# Patient Record
Sex: MALE | Race: WHITE | NOT HISPANIC OR LATINO | Employment: OTHER | URBAN - METROPOLITAN AREA
[De-identification: names, ages, dates, MRNs, and addresses within clinical notes are randomized per-mention and may not be internally consistent; named-entity substitution may affect disease eponyms.]

---

## 2018-11-07 ENCOUNTER — IMMUNIZATION (OUTPATIENT)
Dept: INTERNAL MEDICINE | Facility: CLINIC | Age: 73
End: 2018-11-07
Payer: MEDICARE

## 2018-11-07 PROCEDURE — 90662 IIV NO PRSV INCREASED AG IM: CPT | Mod: S$GLB,,, | Performed by: INTERNAL MEDICINE

## 2018-11-07 PROCEDURE — 99999 PR PBB SHADOW E&M-NEW PATIENT-LVL I: CPT | Mod: PBBFAC,,,

## 2018-11-07 PROCEDURE — 90471 IMMUNIZATION ADMIN: CPT | Mod: S$GLB,,, | Performed by: INTERNAL MEDICINE

## 2018-11-07 RX ORDER — BENAZEPRIL/HYDROCHLOROTHIAZIDE 20 MG-25MG
1 TABLET ORAL DAILY
COMMUNITY
End: 2019-04-11

## 2018-11-07 RX ORDER — PANTOPRAZOLE SODIUM 40 MG/1
40 TABLET, DELAYED RELEASE ORAL DAILY
COMMUNITY
End: 2021-04-30 | Stop reason: SDUPTHER

## 2018-11-07 RX ORDER — NAPROXEN SODIUM 220 MG/1
81 TABLET, FILM COATED ORAL DAILY
COMMUNITY

## 2018-11-07 RX ORDER — METOPROLOL TARTRATE 100 MG/1
50 TABLET ORAL 2 TIMES DAILY
COMMUNITY
End: 2021-03-17

## 2018-11-07 RX ORDER — CITALOPRAM 40 MG/1
40 TABLET, FILM COATED ORAL DAILY
COMMUNITY
End: 2019-04-11

## 2018-11-07 RX ORDER — AMLODIPINE BESYLATE 10 MG/1
10 TABLET ORAL DAILY
COMMUNITY
End: 2019-06-26

## 2018-11-07 RX ORDER — FUROSEMIDE 20 MG/1
20 TABLET ORAL 2 TIMES DAILY
COMMUNITY
End: 2019-04-11

## 2018-11-07 RX ORDER — ATORVASTATIN CALCIUM 20 MG/1
20 TABLET, FILM COATED ORAL NIGHTLY
COMMUNITY
End: 2021-04-30 | Stop reason: SDUPTHER

## 2019-04-11 ENCOUNTER — OFFICE VISIT (OUTPATIENT)
Dept: NEPHROLOGY | Facility: CLINIC | Age: 74
End: 2019-04-11
Payer: MEDICARE

## 2019-04-11 VITALS
HEIGHT: 72 IN | BODY MASS INDEX: 29.8 KG/M2 | DIASTOLIC BLOOD PRESSURE: 52 MMHG | HEART RATE: 64 BPM | WEIGHT: 220 LBS | SYSTOLIC BLOOD PRESSURE: 100 MMHG

## 2019-04-11 DIAGNOSIS — N18.30 CKD (CHRONIC KIDNEY DISEASE) STAGE 3, GFR 30-59 ML/MIN: Primary | ICD-10-CM

## 2019-04-11 PROCEDURE — 99999 PR PBB SHADOW E&M-EST. PATIENT-LVL III: CPT | Mod: PBBFAC,,, | Performed by: INTERNAL MEDICINE

## 2019-04-11 PROCEDURE — 99999 PR PBB SHADOW E&M-EST. PATIENT-LVL III: ICD-10-PCS | Mod: PBBFAC,,, | Performed by: INTERNAL MEDICINE

## 2019-04-11 PROCEDURE — 99205 PR OFFICE/OUTPT VISIT, NEW, LEVL V, 60-74 MIN: ICD-10-PCS | Mod: S$GLB,,, | Performed by: INTERNAL MEDICINE

## 2019-04-11 PROCEDURE — 99205 OFFICE O/P NEW HI 60 MIN: CPT | Mod: S$GLB,,, | Performed by: INTERNAL MEDICINE

## 2019-04-11 RX ORDER — AMOXICILLIN 500 MG
CAPSULE ORAL DAILY
COMMUNITY

## 2019-04-11 RX ORDER — LANOLIN ALCOHOL/MO/W.PET/CERES
400 CREAM (GRAM) TOPICAL DAILY
COMMUNITY

## 2019-04-11 RX ORDER — LOSARTAN POTASSIUM AND HYDROCHLOROTHIAZIDE 12.5; 1 MG/1; MG/1
1 TABLET ORAL DAILY
COMMUNITY
End: 2021-04-30 | Stop reason: SDUPTHER

## 2019-04-11 RX ORDER — OXYBUTYNIN CHLORIDE 5 MG/1
5 TABLET, EXTENDED RELEASE ORAL DAILY
COMMUNITY
End: 2021-04-30 | Stop reason: SDUPTHER

## 2019-04-11 RX ORDER — CHOLECALCIFEROL (VITAMIN D3) 50 MCG
2000 TABLET ORAL DAILY
COMMUNITY

## 2019-04-11 RX ORDER — DEXTROMETHORPHAN HYDROBROMIDE, GUAIFENESIN 5; 100 MG/5ML; MG/5ML
650 LIQUID ORAL EVERY 8 HOURS
COMMUNITY

## 2019-04-11 RX ORDER — METFORMIN HYDROCHLORIDE 500 MG/1
500 TABLET ORAL
COMMUNITY
End: 2021-04-30 | Stop reason: SDUPTHER

## 2019-04-11 RX ORDER — FLUOXETINE 10 MG/1
10 CAPSULE ORAL DAILY
COMMUNITY
End: 2021-02-01

## 2019-04-11 NOTE — PATIENT INSTRUCTIONS
Hydrate with 60-70 ounces of water per day.     Avoid NSAID pain medications such as advil, aleve, motrin, ibuprofen, naprosyn, meloxicam, diclofenac, mobic.

## 2019-04-12 NOTE — PROGRESS NOTES
NEPHROLOGY CONSULTATION    PHYSICIAN REQUESTING THE CONSULT: Dr. Vincent Boykin (Urology), PMD: Dr. Sj Petit    REASON FOR CONSULTATION: Renal insufficiency    73 y.o. male with history of renal cell carcinoma (s/p partial left nephrectomy), HTN, hyperlipidemia, GERD, right leg DVT, bacteremia, DM2  presents to the renal clinic for evaluation of renal insufficiency. A consultation has been requested by Dr. Vincent Boykin. Patient today presents to the clinic complaining of SOB with exercise, chronic right leg edema and hand paresthesia.  He denies any headaches, congenital hearing loss, chest pain, hemoptysis, abdominal or flank pain, diarrhea, nausea/vomiting, hematuria, dysuria, rashes, nasal congestion. Patient reports moderate NSAID use history. He had been on motrin at least once per month for years.Last use was many years ago.  Patient has a longstanding history of HTN that was diagnosed > 20 years ago. BP is currently well  controlled at 100/52. In addition, patient reports history of GERD (controlled with protonix), right leg DVT (> 10 years ago, likely due to immobility, he was on coumadin for six months and states that he developed mild chronic right leg edema afterwards), DM2 (he was diagnosed about a year ago and was placed on metformin in 11/2018), renal cell carcinoma (s/p partial left nephrectomy in 2006, currently being followed by Dr. Boykin). He denies any history of nephrolithiasis or heart disease. Patient denies any history of renal disease in his family. Laboratory review revealed that the patient's renal function has been mildly affected with creatinine of 1.9 on 1/23/14 and 1.8 on 3/5/19.     Past Medical History:   Diagnosis Date    DM2 (diabetes mellitus, type 2)     DVT, lower extremity     GERD (gastroesophageal reflux disease)     HTN (hypertension)     Renal cell carcinoma        Past Surgical History:   Procedure Laterality Date    left partial nephrectomy Left 2006          Social History     Socioeconomic History    Marital status:      Spouse name: Not on file    Number of children: Not on file    Years of education: Not on file    Highest education level: Not on file   Occupational History    Not on file   Social Needs    Financial resource strain: Not on file    Food insecurity:     Worry: Not on file     Inability: Not on file    Transportation needs:     Medical: Not on file     Non-medical: Not on file   Tobacco Use    Smoking status: Former Smoker     Types: Cigarettes     Last attempt to quit: 2005     Years since quittin.0   Substance and Sexual Activity    Alcohol use: Not Currently    Drug use: Not on file    Sexual activity: Not on file   Lifestyle    Physical activity:     Days per week: Not on file     Minutes per session: Not on file    Stress: Not on file   Relationships    Social connections:     Talks on phone: Not on file     Gets together: Not on file     Attends Buddhism service: Not on file     Active member of club or organization: Not on file     Attends meetings of clubs or organizations: Not on file     Relationship status: Not on file   Other Topics Concern    Not on file   Social History Narrative    Not on file     History reviewed. No pertinent family history.      Review of patient's allergies indicates:  No Known Allergies        Current Outpatient Medications   Medication Sig Dispense Refill    acetaminophen (TYLENOL) 650 MG TbSR Take 650 mg by mouth every 8 (eight) hours.      amLODIPine (NORVASC) 10 MG tablet Take 10 mg by mouth once daily.      aspirin 81 MG Chew Take 81 mg by mouth once daily.      atorvastatin (LIPITOR) 20 MG tablet Take 20 mg by mouth once daily.      cholecalciferol, vitamin D3, (VITAMIN D3) 2,000 unit Tab Take by mouth once daily.      fish oil-omega-3 fatty acids 300-1,000 mg capsule Take by mouth once daily.      FLUoxetine 10 MG capsule Take 10 mg by mouth once daily.       "losartan-hydrochlorothiazide 100-25 mg (HYZAAR) 100-25 mg per tablet Take 1 tablet by mouth once daily.      magnesium oxide (MAG-OX) 400 mg (241.3 mg magnesium) tablet Take 400 mg by mouth once daily.      metFORMIN (GLUCOPHAGE) 500 MG tablet Take 500 mg by mouth daily with breakfast.      metoprolol tartrate (LOPRESSOR) 100 MG tablet Take 50 mg by mouth 2 (two) times daily.       oxybutynin (DITROPAN-XL) 5 MG TR24 Take 5 mg by mouth once daily.      pantoprazole (PROTONIX) 40 MG tablet Take 40 mg by mouth once daily.       No current facility-administered medications for this visit.          Review of Systems:  1. GENERAL: patient denies any fever, weight changes, generalized weakness, dizziness.  2. HEENT: patient denies headaches, visual disturbances, swallowing problems, sinus pain, nasal congestion.  3. CARDIOVASCULAR: patient denies chest pain, palpitations.  4. PULMONARY: patient reports SOB with exercise, no coughing, hemoptysis, wheezing.  5. GASTROINTESTINAL: patient denies abdominal pain, nausea, vomiting, diarrhea.  6. GENITOURINARY: patient denies dysuria, hematuria, hesitancy, frequency.  7. EXTREMITIES: patient reports mild chronic right LE edema, no LE cramping.  8. DERMATOLOGY: patient denies rashes, ulcers.  9. NEURO: patient denies tremors, extremity weakness,  patient reports  extremity numbness/tingling.  10. MUSCULOSKELETAL: patient denies joint pain, joint swelling.  11. HEMATOLOGY: patient denies rectal or gum bleeding.  12: PSYCH: patient denies anxiety, depression.      PHYSICAL EXAM:    BP (!) 100/52   Pulse 64   Ht 5' 11.5" (1.816 m)   Wt 99.8 kg (220 lb 0.3 oz)   BMI 30.26 kg/m²     GENERAL: Pleasant gentleman presents to clinic with non-labored breathing.  HEENT: PER, no nasal discharge, no icterus, no oral exudates, moist mucosal membranes.  NECK: no thyroid mass, no lymphadenopathy.  HEART: RRR S1/S2, no rubs, good peripheral pulses.  LUNGS: CTA bilaterally, no wheezing, " breathing is nonlabored.  ABDOMEN: soft, nontender, not distended, bowel sounds are present, no abdominal hernia.  EXTREM: mild right LE edema (Chronic as per patient)  SKIN: no rashes, skin is warm and dry.  NEURO: A & O x 3, no obvious focal signs.    LABORATORY RESULTS:    OUTSIDE LABS from 3/5/19:    Na-143, K-5.2 CO2-29, BUN-44 Cr-1.8, Ca-9.6, Glu-117    Cr from 1/23/14: 1.9    Urinalysis: no protein, no blood.    Renal US from 3/5/19: no hydronephrosis.       ASSESSMENT AND PLAN:  73 y.o. male with history of renal cell carcinoma (s/p partial left nephrectomy), HTN, hyperlipidemia, GERD, right leg DVT, bacteremia, DM2  presents to the renal clinic for evaluation of renal insufficiency.    1. Renal insufficiency: Patient presents with mild renal insufficiency, consistent with CKD stage 3 (last creatinine was 1.8, eGFR-39). There is no proteinuria or hematuria as per urinalysis.  Cause of his renal insufficiency is not entirely clear but past NSAID use and recently diagnosed DM2 are in the differential. Furthermore, partial nephrectomy frequently leads to creatinine elevation. Patient's renal function will be monitored closely and he will return to the clinic in 1 month for follow up. I will order a renal panel, CBC, urinalysis, protein creatinine ratio, PTH prior to his return visit.     2. Electrolytes: Borderline hyperkalemia. Monitor for now. Continue Losartan for now.     3. Acid base status: No acute issues.    4. Volume: Mild right LE edema. This is a chronic finding a developed after right LE DVT.    5. Hypertension: Good BP control.     6. Medications: Reviewed. Agree with current medical regimen.     7. Diabetes mellitus: Will check HgA1c. Metformin use OK for now.         Thank you very much for this consult. Please see my note in Epic for recommendations.    Total time spent was about 45 min. 50 % or more of time was spent on counseling patient about treatment options and educating patient about  disease etiology. Complex case. Level 5 consult.

## 2019-05-13 ENCOUNTER — LAB VISIT (OUTPATIENT)
Dept: LAB | Facility: HOSPITAL | Age: 74
End: 2019-05-13
Payer: MEDICARE

## 2019-05-13 DIAGNOSIS — N18.30 CKD (CHRONIC KIDNEY DISEASE) STAGE 3, GFR 30-59 ML/MIN: ICD-10-CM

## 2019-05-13 LAB
ALBUMIN SERPL BCP-MCNC: 3.8 G/DL (ref 3.5–5.2)
ANION GAP SERPL CALC-SCNC: 8 MMOL/L (ref 8–16)
BASOPHILS # BLD AUTO: 0.05 K/UL (ref 0–0.2)
BASOPHILS NFR BLD: 0.7 % (ref 0–1.9)
BUN SERPL-MCNC: 44 MG/DL (ref 8–23)
CALCIUM SERPL-MCNC: 9.2 MG/DL (ref 8.7–10.5)
CHLORIDE SERPL-SCNC: 104 MMOL/L (ref 95–110)
CO2 SERPL-SCNC: 27 MMOL/L (ref 23–29)
CREAT SERPL-MCNC: 1.4 MG/DL (ref 0.5–1.4)
DIFFERENTIAL METHOD: ABNORMAL
EOSINOPHIL # BLD AUTO: 0.2 K/UL (ref 0–0.5)
EOSINOPHIL NFR BLD: 2.8 % (ref 0–8)
ERYTHROCYTE [DISTWIDTH] IN BLOOD BY AUTOMATED COUNT: 13.2 % (ref 11.5–14.5)
EST. GFR  (AFRICAN AMERICAN): 56.4 ML/MIN/1.73 M^2
EST. GFR  (NON AFRICAN AMERICAN): 48.8 ML/MIN/1.73 M^2
ESTIMATED AVG GLUCOSE: 111 MG/DL (ref 68–131)
GLUCOSE SERPL-MCNC: 129 MG/DL (ref 70–110)
HBA1C MFR BLD HPLC: 5.5 % (ref 4–5.6)
HCT VFR BLD AUTO: 40.9 % (ref 40–54)
HGB BLD-MCNC: 13.5 G/DL (ref 14–18)
IMM GRANULOCYTES # BLD AUTO: 0.03 K/UL (ref 0–0.04)
IMM GRANULOCYTES NFR BLD AUTO: 0.4 % (ref 0–0.5)
LYMPHOCYTES # BLD AUTO: 1.6 K/UL (ref 1–4.8)
LYMPHOCYTES NFR BLD: 23.2 % (ref 18–48)
MCH RBC QN AUTO: 29.7 PG (ref 27–31)
MCHC RBC AUTO-ENTMCNC: 33 G/DL (ref 32–36)
MCV RBC AUTO: 90 FL (ref 82–98)
MONOCYTES # BLD AUTO: 0.6 K/UL (ref 0.3–1)
MONOCYTES NFR BLD: 8.6 % (ref 4–15)
NEUTROPHILS # BLD AUTO: 4.3 K/UL (ref 1.8–7.7)
NEUTROPHILS NFR BLD: 64.3 % (ref 38–73)
NRBC BLD-RTO: 0 /100 WBC
PHOSPHATE SERPL-MCNC: 4.3 MG/DL (ref 2.7–4.5)
PLATELET # BLD AUTO: 149 K/UL (ref 150–350)
PMV BLD AUTO: 12.1 FL (ref 9.2–12.9)
POTASSIUM SERPL-SCNC: 4.2 MMOL/L (ref 3.5–5.1)
PTH-INTACT SERPL-MCNC: 71 PG/ML (ref 9–77)
RBC # BLD AUTO: 4.55 M/UL (ref 4.6–6.2)
SODIUM SERPL-SCNC: 139 MMOL/L (ref 136–145)
WBC # BLD AUTO: 6.71 K/UL (ref 3.9–12.7)

## 2019-05-13 PROCEDURE — 83036 HEMOGLOBIN GLYCOSYLATED A1C: CPT

## 2019-05-13 PROCEDURE — 83970 ASSAY OF PARATHORMONE: CPT

## 2019-05-13 PROCEDURE — 85025 COMPLETE CBC W/AUTO DIFF WBC: CPT

## 2019-05-13 PROCEDURE — 80069 RENAL FUNCTION PANEL: CPT

## 2019-05-13 PROCEDURE — 36415 COLL VENOUS BLD VENIPUNCTURE: CPT | Mod: PO

## 2019-06-21 ENCOUNTER — TELEPHONE (OUTPATIENT)
Dept: NEPHROLOGY | Facility: CLINIC | Age: 74
End: 2019-06-21

## 2019-06-21 NOTE — TELEPHONE ENCOUNTER
----- Message from Hari Hernández sent at 6/21/2019  2:53 PM CDT -----  Contact: [phone] or [phone]  .Type:  Patient Returning Call    Who Called:Daniel Villeda  Who Left Message for Patient:unsure  Does the patient know what this is regarding?:unsure  Would the patient rather a call back or a response via [facility]? Call back   Best Call Back Number:[phone] or [phone]  Additional Information:

## 2019-06-26 ENCOUNTER — OFFICE VISIT (OUTPATIENT)
Dept: NEPHROLOGY | Facility: CLINIC | Age: 74
End: 2019-06-26
Payer: MEDICARE

## 2019-06-26 VITALS
SYSTOLIC BLOOD PRESSURE: 90 MMHG | BODY MASS INDEX: 29.68 KG/M2 | HEIGHT: 72 IN | WEIGHT: 219.13 LBS | HEART RATE: 62 BPM | DIASTOLIC BLOOD PRESSURE: 58 MMHG

## 2019-06-26 DIAGNOSIS — I10 BENIGN ESSENTIAL HTN: ICD-10-CM

## 2019-06-26 DIAGNOSIS — N18.30 CKD (CHRONIC KIDNEY DISEASE) STAGE 3, GFR 30-59 ML/MIN: Primary | ICD-10-CM

## 2019-06-26 PROCEDURE — 99214 PR OFFICE/OUTPT VISIT, EST, LEVL IV, 30-39 MIN: ICD-10-PCS | Mod: S$GLB,,, | Performed by: INTERNAL MEDICINE

## 2019-06-26 PROCEDURE — 99999 PR PBB SHADOW E&M-EST. PATIENT-LVL III: CPT | Mod: PBBFAC,,, | Performed by: INTERNAL MEDICINE

## 2019-06-26 PROCEDURE — 99214 OFFICE O/P EST MOD 30 MIN: CPT | Mod: S$GLB,,, | Performed by: INTERNAL MEDICINE

## 2019-06-26 PROCEDURE — 99999 PR PBB SHADOW E&M-EST. PATIENT-LVL III: ICD-10-PCS | Mod: PBBFAC,,, | Performed by: INTERNAL MEDICINE

## 2019-06-26 NOTE — PATIENT INSTRUCTIONS
Stop amlodipine       Check BP at home     Let us know if SBP ( top BP) is highre than 140 then we can restart amlodipine at a lower dose

## 2019-06-26 NOTE — PROGRESS NOTES
Subjective:       Patient ID: Daniel Villeda is a 73 y.o. White male who presents for follow-up evaluation of Chronic Kidney Disease; Follow-up; and Hypertension    HPI 73 y.o. male with history of renal cell carcinoma (s/p partial left nephrectomy), HTN, hyperlipidemia, GERD, right leg DVT, bacteremia, DM2  presents to the renal clinic for evaluation of renal insufficiency. A consultation has been requested by Dr. Vincent Boykin. Patient today presents to the clinic complaining of SOB with exercise, chronic right leg edema and hand paresthesia.  He denies any headaches, congenital hearing loss, chest pain, hemoptysis, abdominal or flank pain, diarrhea, nausea/vomiting, hematuria, dysuria, rashes, nasal congestion. Patient reports moderate NSAID use history. He had been on motrin at least once per month for years.Last use was many years ago.  Patient has a longstanding history of HTN that was diagnosed > 20 years ago. BP is currently well  controlled at 100/52. In addition, patient reports history of GERD (controlled with protonix), right leg DVT (> 10 years ago, likely due to immobility, he was on coumadin for six months and states that he developed mild chronic right leg edema afterwards), DM2 (he was diagnosed about a year ago and was placed on metformin in 11/2018), renal cell carcinoma (s/p partial left nephrectomy in 2006, currently being followed by Dr. Boykin). He denies any history of nephrolithiasis or heart disease. Patient denies any history of renal disease in his family. Laboratory review revealed that the patient's renal function has been mildly affected with creatinine of 1.9 on 1/23/14 and 1.8 on 3/5/19.        June 2019 patient seen for follow-up as a new patient to me.  Creatinine down to 1.4.  Blood pressure is down to 90 systolic.  Amlodipine stopped.        Review of Systems   Constitutional: Negative.  Negative for activity change, appetite change, chills, diaphoresis, fatigue and fever.  "  HENT: Negative.  Negative for congestion and trouble swallowing.    Eyes: Negative.    Respiratory: Negative.  Negative for cough, chest tightness, shortness of breath and wheezing.    Cardiovascular: Negative.  Negative for chest pain, palpitations and leg swelling.   Gastrointestinal: Negative.  Negative for abdominal distention, abdominal pain, nausea and vomiting.   Genitourinary: Negative.  Negative for decreased urine volume, difficulty urinating, dysuria, enuresis, flank pain, frequency, hematuria, penile swelling, scrotal swelling and urgency.   Musculoskeletal: Negative.  Negative for arthralgias, back pain, joint swelling and neck pain.   Skin: Negative for rash.   Neurological: Negative.  Negative for tremors, seizures and headaches.   Psychiatric/Behavioral: Negative.  Negative for confusion and sleep disturbance. The patient is not nervous/anxious.    All other systems reviewed and are negative.      Objective:   BP (!) 90/58   Pulse 62   Ht 5' 11.5" (1.816 m)   Wt 99.4 kg (219 lb 2.2 oz)   BMI 30.14 kg/m²      Physical Exam   Constitutional: He is oriented to person, place, and time. He appears well-developed and well-nourished. No distress.   HENT:   Head: Normocephalic.   Eyes: Pupils are equal, round, and reactive to light. EOM are normal.   Neck: Normal range of motion. Neck supple. No JVD present. No thyromegaly present.   Cardiovascular: Normal rate, regular rhythm, S1 normal, S2 normal, normal heart sounds and intact distal pulses. PMI is not displaced. Exam reveals no gallop and no friction rub.   No murmur heard.  Pulmonary/Chest: Effort normal and breath sounds normal. No respiratory distress. He has no wheezes. He has no rales. He exhibits no tenderness.   Abdominal: He exhibits no distension and no mass. There is no hepatosplenomegaly. There is no tenderness. There is no rebound and no CVA tenderness. No hernia.   Musculoskeletal: Normal range of motion. He exhibits no edema or " tenderness.   Lymphadenopathy:     He has no cervical adenopathy.   Neurological: He is alert and oriented to person, place, and time. He has normal reflexes. He is not disoriented. He displays normal reflexes. No cranial nerve deficit. He exhibits normal muscle tone. Coordination normal.   Skin: Skin is warm and dry. Capillary refill takes less than 2 seconds. No rash noted. No erythema.   Psychiatric: He has a normal mood and affect. His behavior is normal. Judgment and thought content normal.   Nursing note and vitals reviewed.        Lab Results   Component Value Date    CREATININE 1.4 05/13/2019    BUN 44 (H) 05/13/2019     05/13/2019    K 4.2 05/13/2019     05/13/2019    CO2 27 05/13/2019     Lab Results   Component Value Date    WBC 6.71 05/13/2019    HGB 13.5 (L) 05/13/2019    HCT 40.9 05/13/2019    MCV 90 05/13/2019     (L) 05/13/2019     Lab Results   Component Value Date    PTH 71.0 05/13/2019    CALCIUM 9.2 05/13/2019    PHOS 4.3 05/13/2019     @RESUFAST(URICACID)    Assessment:    )    1. CKD (chronic kidney disease) stage 3, GFR 30-59 ml/min    2. Benign essential HTN        Plan:           1.  Chronic kidney disease stable.  Recheck GFR on follow-up with cystatin C.  Avoid Baclofen.  Avoid nonsteroidals.  Adequate dehydration discussed in detail with the patient.    2.  Essential hypertension blood pressure is running low.  Check blood pressure at home.  Stop amlodipine for now.        Follow-up in 4 months with Dr. Phoenix

## 2021-02-01 ENCOUNTER — HOSPITAL ENCOUNTER (OUTPATIENT)
Dept: RADIOLOGY | Facility: HOSPITAL | Age: 76
Discharge: HOME | End: 2021-02-01
Attending: INTERNAL MEDICINE
Payer: MEDICARE

## 2021-02-01 ENCOUNTER — OFFICE VISIT (OUTPATIENT)
Dept: GASTROENTEROLOGY | Facility: CLINIC | Age: 76
End: 2021-02-01
Payer: MEDICARE

## 2021-02-01 VITALS
BODY MASS INDEX: 33.06 KG/M2 | HEIGHT: 72 IN | DIASTOLIC BLOOD PRESSURE: 90 MMHG | SYSTOLIC BLOOD PRESSURE: 156 MMHG | HEART RATE: 66 BPM | WEIGHT: 244.06 LBS

## 2021-02-01 DIAGNOSIS — R14.0 ABDOMINAL DISTENSION: ICD-10-CM

## 2021-02-01 DIAGNOSIS — K59.09 CHRONIC CONSTIPATION: ICD-10-CM

## 2021-02-01 DIAGNOSIS — K59.04 CHRONIC IDIOPATHIC CONSTIPATION: ICD-10-CM

## 2021-02-01 DIAGNOSIS — R10.84 GENERALIZED ABDOMINAL PAIN: ICD-10-CM

## 2021-02-01 DIAGNOSIS — R10.84 GENERALIZED ABDOMINAL PAIN: Primary | ICD-10-CM

## 2021-02-01 DIAGNOSIS — R14.0 ABDOMINAL DISTENSION (GASEOUS): ICD-10-CM

## 2021-02-01 DIAGNOSIS — K31.89 GASTRIC MASS: Primary | ICD-10-CM

## 2021-02-01 PROCEDURE — 74176 CT ABD & PELVIS W/O CONTRAST: CPT | Mod: TC

## 2021-02-01 PROCEDURE — 99203 OFFICE O/P NEW LOW 30 MIN: CPT | Mod: S$GLB,,, | Performed by: INTERNAL MEDICINE

## 2021-02-01 PROCEDURE — A9698 NON-RAD CONTRAST MATERIALNOC: HCPCS | Performed by: INTERNAL MEDICINE

## 2021-02-01 PROCEDURE — 1125F PR PAIN SEVERITY QUANTIFIED, PAIN PRESENT: ICD-10-PCS | Mod: S$GLB,,, | Performed by: INTERNAL MEDICINE

## 2021-02-01 PROCEDURE — 1101F PT FALLS ASSESS-DOCD LE1/YR: CPT | Mod: CPTII,S$GLB,, | Performed by: INTERNAL MEDICINE

## 2021-02-01 PROCEDURE — 74019 XR ABDOMEN FLAT AND ERECT: ICD-10-PCS | Mod: 26,,, | Performed by: RADIOLOGY

## 2021-02-01 PROCEDURE — 3288F FALL RISK ASSESSMENT DOCD: CPT | Mod: CPTII,S$GLB,, | Performed by: INTERNAL MEDICINE

## 2021-02-01 PROCEDURE — 99999 PR PBB SHADOW E&M-EST. PATIENT-LVL IV: CPT | Mod: PBBFAC,,, | Performed by: INTERNAL MEDICINE

## 2021-02-01 PROCEDURE — 99203 PR OFFICE/OUTPT VISIT, NEW, LEVL III, 30-44 MIN: ICD-10-PCS | Mod: S$GLB,,, | Performed by: INTERNAL MEDICINE

## 2021-02-01 PROCEDURE — 3288F PR FALLS RISK ASSESSMENT DOCUMENTED: ICD-10-PCS | Mod: CPTII,S$GLB,, | Performed by: INTERNAL MEDICINE

## 2021-02-01 PROCEDURE — 1159F MED LIST DOCD IN RCRD: CPT | Mod: S$GLB,,, | Performed by: INTERNAL MEDICINE

## 2021-02-01 PROCEDURE — 25500020 PHARM REV CODE 255: Performed by: INTERNAL MEDICINE

## 2021-02-01 PROCEDURE — 99999 PR PBB SHADOW E&M-EST. PATIENT-LVL IV: ICD-10-PCS | Mod: PBBFAC,,, | Performed by: INTERNAL MEDICINE

## 2021-02-01 PROCEDURE — 1101F PR PT FALLS ASSESS DOC 0-1 FALLS W/OUT INJ PAST YR: ICD-10-PCS | Mod: CPTII,S$GLB,, | Performed by: INTERNAL MEDICINE

## 2021-02-01 PROCEDURE — 74019 RADEX ABDOMEN 2 VIEWS: CPT | Mod: TC

## 2021-02-01 PROCEDURE — 3077F SYST BP >= 140 MM HG: CPT | Mod: CPTII,S$GLB,, | Performed by: INTERNAL MEDICINE

## 2021-02-01 PROCEDURE — 1159F PR MEDICATION LIST DOCUMENTED IN MEDICAL RECORD: ICD-10-PCS | Mod: S$GLB,,, | Performed by: INTERNAL MEDICINE

## 2021-02-01 PROCEDURE — 3080F DIAST BP >= 90 MM HG: CPT | Mod: CPTII,S$GLB,, | Performed by: INTERNAL MEDICINE

## 2021-02-01 PROCEDURE — 3080F PR MOST RECENT DIASTOLIC BLOOD PRESSURE >= 90 MM HG: ICD-10-PCS | Mod: CPTII,S$GLB,, | Performed by: INTERNAL MEDICINE

## 2021-02-01 PROCEDURE — 3077F PR MOST RECENT SYSTOLIC BLOOD PRESSURE >= 140 MM HG: ICD-10-PCS | Mod: CPTII,S$GLB,, | Performed by: INTERNAL MEDICINE

## 2021-02-01 PROCEDURE — 1125F AMNT PAIN NOTED PAIN PRSNT: CPT | Mod: S$GLB,,, | Performed by: INTERNAL MEDICINE

## 2021-02-01 PROCEDURE — 74019 RADEX ABDOMEN 2 VIEWS: CPT | Mod: 26,,, | Performed by: RADIOLOGY

## 2021-02-01 RX ORDER — SODIUM, POTASSIUM,MAG SULFATES 17.5-3.13G
1 SOLUTION, RECONSTITUTED, ORAL ORAL DAILY
Qty: 1 KIT | Refills: 0 | Status: SHIPPED | OUTPATIENT
Start: 2021-02-01 | End: 2021-02-03

## 2021-02-01 RX ADMIN — IOHEXOL 1000 ML: 9 SOLUTION ORAL at 04:02

## 2021-02-02 ENCOUNTER — TELEPHONE (OUTPATIENT)
Dept: GASTROENTEROLOGY | Facility: CLINIC | Age: 76
End: 2021-02-02

## 2021-02-03 ENCOUNTER — ANESTHESIA (OUTPATIENT)
Dept: ENDOSCOPY | Facility: HOSPITAL | Age: 76
End: 2021-02-03
Payer: MEDICARE

## 2021-02-03 ENCOUNTER — HOSPITAL ENCOUNTER (OUTPATIENT)
Facility: HOSPITAL | Age: 76
Discharge: HOME | End: 2021-02-03
Attending: INTERNAL MEDICINE | Admitting: INTERNAL MEDICINE
Payer: MEDICARE

## 2021-02-03 ENCOUNTER — ANESTHESIA EVENT (OUTPATIENT)
Dept: ENDOSCOPY | Facility: HOSPITAL | Age: 76
End: 2021-02-03
Payer: MEDICARE

## 2021-02-03 VITALS
SYSTOLIC BLOOD PRESSURE: 131 MMHG | HEART RATE: 65 BPM | OXYGEN SATURATION: 95 % | HEIGHT: 72 IN | RESPIRATION RATE: 18 BRPM | DIASTOLIC BLOOD PRESSURE: 84 MMHG | TEMPERATURE: 98 F | WEIGHT: 241.88 LBS | BODY MASS INDEX: 32.76 KG/M2

## 2021-02-03 DIAGNOSIS — K31.89 GASTRIC MASS: Primary | ICD-10-CM

## 2021-02-03 DIAGNOSIS — R93.3 ABNORMAL FINDING ON GI TRACT IMAGING: ICD-10-CM

## 2021-02-03 LAB — SARS-COV-2 RDRP RESP QL NAA+PROBE: NEGATIVE

## 2021-02-03 PROCEDURE — 25000003 PHARM REV CODE 250: Performed by: NURSE ANESTHETIST, CERTIFIED REGISTERED

## 2021-02-03 PROCEDURE — 43235 EGD DIAGNOSTIC BRUSH WASH: CPT | Mod: ,,, | Performed by: INTERNAL MEDICINE

## 2021-02-03 PROCEDURE — 63600175 PHARM REV CODE 636 W HCPCS: Performed by: NURSE ANESTHETIST, CERTIFIED REGISTERED

## 2021-02-03 PROCEDURE — 37000008 HC ANESTHESIA 1ST 15 MINUTES: Performed by: INTERNAL MEDICINE

## 2021-02-03 PROCEDURE — 43235 PR EGD, FLEX, DIAGNOSTIC: ICD-10-PCS | Mod: ,,, | Performed by: INTERNAL MEDICINE

## 2021-02-03 PROCEDURE — 63600175 PHARM REV CODE 636 W HCPCS: Performed by: INTERNAL MEDICINE

## 2021-02-03 PROCEDURE — 43235 EGD DIAGNOSTIC BRUSH WASH: CPT | Performed by: INTERNAL MEDICINE

## 2021-02-03 PROCEDURE — U0002 COVID-19 LAB TEST NON-CDC: HCPCS

## 2021-02-03 RX ORDER — PROPOFOL 10 MG/ML
VIAL (ML) INTRAVENOUS
Status: DISCONTINUED | OUTPATIENT
Start: 2021-02-03 | End: 2021-02-03

## 2021-02-03 RX ORDER — LIDOCAINE HYDROCHLORIDE 10 MG/ML
INJECTION, SOLUTION EPIDURAL; INFILTRATION; INTRACAUDAL; PERINEURAL
Status: DISCONTINUED | OUTPATIENT
Start: 2021-02-03 | End: 2021-02-03

## 2021-02-03 RX ORDER — SODIUM CHLORIDE 0.9 % (FLUSH) 0.9 %
10 SYRINGE (ML) INJECTION
Status: DISCONTINUED | OUTPATIENT
Start: 2021-02-03 | End: 2021-02-03 | Stop reason: HOSPADM

## 2021-02-03 RX ORDER — SODIUM CHLORIDE, SODIUM LACTATE, POTASSIUM CHLORIDE, CALCIUM CHLORIDE 600; 310; 30; 20 MG/100ML; MG/100ML; MG/100ML; MG/100ML
INJECTION, SOLUTION INTRAVENOUS CONTINUOUS
Status: DISCONTINUED | OUTPATIENT
Start: 2021-02-03 | End: 2021-02-03 | Stop reason: HOSPADM

## 2021-02-03 RX ADMIN — LIDOCAINE HYDROCHLORIDE 50 MG: 10 INJECTION, SOLUTION EPIDURAL; INFILTRATION; INTRACAUDAL; PERINEURAL at 09:02

## 2021-02-03 RX ADMIN — SODIUM CHLORIDE, SODIUM LACTATE, POTASSIUM CHLORIDE, AND CALCIUM CHLORIDE: 600; 310; 30; 20 INJECTION, SOLUTION INTRAVENOUS at 09:02

## 2021-02-03 RX ADMIN — PROPOFOL 100 MG: 10 INJECTION, EMULSION INTRAVENOUS at 09:02

## 2021-02-04 ENCOUNTER — TELEPHONE (OUTPATIENT)
Dept: HEPATOLOGY | Facility: CLINIC | Age: 76
End: 2021-02-04

## 2021-02-04 ENCOUNTER — TELEPHONE (OUTPATIENT)
Dept: ENDOSCOPY | Facility: HOSPITAL | Age: 76
End: 2021-02-04

## 2021-02-04 LAB — POCT GLUCOSE: 144 MG/DL (ref 70–110)

## 2021-02-05 ENCOUNTER — HOSPITAL ENCOUNTER (OUTPATIENT)
Facility: HOSPITAL | Age: 76
Discharge: HOME | End: 2021-02-05
Attending: INTERNAL MEDICINE | Admitting: INTERNAL MEDICINE
Payer: MEDICARE

## 2021-02-05 ENCOUNTER — ANESTHESIA (OUTPATIENT)
Dept: ENDOSCOPY | Facility: HOSPITAL | Age: 76
End: 2021-02-05
Payer: MEDICARE

## 2021-02-05 ENCOUNTER — ANESTHESIA EVENT (OUTPATIENT)
Dept: ENDOSCOPY | Facility: HOSPITAL | Age: 76
End: 2021-02-05
Payer: MEDICARE

## 2021-02-05 VITALS
DIASTOLIC BLOOD PRESSURE: 88 MMHG | RESPIRATION RATE: 16 BRPM | SYSTOLIC BLOOD PRESSURE: 140 MMHG | TEMPERATURE: 98 F | OXYGEN SATURATION: 98 % | HEART RATE: 74 BPM | HEIGHT: 71 IN | WEIGHT: 240 LBS | BODY MASS INDEX: 33.6 KG/M2

## 2021-02-05 DIAGNOSIS — K31.89 GASTRIC MASS: Primary | ICD-10-CM

## 2021-02-05 PROCEDURE — 43242 EGD US FINE NEEDLE BX/ASPIR: CPT | Mod: ,,, | Performed by: INTERNAL MEDICINE

## 2021-02-05 PROCEDURE — 88342 IMHCHEM/IMCYTCHM 1ST ANTB: CPT | Mod: 91 | Performed by: PATHOLOGY

## 2021-02-05 PROCEDURE — 43242 PR UPGI ENDOSCOPY,FN NEEDLE BX,GUIDED: ICD-10-PCS | Mod: ,,, | Performed by: INTERNAL MEDICINE

## 2021-02-05 PROCEDURE — 88305 TISSUE EXAM BY PATHOLOGIST: CPT | Mod: 26,,, | Performed by: PATHOLOGY

## 2021-02-05 PROCEDURE — 88173 PR  INTERPRETATION OF FNA SMEAR: ICD-10-PCS | Mod: 26,,, | Performed by: PATHOLOGY

## 2021-02-05 PROCEDURE — 88173 CYTOPATH EVAL FNA REPORT: CPT | Mod: 26,,, | Performed by: PATHOLOGY

## 2021-02-05 PROCEDURE — 88305 TISSUE EXAM BY PATHOLOGIST: CPT | Performed by: PATHOLOGY

## 2021-02-05 PROCEDURE — 88341 IMHCHEM/IMCYTCHM EA ADD ANTB: CPT | Performed by: PATHOLOGY

## 2021-02-05 PROCEDURE — 88173 CYTOPATH EVAL FNA REPORT: CPT | Mod: 59 | Performed by: PATHOLOGY

## 2021-02-05 PROCEDURE — 88341 IMHCHEM/IMCYTCHM EA ADD ANTB: CPT | Mod: 26,,, | Performed by: PATHOLOGY

## 2021-02-05 PROCEDURE — 88342 IMHCHEM/IMCYTCHM 1ST ANTB: CPT | Performed by: PATHOLOGY

## 2021-02-05 PROCEDURE — 88342 CHG IMMUNOCYTOCHEMISTRY: ICD-10-PCS | Mod: 26,,, | Performed by: PATHOLOGY

## 2021-02-05 PROCEDURE — 63600175 PHARM REV CODE 636 W HCPCS: Performed by: NURSE ANESTHETIST, CERTIFIED REGISTERED

## 2021-02-05 PROCEDURE — 88342 IMHCHEM/IMCYTCHM 1ST ANTB: CPT | Mod: 26,,, | Performed by: PATHOLOGY

## 2021-02-05 PROCEDURE — 27202131 HC NEEDLE, FNB SINGLE (ANY): Performed by: INTERNAL MEDICINE

## 2021-02-05 PROCEDURE — 37000008 HC ANESTHESIA 1ST 15 MINUTES: Performed by: INTERNAL MEDICINE

## 2021-02-05 PROCEDURE — 37000009 HC ANESTHESIA EA ADD 15 MINS: Performed by: INTERNAL MEDICINE

## 2021-02-05 PROCEDURE — 43242 EGD US FINE NEEDLE BX/ASPIR: CPT | Performed by: INTERNAL MEDICINE

## 2021-02-05 PROCEDURE — 25000003 PHARM REV CODE 250: Performed by: NURSE ANESTHETIST, CERTIFIED REGISTERED

## 2021-02-05 PROCEDURE — 88341 IMHCHEM/IMCYTCHM EA ADD ANTB: CPT | Mod: 59 | Performed by: PATHOLOGY

## 2021-02-05 PROCEDURE — 88305 TISSUE EXAM BY PATHOLOGIST: ICD-10-PCS | Mod: 26,,, | Performed by: PATHOLOGY

## 2021-02-05 PROCEDURE — 88341 PR IHC OR ICC EACH ADD'L SINGLE ANTIBODY  STAINPR: ICD-10-PCS | Mod: 26,,, | Performed by: PATHOLOGY

## 2021-02-05 RX ORDER — FENTANYL CITRATE 50 UG/ML
INJECTION, SOLUTION INTRAMUSCULAR; INTRAVENOUS
Status: DISCONTINUED | OUTPATIENT
Start: 2021-02-05 | End: 2021-02-05

## 2021-02-05 RX ORDER — PROPOFOL 10 MG/ML
VIAL (ML) INTRAVENOUS
Status: DISCONTINUED | OUTPATIENT
Start: 2021-02-05 | End: 2021-02-05

## 2021-02-05 RX ORDER — TRAMADOL HYDROCHLORIDE 50 MG/1
50 TABLET ORAL EVERY 6 HOURS PRN
Qty: 30 TABLET | Refills: 0 | Status: SHIPPED | OUTPATIENT
Start: 2021-02-05 | End: 2021-04-30

## 2021-02-05 RX ORDER — LIDOCAINE HYDROCHLORIDE 10 MG/ML
INJECTION, SOLUTION EPIDURAL; INFILTRATION; INTRACAUDAL; PERINEURAL
Status: DISCONTINUED | OUTPATIENT
Start: 2021-02-05 | End: 2021-02-05

## 2021-02-05 RX ORDER — SODIUM CHLORIDE 0.9 % (FLUSH) 0.9 %
10 SYRINGE (ML) INJECTION
Status: DISCONTINUED | OUTPATIENT
Start: 2021-02-05 | End: 2021-02-05 | Stop reason: HOSPADM

## 2021-02-05 RX ADMIN — LIDOCAINE HYDROCHLORIDE 100 MG: 10 INJECTION, SOLUTION EPIDURAL; INFILTRATION; INTRACAUDAL; PERINEURAL at 02:02

## 2021-02-05 RX ADMIN — FENTANYL CITRATE 50 MCG: 50 INJECTION, SOLUTION INTRAMUSCULAR; INTRAVENOUS at 02:02

## 2021-02-05 RX ADMIN — PROPOFOL 75 MCG/KG/MIN: 10 INJECTION, EMULSION INTRAVENOUS at 02:02

## 2021-02-05 RX ADMIN — GLYCOPYRROLATE 0.2 MG: 0.2 INJECTION, SOLUTION INTRAMUSCULAR; INTRAVITREAL at 02:02

## 2021-02-05 RX ADMIN — PROPOFOL 70 MG: 10 INJECTION, EMULSION INTRAVENOUS at 02:02

## 2021-02-05 RX ADMIN — FENTANYL CITRATE 25 MCG: 50 INJECTION, SOLUTION INTRAMUSCULAR; INTRAVENOUS at 02:02

## 2021-02-08 LAB — POCT GLUCOSE: 134 MG/DL (ref 70–110)

## 2021-02-09 ENCOUNTER — TELEPHONE (OUTPATIENT)
Dept: GASTROENTEROLOGY | Facility: CLINIC | Age: 76
End: 2021-02-09

## 2021-02-11 ENCOUNTER — TELEPHONE (OUTPATIENT)
Dept: GASTROENTEROLOGY | Facility: CLINIC | Age: 76
End: 2021-02-11

## 2021-02-11 RX ORDER — HYDROCODONE BITARTRATE AND ACETAMINOPHEN 7.5; 325 MG/15ML; MG/15ML
15 SOLUTION ORAL EVERY 6 HOURS PRN
Qty: 118 ML | Refills: 0 | Status: SHIPPED | OUTPATIENT
Start: 2021-02-11 | End: 2021-02-18

## 2021-02-17 LAB
FINAL PATHOLOGIC DIAGNOSIS: ABNORMAL
Lab: ABNORMAL

## 2021-02-22 ENCOUNTER — TELEPHONE (OUTPATIENT)
Dept: GASTROENTEROLOGY | Facility: CLINIC | Age: 76
End: 2021-02-22

## 2021-02-24 ENCOUNTER — HOSPITAL ENCOUNTER (OUTPATIENT)
Dept: CARDIOLOGY | Facility: HOSPITAL | Age: 76
Discharge: HOME | End: 2021-02-24
Attending: SURGERY
Payer: MEDICARE

## 2021-02-24 ENCOUNTER — OFFICE VISIT (OUTPATIENT)
Dept: SURGERY | Facility: CLINIC | Age: 76
End: 2021-02-24
Payer: MEDICARE

## 2021-02-24 VITALS
DIASTOLIC BLOOD PRESSURE: 93 MMHG | SYSTOLIC BLOOD PRESSURE: 162 MMHG | BODY MASS INDEX: 33.64 KG/M2 | WEIGHT: 241.19 LBS | HEART RATE: 62 BPM

## 2021-02-24 DIAGNOSIS — R94.31 ABNORMAL EKG: ICD-10-CM

## 2021-02-24 DIAGNOSIS — C49.A0 GASTROINTESTINAL STROMAL TUMOR (GIST): ICD-10-CM

## 2021-02-24 DIAGNOSIS — K31.89 GASTRIC MASS: Primary | ICD-10-CM

## 2021-02-24 DIAGNOSIS — Z01.818 PRE-OP TESTING: ICD-10-CM

## 2021-02-24 PROCEDURE — 3288F FALL RISK ASSESSMENT DOCD: CPT | Mod: CPTII,S$GLB,, | Performed by: SURGERY

## 2021-02-24 PROCEDURE — 1159F PR MEDICATION LIST DOCUMENTED IN MEDICAL RECORD: ICD-10-PCS | Mod: S$GLB,,, | Performed by: SURGERY

## 2021-02-24 PROCEDURE — 3077F PR MOST RECENT SYSTOLIC BLOOD PRESSURE >= 140 MM HG: ICD-10-PCS | Mod: CPTII,S$GLB,, | Performed by: SURGERY

## 2021-02-24 PROCEDURE — 3077F SYST BP >= 140 MM HG: CPT | Mod: CPTII,S$GLB,, | Performed by: SURGERY

## 2021-02-24 PROCEDURE — 1125F PR PAIN SEVERITY QUANTIFIED, PAIN PRESENT: ICD-10-PCS | Mod: S$GLB,,, | Performed by: SURGERY

## 2021-02-24 PROCEDURE — 1125F AMNT PAIN NOTED PAIN PRSNT: CPT | Mod: S$GLB,,, | Performed by: SURGERY

## 2021-02-24 PROCEDURE — 93010 ELECTROCARDIOGRAM REPORT: CPT | Mod: ,,, | Performed by: INTERNAL MEDICINE

## 2021-02-24 PROCEDURE — 1101F PR PT FALLS ASSESS DOC 0-1 FALLS W/OUT INJ PAST YR: ICD-10-PCS | Mod: CPTII,S$GLB,, | Performed by: SURGERY

## 2021-02-24 PROCEDURE — 99999 PR PBB SHADOW E&M-EST. PATIENT-LVL V: ICD-10-PCS | Mod: PBBFAC,,, | Performed by: SURGERY

## 2021-02-24 PROCEDURE — 3080F PR MOST RECENT DIASTOLIC BLOOD PRESSURE >= 90 MM HG: ICD-10-PCS | Mod: CPTII,S$GLB,, | Performed by: SURGERY

## 2021-02-24 PROCEDURE — 1101F PT FALLS ASSESS-DOCD LE1/YR: CPT | Mod: CPTII,S$GLB,, | Performed by: SURGERY

## 2021-02-24 PROCEDURE — 99204 OFFICE O/P NEW MOD 45 MIN: CPT | Mod: S$GLB,,, | Performed by: SURGERY

## 2021-02-24 PROCEDURE — 93010 EKG 12-LEAD: ICD-10-PCS | Mod: ,,, | Performed by: INTERNAL MEDICINE

## 2021-02-24 PROCEDURE — 3080F DIAST BP >= 90 MM HG: CPT | Mod: CPTII,S$GLB,, | Performed by: SURGERY

## 2021-02-24 PROCEDURE — 99999 PR PBB SHADOW E&M-EST. PATIENT-LVL V: CPT | Mod: PBBFAC,,, | Performed by: SURGERY

## 2021-02-24 PROCEDURE — 93005 ELECTROCARDIOGRAM TRACING: CPT

## 2021-02-24 PROCEDURE — 99204 PR OFFICE/OUTPT VISIT, NEW, LEVL IV, 45-59 MIN: ICD-10-PCS | Mod: S$GLB,,, | Performed by: SURGERY

## 2021-02-24 PROCEDURE — 1159F MED LIST DOCD IN RCRD: CPT | Mod: S$GLB,,, | Performed by: SURGERY

## 2021-02-24 PROCEDURE — 3288F PR FALLS RISK ASSESSMENT DOCUMENTED: ICD-10-PCS | Mod: CPTII,S$GLB,, | Performed by: SURGERY

## 2021-02-24 RX ORDER — SODIUM CHLORIDE 9 MG/ML
INJECTION, SOLUTION INTRAVENOUS CONTINUOUS
Status: CANCELLED | OUTPATIENT
Start: 2021-02-24

## 2021-02-24 RX ORDER — LIDOCAINE HYDROCHLORIDE 10 MG/ML
1 INJECTION, SOLUTION EPIDURAL; INFILTRATION; INTRACAUDAL; PERINEURAL ONCE
Status: CANCELLED | OUTPATIENT
Start: 2021-02-24 | End: 2021-02-24

## 2021-02-24 RX ORDER — METRONIDAZOLE 500 MG/100ML
500 INJECTION, SOLUTION INTRAVENOUS
Status: CANCELLED | OUTPATIENT
Start: 2021-02-24

## 2021-02-26 ENCOUNTER — OFFICE VISIT (OUTPATIENT)
Dept: CARDIOLOGY | Facility: CLINIC | Age: 76
End: 2021-02-26
Payer: MEDICARE

## 2021-02-26 VITALS
SYSTOLIC BLOOD PRESSURE: 150 MMHG | HEIGHT: 71 IN | OXYGEN SATURATION: 97 % | DIASTOLIC BLOOD PRESSURE: 82 MMHG | BODY MASS INDEX: 33.48 KG/M2 | WEIGHT: 239.19 LBS | HEART RATE: 85 BPM

## 2021-02-26 DIAGNOSIS — I10 ESSENTIAL HYPERTENSION: ICD-10-CM

## 2021-02-26 DIAGNOSIS — I44.0 FIRST DEGREE AV BLOCK: ICD-10-CM

## 2021-02-26 DIAGNOSIS — I45.10 RBBB: ICD-10-CM

## 2021-02-26 DIAGNOSIS — E78.2 MIXED HYPERLIPIDEMIA: ICD-10-CM

## 2021-02-26 DIAGNOSIS — E66.09 CLASS 1 OBESITY DUE TO EXCESS CALORIES WITH SERIOUS COMORBIDITY AND BODY MASS INDEX (BMI) OF 33.0 TO 33.9 IN ADULT: ICD-10-CM

## 2021-02-26 DIAGNOSIS — R94.31 ABNORMAL EKG: ICD-10-CM

## 2021-02-26 DIAGNOSIS — Z01.810 PREOP CARDIOVASCULAR EXAM: Primary | ICD-10-CM

## 2021-02-26 DIAGNOSIS — E11.9 TYPE 2 DIABETES MELLITUS WITHOUT COMPLICATION, WITHOUT LONG-TERM CURRENT USE OF INSULIN: ICD-10-CM

## 2021-02-26 PROCEDURE — 3079F PR MOST RECENT DIASTOLIC BLOOD PRESSURE 80-89 MM HG: ICD-10-PCS | Mod: CPTII,S$GLB,, | Performed by: INTERNAL MEDICINE

## 2021-02-26 PROCEDURE — 1126F PR PAIN SEVERITY QUANTIFIED, NO PAIN PRESENT: ICD-10-PCS | Mod: S$GLB,,, | Performed by: INTERNAL MEDICINE

## 2021-02-26 PROCEDURE — 1126F AMNT PAIN NOTED NONE PRSNT: CPT | Mod: S$GLB,,, | Performed by: INTERNAL MEDICINE

## 2021-02-26 PROCEDURE — 3079F DIAST BP 80-89 MM HG: CPT | Mod: CPTII,S$GLB,, | Performed by: INTERNAL MEDICINE

## 2021-02-26 PROCEDURE — 3077F SYST BP >= 140 MM HG: CPT | Mod: CPTII,S$GLB,, | Performed by: INTERNAL MEDICINE

## 2021-02-26 PROCEDURE — 99204 OFFICE O/P NEW MOD 45 MIN: CPT | Mod: S$GLB,,, | Performed by: INTERNAL MEDICINE

## 2021-02-26 PROCEDURE — 99499 RISK ADDL DX/OHS AUDIT: ICD-10-PCS | Mod: S$GLB,,, | Performed by: INTERNAL MEDICINE

## 2021-02-26 PROCEDURE — 99999 PR PBB SHADOW E&M-EST. PATIENT-LVL III: CPT | Mod: PBBFAC,,, | Performed by: INTERNAL MEDICINE

## 2021-02-26 PROCEDURE — 99204 PR OFFICE/OUTPT VISIT, NEW, LEVL IV, 45-59 MIN: ICD-10-PCS | Mod: S$GLB,,, | Performed by: INTERNAL MEDICINE

## 2021-02-26 PROCEDURE — 3077F PR MOST RECENT SYSTOLIC BLOOD PRESSURE >= 140 MM HG: ICD-10-PCS | Mod: CPTII,S$GLB,, | Performed by: INTERNAL MEDICINE

## 2021-02-26 PROCEDURE — 99999 PR PBB SHADOW E&M-EST. PATIENT-LVL III: ICD-10-PCS | Mod: PBBFAC,,, | Performed by: INTERNAL MEDICINE

## 2021-02-26 PROCEDURE — 1159F PR MEDICATION LIST DOCUMENTED IN MEDICAL RECORD: ICD-10-PCS | Mod: S$GLB,,, | Performed by: INTERNAL MEDICINE

## 2021-02-26 PROCEDURE — 99499 UNLISTED E&M SERVICE: CPT | Mod: S$GLB,,, | Performed by: INTERNAL MEDICINE

## 2021-02-26 PROCEDURE — 1159F MED LIST DOCD IN RCRD: CPT | Mod: S$GLB,,, | Performed by: INTERNAL MEDICINE

## 2021-03-01 ENCOUNTER — TELEPHONE (OUTPATIENT)
Dept: CARDIOLOGY | Facility: CLINIC | Age: 76
End: 2021-03-01

## 2021-03-02 ENCOUNTER — TELEPHONE (OUTPATIENT)
Dept: CARDIOLOGY | Facility: HOSPITAL | Age: 76
End: 2021-03-02

## 2021-03-02 ENCOUNTER — HOSPITAL ENCOUNTER (OUTPATIENT)
Dept: CARDIOLOGY | Facility: HOSPITAL | Age: 76
Discharge: HOME | End: 2021-03-02
Attending: INTERNAL MEDICINE
Payer: MEDICARE

## 2021-03-02 VITALS
BODY MASS INDEX: 33.46 KG/M2 | SYSTOLIC BLOOD PRESSURE: 130 MMHG | WEIGHT: 239 LBS | DIASTOLIC BLOOD PRESSURE: 80 MMHG | HEIGHT: 71 IN

## 2021-03-02 DIAGNOSIS — E11.9 TYPE 2 DIABETES MELLITUS WITHOUT COMPLICATION, WITHOUT LONG-TERM CURRENT USE OF INSULIN: ICD-10-CM

## 2021-03-02 DIAGNOSIS — I44.0 FIRST DEGREE AV BLOCK: ICD-10-CM

## 2021-03-02 DIAGNOSIS — Z01.810 PREOP CARDIOVASCULAR EXAM: ICD-10-CM

## 2021-03-02 DIAGNOSIS — R94.31 ABNORMAL EKG: ICD-10-CM

## 2021-03-02 DIAGNOSIS — I45.10 RBBB: ICD-10-CM

## 2021-03-02 DIAGNOSIS — I10 ESSENTIAL HYPERTENSION: ICD-10-CM

## 2021-03-02 LAB
AORTIC ROOT ANNULUS: 3.64 CM
AV INDEX (PROSTH): 0.74
AV MEAN GRADIENT: 5 MMHG
AV PEAK GRADIENT: 8 MMHG
AV VALVE AREA: 2.55 CM2
AV VELOCITY RATIO: 0.79
BSA FOR ECHO PROCEDURE: 2.33 M2
CV ECHO LV RWT: 0.63 CM
DOP CALC AO PEAK VEL: 1.43 M/S
DOP CALC AO VTI: 31.55 CM
DOP CALC LVOT AREA: 3.5 CM2
DOP CALC LVOT DIAMETER: 2.1 CM
DOP CALC LVOT PEAK VEL: 1.13 M/S
DOP CALC LVOT STROKE VOLUME: 80.31 CM3
DOP CALC RVOT PEAK VEL: 0.79 M/S
DOP CALC RVOT VTI: 13.7 CM
DOP CALCLVOT PEAK VEL VTI: 23.2 CM
E WAVE DECELERATION TIME: 376.33 MSEC
E/A RATIO: 0.83
E/E' RATIO: 8 M/S
ECHO LV POSTERIOR WALL: 1.39 CM (ref 0.6–1.1)
FRACTIONAL SHORTENING: 28 % (ref 28–44)
INTERVENTRICULAR SEPTUM: 1.23 CM (ref 0.6–1.1)
IVRT: 145.58 MSEC
LA MAJOR: 4.88 CM
LA MINOR: 4.7 CM
LA WIDTH: 3.45 CM
LEFT ATRIUM SIZE: 4.2 CM
LEFT ATRIUM VOLUME INDEX: 26 ML/M2
LEFT ATRIUM VOLUME: 58.98 CM3
LEFT INTERNAL DIMENSION IN SYSTOLE: 3.18 CM (ref 2.1–4)
LEFT VENTRICLE DIASTOLIC VOLUME INDEX: 38.52 ML/M2
LEFT VENTRICLE DIASTOLIC VOLUME: 87.45 ML
LEFT VENTRICLE MASS INDEX: 96 G/M2
LEFT VENTRICLE SYSTOLIC VOLUME INDEX: 17.8 ML/M2
LEFT VENTRICLE SYSTOLIC VOLUME: 40.37 ML
LEFT VENTRICULAR INTERNAL DIMENSION IN DIASTOLE: 4.39 CM (ref 3.5–6)
LEFT VENTRICULAR MASS: 216.81 G
LV LATERAL E/E' RATIO: 9.71 M/S
LV SEPTAL E/E' RATIO: 6.8 M/S
MV PEAK A VEL: 0.82 M/S
MV PEAK E VEL: 0.68 M/S
MV STENOSIS PRESSURE HALF TIME: 109.14 MS
MV VALVE AREA P 1/2 METHOD: 2.02 CM2
PISA TR MAX VEL: 2.68 M/S
PV MEAN GRADIENT: 1 MMHG
PV PEAK VELOCITY: 0.78 CM/S
RA MAJOR: 4.64 CM
RA PRESSURE: 3 MMHG
RA WIDTH: 3.13 CM
RIGHT VENTRICULAR END-DIASTOLIC DIMENSION: 3.16 CM
SINUS: 4.02 CM
STJ: 4.08 CM
TDI LATERAL: 0.07 M/S
TDI SEPTAL: 0.1 M/S
TDI: 0.09 M/S
TR MAX PG: 29 MMHG
TRICUSPID ANNULAR PLANE SYSTOLIC EXCURSION: 1.8 CM
TV REST PULMONARY ARTERY PRESSURE: 32 MMHG

## 2021-03-02 PROCEDURE — 93306 ECHO (CUPID ONLY): ICD-10-PCS | Mod: 26,,, | Performed by: INTERNAL MEDICINE

## 2021-03-02 PROCEDURE — 93306 TTE W/DOPPLER COMPLETE: CPT

## 2021-03-02 PROCEDURE — 93306 TTE W/DOPPLER COMPLETE: CPT | Mod: 26,,, | Performed by: INTERNAL MEDICINE

## 2021-03-03 ENCOUNTER — TELEPHONE (OUTPATIENT)
Dept: CARDIOLOGY | Facility: CLINIC | Age: 76
End: 2021-03-03

## 2021-03-06 ENCOUNTER — LAB VISIT (OUTPATIENT)
Dept: OTOLARYNGOLOGY | Facility: CLINIC | Age: 76
End: 2021-03-06
Payer: MEDICARE

## 2021-03-06 DIAGNOSIS — Z01.818 PRE-OP TESTING: ICD-10-CM

## 2021-03-06 PROCEDURE — U0005 INFEC AGEN DETEC AMPLI PROBE: HCPCS | Performed by: SURGERY

## 2021-03-06 PROCEDURE — U0003 INFECTIOUS AGENT DETECTION BY NUCLEIC ACID (DNA OR RNA); SEVERE ACUTE RESPIRATORY SYNDROME CORONAVIRUS 2 (SARS-COV-2) (CORONAVIRUS DISEASE [COVID-19]), AMPLIFIED PROBE TECHNIQUE, MAKING USE OF HIGH THROUGHPUT TECHNOLOGIES AS DESCRIBED BY CMS-2020-01-R: HCPCS | Performed by: SURGERY

## 2021-03-07 LAB — SARS-COV-2 RNA RESP QL NAA+PROBE: NOT DETECTED

## 2021-03-09 ENCOUNTER — HOSPITAL ENCOUNTER (INPATIENT)
Facility: HOSPITAL | Age: 76
LOS: 1 days | Discharge: HOME | DRG: 328 | End: 2021-03-11
Attending: SURGERY | Admitting: SURGERY
Payer: MEDICARE

## 2021-03-09 ENCOUNTER — ANESTHESIA EVENT (OUTPATIENT)
Dept: SURGERY | Facility: HOSPITAL | Age: 76
DRG: 328 | End: 2021-03-09
Payer: MEDICARE

## 2021-03-09 ENCOUNTER — ANESTHESIA (OUTPATIENT)
Dept: SURGERY | Facility: HOSPITAL | Age: 76
DRG: 328 | End: 2021-03-09
Payer: MEDICARE

## 2021-03-09 DIAGNOSIS — C49.A0 GASTROINTESTINAL STROMAL TUMOR (GIST): Primary | ICD-10-CM

## 2021-03-09 LAB
ANION GAP SERPL CALC-SCNC: 12 MMOL/L (ref 8–16)
BUN SERPL-MCNC: 27 MG/DL (ref 8–23)
CALCIUM SERPL-MCNC: 8.3 MG/DL (ref 8.7–10.5)
CHLORIDE SERPL-SCNC: 107 MMOL/L (ref 95–110)
CO2 SERPL-SCNC: 23 MMOL/L (ref 23–29)
CREAT SERPL-MCNC: 1.4 MG/DL (ref 0.5–1.4)
EST. GFR  (AFRICAN AMERICAN): 56 ML/MIN/1.73 M^2
EST. GFR  (NON AFRICAN AMERICAN): 48 ML/MIN/1.73 M^2
GLUCOSE SERPL-MCNC: 138 MG/DL (ref 70–110)
POCT GLUCOSE: 137 MG/DL (ref 70–110)
POCT GLUCOSE: 179 MG/DL (ref 70–110)
POTASSIUM SERPL-SCNC: 4.5 MMOL/L (ref 3.5–5.1)
SODIUM SERPL-SCNC: 142 MMOL/L (ref 136–145)

## 2021-03-09 PROCEDURE — 49329 PR LAP OMENTAL FLAP: ICD-10-PCS | Mod: ,,, | Performed by: SURGERY

## 2021-03-09 PROCEDURE — 37000008 HC ANESTHESIA 1ST 15 MINUTES: Performed by: SURGERY

## 2021-03-09 PROCEDURE — 88309 TISSUE EXAM BY PATHOLOGIST: CPT | Performed by: STUDENT IN AN ORGANIZED HEALTH CARE EDUCATION/TRAINING PROGRAM

## 2021-03-09 PROCEDURE — 88341 PR IHC OR ICC EACH ADD'L SINGLE ANTIBODY  STAINPR: ICD-10-PCS | Mod: 26,,, | Performed by: STUDENT IN AN ORGANIZED HEALTH CARE EDUCATION/TRAINING PROGRAM

## 2021-03-09 PROCEDURE — 83036 HEMOGLOBIN GLYCOSYLATED A1C: CPT | Performed by: SURGERY

## 2021-03-09 PROCEDURE — 88341 IMHCHEM/IMCYTCHM EA ADD ANTB: CPT | Mod: 26,,, | Performed by: STUDENT IN AN ORGANIZED HEALTH CARE EDUCATION/TRAINING PROGRAM

## 2021-03-09 PROCEDURE — 88341 IMHCHEM/IMCYTCHM EA ADD ANTB: CPT | Performed by: STUDENT IN AN ORGANIZED HEALTH CARE EDUCATION/TRAINING PROGRAM

## 2021-03-09 PROCEDURE — 63600175 PHARM REV CODE 636 W HCPCS: Performed by: ANESTHESIOLOGY

## 2021-03-09 PROCEDURE — 71000033 HC RECOVERY, INTIAL HOUR: Performed by: SURGERY

## 2021-03-09 PROCEDURE — 36000712 HC OR TIME LEV V 1ST 15 MIN: Performed by: SURGERY

## 2021-03-09 PROCEDURE — 25000003 PHARM REV CODE 250: Performed by: NURSE ANESTHETIST, CERTIFIED REGISTERED

## 2021-03-09 PROCEDURE — S0028 INJECTION, FAMOTIDINE, 20 MG: HCPCS | Performed by: SURGERY

## 2021-03-09 PROCEDURE — 43659 UNLISTED LAPS PX STOMACH: CPT | Mod: ,,, | Performed by: SURGERY

## 2021-03-09 PROCEDURE — 43659 PR LAPAROSCOPIC; LOCAL; ULCER OR TUMOR OF STOMACH: ICD-10-PCS | Mod: ,,, | Performed by: SURGERY

## 2021-03-09 PROCEDURE — 25000003 PHARM REV CODE 250: Performed by: SURGERY

## 2021-03-09 PROCEDURE — 63600175 PHARM REV CODE 636 W HCPCS: Performed by: SURGERY

## 2021-03-09 PROCEDURE — 94761 N-INVAS EAR/PLS OXIMETRY MLT: CPT

## 2021-03-09 PROCEDURE — 63600175 PHARM REV CODE 636 W HCPCS: Performed by: NURSE ANESTHETIST, CERTIFIED REGISTERED

## 2021-03-09 PROCEDURE — 80048 BASIC METABOLIC PNL TOTAL CA: CPT | Performed by: SURGERY

## 2021-03-09 PROCEDURE — S0030 INJECTION, METRONIDAZOLE: HCPCS | Performed by: SURGERY

## 2021-03-09 PROCEDURE — 49329 UNLSTD LAPS PX ABD PERTM&OMN: CPT | Mod: ,,, | Performed by: SURGERY

## 2021-03-09 PROCEDURE — 88342 CHG IMMUNOCYTOCHEMISTRY: ICD-10-PCS | Mod: 26,,, | Performed by: STUDENT IN AN ORGANIZED HEALTH CARE EDUCATION/TRAINING PROGRAM

## 2021-03-09 PROCEDURE — 99900035 HC TECH TIME PER 15 MIN (STAT)

## 2021-03-09 PROCEDURE — 88309 TISSUE EXAM BY PATHOLOGIST: CPT | Mod: 26,,, | Performed by: STUDENT IN AN ORGANIZED HEALTH CARE EDUCATION/TRAINING PROGRAM

## 2021-03-09 PROCEDURE — 27201423 OPTIME MED/SURG SUP & DEVICES STERILE SUPPLY: Performed by: SURGERY

## 2021-03-09 PROCEDURE — 37000009 HC ANESTHESIA EA ADD 15 MINS: Performed by: SURGERY

## 2021-03-09 PROCEDURE — 27000221 HC OXYGEN, UP TO 24 HOURS

## 2021-03-09 PROCEDURE — 88342 IMHCHEM/IMCYTCHM 1ST ANTB: CPT | Performed by: STUDENT IN AN ORGANIZED HEALTH CARE EDUCATION/TRAINING PROGRAM

## 2021-03-09 PROCEDURE — 88309 PR  SURG PATH,LEVEL VI: ICD-10-PCS | Mod: 26,,, | Performed by: STUDENT IN AN ORGANIZED HEALTH CARE EDUCATION/TRAINING PROGRAM

## 2021-03-09 PROCEDURE — 94799 UNLISTED PULMONARY SVC/PX: CPT

## 2021-03-09 PROCEDURE — 36415 COLL VENOUS BLD VENIPUNCTURE: CPT | Performed by: SURGERY

## 2021-03-09 PROCEDURE — 71000039 HC RECOVERY, EACH ADD'L HOUR: Performed by: SURGERY

## 2021-03-09 PROCEDURE — 88342 IMHCHEM/IMCYTCHM 1ST ANTB: CPT | Mod: 26,,, | Performed by: STUDENT IN AN ORGANIZED HEALTH CARE EDUCATION/TRAINING PROGRAM

## 2021-03-09 PROCEDURE — 36000713 HC OR TIME LEV V EA ADD 15 MIN: Performed by: SURGERY

## 2021-03-09 RX ORDER — NALOXONE HCL 0.4 MG/ML
0.02 VIAL (ML) INJECTION
Status: DISCONTINUED | OUTPATIENT
Start: 2021-03-09 | End: 2021-03-11 | Stop reason: HOSPADM

## 2021-03-09 RX ORDER — SUCCINYLCHOLINE CHLORIDE 20 MG/ML
INJECTION INTRAMUSCULAR; INTRAVENOUS
Status: DISCONTINUED | OUTPATIENT
Start: 2021-03-09 | End: 2021-03-09

## 2021-03-09 RX ORDER — DIPHENHYDRAMINE HYDROCHLORIDE 50 MG/ML
25 INJECTION INTRAMUSCULAR; INTRAVENOUS EVERY 6 HOURS PRN
Status: DISCONTINUED | OUTPATIENT
Start: 2021-03-09 | End: 2021-03-09 | Stop reason: HOSPADM

## 2021-03-09 RX ORDER — FENTANYL CITRATE 50 UG/ML
INJECTION, SOLUTION INTRAMUSCULAR; INTRAVENOUS
Status: DISCONTINUED | OUTPATIENT
Start: 2021-03-09 | End: 2021-03-09

## 2021-03-09 RX ORDER — LIDOCAINE HYDROCHLORIDE 10 MG/ML
INJECTION, SOLUTION EPIDURAL; INFILTRATION; INTRACAUDAL; PERINEURAL
Status: DISCONTINUED | OUTPATIENT
Start: 2021-03-09 | End: 2021-03-09 | Stop reason: HOSPADM

## 2021-03-09 RX ORDER — GLUCAGON 1 MG
1 KIT INJECTION
Status: DISCONTINUED | OUTPATIENT
Start: 2021-03-09 | End: 2021-03-11 | Stop reason: HOSPADM

## 2021-03-09 RX ORDER — METRONIDAZOLE 500 MG/100ML
500 INJECTION, SOLUTION INTRAVENOUS
Status: COMPLETED | OUTPATIENT
Start: 2021-03-09 | End: 2021-03-09

## 2021-03-09 RX ORDER — MORPHINE SULFATE 1 MG/ML
INJECTION INTRAVENOUS CONTINUOUS
Status: DISCONTINUED | OUTPATIENT
Start: 2021-03-09 | End: 2021-03-10

## 2021-03-09 RX ORDER — ACETAMINOPHEN 10 MG/ML
1000 INJECTION, SOLUTION INTRAVENOUS ONCE
Status: COMPLETED | OUTPATIENT
Start: 2021-03-09 | End: 2021-03-09

## 2021-03-09 RX ORDER — CHLORHEXIDINE GLUCONATE ORAL RINSE 1.2 MG/ML
10 SOLUTION DENTAL 2 TIMES DAILY
Status: DISCONTINUED | OUTPATIENT
Start: 2021-03-09 | End: 2021-03-11 | Stop reason: HOSPADM

## 2021-03-09 RX ORDER — DIPHENHYDRAMINE HYDROCHLORIDE 50 MG/ML
12.5 INJECTION INTRAMUSCULAR; INTRAVENOUS ONCE
Status: DISCONTINUED | OUTPATIENT
Start: 2021-03-09 | End: 2021-03-09

## 2021-03-09 RX ORDER — SODIUM CHLORIDE, SODIUM LACTATE, POTASSIUM CHLORIDE, CALCIUM CHLORIDE 600; 310; 30; 20 MG/100ML; MG/100ML; MG/100ML; MG/100ML
INJECTION, SOLUTION INTRAVENOUS CONTINUOUS PRN
Status: DISCONTINUED | OUTPATIENT
Start: 2021-03-09 | End: 2021-03-09

## 2021-03-09 RX ORDER — LIDOCAINE HYDROCHLORIDE 10 MG/ML
1 INJECTION, SOLUTION EPIDURAL; INFILTRATION; INTRACAUDAL; PERINEURAL ONCE
Status: DISCONTINUED | OUTPATIENT
Start: 2021-03-09 | End: 2021-03-09 | Stop reason: HOSPADM

## 2021-03-09 RX ORDER — ONDANSETRON 2 MG/ML
INJECTION INTRAMUSCULAR; INTRAVENOUS
Status: DISCONTINUED | OUTPATIENT
Start: 2021-03-09 | End: 2021-03-09

## 2021-03-09 RX ORDER — FAMOTIDINE 20 MG/50ML
20 INJECTION, SOLUTION INTRAVENOUS EVERY 12 HOURS
Status: DISCONTINUED | OUTPATIENT
Start: 2021-03-09 | End: 2021-03-10

## 2021-03-09 RX ORDER — SODIUM CHLORIDE 0.9 % (FLUSH) 0.9 %
3 SYRINGE (ML) INJECTION EVERY 8 HOURS
Status: DISCONTINUED | OUTPATIENT
Start: 2021-03-09 | End: 2021-03-09 | Stop reason: HOSPADM

## 2021-03-09 RX ORDER — HYDROMORPHONE HYDROCHLORIDE 2 MG/ML
0.2 INJECTION, SOLUTION INTRAMUSCULAR; INTRAVENOUS; SUBCUTANEOUS EVERY 5 MIN PRN
Status: DISCONTINUED | OUTPATIENT
Start: 2021-03-09 | End: 2021-03-09 | Stop reason: HOSPADM

## 2021-03-09 RX ORDER — METOCLOPRAMIDE HYDROCHLORIDE 5 MG/ML
10 INJECTION INTRAMUSCULAR; INTRAVENOUS EVERY 10 MIN PRN
Status: DISCONTINUED | OUTPATIENT
Start: 2021-03-09 | End: 2021-03-09 | Stop reason: HOSPADM

## 2021-03-09 RX ORDER — PHENYLEPHRINE HYDROCHLORIDE 10 MG/ML
INJECTION INTRAVENOUS
Status: DISCONTINUED | OUTPATIENT
Start: 2021-03-09 | End: 2021-03-09

## 2021-03-09 RX ORDER — ONDANSETRON 2 MG/ML
4 INJECTION INTRAMUSCULAR; INTRAVENOUS EVERY 8 HOURS PRN
Status: DISCONTINUED | OUTPATIENT
Start: 2021-03-09 | End: 2021-03-11 | Stop reason: HOSPADM

## 2021-03-09 RX ORDER — ROCURONIUM BROMIDE 10 MG/ML
INJECTION, SOLUTION INTRAVENOUS
Status: DISCONTINUED | OUTPATIENT
Start: 2021-03-09 | End: 2021-03-09

## 2021-03-09 RX ORDER — DIPHENHYDRAMINE HYDROCHLORIDE 50 MG/ML
25 INJECTION INTRAMUSCULAR; INTRAVENOUS EVERY 4 HOURS PRN
Status: DISCONTINUED | OUTPATIENT
Start: 2021-03-09 | End: 2021-03-11 | Stop reason: HOSPADM

## 2021-03-09 RX ORDER — LIDOCAINE HYDROCHLORIDE 10 MG/ML
INJECTION, SOLUTION EPIDURAL; INFILTRATION; INTRACAUDAL; PERINEURAL
Status: DISCONTINUED | OUTPATIENT
Start: 2021-03-09 | End: 2021-03-09

## 2021-03-09 RX ORDER — BUPIVACAINE HYDROCHLORIDE 2.5 MG/ML
INJECTION, SOLUTION EPIDURAL; INFILTRATION; INTRACAUDAL
Status: DISCONTINUED | OUTPATIENT
Start: 2021-03-09 | End: 2021-03-09 | Stop reason: HOSPADM

## 2021-03-09 RX ORDER — SODIUM CHLORIDE 9 MG/ML
INJECTION, SOLUTION INTRAVENOUS CONTINUOUS
Status: DISCONTINUED | OUTPATIENT
Start: 2021-03-09 | End: 2021-03-09

## 2021-03-09 RX ORDER — MEPERIDINE HYDROCHLORIDE 25 MG/ML
12.5 INJECTION INTRAMUSCULAR; INTRAVENOUS; SUBCUTANEOUS ONCE AS NEEDED
Status: COMPLETED | OUTPATIENT
Start: 2021-03-09 | End: 2021-03-09

## 2021-03-09 RX ORDER — KETOROLAC TROMETHAMINE 30 MG/ML
15 INJECTION, SOLUTION INTRAMUSCULAR; INTRAVENOUS ONCE
Status: COMPLETED | OUTPATIENT
Start: 2021-03-09 | End: 2021-03-09

## 2021-03-09 RX ORDER — PROPOFOL 10 MG/ML
VIAL (ML) INTRAVENOUS
Status: DISCONTINUED | OUTPATIENT
Start: 2021-03-09 | End: 2021-03-09

## 2021-03-09 RX ORDER — SODIUM CHLORIDE, SODIUM LACTATE, POTASSIUM CHLORIDE, CALCIUM CHLORIDE 600; 310; 30; 20 MG/100ML; MG/100ML; MG/100ML; MG/100ML
INJECTION, SOLUTION INTRAVENOUS CONTINUOUS
Status: DISCONTINUED | OUTPATIENT
Start: 2021-03-09 | End: 2021-03-11

## 2021-03-09 RX ADMIN — HYDROMORPHONE HYDROCHLORIDE 0.2 MG: 2 INJECTION INTRAMUSCULAR; INTRAVENOUS; SUBCUTANEOUS at 05:03

## 2021-03-09 RX ADMIN — MORPHINE SULFATE: 1 INJECTION INTRAVENOUS at 04:03

## 2021-03-09 RX ADMIN — FENTANYL CITRATE 100 MCG: 50 INJECTION, SOLUTION INTRAMUSCULAR; INTRAVENOUS at 01:03

## 2021-03-09 RX ADMIN — KETOROLAC TROMETHAMINE 15 MG: 30 INJECTION, SOLUTION INTRAMUSCULAR; INTRAVENOUS at 05:03

## 2021-03-09 RX ADMIN — SUCCINYLCHOLINE CHLORIDE 120 MG: 20 INJECTION, SOLUTION INTRAMUSCULAR; INTRAVENOUS at 01:03

## 2021-03-09 RX ADMIN — PHENYLEPHRINE HYDROCHLORIDE 100 MCG: 10 INJECTION INTRAVENOUS at 02:03

## 2021-03-09 RX ADMIN — ROCURONIUM BROMIDE 10 MG: 10 INJECTION, SOLUTION INTRAVENOUS at 02:03

## 2021-03-09 RX ADMIN — PROPOFOL 130 MG: 10 INJECTION, EMULSION INTRAVENOUS at 01:03

## 2021-03-09 RX ADMIN — SUGAMMADEX 200 MG: 100 INJECTION, SOLUTION INTRAVENOUS at 04:03

## 2021-03-09 RX ADMIN — SODIUM CHLORIDE, SODIUM LACTATE, POTASSIUM CHLORIDE, AND CALCIUM CHLORIDE: .6; .31; .03; .02 INJECTION, SOLUTION INTRAVENOUS at 04:03

## 2021-03-09 RX ADMIN — CEFTRIAXONE SODIUM 2 G: 2 INJECTION, SOLUTION INTRAVENOUS at 01:03

## 2021-03-09 RX ADMIN — FENTANYL CITRATE 50 MCG: 50 INJECTION, SOLUTION INTRAMUSCULAR; INTRAVENOUS at 01:03

## 2021-03-09 RX ADMIN — CHLORHEXIDINE GLUCONATE 0.12% ORAL RINSE 10 ML: 1.2 LIQUID ORAL at 08:03

## 2021-03-09 RX ADMIN — ONDANSETRON 4 MG: 2 INJECTION, SOLUTION INTRAMUSCULAR; INTRAVENOUS at 04:03

## 2021-03-09 RX ADMIN — SODIUM CHLORIDE, SODIUM LACTATE, POTASSIUM CHLORIDE, AND CALCIUM CHLORIDE: 600; 310; 30; 20 INJECTION, SOLUTION INTRAVENOUS at 02:03

## 2021-03-09 RX ADMIN — ROCURONIUM BROMIDE 25 MG: 10 INJECTION, SOLUTION INTRAVENOUS at 01:03

## 2021-03-09 RX ADMIN — MEPERIDINE HYDROCHLORIDE 12.5 MG: 25 INJECTION INTRAMUSCULAR; INTRAVENOUS; SUBCUTANEOUS at 04:03

## 2021-03-09 RX ADMIN — ROCURONIUM BROMIDE 10 MG: 10 INJECTION, SOLUTION INTRAVENOUS at 03:03

## 2021-03-09 RX ADMIN — ROCURONIUM BROMIDE 10 MG: 10 INJECTION, SOLUTION INTRAVENOUS at 01:03

## 2021-03-09 RX ADMIN — HYDROMORPHONE HYDROCHLORIDE 0.2 MG: 2 INJECTION INTRAMUSCULAR; INTRAVENOUS; SUBCUTANEOUS at 04:03

## 2021-03-09 RX ADMIN — ROCURONIUM BROMIDE 5 MG: 10 INJECTION, SOLUTION INTRAVENOUS at 01:03

## 2021-03-09 RX ADMIN — LIDOCAINE HYDROCHLORIDE 50 MG: 10 INJECTION, SOLUTION EPIDURAL; INFILTRATION; INTRACAUDAL; PERINEURAL at 01:03

## 2021-03-09 RX ADMIN — ACETAMINOPHEN 1000 MG: 10 INJECTION, SOLUTION INTRAVENOUS at 05:03

## 2021-03-09 RX ADMIN — METRONIDAZOLE 500 MG: 500 SOLUTION INTRAVENOUS at 01:03

## 2021-03-09 RX ADMIN — FAMOTIDINE 20 MG: 20 INJECTION, SOLUTION INTRAVENOUS at 08:03

## 2021-03-09 RX ADMIN — SODIUM CHLORIDE, SODIUM LACTATE, POTASSIUM CHLORIDE, AND CALCIUM CHLORIDE: 600; 310; 30; 20 INJECTION, SOLUTION INTRAVENOUS at 01:03

## 2021-03-09 RX ADMIN — MORPHINE SULFATE: 1 INJECTION INTRAVENOUS at 10:03

## 2021-03-09 RX ADMIN — DIPHENHYDRAMINE HYDROCHLORIDE 25 MG: 50 INJECTION, SOLUTION INTRAMUSCULAR; INTRAVENOUS at 05:03

## 2021-03-10 LAB
ERYTHROCYTE [DISTWIDTH] IN BLOOD BY AUTOMATED COUNT: 12.9 % (ref 11.5–14.5)
ESTIMATED AVG GLUCOSE: 151 MG/DL (ref 68–131)
HBA1C MFR BLD: 6.9 % (ref 4–5.6)
HCT VFR BLD AUTO: 41 % (ref 40–54)
HGB BLD-MCNC: 13.1 G/DL (ref 14–18)
MCH RBC QN AUTO: 29.7 PG (ref 27–31)
MCHC RBC AUTO-ENTMCNC: 32 G/DL (ref 32–36)
MCV RBC AUTO: 93 FL (ref 82–98)
PLATELET # BLD AUTO: 120 K/UL (ref 150–350)
PMV BLD AUTO: 11.2 FL (ref 9.2–12.9)
POCT GLUCOSE: 105 MG/DL (ref 70–110)
POCT GLUCOSE: 110 MG/DL (ref 70–110)
POCT GLUCOSE: 119 MG/DL (ref 70–110)
POCT GLUCOSE: 136 MG/DL (ref 70–110)
POCT GLUCOSE: 93 MG/DL (ref 70–110)
RBC # BLD AUTO: 4.41 M/UL (ref 4.6–6.2)
WBC # BLD AUTO: 8.37 K/UL (ref 3.9–12.7)

## 2021-03-10 PROCEDURE — 94799 UNLISTED PULMONARY SVC/PX: CPT

## 2021-03-10 PROCEDURE — 36415 COLL VENOUS BLD VENIPUNCTURE: CPT | Performed by: SURGERY

## 2021-03-10 PROCEDURE — 25500020 PHARM REV CODE 255: Performed by: SURGERY

## 2021-03-10 PROCEDURE — 63600175 PHARM REV CODE 636 W HCPCS: Performed by: SURGERY

## 2021-03-10 PROCEDURE — 99900035 HC TECH TIME PER 15 MIN (STAT)

## 2021-03-10 PROCEDURE — 94760 N-INVAS EAR/PLS OXIMETRY 1: CPT

## 2021-03-10 PROCEDURE — 25000003 PHARM REV CODE 250: Performed by: SURGERY

## 2021-03-10 PROCEDURE — 85027 COMPLETE CBC AUTOMATED: CPT | Performed by: SURGERY

## 2021-03-10 PROCEDURE — 97116 GAIT TRAINING THERAPY: CPT

## 2021-03-10 PROCEDURE — 11000001 HC ACUTE MED/SURG PRIVATE ROOM

## 2021-03-10 PROCEDURE — S0028 INJECTION, FAMOTIDINE, 20 MG: HCPCS | Performed by: SURGERY

## 2021-03-10 PROCEDURE — 97161 PT EVAL LOW COMPLEX 20 MIN: CPT

## 2021-03-10 RX ORDER — OXYBUTYNIN CHLORIDE 5 MG/1
5 TABLET, EXTENDED RELEASE ORAL DAILY
Status: DISCONTINUED | OUTPATIENT
Start: 2021-03-11 | End: 2021-03-11 | Stop reason: HOSPADM

## 2021-03-10 RX ORDER — MORPHINE SULFATE 2 MG/ML
2 INJECTION, SOLUTION INTRAMUSCULAR; INTRAVENOUS
Status: DISCONTINUED | OUTPATIENT
Start: 2021-03-10 | End: 2021-03-11 | Stop reason: HOSPADM

## 2021-03-10 RX ORDER — METOPROLOL TARTRATE 50 MG/1
50 TABLET ORAL 2 TIMES DAILY
Status: DISCONTINUED | OUTPATIENT
Start: 2021-03-10 | End: 2021-03-11 | Stop reason: HOSPADM

## 2021-03-10 RX ORDER — LOSARTAN POTASSIUM 50 MG/1
100 TABLET ORAL DAILY
Status: DISCONTINUED | OUTPATIENT
Start: 2021-03-11 | End: 2021-03-10

## 2021-03-10 RX ORDER — HYDROCHLOROTHIAZIDE 25 MG/1
25 TABLET ORAL DAILY
Status: DISCONTINUED | OUTPATIENT
Start: 2021-03-11 | End: 2021-03-10

## 2021-03-10 RX ORDER — HYDROCODONE BITARTRATE AND ACETAMINOPHEN 5; 325 MG/1; MG/1
1 TABLET ORAL EVERY 4 HOURS PRN
Status: DISCONTINUED | OUTPATIENT
Start: 2021-03-10 | End: 2021-03-11 | Stop reason: HOSPADM

## 2021-03-10 RX ORDER — PANTOPRAZOLE SODIUM 40 MG/1
40 TABLET, DELAYED RELEASE ORAL DAILY
Status: DISCONTINUED | OUTPATIENT
Start: 2021-03-10 | End: 2021-03-11 | Stop reason: HOSPADM

## 2021-03-10 RX ORDER — HYDROCHLOROTHIAZIDE 25 MG/1
25 TABLET ORAL DAILY
Status: DISCONTINUED | OUTPATIENT
Start: 2021-03-10 | End: 2021-03-11 | Stop reason: HOSPADM

## 2021-03-10 RX ORDER — LOSARTAN POTASSIUM 50 MG/1
100 TABLET ORAL DAILY
Status: DISCONTINUED | OUTPATIENT
Start: 2021-03-10 | End: 2021-03-11 | Stop reason: HOSPADM

## 2021-03-10 RX ORDER — ENOXAPARIN SODIUM 100 MG/ML
40 INJECTION SUBCUTANEOUS EVERY 24 HOURS
Status: DISCONTINUED | OUTPATIENT
Start: 2021-03-10 | End: 2021-03-11 | Stop reason: HOSPADM

## 2021-03-10 RX ORDER — NAPROXEN SODIUM 220 MG/1
81 TABLET, FILM COATED ORAL DAILY
Status: DISCONTINUED | OUTPATIENT
Start: 2021-03-11 | End: 2021-03-11 | Stop reason: HOSPADM

## 2021-03-10 RX ORDER — ATORVASTATIN CALCIUM 10 MG/1
20 TABLET, FILM COATED ORAL NIGHTLY
Status: DISCONTINUED | OUTPATIENT
Start: 2021-03-10 | End: 2021-03-11 | Stop reason: HOSPADM

## 2021-03-10 RX ADMIN — CHLORHEXIDINE GLUCONATE 0.12% ORAL RINSE 10 ML: 1.2 LIQUID ORAL at 08:03

## 2021-03-10 RX ADMIN — PANTOPRAZOLE SODIUM 40 MG: 40 TABLET, DELAYED RELEASE ORAL at 04:03

## 2021-03-10 RX ADMIN — HYDROCHLOROTHIAZIDE 25 MG: 25 TABLET ORAL at 04:03

## 2021-03-10 RX ADMIN — FAMOTIDINE 20 MG: 20 INJECTION, SOLUTION INTRAVENOUS at 08:03

## 2021-03-10 RX ADMIN — LOSARTAN POTASSIUM 100 MG: 50 TABLET, FILM COATED ORAL at 04:03

## 2021-03-10 RX ADMIN — ENOXAPARIN SODIUM 40 MG: 40 INJECTION SUBCUTANEOUS at 11:03

## 2021-03-10 RX ADMIN — DIATRIZOATE MEGLUMINE AND DIATRIZOATE SODIUM 120 ML: 660; 100 LIQUID ORAL; RECTAL at 03:03

## 2021-03-10 RX ADMIN — METOPROLOL TARTRATE 50 MG: 50 TABLET, FILM COATED ORAL at 08:03

## 2021-03-10 RX ADMIN — ATORVASTATIN CALCIUM 20 MG: 10 TABLET, FILM COATED ORAL at 08:03

## 2021-03-10 RX ADMIN — MORPHINE SULFATE: 1 INJECTION INTRAVENOUS at 07:03

## 2021-03-10 RX ADMIN — HYDROCODONE BITARTRATE AND ACETAMINOPHEN 1 TABLET: 5; 325 TABLET ORAL at 04:03

## 2021-03-11 VITALS
WEIGHT: 241.19 LBS | SYSTOLIC BLOOD PRESSURE: 145 MMHG | BODY MASS INDEX: 33.77 KG/M2 | TEMPERATURE: 99 F | DIASTOLIC BLOOD PRESSURE: 79 MMHG | RESPIRATION RATE: 18 BRPM | HEART RATE: 66 BPM | OXYGEN SATURATION: 96 % | HEIGHT: 71 IN

## 2021-03-11 PROBLEM — I10 HTN (HYPERTENSION): Status: ACTIVE | Noted: 2021-03-11

## 2021-03-11 LAB — POCT GLUCOSE: 127 MG/DL (ref 70–110)

## 2021-03-11 PROCEDURE — 94761 N-INVAS EAR/PLS OXIMETRY MLT: CPT

## 2021-03-11 PROCEDURE — 25000003 PHARM REV CODE 250: Performed by: SURGERY

## 2021-03-11 PROCEDURE — 97116 GAIT TRAINING THERAPY: CPT

## 2021-03-11 PROCEDURE — 63600175 PHARM REV CODE 636 W HCPCS: Performed by: SURGERY

## 2021-03-11 PROCEDURE — 94799 UNLISTED PULMONARY SVC/PX: CPT

## 2021-03-11 PROCEDURE — 97530 THERAPEUTIC ACTIVITIES: CPT

## 2021-03-11 RX ORDER — HYDROCODONE BITARTRATE AND ACETAMINOPHEN 5; 325 MG/1; MG/1
1 TABLET ORAL EVERY 4 HOURS PRN
Qty: 15 TABLET | Refills: 0 | Status: SHIPPED | OUTPATIENT
Start: 2021-03-11 | End: 2021-04-30

## 2021-03-11 RX ADMIN — HYDROCHLOROTHIAZIDE 25 MG: 25 TABLET ORAL at 09:03

## 2021-03-11 RX ADMIN — CHLORHEXIDINE GLUCONATE 0.12% ORAL RINSE 10 ML: 1.2 LIQUID ORAL at 09:03

## 2021-03-11 RX ADMIN — METOPROLOL TARTRATE 50 MG: 50 TABLET, FILM COATED ORAL at 09:03

## 2021-03-11 RX ADMIN — ASPIRIN 81 MG: 81 TABLET, CHEWABLE ORAL at 09:03

## 2021-03-11 RX ADMIN — PANTOPRAZOLE SODIUM 40 MG: 40 TABLET, DELAYED RELEASE ORAL at 09:03

## 2021-03-11 RX ADMIN — OXYBUTYNIN CHLORIDE 5 MG: 5 TABLET, EXTENDED RELEASE ORAL at 09:03

## 2021-03-11 RX ADMIN — ONDANSETRON 4 MG: 2 INJECTION INTRAMUSCULAR; INTRAVENOUS at 09:03

## 2021-03-11 RX ADMIN — LOSARTAN POTASSIUM 100 MG: 50 TABLET, FILM COATED ORAL at 09:03

## 2021-03-12 ENCOUNTER — PATIENT OUTREACH (OUTPATIENT)
Dept: ADMINISTRATIVE | Facility: CLINIC | Age: 76
End: 2021-03-12

## 2021-03-17 ENCOUNTER — OFFICE VISIT (OUTPATIENT)
Dept: HEMATOLOGY/ONCOLOGY | Facility: CLINIC | Age: 76
End: 2021-03-17
Payer: MEDICARE

## 2021-03-17 VITALS
WEIGHT: 231.69 LBS | HEART RATE: 74 BPM | HEIGHT: 72 IN | BODY MASS INDEX: 31.38 KG/M2 | SYSTOLIC BLOOD PRESSURE: 138 MMHG | OXYGEN SATURATION: 96 % | DIASTOLIC BLOOD PRESSURE: 87 MMHG | TEMPERATURE: 98 F

## 2021-03-17 DIAGNOSIS — C49.A0 GASTROINTESTINAL STROMAL TUMOR (GIST): ICD-10-CM

## 2021-03-17 DIAGNOSIS — D64.9 ANEMIA, UNSPECIFIED TYPE: Primary | ICD-10-CM

## 2021-03-17 LAB
COMMENT: NORMAL
FINAL PATHOLOGIC DIAGNOSIS: NORMAL
FROZEN SECTION DIAGNOSIS: NORMAL
GROSS: NORMAL
Lab: NORMAL
MICROSCOPIC EXAM: NORMAL

## 2021-03-17 PROCEDURE — 3079F PR MOST RECENT DIASTOLIC BLOOD PRESSURE 80-89 MM HG: ICD-10-PCS | Mod: CPTII,S$GLB,, | Performed by: INTERNAL MEDICINE

## 2021-03-17 PROCEDURE — 99499 UNLISTED E&M SERVICE: CPT | Mod: S$GLB,,, | Performed by: INTERNAL MEDICINE

## 2021-03-17 PROCEDURE — 1126F PR PAIN SEVERITY QUANTIFIED, NO PAIN PRESENT: ICD-10-PCS | Mod: S$GLB,,, | Performed by: INTERNAL MEDICINE

## 2021-03-17 PROCEDURE — 1101F PR PT FALLS ASSESS DOC 0-1 FALLS W/OUT INJ PAST YR: ICD-10-PCS | Mod: CPTII,S$GLB,, | Performed by: INTERNAL MEDICINE

## 2021-03-17 PROCEDURE — 3075F SYST BP GE 130 - 139MM HG: CPT | Mod: CPTII,S$GLB,, | Performed by: INTERNAL MEDICINE

## 2021-03-17 PROCEDURE — 3075F PR MOST RECENT SYSTOLIC BLOOD PRESS GE 130-139MM HG: ICD-10-PCS | Mod: CPTII,S$GLB,, | Performed by: INTERNAL MEDICINE

## 2021-03-17 PROCEDURE — 99205 OFFICE O/P NEW HI 60 MIN: CPT | Mod: S$GLB,,, | Performed by: INTERNAL MEDICINE

## 2021-03-17 PROCEDURE — 3288F PR FALLS RISK ASSESSMENT DOCUMENTED: ICD-10-PCS | Mod: CPTII,S$GLB,, | Performed by: INTERNAL MEDICINE

## 2021-03-17 PROCEDURE — 99999 PR PBB SHADOW E&M-EST. PATIENT-LVL IV: ICD-10-PCS | Mod: PBBFAC,,, | Performed by: INTERNAL MEDICINE

## 2021-03-17 PROCEDURE — 1126F AMNT PAIN NOTED NONE PRSNT: CPT | Mod: S$GLB,,, | Performed by: INTERNAL MEDICINE

## 2021-03-17 PROCEDURE — 99499 RISK ADDL DX/OHS AUDIT: ICD-10-PCS | Mod: S$GLB,,, | Performed by: INTERNAL MEDICINE

## 2021-03-17 PROCEDURE — 1159F PR MEDICATION LIST DOCUMENTED IN MEDICAL RECORD: ICD-10-PCS | Mod: S$GLB,,, | Performed by: INTERNAL MEDICINE

## 2021-03-17 PROCEDURE — 3079F DIAST BP 80-89 MM HG: CPT | Mod: CPTII,S$GLB,, | Performed by: INTERNAL MEDICINE

## 2021-03-17 PROCEDURE — 1101F PT FALLS ASSESS-DOCD LE1/YR: CPT | Mod: CPTII,S$GLB,, | Performed by: INTERNAL MEDICINE

## 2021-03-17 PROCEDURE — 99999 PR PBB SHADOW E&M-EST. PATIENT-LVL IV: CPT | Mod: PBBFAC,,, | Performed by: INTERNAL MEDICINE

## 2021-03-17 PROCEDURE — 3288F FALL RISK ASSESSMENT DOCD: CPT | Mod: CPTII,S$GLB,, | Performed by: INTERNAL MEDICINE

## 2021-03-17 PROCEDURE — 99205 PR OFFICE/OUTPT VISIT, NEW, LEVL V, 60-74 MIN: ICD-10-PCS | Mod: S$GLB,,, | Performed by: INTERNAL MEDICINE

## 2021-03-17 PROCEDURE — 1159F MED LIST DOCD IN RCRD: CPT | Mod: S$GLB,,, | Performed by: INTERNAL MEDICINE

## 2021-03-17 RX ORDER — METOPROLOL TARTRATE 50 MG/1
100 TABLET ORAL 2 TIMES DAILY
COMMUNITY
Start: 2021-03-01 | End: 2021-04-30 | Stop reason: SDUPTHER

## 2021-03-18 ENCOUNTER — TELEPHONE (OUTPATIENT)
Dept: SURGERY | Facility: CLINIC | Age: 76
End: 2021-03-18

## 2021-03-24 ENCOUNTER — OFFICE VISIT (OUTPATIENT)
Dept: SURGERY | Facility: CLINIC | Age: 76
End: 2021-03-24
Payer: MEDICARE

## 2021-03-24 VITALS
HEART RATE: 56 BPM | WEIGHT: 234.13 LBS | DIASTOLIC BLOOD PRESSURE: 75 MMHG | BODY MASS INDEX: 31.75 KG/M2 | SYSTOLIC BLOOD PRESSURE: 121 MMHG

## 2021-03-24 DIAGNOSIS — C49.A0 GASTROINTESTINAL STROMAL TUMOR (GIST): Primary | ICD-10-CM

## 2021-03-24 PROCEDURE — 3288F FALL RISK ASSESSMENT DOCD: CPT | Mod: CPTII,S$GLB,, | Performed by: SURGERY

## 2021-03-24 PROCEDURE — 1101F PT FALLS ASSESS-DOCD LE1/YR: CPT | Mod: CPTII,S$GLB,, | Performed by: SURGERY

## 2021-03-24 PROCEDURE — 1101F PR PT FALLS ASSESS DOC 0-1 FALLS W/OUT INJ PAST YR: ICD-10-PCS | Mod: CPTII,S$GLB,, | Performed by: SURGERY

## 2021-03-24 PROCEDURE — 1126F PR PAIN SEVERITY QUANTIFIED, NO PAIN PRESENT: ICD-10-PCS | Mod: S$GLB,,, | Performed by: SURGERY

## 2021-03-24 PROCEDURE — 3288F PR FALLS RISK ASSESSMENT DOCUMENTED: ICD-10-PCS | Mod: CPTII,S$GLB,, | Performed by: SURGERY

## 2021-03-24 PROCEDURE — 1126F AMNT PAIN NOTED NONE PRSNT: CPT | Mod: S$GLB,,, | Performed by: SURGERY

## 2021-03-24 PROCEDURE — 99999 PR PBB SHADOW E&M-EST. PATIENT-LVL III: ICD-10-PCS | Mod: PBBFAC,,, | Performed by: SURGERY

## 2021-03-24 PROCEDURE — 99024 PR POST-OP FOLLOW-UP VISIT: ICD-10-PCS | Mod: S$GLB,,, | Performed by: SURGERY

## 2021-03-24 PROCEDURE — 99999 PR PBB SHADOW E&M-EST. PATIENT-LVL III: CPT | Mod: PBBFAC,,, | Performed by: SURGERY

## 2021-03-24 PROCEDURE — 99024 POSTOP FOLLOW-UP VISIT: CPT | Mod: S$GLB,,, | Performed by: SURGERY

## 2021-04-06 ENCOUNTER — LAB VISIT (OUTPATIENT)
Dept: LAB | Facility: HOSPITAL | Age: 76
End: 2021-04-06
Attending: INTERNAL MEDICINE
Payer: MEDICARE

## 2021-04-06 ENCOUNTER — OFFICE VISIT (OUTPATIENT)
Dept: HEMATOLOGY/ONCOLOGY | Facility: CLINIC | Age: 76
End: 2021-04-06
Payer: MEDICARE

## 2021-04-06 VITALS
OXYGEN SATURATION: 97 % | WEIGHT: 237.19 LBS | DIASTOLIC BLOOD PRESSURE: 98 MMHG | HEIGHT: 71 IN | SYSTOLIC BLOOD PRESSURE: 161 MMHG | TEMPERATURE: 98 F | HEART RATE: 72 BPM | BODY MASS INDEX: 33.21 KG/M2

## 2021-04-06 DIAGNOSIS — C49.A0 GASTROINTESTINAL STROMAL TUMOR (GIST): ICD-10-CM

## 2021-04-06 DIAGNOSIS — D51.8 OTHER VITAMIN B12 DEFICIENCY ANEMIAS: ICD-10-CM

## 2021-04-06 DIAGNOSIS — D64.9 ANEMIA, UNSPECIFIED TYPE: ICD-10-CM

## 2021-04-06 DIAGNOSIS — R73.03 PREDIABETES: ICD-10-CM

## 2021-04-06 DIAGNOSIS — D52.0 DIETARY FOLATE DEFICIENCY ANEMIA: ICD-10-CM

## 2021-04-06 DIAGNOSIS — D75.89 BICYTOPENIA: ICD-10-CM

## 2021-04-06 LAB
ALBUMIN SERPL BCP-MCNC: 4 G/DL (ref 3.5–5.2)
ALP SERPL-CCNC: 71 U/L (ref 55–135)
ALT SERPL W/O P-5'-P-CCNC: 32 U/L (ref 10–44)
ANION GAP SERPL CALC-SCNC: 10 MMOL/L (ref 8–16)
AST SERPL-CCNC: 19 U/L (ref 10–40)
BASOPHILS # BLD AUTO: 0.04 K/UL (ref 0–0.2)
BASOPHILS NFR BLD: 0.7 % (ref 0–1.9)
BILIRUB SERPL-MCNC: 0.9 MG/DL (ref 0.1–1)
BUN SERPL-MCNC: 24 MG/DL (ref 8–23)
CALCIUM SERPL-MCNC: 8.6 MG/DL (ref 8.7–10.5)
CHLORIDE SERPL-SCNC: 107 MMOL/L (ref 95–110)
CO2 SERPL-SCNC: 26 MMOL/L (ref 23–29)
CREAT SERPL-MCNC: 1.2 MG/DL (ref 0.5–1.4)
DIFFERENTIAL METHOD: ABNORMAL
EOSINOPHIL # BLD AUTO: 0.2 K/UL (ref 0–0.5)
EOSINOPHIL NFR BLD: 3.2 % (ref 0–8)
ERYTHROCYTE [DISTWIDTH] IN BLOOD BY AUTOMATED COUNT: 13.2 % (ref 11.5–14.5)
EST. GFR  (AFRICAN AMERICAN): >60 ML/MIN/1.73 M^2
EST. GFR  (NON AFRICAN AMERICAN): 58 ML/MIN/1.73 M^2
GLUCOSE SERPL-MCNC: 120 MG/DL (ref 70–110)
HCT VFR BLD AUTO: 42.4 % (ref 40–54)
HGB BLD-MCNC: 13.7 G/DL (ref 14–18)
IMM GRANULOCYTES # BLD AUTO: 0.04 K/UL (ref 0–0.04)
IMM GRANULOCYTES NFR BLD AUTO: 0.7 % (ref 0–0.5)
LYMPHOCYTES # BLD AUTO: 1.7 K/UL (ref 1–4.8)
LYMPHOCYTES NFR BLD: 29.6 % (ref 18–48)
MCH RBC QN AUTO: 29.1 PG (ref 27–31)
MCHC RBC AUTO-ENTMCNC: 32.3 G/DL (ref 32–36)
MCV RBC AUTO: 90 FL (ref 82–98)
MONOCYTES # BLD AUTO: 0.5 K/UL (ref 0.3–1)
MONOCYTES NFR BLD: 8.5 % (ref 4–15)
NEUTROPHILS # BLD AUTO: 3.3 K/UL (ref 1.8–7.7)
NEUTROPHILS NFR BLD: 57.3 % (ref 38–73)
NRBC BLD-RTO: 0 /100 WBC
PLATELET # BLD AUTO: 136 K/UL (ref 150–450)
PMV BLD AUTO: 10.5 FL (ref 9.2–12.9)
POTASSIUM SERPL-SCNC: 4.4 MMOL/L (ref 3.5–5.1)
PROT SERPL-MCNC: 6.4 G/DL (ref 6–8.4)
RBC # BLD AUTO: 4.71 M/UL (ref 4.6–6.2)
SODIUM SERPL-SCNC: 143 MMOL/L (ref 136–145)
WBC # BLD AUTO: 5.67 K/UL (ref 3.9–12.7)

## 2021-04-06 PROCEDURE — 1126F PR PAIN SEVERITY QUANTIFIED, NO PAIN PRESENT: ICD-10-PCS | Mod: S$GLB,,, | Performed by: INTERNAL MEDICINE

## 2021-04-06 PROCEDURE — 3288F PR FALLS RISK ASSESSMENT DOCUMENTED: ICD-10-PCS | Mod: CPTII,S$GLB,, | Performed by: INTERNAL MEDICINE

## 2021-04-06 PROCEDURE — 3080F DIAST BP >= 90 MM HG: CPT | Mod: CPTII,S$GLB,, | Performed by: INTERNAL MEDICINE

## 2021-04-06 PROCEDURE — 1159F PR MEDICATION LIST DOCUMENTED IN MEDICAL RECORD: ICD-10-PCS | Mod: S$GLB,,, | Performed by: INTERNAL MEDICINE

## 2021-04-06 PROCEDURE — 36415 COLL VENOUS BLD VENIPUNCTURE: CPT | Performed by: INTERNAL MEDICINE

## 2021-04-06 PROCEDURE — 99999 PR PBB SHADOW E&M-EST. PATIENT-LVL III: ICD-10-PCS | Mod: PBBFAC,,, | Performed by: INTERNAL MEDICINE

## 2021-04-06 PROCEDURE — 99215 PR OFFICE/OUTPT VISIT, EST, LEVL V, 40-54 MIN: ICD-10-PCS | Mod: S$GLB,,, | Performed by: INTERNAL MEDICINE

## 2021-04-06 PROCEDURE — 3077F SYST BP >= 140 MM HG: CPT | Mod: CPTII,S$GLB,, | Performed by: INTERNAL MEDICINE

## 2021-04-06 PROCEDURE — 1126F AMNT PAIN NOTED NONE PRSNT: CPT | Mod: S$GLB,,, | Performed by: INTERNAL MEDICINE

## 2021-04-06 PROCEDURE — 99999 PR PBB SHADOW E&M-EST. PATIENT-LVL III: CPT | Mod: PBBFAC,,, | Performed by: INTERNAL MEDICINE

## 2021-04-06 PROCEDURE — 3080F PR MOST RECENT DIASTOLIC BLOOD PRESSURE >= 90 MM HG: ICD-10-PCS | Mod: CPTII,S$GLB,, | Performed by: INTERNAL MEDICINE

## 2021-04-06 PROCEDURE — 1101F PR PT FALLS ASSESS DOC 0-1 FALLS W/OUT INJ PAST YR: ICD-10-PCS | Mod: CPTII,S$GLB,, | Performed by: INTERNAL MEDICINE

## 2021-04-06 PROCEDURE — 80053 COMPREHEN METABOLIC PANEL: CPT | Performed by: INTERNAL MEDICINE

## 2021-04-06 PROCEDURE — 3077F PR MOST RECENT SYSTOLIC BLOOD PRESSURE >= 140 MM HG: ICD-10-PCS | Mod: CPTII,S$GLB,, | Performed by: INTERNAL MEDICINE

## 2021-04-06 PROCEDURE — 99215 OFFICE O/P EST HI 40 MIN: CPT | Mod: S$GLB,,, | Performed by: INTERNAL MEDICINE

## 2021-04-06 PROCEDURE — 1159F MED LIST DOCD IN RCRD: CPT | Mod: S$GLB,,, | Performed by: INTERNAL MEDICINE

## 2021-04-06 PROCEDURE — 85025 COMPLETE CBC W/AUTO DIFF WBC: CPT | Performed by: INTERNAL MEDICINE

## 2021-04-06 PROCEDURE — 1101F PT FALLS ASSESS-DOCD LE1/YR: CPT | Mod: CPTII,S$GLB,, | Performed by: INTERNAL MEDICINE

## 2021-04-06 PROCEDURE — 3288F FALL RISK ASSESSMENT DOCD: CPT | Mod: CPTII,S$GLB,, | Performed by: INTERNAL MEDICINE

## 2021-04-12 ENCOUNTER — TELEPHONE (OUTPATIENT)
Dept: HEMATOLOGY/ONCOLOGY | Facility: CLINIC | Age: 76
End: 2021-04-12

## 2021-04-30 ENCOUNTER — OFFICE VISIT (OUTPATIENT)
Dept: INTERNAL MEDICINE | Facility: CLINIC | Age: 76
End: 2021-04-30
Payer: MEDICARE

## 2021-04-30 VITALS
OXYGEN SATURATION: 97 % | DIASTOLIC BLOOD PRESSURE: 88 MMHG | SYSTOLIC BLOOD PRESSURE: 130 MMHG | BODY MASS INDEX: 33.24 KG/M2 | HEART RATE: 68 BPM | WEIGHT: 237.44 LBS | HEIGHT: 71 IN

## 2021-04-30 DIAGNOSIS — Z00.00 ROUTINE GENERAL MEDICAL EXAMINATION AT A HEALTH CARE FACILITY: Primary | ICD-10-CM

## 2021-04-30 DIAGNOSIS — D69.6 THROMBOCYTOPENIA: ICD-10-CM

## 2021-04-30 DIAGNOSIS — E11.69 HYPERLIPIDEMIA ASSOCIATED WITH TYPE 2 DIABETES MELLITUS: ICD-10-CM

## 2021-04-30 DIAGNOSIS — Z86.718 HISTORY OF DVT (DEEP VEIN THROMBOSIS): ICD-10-CM

## 2021-04-30 DIAGNOSIS — E11.59 HYPERTENSION ASSOCIATED WITH DIABETES: ICD-10-CM

## 2021-04-30 DIAGNOSIS — N18.31 STAGE 3A CHRONIC KIDNEY DISEASE: ICD-10-CM

## 2021-04-30 DIAGNOSIS — E78.5 HYPERLIPIDEMIA ASSOCIATED WITH TYPE 2 DIABETES MELLITUS: ICD-10-CM

## 2021-04-30 DIAGNOSIS — E11.8 DM (DIABETES MELLITUS) WITH COMPLICATIONS: ICD-10-CM

## 2021-04-30 DIAGNOSIS — I15.2 HYPERTENSION ASSOCIATED WITH DIABETES: ICD-10-CM

## 2021-04-30 DIAGNOSIS — N18.32 TYPE 2 DIABETES MELLITUS WITH STAGE 3B CHRONIC KIDNEY DISEASE, WITHOUT LONG-TERM CURRENT USE OF INSULIN: ICD-10-CM

## 2021-04-30 DIAGNOSIS — Z85.528 HISTORY OF RENAL CELL CARCINOMA: ICD-10-CM

## 2021-04-30 DIAGNOSIS — E11.22 TYPE 2 DIABETES MELLITUS WITH STAGE 3B CHRONIC KIDNEY DISEASE, WITHOUT LONG-TERM CURRENT USE OF INSULIN: ICD-10-CM

## 2021-04-30 PROCEDURE — 1126F AMNT PAIN NOTED NONE PRSNT: CPT | Mod: S$GLB,,, | Performed by: INTERNAL MEDICINE

## 2021-04-30 PROCEDURE — 3288F PR FALLS RISK ASSESSMENT DOCUMENTED: ICD-10-PCS | Mod: CPTII,S$GLB,, | Performed by: INTERNAL MEDICINE

## 2021-04-30 PROCEDURE — 99999 PR PBB SHADOW E&M-EST. PATIENT-LVL III: ICD-10-PCS | Mod: PBBFAC,,, | Performed by: INTERNAL MEDICINE

## 2021-04-30 PROCEDURE — 99499 RISK ADDL DX/OHS AUDIT: ICD-10-PCS | Mod: S$PBB,,, | Performed by: INTERNAL MEDICINE

## 2021-04-30 PROCEDURE — 1101F PT FALLS ASSESS-DOCD LE1/YR: CPT | Mod: CPTII,S$GLB,, | Performed by: INTERNAL MEDICINE

## 2021-04-30 PROCEDURE — 3288F FALL RISK ASSESSMENT DOCD: CPT | Mod: CPTII,S$GLB,, | Performed by: INTERNAL MEDICINE

## 2021-04-30 PROCEDURE — 1126F PR PAIN SEVERITY QUANTIFIED, NO PAIN PRESENT: ICD-10-PCS | Mod: S$GLB,,, | Performed by: INTERNAL MEDICINE

## 2021-04-30 PROCEDURE — 1101F PR PT FALLS ASSESS DOC 0-1 FALLS W/OUT INJ PAST YR: ICD-10-PCS | Mod: CPTII,S$GLB,, | Performed by: INTERNAL MEDICINE

## 2021-04-30 PROCEDURE — 99387 PR PREVENTIVE VISIT,NEW,65 & OVER: ICD-10-PCS | Mod: S$GLB,,, | Performed by: INTERNAL MEDICINE

## 2021-04-30 PROCEDURE — 99499 UNLISTED E&M SERVICE: CPT | Mod: S$PBB,,, | Performed by: INTERNAL MEDICINE

## 2021-04-30 PROCEDURE — 99387 INIT PM E/M NEW PAT 65+ YRS: CPT | Mod: S$GLB,,, | Performed by: INTERNAL MEDICINE

## 2021-04-30 PROCEDURE — 99999 PR PBB SHADOW E&M-EST. PATIENT-LVL III: CPT | Mod: PBBFAC,,, | Performed by: INTERNAL MEDICINE

## 2021-04-30 RX ORDER — LOSARTAN POTASSIUM AND HYDROCHLOROTHIAZIDE 12.5; 1 MG/1; MG/1
1 TABLET ORAL DAILY
Qty: 90 TABLET | Refills: 3 | Status: SHIPPED | OUTPATIENT
Start: 2021-04-30 | End: 2021-07-06 | Stop reason: SDUPTHER

## 2021-04-30 RX ORDER — ATORVASTATIN CALCIUM 20 MG/1
20 TABLET, FILM COATED ORAL NIGHTLY
Qty: 90 TABLET | Refills: 3 | Status: SHIPPED | OUTPATIENT
Start: 2021-04-30 | End: 2021-06-14 | Stop reason: SDUPTHER

## 2021-04-30 RX ORDER — METFORMIN HYDROCHLORIDE 500 MG/1
500 TABLET ORAL
Qty: 90 TABLET | Refills: 3 | Status: SHIPPED | OUTPATIENT
Start: 2021-04-30 | End: 2022-05-02 | Stop reason: SDUPTHER

## 2021-04-30 RX ORDER — PANTOPRAZOLE SODIUM 40 MG/1
40 TABLET, DELAYED RELEASE ORAL DAILY
Qty: 90 TABLET | Refills: 3 | Status: SHIPPED | OUTPATIENT
Start: 2021-04-30 | End: 2021-06-01 | Stop reason: SDUPTHER

## 2021-04-30 RX ORDER — OXYBUTYNIN CHLORIDE 5 MG/1
5 TABLET, EXTENDED RELEASE ORAL DAILY
Qty: 90 TABLET | Refills: 3 | Status: SHIPPED | OUTPATIENT
Start: 2021-04-30 | End: 2023-05-10

## 2021-04-30 RX ORDER — METOPROLOL TARTRATE 50 MG/1
100 TABLET ORAL 2 TIMES DAILY
Qty: 90 TABLET | Refills: 3 | Status: SHIPPED | OUTPATIENT
Start: 2021-04-30 | End: 2021-11-29 | Stop reason: SDUPTHER

## 2021-05-04 ENCOUNTER — TELEPHONE (OUTPATIENT)
Dept: HEMATOLOGY/ONCOLOGY | Facility: CLINIC | Age: 76
End: 2021-05-04

## 2021-05-04 ENCOUNTER — LAB VISIT (OUTPATIENT)
Dept: LAB | Facility: HOSPITAL | Age: 76
End: 2021-05-04
Attending: INTERNAL MEDICINE
Payer: MEDICARE

## 2021-05-04 DIAGNOSIS — E11.8 DM (DIABETES MELLITUS) WITH COMPLICATIONS: ICD-10-CM

## 2021-05-04 LAB
BASOPHILS # BLD AUTO: 0.06 K/UL (ref 0–0.2)
BASOPHILS NFR BLD: 0.8 % (ref 0–1.9)
DIFFERENTIAL METHOD: ABNORMAL
EOSINOPHIL # BLD AUTO: 1 K/UL (ref 0–0.5)
EOSINOPHIL NFR BLD: 13.8 % (ref 0–8)
ERYTHROCYTE [DISTWIDTH] IN BLOOD BY AUTOMATED COUNT: 13.6 % (ref 11.5–14.5)
HCT VFR BLD AUTO: 42.8 % (ref 40–54)
HGB BLD-MCNC: 13.8 G/DL (ref 14–18)
IMM GRANULOCYTES # BLD AUTO: 0.05 K/UL (ref 0–0.04)
IMM GRANULOCYTES NFR BLD AUTO: 0.7 % (ref 0–0.5)
LYMPHOCYTES # BLD AUTO: 1.8 K/UL (ref 1–4.8)
LYMPHOCYTES NFR BLD: 25.1 % (ref 18–48)
MCH RBC QN AUTO: 29.1 PG (ref 27–31)
MCHC RBC AUTO-ENTMCNC: 32.2 G/DL (ref 32–36)
MCV RBC AUTO: 90 FL (ref 82–98)
MONOCYTES # BLD AUTO: 0.6 K/UL (ref 0.3–1)
MONOCYTES NFR BLD: 8.2 % (ref 4–15)
NEUTROPHILS # BLD AUTO: 3.7 K/UL (ref 1.8–7.7)
NEUTROPHILS NFR BLD: 51.4 % (ref 38–73)
NRBC BLD-RTO: 0 /100 WBC
PLATELET # BLD AUTO: 142 K/UL (ref 150–450)
PMV BLD AUTO: 11.9 FL (ref 9.2–12.9)
RBC # BLD AUTO: 4.74 M/UL (ref 4.6–6.2)
WBC # BLD AUTO: 7.2 K/UL (ref 3.9–12.7)

## 2021-05-04 PROCEDURE — 80061 LIPID PANEL: CPT | Performed by: INTERNAL MEDICINE

## 2021-05-04 PROCEDURE — 80053 COMPREHEN METABOLIC PANEL: CPT | Performed by: INTERNAL MEDICINE

## 2021-05-04 PROCEDURE — 36415 COLL VENOUS BLD VENIPUNCTURE: CPT | Mod: PO | Performed by: INTERNAL MEDICINE

## 2021-05-04 PROCEDURE — 84443 ASSAY THYROID STIM HORMONE: CPT | Performed by: INTERNAL MEDICINE

## 2021-05-04 PROCEDURE — 83036 HEMOGLOBIN GLYCOSYLATED A1C: CPT | Performed by: INTERNAL MEDICINE

## 2021-05-04 PROCEDURE — 85025 COMPLETE CBC W/AUTO DIFF WBC: CPT | Performed by: INTERNAL MEDICINE

## 2021-05-05 LAB
ALBUMIN SERPL BCP-MCNC: 3.7 G/DL (ref 3.5–5.2)
ALP SERPL-CCNC: 70 U/L (ref 55–135)
ALT SERPL W/O P-5'-P-CCNC: 24 U/L (ref 10–44)
ANION GAP SERPL CALC-SCNC: 8 MMOL/L (ref 8–16)
AST SERPL-CCNC: 18 U/L (ref 10–40)
BILIRUB SERPL-MCNC: 0.9 MG/DL (ref 0.1–1)
BUN SERPL-MCNC: 20 MG/DL (ref 8–23)
CALCIUM SERPL-MCNC: 9.3 MG/DL (ref 8.7–10.5)
CHLORIDE SERPL-SCNC: 107 MMOL/L (ref 95–110)
CHOLEST SERPL-MCNC: 83 MG/DL (ref 120–199)
CHOLEST/HDLC SERPL: 4.2 {RATIO} (ref 2–5)
CO2 SERPL-SCNC: 27 MMOL/L (ref 23–29)
CREAT SERPL-MCNC: 1.3 MG/DL (ref 0.5–1.4)
EST. GFR  (AFRICAN AMERICAN): >60 ML/MIN/1.73 M^2
EST. GFR  (NON AFRICAN AMERICAN): 52.6 ML/MIN/1.73 M^2
ESTIMATED AVG GLUCOSE: 140 MG/DL (ref 68–131)
GLUCOSE SERPL-MCNC: 137 MG/DL (ref 70–110)
HBA1C MFR BLD: 6.5 % (ref 4–5.6)
HDLC SERPL-MCNC: 20 MG/DL (ref 40–75)
HDLC SERPL: 24.1 % (ref 20–50)
LDLC SERPL CALC-MCNC: 36.2 MG/DL (ref 63–159)
NONHDLC SERPL-MCNC: 63 MG/DL
POTASSIUM SERPL-SCNC: 4.5 MMOL/L (ref 3.5–5.1)
PROT SERPL-MCNC: 6.4 G/DL (ref 6–8.4)
SODIUM SERPL-SCNC: 142 MMOL/L (ref 136–145)
TRIGL SERPL-MCNC: 134 MG/DL (ref 30–150)
TSH SERPL DL<=0.005 MIU/L-ACNC: 1.79 UIU/ML (ref 0.4–4)

## 2021-05-10 ENCOUNTER — TELEPHONE (OUTPATIENT)
Dept: INTERNAL MEDICINE | Facility: CLINIC | Age: 76
End: 2021-05-10

## 2021-06-01 RX ORDER — PANTOPRAZOLE SODIUM 40 MG/1
40 TABLET, DELAYED RELEASE ORAL DAILY
Qty: 90 TABLET | Refills: 3 | Status: SHIPPED | OUTPATIENT
Start: 2021-06-01 | End: 2022-05-02 | Stop reason: SDUPTHER

## 2021-06-15 RX ORDER — ATORVASTATIN CALCIUM 20 MG/1
20 TABLET, FILM COATED ORAL NIGHTLY
Qty: 90 TABLET | Refills: 3 | Status: SHIPPED | OUTPATIENT
Start: 2021-06-15 | End: 2022-06-23

## 2021-07-02 ENCOUNTER — TELEPHONE (OUTPATIENT)
Dept: RADIOLOGY | Facility: HOSPITAL | Age: 76
End: 2021-07-02

## 2021-07-06 ENCOUNTER — HOSPITAL ENCOUNTER (OUTPATIENT)
Dept: RADIOLOGY | Facility: HOSPITAL | Age: 76
Discharge: HOME | End: 2021-07-06
Attending: INTERNAL MEDICINE
Payer: MEDICARE

## 2021-07-06 DIAGNOSIS — C49.A0 GASTROINTESTINAL STROMAL TUMOR (GIST): ICD-10-CM

## 2021-07-06 DIAGNOSIS — D75.89 BICYTOPENIA: ICD-10-CM

## 2021-07-06 PROCEDURE — 74177 CT ABD & PELVIS W/CONTRAST: CPT | Mod: TC

## 2021-07-06 PROCEDURE — 74177 CT ABD & PELVIS W/CONTRAST: CPT | Mod: 26,,, | Performed by: RADIOLOGY

## 2021-07-06 PROCEDURE — 25500020 PHARM REV CODE 255: Performed by: INTERNAL MEDICINE

## 2021-07-06 PROCEDURE — 74177 CT ABDOMEN PELVIS WITH CONTRAST: ICD-10-PCS | Mod: 26,,, | Performed by: RADIOLOGY

## 2021-07-06 RX ORDER — LOSARTAN POTASSIUM AND HYDROCHLOROTHIAZIDE 12.5; 1 MG/1; MG/1
1 TABLET ORAL DAILY
Qty: 90 TABLET | Refills: 3 | Status: SHIPPED | OUTPATIENT
Start: 2021-07-06 | End: 2022-05-02 | Stop reason: SDUPTHER

## 2021-07-06 RX ADMIN — IOHEXOL 100 ML: 350 INJECTION, SOLUTION INTRAVENOUS at 09:07

## 2021-07-06 RX ADMIN — IOHEXOL 30 ML: 350 INJECTION, SOLUTION INTRAVENOUS at 07:07

## 2021-07-08 ENCOUNTER — OFFICE VISIT (OUTPATIENT)
Dept: HEMATOLOGY/ONCOLOGY | Facility: CLINIC | Age: 76
End: 2021-07-08
Payer: MEDICARE

## 2021-07-08 VITALS
DIASTOLIC BLOOD PRESSURE: 81 MMHG | HEIGHT: 72 IN | OXYGEN SATURATION: 97 % | BODY MASS INDEX: 32.13 KG/M2 | SYSTOLIC BLOOD PRESSURE: 137 MMHG | HEART RATE: 77 BPM | RESPIRATION RATE: 18 BRPM | WEIGHT: 237.19 LBS | TEMPERATURE: 98 F

## 2021-07-08 DIAGNOSIS — D69.6 THROMBOCYTOPENIA: ICD-10-CM

## 2021-07-08 DIAGNOSIS — C49.A0 GASTROINTESTINAL STROMAL TUMOR (GIST): ICD-10-CM

## 2021-07-08 PROCEDURE — 99499 UNLISTED E&M SERVICE: CPT | Mod: S$PBB,HCNC,, | Performed by: INTERNAL MEDICINE

## 2021-07-08 PROCEDURE — 3288F FALL RISK ASSESSMENT DOCD: CPT | Mod: CPTII,S$GLB,, | Performed by: INTERNAL MEDICINE

## 2021-07-08 PROCEDURE — 1126F PR PAIN SEVERITY QUANTIFIED, NO PAIN PRESENT: ICD-10-PCS | Mod: S$GLB,,, | Performed by: INTERNAL MEDICINE

## 2021-07-08 PROCEDURE — 1126F AMNT PAIN NOTED NONE PRSNT: CPT | Mod: S$GLB,,, | Performed by: INTERNAL MEDICINE

## 2021-07-08 PROCEDURE — 99215 OFFICE O/P EST HI 40 MIN: CPT | Mod: S$GLB,,, | Performed by: INTERNAL MEDICINE

## 2021-07-08 PROCEDURE — 1159F MED LIST DOCD IN RCRD: CPT | Mod: S$GLB,,, | Performed by: INTERNAL MEDICINE

## 2021-07-08 PROCEDURE — 1159F PR MEDICATION LIST DOCUMENTED IN MEDICAL RECORD: ICD-10-PCS | Mod: S$GLB,,, | Performed by: INTERNAL MEDICINE

## 2021-07-08 PROCEDURE — 99999 PR PBB SHADOW E&M-EST. PATIENT-LVL III: ICD-10-PCS | Mod: PBBFAC,,, | Performed by: INTERNAL MEDICINE

## 2021-07-08 PROCEDURE — 1101F PT FALLS ASSESS-DOCD LE1/YR: CPT | Mod: CPTII,S$GLB,, | Performed by: INTERNAL MEDICINE

## 2021-07-08 PROCEDURE — 99215 PR OFFICE/OUTPT VISIT, EST, LEVL V, 40-54 MIN: ICD-10-PCS | Mod: S$GLB,,, | Performed by: INTERNAL MEDICINE

## 2021-07-08 PROCEDURE — 3288F PR FALLS RISK ASSESSMENT DOCUMENTED: ICD-10-PCS | Mod: CPTII,S$GLB,, | Performed by: INTERNAL MEDICINE

## 2021-07-08 PROCEDURE — 99999 PR PBB SHADOW E&M-EST. PATIENT-LVL III: CPT | Mod: PBBFAC,,, | Performed by: INTERNAL MEDICINE

## 2021-07-08 PROCEDURE — 1101F PR PT FALLS ASSESS DOC 0-1 FALLS W/OUT INJ PAST YR: ICD-10-PCS | Mod: CPTII,S$GLB,, | Performed by: INTERNAL MEDICINE

## 2021-07-08 PROCEDURE — 99499 RISK ADDL DX/OHS AUDIT: ICD-10-PCS | Mod: S$PBB,HCNC,, | Performed by: INTERNAL MEDICINE

## 2021-10-11 ENCOUNTER — TELEPHONE (OUTPATIENT)
Dept: INTERNAL MEDICINE | Facility: CLINIC | Age: 76
End: 2021-10-11

## 2021-10-11 DIAGNOSIS — E11.9 DIABETES MELLITUS WITHOUT COMPLICATION: Primary | ICD-10-CM

## 2021-10-19 ENCOUNTER — HOSPITAL ENCOUNTER (OUTPATIENT)
Dept: RADIOLOGY | Facility: HOSPITAL | Age: 76
Discharge: HOME | End: 2021-10-19
Attending: INTERNAL MEDICINE
Payer: MEDICARE

## 2021-10-19 DIAGNOSIS — D69.6 THROMBOCYTOPENIA: ICD-10-CM

## 2021-10-19 DIAGNOSIS — C49.A0 GASTROINTESTINAL STROMAL TUMOR (GIST): ICD-10-CM

## 2021-10-19 PROCEDURE — 25500020 PHARM REV CODE 255: Mod: HCNC | Performed by: INTERNAL MEDICINE

## 2021-10-19 PROCEDURE — A9698 NON-RAD CONTRAST MATERIALNOC: HCPCS | Mod: HCNC | Performed by: INTERNAL MEDICINE

## 2021-10-19 PROCEDURE — 74177 CT ABD & PELVIS W/CONTRAST: CPT | Mod: TC,HCNC

## 2021-10-19 RX ADMIN — IOHEXOL 100 ML: 350 INJECTION, SOLUTION INTRAVENOUS at 10:10

## 2021-10-19 RX ADMIN — IOHEXOL 1000 ML: 9 SOLUTION ORAL at 10:10

## 2021-10-21 ENCOUNTER — OFFICE VISIT (OUTPATIENT)
Dept: HEMATOLOGY/ONCOLOGY | Facility: CLINIC | Age: 76
End: 2021-10-21
Payer: MEDICARE

## 2021-10-21 VITALS
WEIGHT: 232.81 LBS | SYSTOLIC BLOOD PRESSURE: 147 MMHG | HEART RATE: 97 BPM | DIASTOLIC BLOOD PRESSURE: 53 MMHG | TEMPERATURE: 97 F | OXYGEN SATURATION: 97 % | HEIGHT: 72 IN | BODY MASS INDEX: 31.53 KG/M2

## 2021-10-21 DIAGNOSIS — C49.A0 GASTROINTESTINAL STROMAL TUMOR (GIST): ICD-10-CM

## 2021-10-21 DIAGNOSIS — D69.6 THROMBOCYTOPENIA: ICD-10-CM

## 2021-10-21 PROCEDURE — 99499 RISK ADDL DX/OHS AUDIT: ICD-10-PCS | Mod: S$GLB,,, | Performed by: INTERNAL MEDICINE

## 2021-10-21 PROCEDURE — 99999 PR PBB SHADOW E&M-EST. PATIENT-LVL III: ICD-10-PCS | Mod: PBBFAC,HCNC,, | Performed by: INTERNAL MEDICINE

## 2021-10-21 PROCEDURE — 99215 OFFICE O/P EST HI 40 MIN: CPT | Mod: HCNC,S$GLB,, | Performed by: INTERNAL MEDICINE

## 2021-10-21 PROCEDURE — 99999 PR PBB SHADOW E&M-EST. PATIENT-LVL III: CPT | Mod: PBBFAC,HCNC,, | Performed by: INTERNAL MEDICINE

## 2021-10-21 PROCEDURE — 99215 PR OFFICE/OUTPT VISIT, EST, LEVL V, 40-54 MIN: ICD-10-PCS | Mod: HCNC,S$GLB,, | Performed by: INTERNAL MEDICINE

## 2021-10-21 PROCEDURE — 99499 UNLISTED E&M SERVICE: CPT | Mod: S$GLB,,, | Performed by: INTERNAL MEDICINE

## 2021-10-25 ENCOUNTER — LAB VISIT (OUTPATIENT)
Dept: LAB | Facility: HOSPITAL | Age: 76
End: 2021-10-25
Attending: INTERNAL MEDICINE
Payer: MEDICARE

## 2021-10-25 DIAGNOSIS — E11.9 DIABETES MELLITUS WITHOUT COMPLICATION: ICD-10-CM

## 2021-10-25 LAB
BASOPHILS # BLD AUTO: 0.04 K/UL (ref 0–0.2)
BASOPHILS NFR BLD: 0.7 % (ref 0–1.9)
CHOLEST SERPL-MCNC: 97 MG/DL (ref 120–199)
CHOLEST/HDLC SERPL: 4.2 {RATIO} (ref 2–5)
DIFFERENTIAL METHOD: ABNORMAL
EOSINOPHIL # BLD AUTO: 0.1 K/UL (ref 0–0.5)
EOSINOPHIL NFR BLD: 1.3 % (ref 0–8)
ERYTHROCYTE [DISTWIDTH] IN BLOOD BY AUTOMATED COUNT: 13.8 % (ref 11.5–14.5)
ESTIMATED AVG GLUCOSE: 143 MG/DL (ref 68–131)
HBA1C MFR BLD: 6.6 % (ref 4–5.6)
HCT VFR BLD AUTO: 45.7 % (ref 40–54)
HDLC SERPL-MCNC: 23 MG/DL (ref 40–75)
HDLC SERPL: 23.7 % (ref 20–50)
HGB BLD-MCNC: 14.6 G/DL (ref 14–18)
IMM GRANULOCYTES # BLD AUTO: 0.03 K/UL (ref 0–0.04)
IMM GRANULOCYTES NFR BLD AUTO: 0.5 % (ref 0–0.5)
LDLC SERPL CALC-MCNC: 42.4 MG/DL (ref 63–159)
LYMPHOCYTES # BLD AUTO: 0.9 K/UL (ref 1–4.8)
LYMPHOCYTES NFR BLD: 14.7 % (ref 18–48)
MCH RBC QN AUTO: 29.4 PG (ref 27–31)
MCHC RBC AUTO-ENTMCNC: 31.9 G/DL (ref 32–36)
MCV RBC AUTO: 92 FL (ref 82–98)
MONOCYTES # BLD AUTO: 0.7 K/UL (ref 0.3–1)
MONOCYTES NFR BLD: 10.9 % (ref 4–15)
NEUTROPHILS # BLD AUTO: 4.3 K/UL (ref 1.8–7.7)
NEUTROPHILS NFR BLD: 71.9 % (ref 38–73)
NONHDLC SERPL-MCNC: 74 MG/DL
NRBC BLD-RTO: 0 /100 WBC
PLATELET # BLD AUTO: 139 K/UL (ref 150–450)
PMV BLD AUTO: 11.6 FL (ref 9.2–12.9)
RBC # BLD AUTO: 4.96 M/UL (ref 4.6–6.2)
TRIGL SERPL-MCNC: 158 MG/DL (ref 30–150)
TSH SERPL DL<=0.005 MIU/L-ACNC: 2.72 UIU/ML (ref 0.4–4)
WBC # BLD AUTO: 5.99 K/UL (ref 3.9–12.7)

## 2021-10-25 PROCEDURE — 83036 HEMOGLOBIN GLYCOSYLATED A1C: CPT | Mod: HCNC | Performed by: INTERNAL MEDICINE

## 2021-10-25 PROCEDURE — 80061 LIPID PANEL: CPT | Mod: HCNC | Performed by: INTERNAL MEDICINE

## 2021-10-25 PROCEDURE — 84443 ASSAY THYROID STIM HORMONE: CPT | Mod: HCNC | Performed by: INTERNAL MEDICINE

## 2021-10-25 PROCEDURE — 85025 COMPLETE CBC W/AUTO DIFF WBC: CPT | Mod: HCNC | Performed by: INTERNAL MEDICINE

## 2021-10-25 PROCEDURE — 36415 COLL VENOUS BLD VENIPUNCTURE: CPT | Mod: HCNC,PO | Performed by: INTERNAL MEDICINE

## 2021-11-01 ENCOUNTER — OFFICE VISIT (OUTPATIENT)
Dept: INTERNAL MEDICINE | Facility: CLINIC | Age: 76
End: 2021-11-01
Payer: MEDICARE

## 2021-11-01 VITALS
OXYGEN SATURATION: 97 % | WEIGHT: 240.06 LBS | DIASTOLIC BLOOD PRESSURE: 78 MMHG | HEART RATE: 64 BPM | SYSTOLIC BLOOD PRESSURE: 138 MMHG | HEIGHT: 72 IN | BODY MASS INDEX: 32.52 KG/M2

## 2021-11-01 DIAGNOSIS — E11.8 DM (DIABETES MELLITUS) WITH COMPLICATIONS: ICD-10-CM

## 2021-11-01 DIAGNOSIS — I44.0 FIRST DEGREE AV BLOCK: ICD-10-CM

## 2021-11-01 DIAGNOSIS — C49.A0 GASTROINTESTINAL STROMAL TUMOR (GIST): ICD-10-CM

## 2021-11-01 DIAGNOSIS — N18.31 STAGE 3A CHRONIC KIDNEY DISEASE: ICD-10-CM

## 2021-11-01 DIAGNOSIS — E11.69 HYPERLIPIDEMIA ASSOCIATED WITH TYPE 2 DIABETES MELLITUS: Primary | ICD-10-CM

## 2021-11-01 DIAGNOSIS — E11.22 TYPE 2 DIABETES MELLITUS WITH STAGE 3B CHRONIC KIDNEY DISEASE, WITHOUT LONG-TERM CURRENT USE OF INSULIN: ICD-10-CM

## 2021-11-01 DIAGNOSIS — Z85.528 HISTORY OF RENAL CELL CARCINOMA: ICD-10-CM

## 2021-11-01 DIAGNOSIS — Z86.718 HISTORY OF DVT (DEEP VEIN THROMBOSIS): ICD-10-CM

## 2021-11-01 DIAGNOSIS — E11.59 HYPERTENSION ASSOCIATED WITH DIABETES: ICD-10-CM

## 2021-11-01 DIAGNOSIS — N18.32 TYPE 2 DIABETES MELLITUS WITH STAGE 3B CHRONIC KIDNEY DISEASE, WITHOUT LONG-TERM CURRENT USE OF INSULIN: ICD-10-CM

## 2021-11-01 DIAGNOSIS — D69.6 THROMBOCYTOPENIA: ICD-10-CM

## 2021-11-01 DIAGNOSIS — I15.2 HYPERTENSION ASSOCIATED WITH DIABETES: ICD-10-CM

## 2021-11-01 DIAGNOSIS — E78.5 HYPERLIPIDEMIA ASSOCIATED WITH TYPE 2 DIABETES MELLITUS: Primary | ICD-10-CM

## 2021-11-01 PROCEDURE — 1126F AMNT PAIN NOTED NONE PRSNT: CPT | Mod: HCNC,CPTII,S$GLB, | Performed by: INTERNAL MEDICINE

## 2021-11-01 PROCEDURE — 99214 OFFICE O/P EST MOD 30 MIN: CPT | Mod: 25,HCNC,S$GLB, | Performed by: INTERNAL MEDICINE

## 2021-11-01 PROCEDURE — 99214 PR OFFICE/OUTPT VISIT, EST, LEVL IV, 30-39 MIN: ICD-10-PCS | Mod: 25,HCNC,S$GLB, | Performed by: INTERNAL MEDICINE

## 2021-11-01 PROCEDURE — 90694 VACC AIIV4 NO PRSRV 0.5ML IM: CPT | Mod: HCNC,S$GLB,, | Performed by: INTERNAL MEDICINE

## 2021-11-01 PROCEDURE — 99999 PR PBB SHADOW E&M-EST. PATIENT-LVL III: CPT | Mod: PBBFAC,HCNC,, | Performed by: INTERNAL MEDICINE

## 2021-11-01 PROCEDURE — 3075F PR MOST RECENT SYSTOLIC BLOOD PRESS GE 130-139MM HG: ICD-10-PCS | Mod: HCNC,CPTII,S$GLB, | Performed by: INTERNAL MEDICINE

## 2021-11-01 PROCEDURE — 1160F PR REVIEW ALL MEDS BY PRESCRIBER/CLIN PHARMACIST DOCUMENTED: ICD-10-PCS | Mod: HCNC,CPTII,S$GLB, | Performed by: INTERNAL MEDICINE

## 2021-11-01 PROCEDURE — G0008 ADMIN INFLUENZA VIRUS VAC: HCPCS | Mod: HCNC,S$GLB,, | Performed by: INTERNAL MEDICINE

## 2021-11-01 PROCEDURE — 3288F PR FALLS RISK ASSESSMENT DOCUMENTED: ICD-10-PCS | Mod: HCNC,CPTII,S$GLB, | Performed by: INTERNAL MEDICINE

## 2021-11-01 PROCEDURE — 3288F FALL RISK ASSESSMENT DOCD: CPT | Mod: HCNC,CPTII,S$GLB, | Performed by: INTERNAL MEDICINE

## 2021-11-01 PROCEDURE — 3078F PR MOST RECENT DIASTOLIC BLOOD PRESSURE < 80 MM HG: ICD-10-PCS | Mod: HCNC,CPTII,S$GLB, | Performed by: INTERNAL MEDICINE

## 2021-11-01 PROCEDURE — 1159F PR MEDICATION LIST DOCUMENTED IN MEDICAL RECORD: ICD-10-PCS | Mod: HCNC,CPTII,S$GLB, | Performed by: INTERNAL MEDICINE

## 2021-11-01 PROCEDURE — 99999 PR PBB SHADOW E&M-EST. PATIENT-LVL III: ICD-10-PCS | Mod: PBBFAC,HCNC,, | Performed by: INTERNAL MEDICINE

## 2021-11-01 PROCEDURE — 1101F PT FALLS ASSESS-DOCD LE1/YR: CPT | Mod: HCNC,CPTII,S$GLB, | Performed by: INTERNAL MEDICINE

## 2021-11-01 PROCEDURE — 90694 FLU VACCINE - QUADRIVALENT - ADJUVANTED: ICD-10-PCS | Mod: HCNC,S$GLB,, | Performed by: INTERNAL MEDICINE

## 2021-11-01 PROCEDURE — G0008 FLU VACCINE - QUADRIVALENT - ADJUVANTED: ICD-10-PCS | Mod: HCNC,S$GLB,, | Performed by: INTERNAL MEDICINE

## 2021-11-01 PROCEDURE — 1160F RVW MEDS BY RX/DR IN RCRD: CPT | Mod: HCNC,CPTII,S$GLB, | Performed by: INTERNAL MEDICINE

## 2021-11-01 PROCEDURE — 1126F PR PAIN SEVERITY QUANTIFIED, NO PAIN PRESENT: ICD-10-PCS | Mod: HCNC,CPTII,S$GLB, | Performed by: INTERNAL MEDICINE

## 2021-11-01 PROCEDURE — 1159F MED LIST DOCD IN RCRD: CPT | Mod: HCNC,CPTII,S$GLB, | Performed by: INTERNAL MEDICINE

## 2021-11-01 PROCEDURE — 1101F PR PT FALLS ASSESS DOC 0-1 FALLS W/OUT INJ PAST YR: ICD-10-PCS | Mod: HCNC,CPTII,S$GLB, | Performed by: INTERNAL MEDICINE

## 2021-11-01 PROCEDURE — 3078F DIAST BP <80 MM HG: CPT | Mod: HCNC,CPTII,S$GLB, | Performed by: INTERNAL MEDICINE

## 2021-11-01 PROCEDURE — 3075F SYST BP GE 130 - 139MM HG: CPT | Mod: HCNC,CPTII,S$GLB, | Performed by: INTERNAL MEDICINE

## 2021-11-23 ENCOUNTER — IMMUNIZATION (OUTPATIENT)
Dept: PRIMARY CARE CLINIC | Facility: CLINIC | Age: 76
End: 2021-11-23
Payer: MEDICARE

## 2021-11-23 DIAGNOSIS — Z23 NEED FOR VACCINATION: Primary | ICD-10-CM

## 2021-11-23 PROCEDURE — 0004A COVID-19, MRNA, LNP-S, PF, 30 MCG/0.3 ML DOSE VACCINE: CPT | Mod: CV19,PBBFAC | Performed by: FAMILY MEDICINE

## 2021-11-29 RX ORDER — METOPROLOL TARTRATE 50 MG/1
100 TABLET ORAL 2 TIMES DAILY
Qty: 90 TABLET | Refills: 3 | Status: SHIPPED | OUTPATIENT
Start: 2021-11-29 | End: 2022-05-02 | Stop reason: SDUPTHER

## 2021-11-30 ENCOUNTER — PATIENT OUTREACH (OUTPATIENT)
Dept: ADMINISTRATIVE | Facility: OTHER | Age: 76
End: 2021-11-30
Payer: MEDICARE

## 2021-12-06 ENCOUNTER — OFFICE VISIT (OUTPATIENT)
Dept: GASTROENTEROLOGY | Facility: CLINIC | Age: 76
End: 2021-12-06
Payer: MEDICARE

## 2021-12-06 VITALS
OXYGEN SATURATION: 98 % | DIASTOLIC BLOOD PRESSURE: 84 MMHG | HEIGHT: 72 IN | BODY MASS INDEX: 31.98 KG/M2 | WEIGHT: 236.13 LBS | SYSTOLIC BLOOD PRESSURE: 140 MMHG | HEART RATE: 55 BPM

## 2021-12-06 DIAGNOSIS — K57.30 DIVERTICULOSIS OF COLON: ICD-10-CM

## 2021-12-06 DIAGNOSIS — Z86.010 HX OF ADENOMATOUS COLONIC POLYPS: Primary | ICD-10-CM

## 2021-12-06 DIAGNOSIS — N18.30 STAGE 3 CHRONIC KIDNEY DISEASE, UNSPECIFIED WHETHER STAGE 3A OR 3B CKD: ICD-10-CM

## 2021-12-06 DIAGNOSIS — E11.8 TYPE 2 DIABETES MELLITUS WITH COMPLICATIONS: ICD-10-CM

## 2021-12-06 DIAGNOSIS — K62.5 RECTAL BLEEDING: ICD-10-CM

## 2021-12-06 DIAGNOSIS — K64.8 INTERNAL HEMORRHOIDS: ICD-10-CM

## 2021-12-06 PROCEDURE — 99214 OFFICE O/P EST MOD 30 MIN: CPT | Mod: HCNC,S$GLB,, | Performed by: INTERNAL MEDICINE

## 2021-12-06 PROCEDURE — 99214 PR OFFICE/OUTPT VISIT, EST, LEVL IV, 30-39 MIN: ICD-10-PCS | Mod: HCNC,S$GLB,, | Performed by: INTERNAL MEDICINE

## 2021-12-06 PROCEDURE — 99999 PR PBB SHADOW E&M-EST. PATIENT-LVL III: ICD-10-PCS | Mod: PBBFAC,HCNC,, | Performed by: INTERNAL MEDICINE

## 2021-12-06 PROCEDURE — 99999 PR PBB SHADOW E&M-EST. PATIENT-LVL III: CPT | Mod: PBBFAC,HCNC,, | Performed by: INTERNAL MEDICINE

## 2021-12-06 RX ORDER — SODIUM, POTASSIUM,MAG SULFATES 17.5-3.13G
1 SOLUTION, RECONSTITUTED, ORAL ORAL DAILY
Qty: 1 KIT | Refills: 0 | Status: SHIPPED | OUTPATIENT
Start: 2021-12-06 | End: 2021-12-08

## 2022-01-04 PROBLEM — Z86.0100 HISTORY OF COLON POLYPS: Status: ACTIVE | Noted: 2022-01-04

## 2022-01-04 PROBLEM — K62.5 RECTAL BLEEDING: Status: ACTIVE | Noted: 2022-01-04

## 2022-01-04 PROBLEM — Z86.010 HISTORY OF COLON POLYPS: Status: ACTIVE | Noted: 2022-01-04

## 2022-01-05 ENCOUNTER — HOSPITAL ENCOUNTER (OUTPATIENT)
Facility: HOSPITAL | Age: 77
Discharge: HOME | End: 2022-01-05
Attending: INTERNAL MEDICINE | Admitting: INTERNAL MEDICINE
Payer: MEDICARE

## 2022-01-05 ENCOUNTER — ANESTHESIA (OUTPATIENT)
Dept: ENDOSCOPY | Facility: HOSPITAL | Age: 77
End: 2022-01-05
Payer: MEDICARE

## 2022-01-05 ENCOUNTER — ANESTHESIA EVENT (OUTPATIENT)
Dept: ENDOSCOPY | Facility: HOSPITAL | Age: 77
End: 2022-01-05
Payer: MEDICARE

## 2022-01-05 DIAGNOSIS — Z86.010 HISTORY OF COLON POLYPS: ICD-10-CM

## 2022-01-05 DIAGNOSIS — K62.5 RECTAL BLEEDING: Primary | ICD-10-CM

## 2022-01-05 LAB
CTP QC/QA: YES
POCT GLUCOSE: 146 MG/DL (ref 70–110)
SARS-COV-2 RDRP RESP QL NAA+PROBE: NEGATIVE

## 2022-01-05 PROCEDURE — 88305 TISSUE EXAM BY PATHOLOGIST: CPT | Mod: HCNC | Performed by: PATHOLOGY

## 2022-01-05 PROCEDURE — 88305 TISSUE EXAM BY PATHOLOGIST: ICD-10-PCS | Mod: 26,HCNC,, | Performed by: PATHOLOGY

## 2022-01-05 PROCEDURE — 45380 COLONOSCOPY AND BIOPSY: CPT | Mod: 59,HCNC,, | Performed by: INTERNAL MEDICINE

## 2022-01-05 PROCEDURE — 45380 COLONOSCOPY AND BIOPSY: CPT | Mod: HCNC | Performed by: INTERNAL MEDICINE

## 2022-01-05 PROCEDURE — 25000003 PHARM REV CODE 250: Mod: HCNC | Performed by: NURSE ANESTHETIST, CERTIFIED REGISTERED

## 2022-01-05 PROCEDURE — 37000009 HC ANESTHESIA EA ADD 15 MINS: Mod: HCNC | Performed by: INTERNAL MEDICINE

## 2022-01-05 PROCEDURE — 27201089 HC SNARE, DISP (ANY): Mod: HCNC | Performed by: INTERNAL MEDICINE

## 2022-01-05 PROCEDURE — 45385 COLONOSCOPY W/LESION REMOVAL: CPT | Mod: HCNC,,, | Performed by: INTERNAL MEDICINE

## 2022-01-05 PROCEDURE — 37000008 HC ANESTHESIA 1ST 15 MINUTES: Mod: HCNC | Performed by: INTERNAL MEDICINE

## 2022-01-05 PROCEDURE — 27200997: Mod: HCNC | Performed by: INTERNAL MEDICINE

## 2022-01-05 PROCEDURE — 63600175 PHARM REV CODE 636 W HCPCS: Mod: HCNC | Performed by: NURSE ANESTHETIST, CERTIFIED REGISTERED

## 2022-01-05 PROCEDURE — 45380 PR COLONOSCOPY,BIOPSY: ICD-10-PCS | Mod: 59,HCNC,, | Performed by: INTERNAL MEDICINE

## 2022-01-05 PROCEDURE — 45385 COLONOSCOPY W/LESION REMOVAL: CPT | Mod: HCNC | Performed by: INTERNAL MEDICINE

## 2022-01-05 PROCEDURE — 88305 TISSUE EXAM BY PATHOLOGIST: CPT | Mod: 26,HCNC,, | Performed by: PATHOLOGY

## 2022-01-05 PROCEDURE — 45385 PR COLONOSCOPY,REMV LESN,SNARE: ICD-10-PCS | Mod: HCNC,,, | Performed by: INTERNAL MEDICINE

## 2022-01-05 PROCEDURE — 27201012 HC FORCEPS, HOT/COLD, DISP: Mod: HCNC | Performed by: INTERNAL MEDICINE

## 2022-01-05 PROCEDURE — U0002 COVID-19 LAB TEST NON-CDC: HCPCS | Mod: HCNC | Performed by: INTERNAL MEDICINE

## 2022-01-05 RX ORDER — LIDOCAINE HYDROCHLORIDE 10 MG/ML
INJECTION, SOLUTION EPIDURAL; INFILTRATION; INTRACAUDAL; PERINEURAL
Status: DISCONTINUED | OUTPATIENT
Start: 2022-01-05 | End: 2022-01-05

## 2022-01-05 RX ORDER — SODIUM CHLORIDE, SODIUM LACTATE, POTASSIUM CHLORIDE, CALCIUM CHLORIDE 600; 310; 30; 20 MG/100ML; MG/100ML; MG/100ML; MG/100ML
INJECTION, SOLUTION INTRAVENOUS CONTINUOUS
Status: DISCONTINUED | OUTPATIENT
Start: 2022-01-05 | End: 2022-01-05 | Stop reason: HOSPADM

## 2022-01-05 RX ORDER — SODIUM CHLORIDE 0.9 % (FLUSH) 0.9 %
10 SYRINGE (ML) INJECTION
Status: DISCONTINUED | OUTPATIENT
Start: 2022-01-05 | End: 2022-01-05 | Stop reason: HOSPADM

## 2022-01-05 RX ORDER — SODIUM CHLORIDE, SODIUM LACTATE, POTASSIUM CHLORIDE, CALCIUM CHLORIDE 600; 310; 30; 20 MG/100ML; MG/100ML; MG/100ML; MG/100ML
INJECTION, SOLUTION INTRAVENOUS CONTINUOUS PRN
Status: DISCONTINUED | OUTPATIENT
Start: 2022-01-05 | End: 2022-01-05

## 2022-01-05 RX ORDER — PROPOFOL 10 MG/ML
VIAL (ML) INTRAVENOUS
Status: DISCONTINUED | OUTPATIENT
Start: 2022-01-05 | End: 2022-01-05

## 2022-01-05 RX ADMIN — PROPOFOL 20 MG: 10 INJECTION, EMULSION INTRAVENOUS at 10:01

## 2022-01-05 RX ADMIN — SODIUM CHLORIDE, SODIUM LACTATE, POTASSIUM CHLORIDE, AND CALCIUM CHLORIDE: .6; .31; .03; .02 INJECTION, SOLUTION INTRAVENOUS at 10:01

## 2022-01-05 RX ADMIN — PROPOFOL 100 MG: 10 INJECTION, EMULSION INTRAVENOUS at 10:01

## 2022-01-05 RX ADMIN — LIDOCAINE HYDROCHLORIDE 50 MG: 10 INJECTION, SOLUTION EPIDURAL; INFILTRATION; INTRACAUDAL; PERINEURAL at 10:01

## 2022-01-05 NOTE — PLAN OF CARE
DR GRANADOS AT BEDSIDE TO SPEAK TO PT. REGARDING RESULTS.  VSS, NO GI BLEEDING, NO ABD. PAIN, NO N/V. PT. DISCHARGED FROM UNIT.

## 2022-01-05 NOTE — [employer] PATIENT INSTRUCTIONS
Discharge Summary/Instructions after an Endoscopic Procedure  Patient Name: Daniel Villeda  Patient MRN: [id number]  Patient YOB: 1945 Wednesday, January 5, 2022 Mirta Grover MD  Dear patient,  As a result of recent federal legislation (The Federal Cures Act), you may   receive lab or pathology results from your procedure in your [facility]   account before your physician is able to contact you. Your physician or   their representative will relay the results to you with their   recommendations at their soonest availability.  Thank you,  RESTRICTIONS:  During your procedure today, you received medications for sedation.  These   medications may affect your judgment, balance and coordination.  Therefore,   for 24 hours, you have the following restrictions:   - DO NOT drive a car, operate machinery, make legal/financial decisions,   sign important papers or drink alcohol.    ACTIVITY:  Today: no heavy lifting, straining or running due to procedural   sedation/anesthesia.  The following day: return to full activity including work.  DIET:  Eat and drink normally unless instructed otherwise.     TREATMENT FOR COMMON SIDE EFFECTS:  - Mild abdominal pain, nausea, belching, bloating or excessive gas:  rest,   eat lightly and use a heating pad.  - Sore Throat: treat with throat lozenges and/or gargle with warm salt   water.  - Because air was used during the procedure, expelling large amounts of air   from your rectum or belching is normal.  - If a bowel prep was taken, you may not have a bowel movement for 1-3 days.    This is normal.  SYMPTOMS TO WATCH FOR AND REPORT TO YOUR PHYSICIAN:  1. Abdominal pain or bloating, other than gas cramps.  2. Chest pain.  3. Back pain.  4. Signs of infection such as: chills or fever occurring within 24 hours   after the procedure.  5. Rectal bleeding, which would show as bright red, maroon, or black stools.   (A tablespoon of blood from the rectum is not serious, especially  if   hemorrhoids are present.)  6. Vomiting.  7. Weakness or dizziness.  GO DIRECTLY TO THE NEAREST EMERGENCY ROOM IF YOU HAVE ANY OF THE FOLLOWING:      Difficulty breathing              Chills and/or fever over 101 F   Persistent vomiting and/or vomiting blood   Severe abdominal pain   Severe chest pain   Black, tarry stools   Bleeding- more than one tablespoon   Any other symptom or condition that you feel may need urgent attention  Your doctor recommends these additional instructions:  If any biopsies were taken, your doctors clinic will contact you in 1 to 2   weeks with any results.  - Discharge patient to home (via wheelchair).   - High fiber diet.   - Continue present medications.   - Await pathology results.   - Repeat colonoscopy in 3 years for surveillance.   - Telephone GI clinic for pathology results in 2 weeks.   - Patient has a contact number available for emergencies.  The signs and   symptoms of potential delayed complications were discussed with the   patient.  Return to normal activities tomorrow.  Written discharge   instructions were provided to the patient.  For questions, problems or results please call your physician Mirta Grover MD at Work:  [phone]  If you have any questions about the above instructions, call the GI   department at [phone] or call the endoscopy unit at [phone]   from 7am until 3 pm.  [facility] [place], EMERGENCY ROOM PHONE NUMBER:   [phone]  IF A COMPLICATION OR EMERGENCY SITUATION ARISES AND YOU ARE UNABLE TO REACH   YOUR PHYSICIAN - GO DIRECTLY TO THE EMERGENCY ROOM.  I have read or have had read to me these discharge instructions for my   procedure and have received a written copy.  I understand these   instructions and will follow-up with my physician if I have any questions.     __________________________________       _____________________________________  Nurse Signature                                           Patient/Designated   Responsible Party Signature  MD Mirta Gross MD  1/5/2022 10:35:18 AM  [employer]

## 2022-01-05 NOTE — DISCHARGE SUMMARY
[facility] - Endoscopy (Hospital)  Discharge Note  Short Stay    Procedure(s) (LRB):  COLONOSCOPY (N/A)    OUTCOME: Patient tolerated treatment/procedure well without complication and is now ready for discharge.    DISPOSITION: Home or Self Care    FINAL DIAGNOSIS:  Rectal bleeding    FOLLOWUP: With primary care provider    DISCHARGE INSTRUCTIONS:  No discharge procedures on file.      Clinical Reference Documents Added to Patient Instructions       Document    COLON POLYPECTOMY DISCHARGE INSTRUCTIONS (ENGLISH)    DIVERTICULOSIS (ENGLISH)

## 2022-01-05 NOTE — DISCHARGE INSTRUCTIONS
Patient Education       Colon Polypectomy Discharge Instructions   About this topic   The colon is also called the large intestine. It is a long, hollow tube at the end of your digestive tract. It absorbs water from solid waste and changes it from liquid to a solid bowel movement.  A colon polyp is a growth of extra tissue that is not normally in your colon. Colon polyps do not often cause any signs. Most colon polyps are not cancer, but some polyps may turn into cancer. The doctor takes the polyps out during a procedure called a colonoscopy and sends them to the lab for a check to make sure there is no cancer.  You may have a colon polyp or multiple polyps removed. The doctor will send them to the lab to see what type they are. If a polyp is very large, it may need to be removed by surgery.     What care is needed at home?   · Ask your doctor what you need to do when you go home. Make sure you ask questions if you do not understand what the doctor says.  · Do not drive for 24 hours after a colonoscopy.  · Take your drugs as ordered by your doctor.  · Go back to your normal diet unless your doctor has told you to make some changes in your diet.  · Rest  What follow-up care is needed?   · Your doctor may ask you to make visits to the office to check on your progress. Be sure to keep these visits.  · Your doctor may suggest you get tested regularly. People with colon polyps need to have a colonoscopy regularly to check for the growth of new polyps.  · Some polyps may not be removed and more surgery may be needed.  · The results of the polyp testing will be given to you at one of these visits.  What drugs may be needed?   The doctor may order drugs to:  · Prevent hard stools  · Help with pain  · Reduce your risk of colon polyps or colon cancer  Will physical activity be limited?   You may feel sleepy after the colonoscopy. Try to get some rest.  What can be done to prevent this health problem?   · Have regular  colonoscopies.  · Eat foods high in fiber.  · Eat foods low in fat.  · Limit your intake of beer, wine, and mixed drinks (alcohol).  · Ask your doctor or dietitian for a diet that is right for you. Include calcium in your diet. Good sources of calcium include milk, cheese, and yogurt.  When do I need to call the doctor?   · Signs of infection. These include a fever of 100.4°F (38°C) or higher, chills, and anal itching or pain.  · Bleeding from rectum that gets worse  · Belly becomes swollen and sore  · Upset stomach and throwing up continues after you return home  · Not being able to move your bowels  · Weight loss without trying  · Blood in your stool  Teach Back: Helping You Understand   The Teach Back Method helps you understand the information we are giving you. After you talk with the staff, tell them in your own words what you learned. This helps to make sure the staff has described each thing clearly. It also helps to explain things that may have been confusing. Before going home, make sure you can do these:  · I can tell you about my condition.  · I can tell you what changes I need to make with my diet.  · I can tell you what I will do if my stomach is swollen, I have belly pain, or there is blood in my stool.  Where can I learn more?   [employer]  https://www.betterhealth.[place].gov.[place]/health/ConditionsAndTreatments/colonoscopy   [facility]  https://www.nhs.uk/conditions/bowel-polyps/   UpToDate  https://www.uptodate.com/contents/colon-polyps-beyond-the-basics   Last Reviewed Date   2021-03-10  Consumer Information Use and Disclaimer   This information is not specific medical advice and does not replace information you receive from your health care provider. This is only a brief summary of general information. It does NOT include all information about conditions, illnesses, injuries, tests, procedures, treatments, therapies, discharge instructions or life-style choices that may apply to you. You must talk with your  "health care provider for complete information about your health and treatment options. This information should not be used to decide whether or not to accept your health care providers advice, instructions or recommendations. Only your health care provider has the knowledge and training to provide advice that is right for you.  Copyright   Copyright © 2021 UpToDate, Inc. and its affiliates and/or licensors. All rights reserved.  Patient Education       Diverticulosis   About this topic   Diverticulosis is a problem of the large bowel or colon. The wall of the bowel becomes weak and pushes outward. They form balloon-like pouches called diverticula or "tics." When you have hard stool, you strain to have a bowel movement. This raises the pressure in the bowel and causes pouches or bulges to form. Most often, they do not cause a problem. If they become infected, you have diverticulitis. If you have both bleeding and infection it is diverticular disease.     What are the causes?   Doctors do not know why these pouches form in the large bowel. Pressure may build up if food moves too slowly through the bowel. Fiber in your diet helps your stool move more quickly through your bowels. If you eat a diet low in fiber, your stools become harder and move slower. Then you strain and push more when you have a bowel movement, which may cause the pouches to form.  What can make this more likely to happen?   · A low-fiber diet  · A diet high in meat or protein  · Many hard stools ? Less bowel movements than normal  · Age 40 years or older  · Lack of exercise  · Genetics  · Obesity  · Smoking  · Certain drugs you may be taking  What are the main signs?   Most people with diverticulosis do not have signs. Others have problems with diarrhea, constipation, bloating, or abdominal pain. Your doctor may find this condition when you have a colonoscopy.  How does the doctor diagnose this health problem?   Your doctor will take your history. " Your doctor may ask about your bowel movements, belly pain, diet, and drugs you are taking. The doctor may press on your belly to see if you have any pain, especially on the left side. The doctor will listen to your bowel sounds and may do a rectal exam.  The doctor will do an exam and may order:  · Stool test  · Blood tests  · CT scan  · Colonoscopy  · Barium enema  How does the doctor treat this health problem?   Most often, people with diverticulosis will not need any treatment. Your doctor may suggest you add more fiber to your diet. This will help add to the amount of stool you form. It may also keep you from forming new diverticula.  Are there other health problems to treat?   Other health problems, such as irritable bowel syndrome or IBS, can have these same signs. So can ulcers. Talk with your doctor if you keep having any of the above signs.  What drugs may be needed?   Most often with diverticulosis you will not need to take any drugs.  What problems could happen?   You may develop diverticulitis, which may cause:  · Fever and pain in the left lower part of your abdomen  · Pockets or pouches in your bowel may be infected or filled with pus.  · Hole or tear in your bowel  · Part of your bowel to become narrow  · You to need surgery  What can be done to prevent this health problem?   The best way to keep from having diverticulosis is to keep your bowel movements soft and normal. To keep more pouches from forming:  · Eat more whole grains, vegetables, and fruits.  · Drink 8 to 10 glasses of water each day.  · Be active. Walk, garden, or do something active for 30 minutes or more on most days of the week.  · Talk with your doctor about adding an over-the-counter (OTC) fiber product to keep your stools soft.  · Limit how much pain drugs you take. Overuse of some pain drugs can cause hard stools. Talk with your doctor about this.  Where can I learn more?    [employer]org  http://familydoctor.org/familydoctor/en/diseases-conditions/diverticular-disease.html   [facility]  https://www.nhs.uk/conditions/diverticular-disease-and-diverticulitis/   Last Reviewed Date   2021-04-13  Consumer Information Use and Disclaimer   This information is not specific medical advice and does not replace information you receive from your health care provider. This is only a brief summary of general information. It does NOT include all information about conditions, illnesses, injuries, tests, procedures, treatments, therapies, discharge instructions or life-style choices that may apply to you. You must talk with your health care provider for complete information about your health and treatment options. This information should not be used to decide whether or not to accept your health care providers advice, instructions or recommendations. Only your health care provider has the knowledge and training to provide advice that is right for you.  Copyright   Copyright © 2021 UpToDate, Inc. and its affiliates and/or licensors. All rights reserved.

## 2022-01-05 NOTE — TRANSFER OF CARE
Anesthesia Transfer of Care Note    Patient: Daniel Villeda    Procedure(s) Performed: Procedure(s) (LRB):  COLONOSCOPY (N/A)    Patient location: PACU    Anesthesia Type: MAC    Transport from OR: Transported from OR on room air with adequate spontaneous ventilation    Post pain: adequate analgesia    Post assessment: no apparent anesthetic complications and tolerated procedure well    Post vital signs: stable    Level of consciousness: awake    Nausea/Vomiting: no nausea/vomiting    Complications: none    Transfer of care protocol was followed      Last vitals:   Visit Vitals  /80 (BP Location: Left arm, Patient Position: Lying)   Pulse 61   Temp 36.5 °C (97.7 °F) (Temporal)   Resp 19   Ht 6' (1.829 m)   Wt 101.6 kg (224 lb)   SpO2 97%   BMI 30.38 kg/m²

## 2022-01-05 NOTE — ANESTHESIA PREPROCEDURE EVALUATION
01/05/2022  Daniel Villeda is a 76 y.o., male.    Anesthesia Evaluation    I have reviewed the Patient Summary Reports.    I have reviewed the Nursing Notes. I have reviewed the NPO Status.   I have reviewed the Medications.     Review of Systems  Anesthesia Hx:  No problems with previous Anesthesia  Denies Family Hx of Anesthesia complications.   Denies Personal Hx of Anesthesia complications.   Social:  Former Smoker, No Alcohol Use    Hematology/Oncology:        Hematology Comments: DVT  Thrombocytopenia Oncology Comments: renal cell carcinoma     EENT/Dental:EENT/Dental Normal   Cardiovascular:   Hypertension Denies MI.   Denies CABG/stent. Dysrhythmias      hyperlipidemia ECG has been reviewed. ekg 2/2021:  Sinus rhythm with 1st degree A-V block 61  Right bundle branch block   Abnormal ECG   No previous ECGs available   Pulmonary:   Denies COPD.  Denies Asthma.  Denies Sleep Apnea.    Renal/:   Chronic Renal Disease, CRI    Hepatic/GI:   Bowel Prep. GERD Denies Liver Disease. Denies Hepatitis. Gastrointestinal stromal tumor (GIST   Musculoskeletal:  Musculoskeletal Normal    Neurological:   Denies CVA. Denies Seizures.    Endocrine:   Diabetes, type 2 Denies Hypothyroidism. Denies Hyperthyroidism.        Physical Exam  General:  Obesity    Airway/Jaw/Neck:  Airway Findings: Mouth Opening: Normal Tongue: Normal  General Airway Assessment: Adult  Mallampati: II      Dental:  Dental Findings:    Chest/Lungs:  Chest/Lungs Findings: Clear to auscultation, Normal Respiratory Rate     Heart/Vascular:  Heart Findings: Rate: Normal  Rhythm: Regular Rhythm             Anesthesia Plan  Type of Anesthesia, risks & benefits discussed:  Anesthesia Type:  MAC    Patient's Preference:   Plan Factors:          Intra-op Monitoring Plan: standard ASA monitors  Intra-op Monitoring Plan Comments:   Post Op Pain Control  Plan:   Post Op Pain Control Plan Comments:     Induction:   IV  Beta Blocker:  Patient is on a Beta-Blocker and has received one dose within the past 24 hours (No further documentation required).       Informed Consent: Patient understands risks and agrees with Anesthesia plan.  Questions answered. Anesthesia consent signed with patient.  ASA Score: 2     Day of Surgery Review of History & Physical: I have interviewed and examined the patient. I have reviewed the patient's H&P dated:  There are no significant changes.  H&P update referred to the surgeon.         Ready For Surgery From Anesthesia Perspective.

## 2022-01-05 NOTE — ANESTHESIA POSTPROCEDURE EVALUATION
Anesthesia Post Evaluation    Patient: Daniel Villeda    Procedure(s) Performed: Procedure(s) (LRB):  COLONOSCOPY (N/A)    Final Anesthesia Type: MAC      Patient location during evaluation: PACU  Patient participation: Yes- Able to Participate  Level of consciousness: awake  Post-procedure vital signs: reviewed and stable  Pain management: adequate  Airway patency: patent    PONV status at discharge: No PONV  Anesthetic complications: no      Cardiovascular status: blood pressure returned to baseline and hemodynamically stable  Respiratory status: unassisted and spontaneous ventilation  Hydration status: euvolemic  Follow-up not needed.                No case tracking events are documented in the log.      Pain/Toni Score: No data recorded

## 2022-01-06 VITALS
HEART RATE: 68 BPM | WEIGHT: 224 LBS | DIASTOLIC BLOOD PRESSURE: 78 MMHG | OXYGEN SATURATION: 97 % | TEMPERATURE: 98 F | RESPIRATION RATE: 15 BRPM | BODY MASS INDEX: 30.34 KG/M2 | SYSTOLIC BLOOD PRESSURE: 122 MMHG | HEIGHT: 72 IN

## 2022-01-07 LAB
FINAL PATHOLOGIC DIAGNOSIS: NORMAL
GROSS: NORMAL
Lab: NORMAL

## 2022-02-18 ENCOUNTER — PATIENT OUTREACH (OUTPATIENT)
Dept: ADMINISTRATIVE | Facility: HOSPITAL | Age: 77
End: 2022-02-18
Payer: MEDICARE

## 2022-04-14 ENCOUNTER — TELEPHONE (OUTPATIENT)
Dept: ADMINISTRATIVE | Facility: HOSPITAL | Age: 77
End: 2022-04-14
Payer: MEDICARE

## 2022-04-25 ENCOUNTER — LAB VISIT (OUTPATIENT)
Dept: LAB | Facility: HOSPITAL | Age: 77
End: 2022-04-25
Attending: INTERNAL MEDICINE
Payer: MEDICARE

## 2022-04-25 DIAGNOSIS — E11.22 TYPE 2 DIABETES MELLITUS WITH STAGE 3B CHRONIC KIDNEY DISEASE, WITHOUT LONG-TERM CURRENT USE OF INSULIN: ICD-10-CM

## 2022-04-25 DIAGNOSIS — N18.32 TYPE 2 DIABETES MELLITUS WITH STAGE 3B CHRONIC KIDNEY DISEASE, WITHOUT LONG-TERM CURRENT USE OF INSULIN: ICD-10-CM

## 2022-04-25 LAB
ALBUMIN SERPL BCP-MCNC: 3.6 G/DL (ref 3.5–5.2)
ALP SERPL-CCNC: 67 U/L (ref 55–135)
ALT SERPL W/O P-5'-P-CCNC: 41 U/L (ref 10–44)
ANION GAP SERPL CALC-SCNC: 12 MMOL/L (ref 8–16)
AST SERPL-CCNC: 27 U/L (ref 10–40)
BASOPHILS # BLD AUTO: 0.04 K/UL (ref 0–0.2)
BASOPHILS NFR BLD: 0.9 % (ref 0–1.9)
BILIRUB SERPL-MCNC: 0.7 MG/DL (ref 0.1–1)
BUN SERPL-MCNC: 30 MG/DL (ref 8–23)
CALCIUM SERPL-MCNC: 9.4 MG/DL (ref 8.7–10.5)
CHLORIDE SERPL-SCNC: 105 MMOL/L (ref 95–110)
CHOLEST SERPL-MCNC: 88 MG/DL (ref 120–199)
CHOLEST/HDLC SERPL: 4.4 {RATIO} (ref 2–5)
CO2 SERPL-SCNC: 24 MMOL/L (ref 23–29)
CREAT SERPL-MCNC: 1.5 MG/DL (ref 0.5–1.4)
DIFFERENTIAL METHOD: ABNORMAL
EOSINOPHIL # BLD AUTO: 0.1 K/UL (ref 0–0.5)
EOSINOPHIL NFR BLD: 2 % (ref 0–8)
ERYTHROCYTE [DISTWIDTH] IN BLOOD BY AUTOMATED COUNT: 13.3 % (ref 11.5–14.5)
EST. GFR  (AFRICAN AMERICAN): 50.8 ML/MIN/1.73 M^2
EST. GFR  (NON AFRICAN AMERICAN): 44 ML/MIN/1.73 M^2
ESTIMATED AVG GLUCOSE: 157 MG/DL (ref 68–131)
GLUCOSE SERPL-MCNC: 167 MG/DL (ref 70–110)
HBA1C MFR BLD: 7.1 % (ref 4–5.6)
HCT VFR BLD AUTO: 43 % (ref 40–54)
HDLC SERPL-MCNC: 20 MG/DL (ref 40–75)
HDLC SERPL: 22.7 % (ref 20–50)
HGB BLD-MCNC: 14 G/DL (ref 14–18)
IMM GRANULOCYTES # BLD AUTO: 0.03 K/UL (ref 0–0.04)
IMM GRANULOCYTES NFR BLD AUTO: 0.7 % (ref 0–0.5)
LDLC SERPL CALC-MCNC: 28.2 MG/DL (ref 63–159)
LYMPHOCYTES # BLD AUTO: 1.7 K/UL (ref 1–4.8)
LYMPHOCYTES NFR BLD: 37.5 % (ref 18–48)
MCH RBC QN AUTO: 29.4 PG (ref 27–31)
MCHC RBC AUTO-ENTMCNC: 32.6 G/DL (ref 32–36)
MCV RBC AUTO: 90 FL (ref 82–98)
MONOCYTES # BLD AUTO: 0.5 K/UL (ref 0.3–1)
MONOCYTES NFR BLD: 10.3 % (ref 4–15)
NEUTROPHILS # BLD AUTO: 2.2 K/UL (ref 1.8–7.7)
NEUTROPHILS NFR BLD: 48.6 % (ref 38–73)
NONHDLC SERPL-MCNC: 68 MG/DL
NRBC BLD-RTO: 0 /100 WBC
PLATELET # BLD AUTO: 140 K/UL (ref 150–450)
PMV BLD AUTO: 11 FL (ref 9.2–12.9)
POTASSIUM SERPL-SCNC: 4.4 MMOL/L (ref 3.5–5.1)
PROT SERPL-MCNC: 6.3 G/DL (ref 6–8.4)
RBC # BLD AUTO: 4.76 M/UL (ref 4.6–6.2)
SODIUM SERPL-SCNC: 141 MMOL/L (ref 136–145)
TRIGL SERPL-MCNC: 199 MG/DL (ref 30–150)
TSH SERPL DL<=0.005 MIU/L-ACNC: 1.3 UIU/ML (ref 0.4–4)
WBC # BLD AUTO: 4.56 K/UL (ref 3.9–12.7)

## 2022-04-25 PROCEDURE — 36415 COLL VENOUS BLD VENIPUNCTURE: CPT | Mod: PO | Performed by: INTERNAL MEDICINE

## 2022-04-25 PROCEDURE — 84443 ASSAY THYROID STIM HORMONE: CPT | Performed by: INTERNAL MEDICINE

## 2022-04-25 PROCEDURE — 80061 LIPID PANEL: CPT | Performed by: INTERNAL MEDICINE

## 2022-04-25 PROCEDURE — 80053 COMPREHEN METABOLIC PANEL: CPT | Performed by: INTERNAL MEDICINE

## 2022-04-25 PROCEDURE — 85025 COMPLETE CBC W/AUTO DIFF WBC: CPT | Performed by: INTERNAL MEDICINE

## 2022-04-25 PROCEDURE — 83036 HEMOGLOBIN GLYCOSYLATED A1C: CPT | Performed by: INTERNAL MEDICINE

## 2022-05-02 ENCOUNTER — OFFICE VISIT (OUTPATIENT)
Dept: INTERNAL MEDICINE | Facility: CLINIC | Age: 77
End: 2022-05-02
Payer: MEDICARE

## 2022-05-02 VITALS
BODY MASS INDEX: 33.21 KG/M2 | OXYGEN SATURATION: 98 % | SYSTOLIC BLOOD PRESSURE: 130 MMHG | HEART RATE: 68 BPM | DIASTOLIC BLOOD PRESSURE: 86 MMHG | HEIGHT: 71 IN | WEIGHT: 237.19 LBS

## 2022-05-02 DIAGNOSIS — I15.2 HYPERTENSION ASSOCIATED WITH DIABETES: ICD-10-CM

## 2022-05-02 DIAGNOSIS — C49.A0 GASTROINTESTINAL STROMAL TUMOR (GIST): ICD-10-CM

## 2022-05-02 DIAGNOSIS — E11.69 HYPERLIPIDEMIA ASSOCIATED WITH TYPE 2 DIABETES MELLITUS: ICD-10-CM

## 2022-05-02 DIAGNOSIS — N18.32 TYPE 2 DIABETES MELLITUS WITH STAGE 3B CHRONIC KIDNEY DISEASE, WITHOUT LONG-TERM CURRENT USE OF INSULIN: ICD-10-CM

## 2022-05-02 DIAGNOSIS — I45.10 RBBB: ICD-10-CM

## 2022-05-02 DIAGNOSIS — N18.31 STAGE 3A CHRONIC KIDNEY DISEASE: ICD-10-CM

## 2022-05-02 DIAGNOSIS — Z85.528 HISTORY OF RENAL CELL CARCINOMA: ICD-10-CM

## 2022-05-02 DIAGNOSIS — Z86.718 HISTORY OF DVT (DEEP VEIN THROMBOSIS): ICD-10-CM

## 2022-05-02 DIAGNOSIS — Z86.010 HISTORY OF COLON POLYPS: ICD-10-CM

## 2022-05-02 DIAGNOSIS — D69.6 THROMBOCYTOPENIA: ICD-10-CM

## 2022-05-02 DIAGNOSIS — E78.5 HYPERLIPIDEMIA ASSOCIATED WITH TYPE 2 DIABETES MELLITUS: ICD-10-CM

## 2022-05-02 DIAGNOSIS — E11.22 TYPE 2 DIABETES MELLITUS WITH STAGE 3B CHRONIC KIDNEY DISEASE, WITHOUT LONG-TERM CURRENT USE OF INSULIN: ICD-10-CM

## 2022-05-02 DIAGNOSIS — E11.59 HYPERTENSION ASSOCIATED WITH DIABETES: ICD-10-CM

## 2022-05-02 DIAGNOSIS — E11.8 DM (DIABETES MELLITUS) WITH COMPLICATIONS: ICD-10-CM

## 2022-05-02 DIAGNOSIS — Z00.00 ROUTINE GENERAL MEDICAL EXAMINATION AT A HEALTH CARE FACILITY: Primary | ICD-10-CM

## 2022-05-02 DIAGNOSIS — I44.0 FIRST DEGREE AV BLOCK: ICD-10-CM

## 2022-05-02 PROCEDURE — 1101F PR PT FALLS ASSESS DOC 0-1 FALLS W/OUT INJ PAST YR: ICD-10-PCS | Mod: CPTII,S$GLB,, | Performed by: INTERNAL MEDICINE

## 2022-05-02 PROCEDURE — 3079F PR MOST RECENT DIASTOLIC BLOOD PRESSURE 80-89 MM HG: ICD-10-PCS | Mod: CPTII,S$GLB,, | Performed by: INTERNAL MEDICINE

## 2022-05-02 PROCEDURE — 3288F FALL RISK ASSESSMENT DOCD: CPT | Mod: CPTII,S$GLB,, | Performed by: INTERNAL MEDICINE

## 2022-05-02 PROCEDURE — 1126F AMNT PAIN NOTED NONE PRSNT: CPT | Mod: CPTII,S$GLB,, | Performed by: INTERNAL MEDICINE

## 2022-05-02 PROCEDURE — 3075F SYST BP GE 130 - 139MM HG: CPT | Mod: CPTII,S$GLB,, | Performed by: INTERNAL MEDICINE

## 2022-05-02 PROCEDURE — 99397 PER PM REEVAL EST PAT 65+ YR: CPT | Mod: S$GLB,,, | Performed by: INTERNAL MEDICINE

## 2022-05-02 PROCEDURE — 3288F PR FALLS RISK ASSESSMENT DOCUMENTED: ICD-10-PCS | Mod: CPTII,S$GLB,, | Performed by: INTERNAL MEDICINE

## 2022-05-02 PROCEDURE — 99499 RISK ADDL DX/OHS AUDIT: ICD-10-PCS | Mod: S$GLB,,, | Performed by: INTERNAL MEDICINE

## 2022-05-02 PROCEDURE — 99999 PR PBB SHADOW E&M-EST. PATIENT-LVL III: ICD-10-PCS | Mod: PBBFAC,,, | Performed by: INTERNAL MEDICINE

## 2022-05-02 PROCEDURE — 3051F PR MOST RECENT HEMOGLOBIN A1C LEVEL 7.0 - < 8.0%: ICD-10-PCS | Mod: CPTII,S$GLB,, | Performed by: INTERNAL MEDICINE

## 2022-05-02 PROCEDURE — 1126F PR PAIN SEVERITY QUANTIFIED, NO PAIN PRESENT: ICD-10-PCS | Mod: CPTII,S$GLB,, | Performed by: INTERNAL MEDICINE

## 2022-05-02 PROCEDURE — 3079F DIAST BP 80-89 MM HG: CPT | Mod: CPTII,S$GLB,, | Performed by: INTERNAL MEDICINE

## 2022-05-02 PROCEDURE — 3051F HG A1C>EQUAL 7.0%<8.0%: CPT | Mod: CPTII,S$GLB,, | Performed by: INTERNAL MEDICINE

## 2022-05-02 PROCEDURE — 1159F PR MEDICATION LIST DOCUMENTED IN MEDICAL RECORD: ICD-10-PCS | Mod: CPTII,S$GLB,, | Performed by: INTERNAL MEDICINE

## 2022-05-02 PROCEDURE — 3075F PR MOST RECENT SYSTOLIC BLOOD PRESS GE 130-139MM HG: ICD-10-PCS | Mod: CPTII,S$GLB,, | Performed by: INTERNAL MEDICINE

## 2022-05-02 PROCEDURE — 1101F PT FALLS ASSESS-DOCD LE1/YR: CPT | Mod: CPTII,S$GLB,, | Performed by: INTERNAL MEDICINE

## 2022-05-02 PROCEDURE — 1159F MED LIST DOCD IN RCRD: CPT | Mod: CPTII,S$GLB,, | Performed by: INTERNAL MEDICINE

## 2022-05-02 PROCEDURE — 99999 PR PBB SHADOW E&M-EST. PATIENT-LVL III: CPT | Mod: PBBFAC,,, | Performed by: INTERNAL MEDICINE

## 2022-05-02 PROCEDURE — 99397 PR PREVENTIVE VISIT,EST,65 & OVER: ICD-10-PCS | Mod: S$GLB,,, | Performed by: INTERNAL MEDICINE

## 2022-05-02 PROCEDURE — 99499 UNLISTED E&M SERVICE: CPT | Mod: S$GLB,,, | Performed by: INTERNAL MEDICINE

## 2022-05-02 RX ORDER — METFORMIN HYDROCHLORIDE 500 MG/1
500 TABLET ORAL 2 TIMES DAILY WITH MEALS
Qty: 180 TABLET | Refills: 3 | Status: SHIPPED | OUTPATIENT
Start: 2022-05-02 | End: 2023-04-10

## 2022-05-02 RX ORDER — METOPROLOL TARTRATE 50 MG/1
100 TABLET ORAL 2 TIMES DAILY
Qty: 90 TABLET | Refills: 3 | Status: SHIPPED | OUTPATIENT
Start: 2022-05-02 | End: 2022-11-10

## 2022-05-02 RX ORDER — LOSARTAN POTASSIUM AND HYDROCHLOROTHIAZIDE 12.5; 1 MG/1; MG/1
1 TABLET ORAL DAILY
Qty: 90 TABLET | Refills: 3 | Status: SHIPPED | OUTPATIENT
Start: 2022-05-02 | End: 2023-02-06 | Stop reason: SDUPTHER

## 2022-05-02 RX ORDER — PANTOPRAZOLE SODIUM 40 MG/1
40 TABLET, DELAYED RELEASE ORAL DAILY
Qty: 90 TABLET | Refills: 3 | Status: SHIPPED | OUTPATIENT
Start: 2022-05-02 | End: 2023-05-22 | Stop reason: SDUPTHER

## 2022-05-02 NOTE — PROGRESS NOTES
HPI:  Patient is 68-year-old man who comes today for follow-up of his diabetes, hypertension, lipids, chronic kidney disease and for his annual exam.  He has been doing very well.  He has no complaints at this time.  He denies any hypoglycemia.  His blood pressures been well controlled at home.      Current MEDS: medcard review, verified and update  Allergies: Per the electronic medical record    Past Medical History:   Diagnosis Date    CKD (chronic kidney disease), stage III     DM (diabetes mellitus) with complications     GERD (gastroesophageal reflux disease)     History of DVT (deep vein thrombosis)     History of renal cell carcinoma 2009    Hyperlipidemia associated with type 2 diabetes mellitus 02/26/2021    Hypertension associated with diabetes     Thrombocytopenia     Type II diabetes mellitus with renal manifestations        Past Surgical History:   Procedure Laterality Date    CLOSURE OF PERFORATED ULCER OF DUODENUM USING OMENTAL PATCH N/A 3/9/2021    Procedure: CLOSURE, ULCER, PERFORATED, DUODENUM, USING OMENTAL PATCH;  Surgeon: Edin Bradford MD;  Location: [facility];  Service: General;  Laterality: N/A;    COLONOSCOPY N/A 1/5/2022    Procedure: COLONOSCOPY;  Surgeon: Mirta Grover MD;  Location: [facility];  Service: Endoscopy;  Laterality: N/A;    ENDOSCOPIC ULTRASOUND OF UPPER GASTROINTESTINAL TRACT N/A 2/5/2021    Procedure: ULTRASOUND, UPPER GI TRACT, ENDOSCOPIC;  Surgeon: Geremias Brizuela MD;  Location: [facility];  Service: Endoscopy;  Laterality: N/A;    ESOPHAGOGASTRODUODENOSCOPY N/A 2/3/2021    Procedure: EGD (ESOPHAGOGASTRODUODENOSCOPY);  Surgeon: Mirta Grover MD;  Location: [facility];  Service: Endoscopy;  Laterality: N/A;    ESOPHAGOGASTRODUODENOSCOPY N/A 3/9/2021    Procedure: EGD (ESOPHAGOGASTRODUODENOSCOPY);  Surgeon: Edin Bradford MD;  Location: [facility];  Service: General;  Laterality: N/A;    left partial nephrectomy Left 2006    ROBOT-ASSISTED  "SURGICAL REMOVAL OF STOMACH USING DA PERRI XI N/A 3/9/2021    Procedure: XI ROBOTIC GASTRECTOMY;  Surgeon: Edin Bradford MD;  Location: [facility];  Service: General;  Laterality: N/A;  partial       SHx: per the electronic medical record    FHx: recorded in the electronic medical record    ROS:    denies any chest pains or shortness of breath. Denies any nausea, vomiting or diarrhea. Denies any fever, chills or sweats. Denies any change in weight, voice, stool, skin or hair. Denies any dysuria, dyspepsia or dysphagia. Denies any change in vision, hearing or headaches. Denies any swollen lymph nodes or loss of memory.    PE:  /86 (BP Location: Right arm)   Pulse 68   Ht 5' 11" (1.803 m)   Wt 107.6 kg (237 lb 3.4 oz)   SpO2 98%   BMI 33.08 kg/m²   Gen: Well-developed, well-nourished, male, in no acute distress, oriented x3  HEENT: neck is supple, no adenopathy, carotids 2+ equal without bruits, thyroid exam normal size without nodules.  CHEST: clear to auscultation and percussion  CVS: regular rate and rhythm without significant murmur, gallop, or rubs  ABD: soft, benign, no rebound no guarding, no distention.  Bowel sounds are normal.     nontender.  No palpable masses.  No organomegaly and no audible bruits.  RECTAL:  Deferred.  EXT: no clubbing, cyanosis, or edema  LYMPH: no cervical, inguinal, or axillary adenopathy  FEET: no loss of sensation.  No ulcers or pressure sores.  Monofilament testing normal.  NEURO: gait normal.  Cranial nerves II- XII intact. No nystagmus.  Speech normal.   Gross motor and sensory unremarkable.    Lab Results   Component Value Date    WBC 4.56 04/25/2022    HGB 14.0 04/25/2022    HCT 43.0 04/25/2022     (L) 04/25/2022    CHOL 88 (L) 04/25/2022    TRIG 199 (H) 04/25/2022    HDL 20 (L) 04/25/2022    ALT 41 04/25/2022    AST 27 04/25/2022     04/25/2022    K 4.4 04/25/2022     04/25/2022    CREATININE 1.5 (H) 04/25/2022    BUN 30 (H) 04/25/2022    CO2 24 " 04/25/2022    TSH 1.302 04/25/2022    HGBA1C 7.1 (H) 04/25/2022       Impression:  Multiple medical problems below, stable  Patient Active Problem List   Diagnosis    RBBB    First degree AV block    Gastrointestinal stromal tumor (GIST)    DM (diabetes mellitus) with complications    Type II diabetes mellitus with renal manifestations    Hypertension associated with diabetes    Hyperlipidemia associated with type 2 diabetes mellitus    History of renal cell carcinoma    History of DVT (deep vein thrombosis)    Thrombocytopenia    CKD (chronic kidney disease), stage III    History of colon polyps       Plan:   Orders Placed This Encounter    Hemoglobin A1C    Basic Metabolic Panel    Microalbumin/Creatinine Ratio, Urine    Lipid Panel    metFORMIN (GLUCOPHAGE) 500 MG tablet    pantoprazole (PROTONIX) 40 MG tablet    metoprolol tartrate (LOPRESSOR) 50 MG tablet    losartan-hydrochlorothiazide 100-12.5 mg (HYZAAR) 100-12.5 mg Tab     He will increase the metformin to take it twice a day.  Patient will see me again in 6 months with above lab work.  We reinforced his diet.  Patient has been encouraged to exercise 4 days a week.  This note is generated with speech recognition software and is subject to transcription error and sound alike phrases that may be missed by proofreading.

## 2022-05-10 ENCOUNTER — HOSPITAL ENCOUNTER (OUTPATIENT)
Dept: RADIOLOGY | Facility: HOSPITAL | Age: 77
Discharge: HOME | End: 2022-05-10
Attending: INTERNAL MEDICINE
Payer: MEDICARE

## 2022-05-10 DIAGNOSIS — C49.A0 GASTROINTESTINAL STROMAL TUMOR (GIST): ICD-10-CM

## 2022-05-10 DIAGNOSIS — D69.6 THROMBOCYTOPENIA: ICD-10-CM

## 2022-05-10 PROCEDURE — 74177 CT ABD & PELVIS W/CONTRAST: CPT | Mod: TC

## 2022-05-10 PROCEDURE — 25500020 PHARM REV CODE 255: Performed by: INTERNAL MEDICINE

## 2022-05-10 PROCEDURE — 71260 CT THORAX DX C+: CPT | Mod: TC

## 2022-05-10 PROCEDURE — A9698 NON-RAD CONTRAST MATERIALNOC: HCPCS | Performed by: INTERNAL MEDICINE

## 2022-05-10 PROCEDURE — 25000003 PHARM REV CODE 250: Performed by: RADIOLOGY

## 2022-05-10 RX ORDER — SODIUM CHLORIDE 9 MG/ML
INJECTION, SOLUTION INTRAVENOUS ONCE
Status: COMPLETED | OUTPATIENT
Start: 2022-05-10 | End: 2022-05-10

## 2022-05-10 RX ADMIN — SODIUM CHLORIDE: 0.9 INJECTION, SOLUTION INTRAVENOUS at 11:05

## 2022-05-10 RX ADMIN — IOHEXOL 1000 ML: 9 SOLUTION ORAL at 11:05

## 2022-05-10 RX ADMIN — IOHEXOL 100 ML: 350 INJECTION, SOLUTION INTRAVENOUS at 11:05

## 2022-05-10 RX ADMIN — SODIUM CHLORIDE: 0.9 INJECTION, SOLUTION INTRAVENOUS at 10:05

## 2022-05-13 ENCOUNTER — TELEPHONE (OUTPATIENT)
Dept: HEMATOLOGY/ONCOLOGY | Facility: CLINIC | Age: 77
End: 2022-05-13
Payer: MEDICARE

## 2022-05-14 DIAGNOSIS — D75.89 BICYTOPENIA: Primary | ICD-10-CM

## 2022-05-14 DIAGNOSIS — C49.A0 GASTROINTESTINAL STROMAL TUMOR (GIST): ICD-10-CM

## 2022-06-06 ENCOUNTER — OFFICE VISIT (OUTPATIENT)
Dept: HEMATOLOGY/ONCOLOGY | Facility: CLINIC | Age: 77
End: 2022-06-06
Payer: MEDICARE

## 2022-06-06 VITALS
BODY MASS INDEX: 32.32 KG/M2 | DIASTOLIC BLOOD PRESSURE: 94 MMHG | TEMPERATURE: 98 F | SYSTOLIC BLOOD PRESSURE: 149 MMHG | WEIGHT: 231.69 LBS | HEART RATE: 68 BPM | OXYGEN SATURATION: 96 %

## 2022-06-06 DIAGNOSIS — D69.6 THROMBOCYTOPENIA: ICD-10-CM

## 2022-06-06 DIAGNOSIS — C49.A0 GASTROINTESTINAL STROMAL TUMOR (GIST): ICD-10-CM

## 2022-06-06 PROCEDURE — 99999 PR PBB SHADOW E&M-EST. PATIENT-LVL IV: ICD-10-PCS | Mod: PBBFAC,,, | Performed by: INTERNAL MEDICINE

## 2022-06-06 PROCEDURE — 3288F FALL RISK ASSESSMENT DOCD: CPT | Mod: CPTII,S$GLB,, | Performed by: INTERNAL MEDICINE

## 2022-06-06 PROCEDURE — 3080F PR MOST RECENT DIASTOLIC BLOOD PRESSURE >= 90 MM HG: ICD-10-PCS | Mod: CPTII,S$GLB,, | Performed by: INTERNAL MEDICINE

## 2022-06-06 PROCEDURE — 1126F PR PAIN SEVERITY QUANTIFIED, NO PAIN PRESENT: ICD-10-PCS | Mod: CPTII,S$GLB,, | Performed by: INTERNAL MEDICINE

## 2022-06-06 PROCEDURE — 1101F PT FALLS ASSESS-DOCD LE1/YR: CPT | Mod: CPTII,S$GLB,, | Performed by: INTERNAL MEDICINE

## 2022-06-06 PROCEDURE — 1126F AMNT PAIN NOTED NONE PRSNT: CPT | Mod: CPTII,S$GLB,, | Performed by: INTERNAL MEDICINE

## 2022-06-06 PROCEDURE — 99215 OFFICE O/P EST HI 40 MIN: CPT | Mod: S$GLB,,, | Performed by: INTERNAL MEDICINE

## 2022-06-06 PROCEDURE — 1101F PR PT FALLS ASSESS DOC 0-1 FALLS W/OUT INJ PAST YR: ICD-10-PCS | Mod: CPTII,S$GLB,, | Performed by: INTERNAL MEDICINE

## 2022-06-06 PROCEDURE — 3080F DIAST BP >= 90 MM HG: CPT | Mod: CPTII,S$GLB,, | Performed by: INTERNAL MEDICINE

## 2022-06-06 PROCEDURE — 99499 RISK ADDL DX/OHS AUDIT: ICD-10-PCS | Mod: S$GLB,,, | Performed by: INTERNAL MEDICINE

## 2022-06-06 PROCEDURE — 99999 PR PBB SHADOW E&M-EST. PATIENT-LVL IV: CPT | Mod: PBBFAC,,, | Performed by: INTERNAL MEDICINE

## 2022-06-06 PROCEDURE — 99499 UNLISTED E&M SERVICE: CPT | Mod: S$GLB,,, | Performed by: INTERNAL MEDICINE

## 2022-06-06 PROCEDURE — 1159F PR MEDICATION LIST DOCUMENTED IN MEDICAL RECORD: ICD-10-PCS | Mod: CPTII,S$GLB,, | Performed by: INTERNAL MEDICINE

## 2022-06-06 PROCEDURE — 99215 PR OFFICE/OUTPT VISIT, EST, LEVL V, 40-54 MIN: ICD-10-PCS | Mod: S$GLB,,, | Performed by: INTERNAL MEDICINE

## 2022-06-06 PROCEDURE — 3288F PR FALLS RISK ASSESSMENT DOCUMENTED: ICD-10-PCS | Mod: CPTII,S$GLB,, | Performed by: INTERNAL MEDICINE

## 2022-06-06 PROCEDURE — 1159F MED LIST DOCD IN RCRD: CPT | Mod: CPTII,S$GLB,, | Performed by: INTERNAL MEDICINE

## 2022-06-06 PROCEDURE — 3077F PR MOST RECENT SYSTOLIC BLOOD PRESSURE >= 140 MM HG: ICD-10-PCS | Mod: CPTII,S$GLB,, | Performed by: INTERNAL MEDICINE

## 2022-06-06 PROCEDURE — 3077F SYST BP >= 140 MM HG: CPT | Mod: CPTII,S$GLB,, | Performed by: INTERNAL MEDICINE

## 2022-06-06 NOTE — ASSESSMENT & PLAN NOTE
Mild thrombocytopenia with platelet count noted above 132,000. Normal B12, folate and thyroid levels.  Will continue to monitor.

## 2022-06-06 NOTE — PROGRESS NOTES
Subjective:   Date of Visit: 6/6/22   ?   ?    REFERRING PROVIDER: No referring provider defined for this encounter.   ?   CHIEF COMPLAINT:  GIST tumor???????   ?   ONCOLOGIC DIAGNOSIS: GIST tumor  ?   CURRENT TREATMENT: none    PAST TREATMENT:Robotic partial gastrectomy with EGD and omental patch.  ?   ONCOLOGIC HISTORY:     1, 2. GASTRIC ANTRAL MASS, EUS-BIOPSY:   Consistent with epithelioid and spindled gastrointestinal stromal tumor, see comment   Comment   The biopsy shows scattered fragments of spindled and epithelioid cells with moderate to focally severe nuclear pleomorphism. No significant mitotic figures have been identified; however this biopsy is a limited specimen. Multiple immunostains have been performed with results as: DOG-1 - Diffuse positive; CD34 - Focally positive, desmin - scattered positive cells    (KIT), S-100, CK7, CK20, CDX2, CALRETININ, EMA, SYNAPTOPHYSIN, AE1/3 -   All negative   INI-1 - Preserved   Based on this extensive immunoprofile, morphologic features and clinical are regular of afindings, this tumor is most consistent with epithelioid and spindled forgastrointestinal stromal tumor. KIT- negativity could be a sampling issue, or the tumor could be kit negative.     Final Pathologic Diagnosis  3/9/21 Stomach, partial gastrectomy:   - Gastrointestinal stromal tumor, epithelioid and spindled type (pT2)     - Low grade, Mitotic rate: Up to 3 mitosis / 5 mm^2   - Resection margin uninvolved by tumor, 0.1 cm away   - Risk assessment: Very low   - KIT () immunopositive     CAP Synoptic Checklist for Gastrointestinal Stromal Tumor Resection:     - Procedure: Partial gastrectomy     - Tumor Site: Stomach     - Tumor Size: 4.8 x 2.7 x 1.9 cm     - Tumor Focality: Unifocal     - Histologic Type: Gastrointestinal stromal tumor, spindle cell and         epithelioid types     - Mitotic Rate: Up to 3 mitosis per 5 mm^2     - Necrosis: Not identified     - Histologic Grade: G1:  Low-grade; mitotic rate < 5 / 5 mm^2     - Risk Assessment: Very low (1.9% risk of progressive disease)     - Margins: Uninvolved by GIST, 0.1 cm away from closest margin     - Regional lymph nodes: No lymph nodes submitted     - Pathologic Stage Classification: pT2     - Ancillary Studies: Additional studies available at clinician's request     - Immunohistochemical Studies: KIT () - Positive     - Preresection Treatment: No known preresection therapy     - Treatment Effect: No known presurgical therapy        HPI:  76-year-old with past medical history significant for diabetes type 2 hyperlipidemia, hypertension GERD, DVT renal cell carcinoma status post partial left nephrectomy was recently diagnosed with a 3.0 by 4.5 cm antral GIST tumor by endoscopic ultrasound biopsy by Dr. León.    Status post robotic partial gastrectomy with EGD and omental patch by Dr. Bradford (pathology as above).  Presents today to discuss role of systemic adjuvant therapy.    Denies worsening epigastric pain, fever, chills, shortness of chest abdominal diarrhea dysuria.  Denies abnormal bleeding including epistaxis, hemoptysis, hematemesis, hematochezia, or hematuria    No pertinent family history      Review of Systems   Constitutional: Positive for fatigue. Negative for activity change, appetite change, chills, fever and unexpected weight change.   HENT: Negative for hearing loss, mouth sores, nosebleeds, sore throat, tinnitus, trouble swallowing and voice change.    Eyes: Negative for visual disturbance.   Respiratory: Negative for cough, chest tightness, shortness of breath and wheezing.    Cardiovascular: Negative for chest pain, palpitations and leg swelling.   Gastrointestinal: Negative for abdominal pain, anal bleeding, blood in stool, constipation, diarrhea, nausea and vomiting.   Genitourinary: Negative for frequency, hematuria and testicular pain.   Musculoskeletal: Negative for arthralgias, back pain, gait problem and  neck pain.   Skin: Negative for color change, pallor, rash and wound.   Allergic/Immunologic: Negative for immunocompromised state.   Neurological: Negative for seizures, syncope, weakness, numbness and headaches.   Hematological: Negative for adenopathy. Does not bruise/bleed easily.   Psychiatric/Behavioral: Negative for agitation, confusion, decreased concentration, hallucinations and sleep disturbance. The patient is not nervous/anxious.        ?   PAST MEDICAL HISTORY:   Past Medical History:   Diagnosis Date    CKD (chronic kidney disease), stage III     DM (diabetes mellitus) with complications     GERD (gastroesophageal reflux disease)     History of DVT (deep vein thrombosis)     History of renal cell carcinoma 2009    Hyperlipidemia associated with type 2 diabetes mellitus 02/26/2021    Hypertension associated with diabetes     Thrombocytopenia     Type II diabetes mellitus with renal manifestations     ?     PAST SURGICAL HISTORY:   Past Surgical History:   Procedure Laterality Date    CLOSURE OF PERFORATED ULCER OF DUODENUM USING OMENTAL PATCH N/A 3/9/2021    Procedure: CLOSURE, ULCER, PERFORATED, DUODENUM, USING OMENTAL PATCH;  Surgeon: Edin Bradford MD;  Location: [facility];  Service: General;  Laterality: N/A;    COLONOSCOPY N/A 1/5/2022    Procedure: COLONOSCOPY;  Surgeon: Mirta Grover MD;  Location: [facility];  Service: Endoscopy;  Laterality: N/A;    ENDOSCOPIC ULTRASOUND OF UPPER GASTROINTESTINAL TRACT N/A 2/5/2021    Procedure: ULTRASOUND, UPPER GI TRACT, ENDOSCOPIC;  Surgeon: Geremias Brizuela MD;  Location: [facility];  Service: Endoscopy;  Laterality: N/A;    ESOPHAGOGASTRODUODENOSCOPY N/A 2/3/2021    Procedure: EGD (ESOPHAGOGASTRODUODENOSCOPY);  Surgeon: Mirta Grover MD;  Location: [facility];  Service: Endoscopy;  Laterality: N/A;    ESOPHAGOGASTRODUODENOSCOPY N/A 3/9/2021    Procedure: EGD (ESOPHAGOGASTRODUODENOSCOPY);  Surgeon: Edin Bradford MD;  Location:  [facility];  Service: General;  Laterality: N/A;    left partial nephrectomy Left 2006    ROBOT-ASSISTED SURGICAL REMOVAL OF STOMACH USING DA PERRI XI N/A 3/9/2021    Procedure: XI ROBOTIC GASTRECTOMY;  Surgeon: Edin Bradford MD;  Location: [facility] OR;  Service: General;  Laterality: N/A;  partial      ?   ALLERGIES:   Allergies as of 06/06/2022    (No Known Allergies)      ?   MEDICATIONS:?   Outpatient Medications Marked as Taking for the 6/6/22 encounter (Office Visit) with Kishor De La Torre MD   Medication Sig Dispense Refill    acetaminophen (TYLENOL) 650 MG TbSR Take 650 mg by mouth every 8 (eight) hours.      aspirin 81 MG Chew Take 81 mg by mouth once daily.      atorvastatin (LIPITOR) 20 MG tablet Take 1 tablet (20 mg total) by mouth every evening. Only taking Mon.,Tues, Wed. And Thursday 90 tablet 3    cholecalciferol, vitamin D3, (VITAMIN D3) 50 mcg (2,000 unit) Tab Take 2,000 Units by mouth once daily.       fish oil-omega-3 fatty acids 300-1,000 mg capsule Take by mouth once daily.      losartan-hydrochlorothiazide 100-12.5 mg (HYZAAR) 100-12.5 mg Tab Take 1 tablet by mouth once daily. 90 tablet 3    magnesium oxide (MAG-OX) 400 mg (241.3 mg magnesium) tablet Take 400 mg by mouth once daily.      metFORMIN (GLUCOPHAGE) 500 MG tablet Take 1 tablet (500 mg total) by mouth 2 (two) times daily with meals. 180 tablet 3    metoprolol tartrate (LOPRESSOR) 50 MG tablet Take 2 tablets (100 mg total) by mouth 2 (two) times daily. 90 tablet 3    ONETOUCH ULTRA BLUE TEST STRIP Strp USE 1 STRIP TO CHECK GLUCOSE 2 TO 3 TIMES DAILY      oxybutynin (DITROPAN-XL) 5 MG TR24 Take 1 tablet (5 mg total) by mouth once daily. 90 tablet 3    pantoprazole (PROTONIX) 40 MG tablet Take 1 tablet (40 mg total) by mouth once daily. 90 tablet 3      ?   SOCIAL HISTORY:?   Social History     Tobacco Use    Smoking status: Former Smoker     Packs/day: 1.50     Years: 40.00     Pack years: 60.00     Types: Cigarettes      Quit date: 2005     Years since quittin.1    Smokeless tobacco: Never Used   Substance Use Topics    Alcohol use: Not Currently        ?   FAMILY HISTORY:   family history includes Hodgkin's lymphoma in his father; Stroke in his mother.   ?     Objective:      Physical Exam  Constitutional:       General: He is not in acute distress.     Appearance: He is well-developed. He is obese.   HENT:      Head: Normocephalic and atraumatic.      Right Ear: External ear normal.      Left Ear: External ear normal.      Mouth/Throat:      Pharynx: No oropharyngeal exudate.   Eyes:      General: No scleral icterus.        Right eye: No discharge.         Left eye: No discharge.      Conjunctiva/sclera: Conjunctivae normal.      Pupils: Pupils are equal, round, and reactive to light.   Neck:      Thyroid: No thyromegaly.   Cardiovascular:      Rate and Rhythm: Normal rate and regular rhythm.      Heart sounds: Normal heart sounds. No murmur heard.     Pulmonary:      Effort: Pulmonary effort is normal. No respiratory distress.      Breath sounds: Normal breath sounds. No wheezing.   Chest:      Chest wall: No tenderness.   Abdominal:      General: Bowel sounds are normal. There is no distension.      Palpations: Abdomen is soft. There is no mass.      Tenderness: There is no abdominal tenderness. There is no rebound.   Musculoskeletal:         General: No tenderness. Normal range of motion.      Cervical back: Normal range of motion and neck supple.   Lymphadenopathy:      Cervical: No cervical adenopathy.      Right cervical: No superficial cervical adenopathy.     Left cervical: No superficial cervical adenopathy.      Upper Body:      Right upper body: No supraclavicular or pectoral adenopathy.      Left upper body: No supraclavicular or pectoral adenopathy.   Skin:     General: Skin is warm and dry.      Capillary Refill: Capillary refill takes 2 to 3 seconds.      Coloration: Skin is not pale.      Findings: No  erythema or rash.   Neurological:      Mental Status: He is alert and oriented to person, place, and time.      Cranial Nerves: No cranial nerve deficit.      Sensory: No sensory deficit.   Psychiatric:         Behavior: Behavior normal.         Judgment: Judgment normal.         ?   Vitals:    06/06/22 1034   BP: (!) 149/94   Pulse: 68   Temp: 97.7 °F (36.5 °C)      ?     ECOG SCORE    1 - Restricted in strenuous activity-ambulatory and able to carry out work of a light nature         ?   Laboratory:  ?   No visits with results within 1 Day(s) from this visit.   Latest known visit with results is:   Lab Visit on 05/10/2022   Component Date Value Ref Range Status    WBC 05/10/2022 4.98  3.90 - 12.70 K/uL Final    RBC 05/10/2022 4.77  4.60 - 6.20 M/uL Final    Hemoglobin 05/10/2022 14.1  14.0 - 18.0 g/dL Final    Hematocrit 05/10/2022 42.4  40.0 - 54.0 % Final    MCV 05/10/2022 89  82 - 98 fL Final    MCH 05/10/2022 29.6  27.0 - 31.0 pg Final    MCHC 05/10/2022 33.3  32.0 - 36.0 g/dL Final    RDW 05/10/2022 13.7  11.5 - 14.5 % Final    Platelets 05/10/2022 132 (A) 150 - 450 K/uL Final    MPV 05/10/2022 10.3  9.2 - 12.9 fL Final    Immature Granulocytes 05/10/2022 0.6 (A) 0.0 - 0.5 % Final    Gran # (ANC) 05/10/2022 2.8  1.8 - 7.7 K/uL Final    Immature Grans (Abs) 05/10/2022 0.03  0.00 - 0.04 K/uL Final    Lymph # 05/10/2022 1.5  1.0 - 4.8 K/uL Final    Mono # 05/10/2022 0.5  0.3 - 1.0 K/uL Final    Eos # 05/10/2022 0.1  0.0 - 0.5 K/uL Final    Baso # 05/10/2022 0.05  0.00 - 0.20 K/uL Final    nRBC 05/10/2022 0  0 /100 WBC Final    Gran % 05/10/2022 55.5  38.0 - 73.0 % Final    Lymph % 05/10/2022 30.9  18.0 - 48.0 % Final    Mono % 05/10/2022 9.8  4.0 - 15.0 % Final    Eosinophil % 05/10/2022 2.2  0.0 - 8.0 % Final    Basophil % 05/10/2022 1.0  0.0 - 1.9 % Final    Differential Method 05/10/2022 Automated   Final    Sodium 05/10/2022 144  136 - 145 mmol/L Final    Potassium 05/10/2022 4.7   3.5 - 5.1 mmol/L Final    Chloride 05/10/2022 108  95 - 110 mmol/L Final    CO2 05/10/2022 22 (A) 23 - 29 mmol/L Final    Glucose 05/10/2022 173 (A) 70 - 110 mg/dL Final    BUN 05/10/2022 28 (A) 8 - 23 mg/dL Final    Creatinine 05/10/2022 1.5 (A) 0.5 - 1.4 mg/dL Final    Calcium 05/10/2022 9.4  8.7 - 10.5 mg/dL Final    Total Protein 05/10/2022 6.2  6.0 - 8.4 g/dL Final    Albumin 05/10/2022 3.8  3.5 - 5.2 g/dL Final    Total Bilirubin 05/10/2022 0.7  0.1 - 1.0 mg/dL Final    Alkaline Phosphatase 05/10/2022 67  55 - 135 U/L Final    AST 05/10/2022 19  10 - 40 U/L Final    ALT 05/10/2022 28  10 - 44 U/L Final    Anion Gap 05/10/2022 14  8 - 16 mmol/L Final    eGFR if  05/10/2022 51 (A) >60 mL/min/1.73 m^2 Final    eGFR if non  05/10/2022 44 (A) >60 mL/min/1.73 m^2 Final      ?   Tumor markers   ?   ?   Imaging: CT Chest Abdomen Pelvis With Contrast  Narrative: EXAMINATION:  CT CHEST ABDOMEN PELVIS WITH CONTRAST (XPD)    CLINICAL HISTORY:  GIST tumor;Gastrointestinal stromal tumor, unspecified site    TECHNIQUE:  The patient was surveyed from the thoracic inlet through the pelvis after the administration of 100 cc Omni 350 IV contrast as well as oral contrast and data was reconstructed for coronal, sagittal, and axial images.    COMPARISON:  10/19/2021    FINDINGS:  The soft tissues at the base of the neck are unremarkable.  The thyroid gland is normal in size and configuration.  There is no abnormal lymph node enlargement.    There is no axillary, mediastinal, or hilar lymphadenopathy.  The hilar contours are unremarkable.  The esophagus is normal in course and contour.    The thoracic aorta is normal in course, caliber, and contour without significant atherosclerotic calcifications.    The heart does not appear enlarged and there is no pericardial effusion.  Moderate coronary disease.    The trachea and proximal airways are patent.  The lungs are symmetrically expanded  and demonstrate no convincing evidence of consolidation, pleural thickening, pneumothorax, or pleural fluid.  Mild atelectasis within the lingula.  Mild emphysematous changes.    The liver is normal in size.  Slightly decreased attenuation liver consistent with diffuse hepatic steatosis.  No focal hepatic abnormality.  The gallbladder shows no evidence of stones or pericholecystic fluid.  There is no intra-or extrahepatic biliary ductal dilatation.    The stomach, spleen, pancreas, and adrenal glands are unremarkable.  Pancreas is atrophic    Mild bilateral renal cortical thinning.  Small cysts within the kidneys.  There is no evidence of hydronephrosis.  The ureters appear normal in course and caliber without evidence of ureteral dilatation. The urinary bladder demonstrates no significant abnormality.    The abdominal aorta is normal in course and caliber with moderate atherosclerotic calcifications. Tortuosity of the descending aorta can be seen with chronic hypertension.    The visualized loops of small bowel show no evidence of obstruction or inflammation.  Small duodenal diverticulum the 4th portion duodenum.  Large bowel demonstrates mild constipation and extensive diverticulosis throughout the descending and sigmoid colon..  There is no ascites, free fluid, or intraperitoneal free air. There is no evidence of lymph node enlargement in the abdomen or pelvis.    Diffuse osteopenia.  No lytic or blastic lesions.  Advanced degenerative changes throughout the lumbar spine with disc space narrowing, disc osteophyte complexes and varying degrees of spinal canal and neural foraminal narrowing.  Grade 2 spondylolisthesis L5/S1 with associated spondylolysis of L5 thought to be degenerative in nature.  Moderate canal stenosis posterior to the S1 region.  Moderate bilateral neural foraminal narrowing L5/S1.  Impression: No evidence of recurrent or metastatic disease.  See findings above.    All CT scans at this facility  use dose modulation, iterative reconstruction and/or weight based dosing when appropriate to reduce radiation dose to as low as reasonably achievable.    Electronically signed by: Myles Lockett MD  Date:    05/10/2022  Time:    11:32     ?      Pathology:  Pathology Results  (Last 10 years)               01/05/22 1035  Specimen to Pathology, Surgery Gastrointestinal tract Final result    Narrative:  Pre-op Diagnosis: Hx of adenomatous colonic polyps [Z86.010]   Procedure(s):   COLONOSCOPY   Number of specimens: 1   Name of specimens:   1 transverse colon polypectomies x4   Release to patient->Immediate   Specimen total (fresh, frozen, permanent):->1       03/09/21 1553  Specimen to Pathology, Surgery General Surgery Final result    Narrative:  Pre-op Diagnosis: Gastrointestinal stromal tumor (GIST)   [C49.A0]   Procedure(s):   XI ROBOTIC GASTRECTOMY   EGD (ESOPHAGOGASTRODUODENOSCOPY)   Number of specimens: 1   Name of specimens:   1. Gastric mass - FROZEN/PERM   Release to patient->7 Day Delay   Specimen total (fresh, frozen, permanent):->1              ?   Assessment/Plan:       1. Gastrointestinal stromal tumor (GIST)    2. Thrombocytopenia          Gastrointestinal stromal tumor (GIST)    Stage I (pT2, pN0, cM0, low mitotic rate) gastric GIST tumor with epithelioid and spindled features. S/p complete resection with negative margins. Advised patient that tumor size and mitotic rates are use to determine the malignant potential of GIST.  In addition tumor site has also been added as a prognostic variable in determining the risk of recurrence.  For instance gastric GIST behavior in indolent manner when the tumor size is less than 2 cm; whereas those originating from small intestine or colon tend to be more aggressive regardless of sites.     Based on our patients gastric GIST tumor size at 4.8 cm, 3 mitoses/50 HPFs, metastasis rate is calculated at 1.9%; progression-free survival over 5 years is noted at 98.1%  [Semin Diagn Pathol 2006; 23:70].      Adjuvant TKI therapy is recommended for patients with intermediate or high risk disease [ACOSOG  study; SSGXVIII/AIO study].  Patient's tumor size, mitoses index and tumor location puts him at low risk of recurrence.      Given the above discussions, patient opted to observe rather than initiate TKI therapy.     CT scan of chest abdomen and pelvis from 05/10/2022 showed no evidence for recurrent or residual lesion in the distal stomach and no evidence to suggest metastatic disease.  Plan is to continue surveillance with imaging every 3-6 months.  Return in 6 months with repeat labs or sooner if needed.        On follow-up plan is as noted below;  -CT scan of abdomen and pelvis every 3-6 months for 3-5 years and then annually.    Thrombocytopenia  Mild thrombocytopenia with platelet count noted above 132,000. Normal B12, folate and thyroid levels.  Will continue to monitor.      ?Gastrointestinal stromal tumor (GIST)  -     CBC Auto Differential; Future; Expected date: 06/06/2022  -     Comprehensive Metabolic Panel; Future; Expected date: 06/06/2022  -     CT Chest Abdomen Pelvis With Contrast (xpd); Future; Expected date: 06/06/2022    Thrombocytopenia  -     CBC Auto Differential; Future; Expected date: 06/06/2022  -     Comprehensive Metabolic Panel; Future; Expected date: 06/06/2022  -     CT Chest Abdomen Pelvis With Contrast (xpd); Future; Expected date: 06/06/2022       Follow-Up: Follow up in about 6 months (around 12/6/2022).    LORRI TOWNSEND Md., Ph.D  Hematology & Oncology Department  Phone #: [phone]

## 2022-06-06 NOTE — ASSESSMENT & PLAN NOTE
Stage I (pT2, pN0, cM0, low mitotic rate) gastric GIST tumor with epithelioid and spindled features. S/p complete resection with negative margins. Advised patient that tumor size and mitotic rates are use to determine the malignant potential of GIST.  In addition tumor site has also been added as a prognostic variable in determining the risk of recurrence.  For instance gastric GIST behavior in indolent manner when the tumor size is less than 2 cm; whereas those originating from small intestine or colon tend to be more aggressive regardless of sites.     Based on our patients gastric GIST tumor size at 4.8 cm, 3 mitoses/50 HPFs, metastasis rate is calculated at 1.9%; progression-free survival over 5 years is noted at 98.1% [Semin Diagn Pathol 2006; 23:70].      Adjuvant TKI therapy is recommended for patients with intermediate or high risk disease [ACOSOG  study; SSGXVIII/AIO study].  Patient's tumor size, mitoses index and tumor location puts him at low risk of recurrence.      Given the above discussions, patient opted to observe rather than initiate TKI therapy.     CT scan of chest abdomen and pelvis from 05/10/2022 showed no evidence for recurrent or residual lesion in the distal stomach and no evidence to suggest metastatic disease.  Plan is to continue surveillance with imaging every 3-6 months.  Return in 6 months with repeat labs or sooner if needed.        On follow-up plan is as noted below;  -CT scan of abdomen and pelvis every 3-6 months for 3-5 years and then annually.

## 2022-06-23 RX ORDER — ATORVASTATIN CALCIUM 20 MG/1
TABLET, FILM COATED ORAL
Qty: 51 TABLET | Refills: 3 | Status: SHIPPED | OUTPATIENT
Start: 2022-06-23 | End: 2022-09-05

## 2022-06-23 NOTE — TELEPHONE ENCOUNTER
No new care gaps identified.  [facility] Embedded Care Gaps. Reference number: [id number]. 6/23/2022   2:09:30 PM CDT

## 2022-06-24 RX ORDER — ATORVASTATIN CALCIUM 20 MG/1
TABLET, FILM COATED ORAL
Qty: 51 TABLET | Refills: 3 | OUTPATIENT
Start: 2022-06-24

## 2022-06-24 NOTE — TELEPHONE ENCOUNTER
No new care gaps identified.  [facility] Embedded Care Gaps. Reference number: [id number]. 6/24/2022   7:27:46 AM RADHAT

## 2022-06-24 NOTE — TELEPHONE ENCOUNTER
Refill Decision Note   Daniel Villeda  is requesting a refill authorization.  Brief Assessment and Rationale for Refill:  Quick Discontinue     Medication Therapy Plan:       Medication Reconciliation Completed: No   Comments:     No Care Gaps recommended.     Note composed:11:01 AM 06/24/2022

## 2022-10-26 ENCOUNTER — TELEPHONE (OUTPATIENT)
Dept: INTERNAL MEDICINE | Facility: CLINIC | Age: 77
End: 2022-10-26

## 2022-10-26 ENCOUNTER — OFFICE VISIT (OUTPATIENT)
Dept: INTERNAL MEDICINE | Facility: CLINIC | Age: 77
End: 2022-10-26
Payer: MEDICARE

## 2022-10-26 VITALS
WEIGHT: 235.88 LBS | HEIGHT: 72 IN | HEART RATE: 64 BPM | TEMPERATURE: 97 F | DIASTOLIC BLOOD PRESSURE: 80 MMHG | BODY MASS INDEX: 31.95 KG/M2 | OXYGEN SATURATION: 98 % | SYSTOLIC BLOOD PRESSURE: 125 MMHG

## 2022-10-26 DIAGNOSIS — C49.A0 GASTROINTESTINAL STROMAL TUMOR (GIST): Primary | ICD-10-CM

## 2022-10-26 DIAGNOSIS — J06.9 VIRAL URI: ICD-10-CM

## 2022-10-26 DIAGNOSIS — R05.9 COUGH, UNSPECIFIED TYPE: Primary | ICD-10-CM

## 2022-10-26 LAB
CTP QC/QA: YES
POC MOLECULAR INFLUENZA A AGN: NEGATIVE
POC MOLECULAR INFLUENZA B AGN: NEGATIVE

## 2022-10-26 PROCEDURE — 3288F FALL RISK ASSESSMENT DOCD: CPT | Mod: CPTII,S$GLB,, | Performed by: FAMILY MEDICINE

## 2022-10-26 PROCEDURE — 3074F SYST BP LT 130 MM HG: CPT | Mod: CPTII,S$GLB,, | Performed by: FAMILY MEDICINE

## 2022-10-26 PROCEDURE — 1126F PR PAIN SEVERITY QUANTIFIED, NO PAIN PRESENT: ICD-10-PCS | Mod: CPTII,S$GLB,, | Performed by: FAMILY MEDICINE

## 2022-10-26 PROCEDURE — 1159F PR MEDICATION LIST DOCUMENTED IN MEDICAL RECORD: ICD-10-PCS | Mod: CPTII,S$GLB,, | Performed by: FAMILY MEDICINE

## 2022-10-26 PROCEDURE — 99999 PR PBB SHADOW E&M-EST. PATIENT-LVL III: ICD-10-PCS | Mod: PBBFAC,,, | Performed by: FAMILY MEDICINE

## 2022-10-26 PROCEDURE — 87502 POCT INFLUENZA A/B MOLECULAR: ICD-10-PCS | Mod: QW,S$GLB,, | Performed by: FAMILY MEDICINE

## 2022-10-26 PROCEDURE — 1101F PT FALLS ASSESS-DOCD LE1/YR: CPT | Mod: CPTII,S$GLB,, | Performed by: FAMILY MEDICINE

## 2022-10-26 PROCEDURE — 1159F MED LIST DOCD IN RCRD: CPT | Mod: CPTII,S$GLB,, | Performed by: FAMILY MEDICINE

## 2022-10-26 PROCEDURE — 99213 PR OFFICE/OUTPT VISIT, EST, LEVL III, 20-29 MIN: ICD-10-PCS | Mod: S$GLB,,, | Performed by: FAMILY MEDICINE

## 2022-10-26 PROCEDURE — 3079F DIAST BP 80-89 MM HG: CPT | Mod: CPTII,S$GLB,, | Performed by: FAMILY MEDICINE

## 2022-10-26 PROCEDURE — 3074F PR MOST RECENT SYSTOLIC BLOOD PRESSURE < 130 MM HG: ICD-10-PCS | Mod: CPTII,S$GLB,, | Performed by: FAMILY MEDICINE

## 2022-10-26 PROCEDURE — 3288F PR FALLS RISK ASSESSMENT DOCUMENTED: ICD-10-PCS | Mod: CPTII,S$GLB,, | Performed by: FAMILY MEDICINE

## 2022-10-26 PROCEDURE — 87502 INFLUENZA DNA AMP PROBE: CPT | Mod: QW,S$GLB,, | Performed by: FAMILY MEDICINE

## 2022-10-26 PROCEDURE — 1101F PR PT FALLS ASSESS DOC 0-1 FALLS W/OUT INJ PAST YR: ICD-10-PCS | Mod: CPTII,S$GLB,, | Performed by: FAMILY MEDICINE

## 2022-10-26 PROCEDURE — 1126F AMNT PAIN NOTED NONE PRSNT: CPT | Mod: CPTII,S$GLB,, | Performed by: FAMILY MEDICINE

## 2022-10-26 PROCEDURE — 99213 OFFICE O/P EST LOW 20 MIN: CPT | Mod: S$GLB,,, | Performed by: FAMILY MEDICINE

## 2022-10-26 PROCEDURE — 3079F PR MOST RECENT DIASTOLIC BLOOD PRESSURE 80-89 MM HG: ICD-10-PCS | Mod: CPTII,S$GLB,, | Performed by: FAMILY MEDICINE

## 2022-10-26 PROCEDURE — 99999 PR PBB SHADOW E&M-EST. PATIENT-LVL III: CPT | Mod: PBBFAC,,, | Performed by: FAMILY MEDICINE

## 2022-10-26 RX ORDER — AZELASTINE 1 MG/ML
1 SPRAY, METERED NASAL 2 TIMES DAILY
Qty: 30 ML | Refills: 1 | Status: SHIPPED | OUTPATIENT
Start: 2022-10-26 | End: 2023-04-10 | Stop reason: ALTCHOICE

## 2022-10-26 RX ORDER — PROMETHAZINE HYDROCHLORIDE AND DEXTROMETHORPHAN HYDROBROMIDE 6.25; 15 MG/5ML; MG/5ML
5 SYRUP ORAL EVERY 4 HOURS PRN
Qty: 200 ML | Refills: 0 | Status: SHIPPED | OUTPATIENT
Start: 2022-10-26 | End: 2022-11-05

## 2022-10-26 NOTE — TELEPHONE ENCOUNTER
Left message with spouse regarding test results being negative and rx's sent in to [facility].

## 2022-10-26 NOTE — PROGRESS NOTES
Subjective:      Patient ID: Daniel Villeda is a 77 y.o. male.    Chief Complaint: Nasal Congestion and Cough      Patient reports nasal congestion, cough for about 2 days now. Wife with similar symptoms which started several days ago, she also has body aches, fever, headache, diarrhea.     Cough  Pertinent negatives include no fever, headaches or postnasal drip.   Review of Systems   Constitutional:  Negative for activity change, appetite change, fatigue and fever.   HENT:  Positive for congestion. Negative for postnasal drip and sinus pain.    Respiratory:  Positive for cough.    Neurological:  Negative for headaches.   Past Medical History:   Diagnosis Date    CKD (chronic kidney disease), stage III     DM (diabetes mellitus) with complications     GERD (gastroesophageal reflux disease)     History of DVT (deep vein thrombosis)     History of renal cell carcinoma 2009    Hyperlipidemia associated with type 2 diabetes mellitus 02/26/2021    Hypertension associated with diabetes     Thrombocytopenia     Type II diabetes mellitus with renal manifestations           Past Surgical History:   Procedure Laterality Date    CLOSURE OF PERFORATED ULCER OF DUODENUM USING OMENTAL PATCH N/A 3/9/2021    Procedure: CLOSURE, ULCER, PERFORATED, DUODENUM, USING OMENTAL PATCH;  Surgeon: Edin Bradford MD;  Location: [facility];  Service: General;  Laterality: N/A;    COLONOSCOPY N/A 1/5/2022    Procedure: COLONOSCOPY;  Surgeon: Mirta Grover MD;  Location: [facility];  Service: Endoscopy;  Laterality: N/A;    ENDOSCOPIC ULTRASOUND OF UPPER GASTROINTESTINAL TRACT N/A 2/5/2021    Procedure: ULTRASOUND, UPPER GI TRACT, ENDOSCOPIC;  Surgeon: Geremias Brizuela MD;  Location: [facility] ENDO;  Service: Endoscopy;  Laterality: N/A;    ESOPHAGOGASTRODUODENOSCOPY N/A 2/3/2021    Procedure: EGD (ESOPHAGOGASTRODUODENOSCOPY);  Surgeon: Mirta Grover MD;  Location: [facility];  Service: Endoscopy;  Laterality: N/A;     ESOPHAGOGASTRODUODENOSCOPY N/A 3/9/2021    Procedure: EGD (ESOPHAGOGASTRODUODENOSCOPY);  Surgeon: Edin Bradford MD;  Location: [facility] OR;  Service: General;  Laterality: N/A;    left partial nephrectomy Left 2006    ROBOT-ASSISTED SURGICAL REMOVAL OF STOMACH USING DA PERRI XI N/A 3/9/2021    Procedure: XI ROBOTIC GASTRECTOMY;  Surgeon: Edin Bradford MD;  Location: [facility] OR;  Service: General;  Laterality: N/A;  partial     Family History   Problem Relation Age of Onset    Stroke Mother     Hodgkin's lymphoma Father      Social History     Socioeconomic History    Marital status:    Tobacco Use    Smoking status: Former     Packs/day: 1.50     Years: 40.00     Pack years: 60.00     Types: Cigarettes     Quit date: 2005     Years since quittin.5    Smokeless tobacco: Never   Substance and Sexual Activity    Alcohol use: Not Currently    Drug use: Never     Review of patient's allergies indicates:  No Known Allergies    Objective:       /80 (BP Location: Left arm, Patient Position: Sitting, BP Method: Large (Manual))   Pulse 64   Temp 97 °F (36.1 °C) (Tympanic)   Ht 6' (1.829 m)   Wt 107 kg (235 lb 14.3 oz)   SpO2 98%   BMI 31.99 kg/m²   Physical Exam  Vitals reviewed.   Constitutional:       General: He is not in acute distress.     Appearance: He is well-developed. He is not diaphoretic.   HENT:      Head: Normocephalic.      Right Ear: Hearing, tympanic membrane, ear canal and external ear normal.      Left Ear: Hearing, tympanic membrane, ear canal and external ear normal.      Nose: Mucosal edema present.      Right Sinus: No maxillary sinus tenderness or frontal sinus tenderness.      Left Sinus: No maxillary sinus tenderness or frontal sinus tenderness.      Mouth/Throat:      Pharynx: Uvula midline. Posterior oropharyngeal erythema present.   Eyes:      Conjunctiva/sclera: Conjunctivae normal.      Pupils: Pupils are equal, round, and reactive to light.   Cardiovascular:      Rate and  Rhythm: Normal rate and regular rhythm.      Heart sounds: Normal heart sounds.   Pulmonary:      Effort: Pulmonary effort is normal. No respiratory distress.      Breath sounds: Normal breath sounds.   Abdominal:      General: Bowel sounds are normal.      Palpations: Abdomen is soft.   Lymphadenopathy:      Cervical: No cervical adenopathy.   Skin:     General: Skin is warm and dry.   Neurological:      Mental Status: He is alert and oriented to person, place, and time.   Psychiatric:         Behavior: Behavior normal.     Assessment:     1. Cough, unspecified type    2. Viral URI      Plan:   Cough, unspecified type  -     POCT Influenza A/B Molecular    Viral URI    Other orders  -     azelastine (ASTELIN) 137 mcg (0.1 %) nasal spray; 1 spray (137 mcg total) by Nasal route 2 (two) times daily.  Dispense: 30 mL; Refill: 1  -     promethazine-dextromethorphan (PROMETHAZINE-DM) 6.25-15 mg/5 mL Syrp; Take 5 mLs by mouth every 4 (four) hours as needed.  Dispense: 200 mL; Refill: 0    Flu test today negative  Medication List with Changes/Refills   New Medications    AZELASTINE (ASTELIN) 137 MCG (0.1 %) NASAL SPRAY    1 spray (137 mcg total) by Nasal route 2 (two) times daily.    PROMETHAZINE-DEXTROMETHORPHAN (PROMETHAZINE-DM) 6.25-15 MG/5 ML SYRP    Take 5 mLs by mouth every 4 (four) hours as needed.   Current Medications    ACETAMINOPHEN (TYLENOL) 650 MG TBSR    Take 650 mg by mouth every 8 (eight) hours.    ASPIRIN 81 MG CHEW    Take 81 mg by mouth once daily.    ATORVASTATIN (LIPITOR) 20 MG TABLET    TAKE 1 TABLET BY MOUTH ONCE DAILY IN THE EVENING (ONLY  TAKE  ON  MONDAY,  TUESDAY,  WEDNESDAY  AND  THURSDAY)    CHOLECALCIFEROL, VITAMIN D3, (VITAMIN D3) 50 MCG (2,000 UNIT) TAB    Take 2,000 Units by mouth once daily.     FISH OIL-OMEGA-3 FATTY ACIDS 300-1,000 MG CAPSULE    Take by mouth once daily.    LOSARTAN-HYDROCHLOROTHIAZIDE 100-12.5 MG (HYZAAR) 100-12.5 MG TAB    Take 1 tablet by mouth once daily.     MAGNESIUM OXIDE (MAG-OX) 400 MG (241.3 MG MAGNESIUM) TABLET    Take 400 mg by mouth once daily.    METFORMIN (GLUCOPHAGE) 500 MG TABLET    Take 1 tablet (500 mg total) by mouth 2 (two) times daily with meals.    METOPROLOL TARTRATE (LOPRESSOR) 50 MG TABLET    Take 2 tablets (100 mg total) by mouth 2 (two) times daily.    ONETOUCH ULTRA BLUE TEST STRIP STRP    USE 1 STRIP TO CHECK GLUCOSE 2 TO 3 TIMES DAILY    OXYBUTYNIN (DITROPAN-XL) 5 MG TR24    Take 1 tablet (5 mg total) by mouth once daily.    PANTOPRAZOLE (PROTONIX) 40 MG TABLET    Take 1 tablet (40 mg total) by mouth once daily.

## 2022-10-26 NOTE — TELEPHONE ENCOUNTER
----- Message from Esteban Glez MD sent at 10/26/2022 11:15 AM CDT -----  Please notify his flu test was negative.  I have sent in prescriptions for astelin and cough syrup to his pharmacy at [facility]

## 2022-11-02 ENCOUNTER — LAB VISIT (OUTPATIENT)
Dept: LAB | Facility: HOSPITAL | Age: 77
End: 2022-11-02
Attending: INTERNAL MEDICINE
Payer: MEDICARE

## 2022-11-02 ENCOUNTER — TELEPHONE (OUTPATIENT)
Dept: HEMATOLOGY/ONCOLOGY | Facility: CLINIC | Age: 77
End: 2022-11-02
Payer: MEDICARE

## 2022-11-02 DIAGNOSIS — N18.32 TYPE 2 DIABETES MELLITUS WITH STAGE 3B CHRONIC KIDNEY DISEASE, WITHOUT LONG-TERM CURRENT USE OF INSULIN: ICD-10-CM

## 2022-11-02 DIAGNOSIS — E11.22 TYPE 2 DIABETES MELLITUS WITH STAGE 3B CHRONIC KIDNEY DISEASE, WITHOUT LONG-TERM CURRENT USE OF INSULIN: ICD-10-CM

## 2022-11-02 PROCEDURE — 83036 HEMOGLOBIN GLYCOSYLATED A1C: CPT | Performed by: INTERNAL MEDICINE

## 2022-11-02 PROCEDURE — 80061 LIPID PANEL: CPT | Performed by: INTERNAL MEDICINE

## 2022-11-02 PROCEDURE — 80048 BASIC METABOLIC PNL TOTAL CA: CPT | Performed by: INTERNAL MEDICINE

## 2022-11-02 PROCEDURE — 36415 COLL VENOUS BLD VENIPUNCTURE: CPT | Mod: PO | Performed by: INTERNAL MEDICINE

## 2022-11-02 NOTE — TELEPHONE ENCOUNTER
Returned patient phone call. Patient needed appointments rescheduled until January 2023. Informed patient that we would send out letter once scheduled. Patient verbalized understanding and

## 2022-11-02 NOTE — TELEPHONE ENCOUNTER
----- Message from Leatha Bajwa sent at 11/2/2022  1:00 PM CDT -----  Contact: Daniel Sanchez would like a call back at [phone], Regards to getting his appointments reschedule until the 1st if the year when I try to do so there is nothing open for Dr. Kolb past 12/30/22.    Thanks  Td

## 2022-11-03 LAB
ANION GAP SERPL CALC-SCNC: 9 MMOL/L (ref 8–16)
BUN SERPL-MCNC: 22 MG/DL (ref 8–23)
CALCIUM SERPL-MCNC: 9 MG/DL (ref 8.7–10.5)
CHLORIDE SERPL-SCNC: 106 MMOL/L (ref 95–110)
CHOLEST SERPL-MCNC: 90 MG/DL (ref 120–199)
CHOLEST/HDLC SERPL: 4.7 {RATIO} (ref 2–5)
CO2 SERPL-SCNC: 27 MMOL/L (ref 23–29)
CREAT SERPL-MCNC: 1.1 MG/DL (ref 0.5–1.4)
EST. GFR  (NO RACE VARIABLE): >60 ML/MIN/1.73 M^2
ESTIMATED AVG GLUCOSE: 140 MG/DL (ref 68–131)
GLUCOSE SERPL-MCNC: 140 MG/DL (ref 70–110)
HBA1C MFR BLD: 6.5 % (ref 4–5.6)
HDLC SERPL-MCNC: 19 MG/DL (ref 40–75)
HDLC SERPL: 21.1 % (ref 20–50)
LDLC SERPL CALC-MCNC: 49.8 MG/DL (ref 63–159)
NONHDLC SERPL-MCNC: 71 MG/DL
POTASSIUM SERPL-SCNC: 4.5 MMOL/L (ref 3.5–5.1)
SODIUM SERPL-SCNC: 142 MMOL/L (ref 136–145)
TRIGL SERPL-MCNC: 106 MG/DL (ref 30–150)

## 2022-11-09 ENCOUNTER — OFFICE VISIT (OUTPATIENT)
Dept: INTERNAL MEDICINE | Facility: CLINIC | Age: 77
End: 2022-11-09
Payer: MEDICARE

## 2022-11-09 VITALS
OXYGEN SATURATION: 93 % | SYSTOLIC BLOOD PRESSURE: 120 MMHG | DIASTOLIC BLOOD PRESSURE: 80 MMHG | BODY MASS INDEX: 31.89 KG/M2 | HEART RATE: 80 BPM | WEIGHT: 235.44 LBS | HEIGHT: 72 IN

## 2022-11-09 DIAGNOSIS — I44.0 FIRST DEGREE AV BLOCK: ICD-10-CM

## 2022-11-09 DIAGNOSIS — Z86.718 HISTORY OF DVT (DEEP VEIN THROMBOSIS): ICD-10-CM

## 2022-11-09 DIAGNOSIS — Z85.528 HISTORY OF RENAL CELL CARCINOMA: ICD-10-CM

## 2022-11-09 DIAGNOSIS — I45.10 RBBB: ICD-10-CM

## 2022-11-09 DIAGNOSIS — N18.32 TYPE 2 DIABETES MELLITUS WITH STAGE 3B CHRONIC KIDNEY DISEASE, WITHOUT LONG-TERM CURRENT USE OF INSULIN: Primary | ICD-10-CM

## 2022-11-09 DIAGNOSIS — I15.2 HYPERTENSION ASSOCIATED WITH DIABETES: ICD-10-CM

## 2022-11-09 DIAGNOSIS — E78.5 HYPERLIPIDEMIA ASSOCIATED WITH TYPE 2 DIABETES MELLITUS: ICD-10-CM

## 2022-11-09 DIAGNOSIS — E11.22 TYPE 2 DIABETES MELLITUS WITH STAGE 3B CHRONIC KIDNEY DISEASE, WITHOUT LONG-TERM CURRENT USE OF INSULIN: Primary | ICD-10-CM

## 2022-11-09 DIAGNOSIS — C49.A0 GASTROINTESTINAL STROMAL TUMOR (GIST): ICD-10-CM

## 2022-11-09 DIAGNOSIS — D69.6 THROMBOCYTOPENIA: ICD-10-CM

## 2022-11-09 DIAGNOSIS — E11.8 DM (DIABETES MELLITUS) WITH COMPLICATIONS: ICD-10-CM

## 2022-11-09 DIAGNOSIS — E11.69 HYPERLIPIDEMIA ASSOCIATED WITH TYPE 2 DIABETES MELLITUS: ICD-10-CM

## 2022-11-09 DIAGNOSIS — Z86.010 HISTORY OF COLON POLYPS: ICD-10-CM

## 2022-11-09 DIAGNOSIS — N18.31 STAGE 3A CHRONIC KIDNEY DISEASE: ICD-10-CM

## 2022-11-09 DIAGNOSIS — E11.59 HYPERTENSION ASSOCIATED WITH DIABETES: ICD-10-CM

## 2022-11-09 PROCEDURE — G0008 ADMIN INFLUENZA VIRUS VAC: HCPCS | Mod: S$GLB,,, | Performed by: INTERNAL MEDICINE

## 2022-11-09 PROCEDURE — 99214 OFFICE O/P EST MOD 30 MIN: CPT | Mod: 25,S$GLB,, | Performed by: INTERNAL MEDICINE

## 2022-11-09 PROCEDURE — 90694 FLU VACCINE - QUADRIVALENT - ADJUVANTED: ICD-10-PCS | Mod: S$GLB,,, | Performed by: INTERNAL MEDICINE

## 2022-11-09 PROCEDURE — 99214 PR OFFICE/OUTPT VISIT, EST, LEVL IV, 30-39 MIN: ICD-10-PCS | Mod: 25,S$GLB,, | Performed by: INTERNAL MEDICINE

## 2022-11-09 PROCEDURE — 1101F PR PT FALLS ASSESS DOC 0-1 FALLS W/OUT INJ PAST YR: ICD-10-PCS | Mod: CPTII,S$GLB,, | Performed by: INTERNAL MEDICINE

## 2022-11-09 PROCEDURE — 90670 PCV13 VACCINE IM: CPT | Mod: S$GLB,,, | Performed by: INTERNAL MEDICINE

## 2022-11-09 PROCEDURE — 99999 PR PBB SHADOW E&M-EST. PATIENT-LVL IV: CPT | Mod: PBBFAC,,, | Performed by: INTERNAL MEDICINE

## 2022-11-09 PROCEDURE — 3079F PR MOST RECENT DIASTOLIC BLOOD PRESSURE 80-89 MM HG: ICD-10-PCS | Mod: CPTII,S$GLB,, | Performed by: INTERNAL MEDICINE

## 2022-11-09 PROCEDURE — 90694 VACC AIIV4 NO PRSRV 0.5ML IM: CPT | Mod: S$GLB,,, | Performed by: INTERNAL MEDICINE

## 2022-11-09 PROCEDURE — 3074F SYST BP LT 130 MM HG: CPT | Mod: CPTII,S$GLB,, | Performed by: INTERNAL MEDICINE

## 2022-11-09 PROCEDURE — 3074F PR MOST RECENT SYSTOLIC BLOOD PRESSURE < 130 MM HG: ICD-10-PCS | Mod: CPTII,S$GLB,, | Performed by: INTERNAL MEDICINE

## 2022-11-09 PROCEDURE — G0009 ADMIN PNEUMOCOCCAL VACCINE: HCPCS | Mod: S$GLB,,, | Performed by: INTERNAL MEDICINE

## 2022-11-09 PROCEDURE — 3288F PR FALLS RISK ASSESSMENT DOCUMENTED: ICD-10-PCS | Mod: CPTII,S$GLB,, | Performed by: INTERNAL MEDICINE

## 2022-11-09 PROCEDURE — 90670 PNEUMOCOCCAL CONJUGATE VACCINE 13-VALENT LESS THAN 5YO & GREATER THAN: ICD-10-PCS | Mod: S$GLB,,, | Performed by: INTERNAL MEDICINE

## 2022-11-09 PROCEDURE — 1101F PT FALLS ASSESS-DOCD LE1/YR: CPT | Mod: CPTII,S$GLB,, | Performed by: INTERNAL MEDICINE

## 2022-11-09 PROCEDURE — 99999 PR PBB SHADOW E&M-EST. PATIENT-LVL IV: ICD-10-PCS | Mod: PBBFAC,,, | Performed by: INTERNAL MEDICINE

## 2022-11-09 PROCEDURE — 3079F DIAST BP 80-89 MM HG: CPT | Mod: CPTII,S$GLB,, | Performed by: INTERNAL MEDICINE

## 2022-11-09 PROCEDURE — 1159F PR MEDICATION LIST DOCUMENTED IN MEDICAL RECORD: ICD-10-PCS | Mod: CPTII,S$GLB,, | Performed by: INTERNAL MEDICINE

## 2022-11-09 PROCEDURE — G0008 FLU VACCINE - QUADRIVALENT - ADJUVANTED: ICD-10-PCS | Mod: S$GLB,,, | Performed by: INTERNAL MEDICINE

## 2022-11-09 PROCEDURE — 99499 UNLISTED E&M SERVICE: CPT | Mod: S$GLB,,, | Performed by: INTERNAL MEDICINE

## 2022-11-09 PROCEDURE — 1159F MED LIST DOCD IN RCRD: CPT | Mod: CPTII,S$GLB,, | Performed by: INTERNAL MEDICINE

## 2022-11-09 PROCEDURE — 1126F PR PAIN SEVERITY QUANTIFIED, NO PAIN PRESENT: ICD-10-PCS | Mod: CPTII,S$GLB,, | Performed by: INTERNAL MEDICINE

## 2022-11-09 PROCEDURE — G0009 PNEUMOCOCCAL CONJUGATE VACCINE 13-VALENT LESS THAN 5YO & GREATER THAN: ICD-10-PCS | Mod: S$GLB,,, | Performed by: INTERNAL MEDICINE

## 2022-11-09 PROCEDURE — 3288F FALL RISK ASSESSMENT DOCD: CPT | Mod: CPTII,S$GLB,, | Performed by: INTERNAL MEDICINE

## 2022-11-09 PROCEDURE — 1126F AMNT PAIN NOTED NONE PRSNT: CPT | Mod: CPTII,S$GLB,, | Performed by: INTERNAL MEDICINE

## 2022-11-09 NOTE — PROGRESS NOTES
Administered Pneumonia 13  vaccine into  left   deltoid.  Patient tolerated well, no adverse reaction noted. Advised 15 min wait

## 2022-11-09 NOTE — PROGRESS NOTES
HPI:  Patient is a 77-year-old man comes in today follow-up of his diabetes, hypertension and lipids.  He is doing well.  He has no reported problems or complaints.    Current meds have been verified and updated per the EMR  Exam:/80 (BP Location: Right arm, Patient Position: Sitting, BP Method: Large (Manual))   Pulse 80   Ht 6' (1.829 m)   Wt 106.8 kg (235 lb 7.2 oz)   SpO2 (!) 93%   BMI 31.93 kg/m²   Carotid 2+ without bruits  Chest clear  Cardiovascular regular rate rhythm without murmur gallop or rub    Lab Results   Component Value Date    WBC 4.98 05/10/2022    HGB 14.1 05/10/2022    HCT 42.4 05/10/2022     (L) 05/10/2022    CHOL 90 (L) 11/02/2022    TRIG 106 11/02/2022    HDL 19 (L) 11/02/2022    ALT 28 05/10/2022    AST 19 05/10/2022     11/02/2022    K 4.5 11/02/2022     11/02/2022    CREATININE 1.1 11/02/2022    BUN 22 11/02/2022    CO2 27 11/02/2022    TSH 1.302 04/25/2022    HGBA1C 6.5 (H) 11/02/2022       Impression:  Stable problems below  Patient Active Problem List   Diagnosis    RBBB    First degree AV block    Gastrointestinal stromal tumor (GIST)    DM (diabetes mellitus) with complications    Type II diabetes mellitus with renal manifestations    Hypertension associated with diabetes    Hyperlipidemia associated with type 2 diabetes mellitus    History of renal cell carcinoma    History of DVT (deep vein thrombosis)    Thrombocytopenia    CKD (chronic kidney disease), stage III    History of colon polyps       Plan:  Orders Placed This Encounter    (In Office Administered) Pneumococcal Conjugate Vaccine (13 Valent) (IM)    Hemoglobin A1C    Comprehensive Metabolic Panel    Lipid Panel    TSH    CBC Auto Differential    Microalbumin/Creatinine Ratio, Urine   He was given high-dose influenza and Prevnar 13 today.  He will be seen again in 6 months with above lab work.      This note is generated with speech recognition software and is subject to transcription error  and sound alike phrases that may be missed by proofreading.

## 2022-11-10 ENCOUNTER — TELEPHONE (OUTPATIENT)
Dept: HEMATOLOGY/ONCOLOGY | Facility: CLINIC | Age: 77
End: 2022-11-10
Payer: MEDICARE

## 2022-11-10 NOTE — TELEPHONE ENCOUNTER
----- Message from Tami Claudio sent at 11/10/2022  8:39 AM CST -----  Regarding: appt request  Contact: pt  Pt is requesting an appt to review ct scan in January. No appt available. Please call pt at [phone]

## 2022-11-10 NOTE — TELEPHONE ENCOUNTER
Returned patient phone call. Wife answered and stated that she would have patient return call. Office number provided. Spouse verbalized understanding, no other issues at this time.

## 2022-11-10 NOTE — TELEPHONE ENCOUNTER
----- Message from Kwame Michael sent at 11/10/2022  1:14 PM CST -----  Contact: [phone]  Type:  Patient Returning Call    Who Called:Daniel   Who Left Message for Patient:DEBORA  Does the patient know what this is regarding?:n.a   Would the patient rather a call back or a response via [facility]? Call back   Best Call Back Number:[phone]  Additional Information: n/a       Thanks KB

## 2022-11-10 NOTE — TELEPHONE ENCOUNTER
Returned patient phone call. Informed patient that January schedule is not currently open. Once schedule is open appointment will be  mailed to the home. Patient verbalized understanding and had no other issues at this time.

## 2022-11-22 ENCOUNTER — PES CALL (OUTPATIENT)
Dept: ADMINISTRATIVE | Facility: CLINIC | Age: 77
End: 2022-11-22
Payer: MEDICARE

## 2022-12-19 ENCOUNTER — OFFICE VISIT (OUTPATIENT)
Dept: INTERNAL MEDICINE | Facility: CLINIC | Age: 77
End: 2022-12-19
Payer: MEDICARE

## 2022-12-19 VITALS
DIASTOLIC BLOOD PRESSURE: 78 MMHG | HEART RATE: 76 BPM | OXYGEN SATURATION: 97 % | WEIGHT: 238.31 LBS | BODY MASS INDEX: 32.28 KG/M2 | TEMPERATURE: 98 F | HEIGHT: 72 IN | SYSTOLIC BLOOD PRESSURE: 138 MMHG

## 2022-12-19 DIAGNOSIS — R05.9 COUGH, UNSPECIFIED TYPE: Primary | ICD-10-CM

## 2022-12-19 PROCEDURE — 3288F FALL RISK ASSESSMENT DOCD: CPT | Mod: HCNC,CPTII,S$GLB, | Performed by: FAMILY MEDICINE

## 2022-12-19 PROCEDURE — 1159F MED LIST DOCD IN RCRD: CPT | Mod: HCNC,CPTII,S$GLB, | Performed by: FAMILY MEDICINE

## 2022-12-19 PROCEDURE — 99213 OFFICE O/P EST LOW 20 MIN: CPT | Mod: HCNC,S$GLB,, | Performed by: FAMILY MEDICINE

## 2022-12-19 PROCEDURE — 1159F PR MEDICATION LIST DOCUMENTED IN MEDICAL RECORD: ICD-10-PCS | Mod: HCNC,CPTII,S$GLB, | Performed by: FAMILY MEDICINE

## 2022-12-19 PROCEDURE — 99213 PR OFFICE/OUTPT VISIT, EST, LEVL III, 20-29 MIN: ICD-10-PCS | Mod: HCNC,S$GLB,, | Performed by: FAMILY MEDICINE

## 2022-12-19 PROCEDURE — 3075F PR MOST RECENT SYSTOLIC BLOOD PRESS GE 130-139MM HG: ICD-10-PCS | Mod: HCNC,CPTII,S$GLB, | Performed by: FAMILY MEDICINE

## 2022-12-19 PROCEDURE — 1101F PR PT FALLS ASSESS DOC 0-1 FALLS W/OUT INJ PAST YR: ICD-10-PCS | Mod: HCNC,CPTII,S$GLB, | Performed by: FAMILY MEDICINE

## 2022-12-19 PROCEDURE — 3078F PR MOST RECENT DIASTOLIC BLOOD PRESSURE < 80 MM HG: ICD-10-PCS | Mod: HCNC,CPTII,S$GLB, | Performed by: FAMILY MEDICINE

## 2022-12-19 PROCEDURE — 3075F SYST BP GE 130 - 139MM HG: CPT | Mod: HCNC,CPTII,S$GLB, | Performed by: FAMILY MEDICINE

## 2022-12-19 PROCEDURE — 1125F AMNT PAIN NOTED PAIN PRSNT: CPT | Mod: HCNC,CPTII,S$GLB, | Performed by: FAMILY MEDICINE

## 2022-12-19 PROCEDURE — 1101F PT FALLS ASSESS-DOCD LE1/YR: CPT | Mod: HCNC,CPTII,S$GLB, | Performed by: FAMILY MEDICINE

## 2022-12-19 PROCEDURE — 99999 PR PBB SHADOW E&M-EST. PATIENT-LVL IV: CPT | Mod: PBBFAC,HCNC,, | Performed by: FAMILY MEDICINE

## 2022-12-19 PROCEDURE — 3288F PR FALLS RISK ASSESSMENT DOCUMENTED: ICD-10-PCS | Mod: HCNC,CPTII,S$GLB, | Performed by: FAMILY MEDICINE

## 2022-12-19 PROCEDURE — 1125F PR PAIN SEVERITY QUANTIFIED, PAIN PRESENT: ICD-10-PCS | Mod: HCNC,CPTII,S$GLB, | Performed by: FAMILY MEDICINE

## 2022-12-19 PROCEDURE — 3078F DIAST BP <80 MM HG: CPT | Mod: HCNC,CPTII,S$GLB, | Performed by: FAMILY MEDICINE

## 2022-12-19 PROCEDURE — 99999 PR PBB SHADOW E&M-EST. PATIENT-LVL IV: ICD-10-PCS | Mod: PBBFAC,HCNC,, | Performed by: FAMILY MEDICINE

## 2022-12-19 RX ORDER — BENZONATATE 200 MG/1
200 CAPSULE ORAL 3 TIMES DAILY PRN
Qty: 30 CAPSULE | Refills: 0 | Status: SHIPPED | OUTPATIENT
Start: 2022-12-19 | End: 2022-12-29

## 2022-12-19 RX ORDER — DOXYCYCLINE 100 MG/1
100 CAPSULE ORAL 2 TIMES DAILY
Qty: 20 CAPSULE | Refills: 0 | Status: SHIPPED | OUTPATIENT
Start: 2022-12-19 | End: 2023-01-12

## 2022-12-19 NOTE — PROGRESS NOTES
Subjective:      Patient ID: Daniel Villeda is a 77 y.o. male.    Chief Complaint: Cough      Patient reports continued coughing-states he has had this for more than 2 months now, since he was last seen by myself in October.  He took all the cough medication I had prescribed with no relief.  Reports cough is worsening, productive of a thick yellow sputum.  Denies fevers, shortness of breath.    Cough  Pertinent negatives include no fever, postnasal drip, sore throat or shortness of breath.   Review of Systems   Constitutional:  Positive for fatigue. Negative for fever.   HENT:  Negative for congestion, postnasal drip, sinus pressure and sore throat.    Respiratory:  Positive for cough. Negative for shortness of breath.    Past Medical History:   Diagnosis Date    CKD (chronic kidney disease), stage III     DM (diabetes mellitus) with complications     GERD (gastroesophageal reflux disease)     History of DVT (deep vein thrombosis)     History of renal cell carcinoma 2009    Hyperlipidemia associated with type 2 diabetes mellitus 02/26/2021    Hypertension associated with diabetes     Thrombocytopenia     Type II diabetes mellitus with renal manifestations           Past Surgical History:   Procedure Laterality Date    CLOSURE OF PERFORATED ULCER OF DUODENUM USING OMENTAL PATCH N/A 3/9/2021    Procedure: CLOSURE, ULCER, PERFORATED, DUODENUM, USING OMENTAL PATCH;  Surgeon: Edin Bradford MD;  Location: [facility] OR;  Service: General;  Laterality: N/A;    COLONOSCOPY N/A 1/5/2022    Procedure: COLONOSCOPY;  Surgeon: Mirta Grover MD;  Location: [facility];  Service: Endoscopy;  Laterality: N/A;    ENDOSCOPIC ULTRASOUND OF UPPER GASTROINTESTINAL TRACT N/A 2/5/2021    Procedure: ULTRASOUND, UPPER GI TRACT, ENDOSCOPIC;  Surgeon: Geremias Brizuela MD;  Location: [facility] ENDO;  Service: Endoscopy;  Laterality: N/A;    ESOPHAGOGASTRODUODENOSCOPY N/A 2/3/2021    Procedure: EGD (ESOPHAGOGASTRODUODENOSCOPY);  Surgeon:  Mirta Grover MD;  Location: [facility];  Service: Endoscopy;  Laterality: N/A;    ESOPHAGOGASTRODUODENOSCOPY N/A 3/9/2021    Procedure: EGD (ESOPHAGOGASTRODUODENOSCOPY);  Surgeon: Edni Bradford MD;  Location: [facility] OR;  Service: General;  Laterality: N/A;    left partial nephrectomy Left 2006    ROBOT-ASSISTED SURGICAL REMOVAL OF STOMACH USING DA PERRI XI N/A 3/9/2021    Procedure: XI ROBOTIC GASTRECTOMY;  Surgeon: Edin Bradford MD;  Location: [facility] OR;  Service: General;  Laterality: N/A;  partial     Family History   Problem Relation Age of Onset    Stroke Mother     Hodgkin's lymphoma Father      Social History     Socioeconomic History    Marital status:    Tobacco Use    Smoking status: Former     Packs/day: 1.50     Years: 40.00     Pack years: 60.00     Types: Cigarettes     Quit date: 2005     Years since quittin.7    Smokeless tobacco: Never   Substance and Sexual Activity    Alcohol use: Not Currently    Drug use: Never     Review of patient's allergies indicates:  No Known Allergies    Objective:       /78 (BP Location: Left arm, Patient Position: Sitting, BP Method: Large (Manual))   Pulse 76   Temp 98.1 °F (36.7 °C) (Temporal)   Ht 6' (1.829 m)   Wt 108.1 kg (238 lb 5.1 oz)   SpO2 97%   BMI 32.32 kg/m²   Physical Exam  Vitals reviewed.   Constitutional:       General: He is not in acute distress.     Appearance: He is well-developed. He is not diaphoretic.   HENT:      Head: Normocephalic.      Right Ear: Hearing, tympanic membrane, ear canal and external ear normal.      Left Ear: Hearing, tympanic membrane, ear canal and external ear normal.      Nose: Mucosal edema present.      Right Sinus: No maxillary sinus tenderness or frontal sinus tenderness.      Left Sinus: No maxillary sinus tenderness or frontal sinus tenderness.      Mouth/Throat:      Pharynx: Uvula midline. No posterior oropharyngeal erythema.   Eyes:      Conjunctiva/sclera: Conjunctivae normal.       Pupils: Pupils are equal, round, and reactive to light.   Cardiovascular:      Rate and Rhythm: Normal rate and regular rhythm.      Heart sounds: Normal heart sounds.   Pulmonary:      Effort: Pulmonary effort is normal. No respiratory distress.      Breath sounds: Normal breath sounds.   Lymphadenopathy:      Cervical: No cervical adenopathy.   Skin:     General: Skin is warm and dry.   Neurological:      Mental Status: He is alert and oriented to person, place, and time.   Psychiatric:         Behavior: Behavior normal.     Assessment:     1. Cough, unspecified type      Plan:   Cough, unspecified type    Other orders  -     doxycycline (VIBRAMYCIN) 100 MG Cap; Take 1 capsule (100 mg total) by mouth 2 (two) times daily.  Dispense: 20 capsule; Refill: 0  -     benzonatate (TESSALON) 200 MG capsule; Take 1 capsule (200 mg total) by mouth 3 (three) times daily as needed for Cough.  Dispense: 30 capsule; Refill: 0      Medication List with Changes/Refills   New Medications    BENZONATATE (TESSALON) 200 MG CAPSULE    Take 1 capsule (200 mg total) by mouth 3 (three) times daily as needed for Cough.    DOXYCYCLINE (VIBRAMYCIN) 100 MG CAP    Take 1 capsule (100 mg total) by mouth 2 (two) times daily.   Current Medications    ACETAMINOPHEN (TYLENOL) 650 MG TBSR    Take 650 mg by mouth every 8 (eight) hours.    ASPIRIN 81 MG CHEW    Take 81 mg by mouth once daily.    ATORVASTATIN (LIPITOR) 20 MG TABLET    TAKE 1 TABLET BY MOUTH ONCE DAILY IN THE EVENING (ONLY  TAKE  ON  MONDAY,  TUESDAY,  WEDNESDAY  AND  THURSDAY)    AZELASTINE (ASTELIN) 137 MCG (0.1 %) NASAL SPRAY    1 spray (137 mcg total) by Nasal route 2 (two) times daily.    CHOLECALCIFEROL, VITAMIN D3, (VITAMIN D3) 50 MCG (2,000 UNIT) TAB    Take 2,000 Units by mouth once daily.     FISH OIL-OMEGA-3 FATTY ACIDS 300-1,000 MG CAPSULE    Take by mouth once daily.    LOSARTAN-HYDROCHLOROTHIAZIDE 100-12.5 MG (HYZAAR) 100-12.5 MG TAB    Take 1 tablet by mouth once daily.     MAGNESIUM OXIDE (MAG-OX) 400 MG (241.3 MG MAGNESIUM) TABLET    Take 400 mg by mouth once daily.    METFORMIN (GLUCOPHAGE) 500 MG TABLET    Take 1 tablet (500 mg total) by mouth 2 (two) times daily with meals.    METOPROLOL TARTRATE (LOPRESSOR) 50 MG TABLET    Take 2 tablets (100 mg total) by mouth 2 (two) times daily.    ONETOUCH ULTRA BLUE TEST STRIP STRP    USE 1 STRIP TO CHECK GLUCOSE 2 TO 3 TIMES DAILY    OXYBUTYNIN (DITROPAN-XL) 5 MG TR24    Take 1 tablet (5 mg total) by mouth once daily.    PANTOPRAZOLE (PROTONIX) 40 MG TABLET    Take 1 tablet (40 mg total) by mouth once daily.

## 2023-01-03 ENCOUNTER — HOSPITAL ENCOUNTER (OUTPATIENT)
Dept: RADIOLOGY | Facility: HOSPITAL | Age: 78
Discharge: HOME | End: 2023-01-03
Attending: INTERNAL MEDICINE
Payer: MEDICARE

## 2023-01-03 DIAGNOSIS — C49.A0 GASTROINTESTINAL STROMAL TUMOR (GIST): ICD-10-CM

## 2023-01-03 PROCEDURE — 74177 CT ABD & PELVIS W/CONTRAST: CPT | Mod: TC,HCNC

## 2023-01-03 PROCEDURE — 74177 CT CHEST ABDOMEN PELVIS WITH CONTRAST (XPD): ICD-10-PCS | Mod: 26,HCNC,, | Performed by: RADIOLOGY

## 2023-01-03 PROCEDURE — 71260 CT CHEST ABDOMEN PELVIS WITH CONTRAST (XPD): ICD-10-PCS | Mod: 26,HCNC,, | Performed by: RADIOLOGY

## 2023-01-03 PROCEDURE — 74177 CT ABD & PELVIS W/CONTRAST: CPT | Mod: 26,HCNC,, | Performed by: RADIOLOGY

## 2023-01-03 PROCEDURE — 25500020 PHARM REV CODE 255: Mod: HCNC | Performed by: INTERNAL MEDICINE

## 2023-01-03 PROCEDURE — 71260 CT THORAX DX C+: CPT | Mod: TC,HCNC

## 2023-01-03 PROCEDURE — 71260 CT THORAX DX C+: CPT | Mod: 26,HCNC,, | Performed by: RADIOLOGY

## 2023-01-03 RX ADMIN — IOHEXOL 100 ML: 350 INJECTION, SOLUTION INTRAVENOUS at 09:01

## 2023-01-12 ENCOUNTER — OFFICE VISIT (OUTPATIENT)
Dept: INTERNAL MEDICINE | Facility: CLINIC | Age: 78
End: 2023-01-12
Payer: MEDICARE

## 2023-01-12 VITALS
WEIGHT: 234.81 LBS | HEIGHT: 72 IN | DIASTOLIC BLOOD PRESSURE: 78 MMHG | TEMPERATURE: 97 F | BODY MASS INDEX: 31.8 KG/M2 | OXYGEN SATURATION: 96 % | SYSTOLIC BLOOD PRESSURE: 132 MMHG | HEART RATE: 62 BPM

## 2023-01-12 DIAGNOSIS — R05.9 COUGH, UNSPECIFIED TYPE: Primary | ICD-10-CM

## 2023-01-12 PROCEDURE — 99213 OFFICE O/P EST LOW 20 MIN: CPT | Mod: HCNC,25,S$GLB, | Performed by: FAMILY MEDICINE

## 2023-01-12 PROCEDURE — 1101F PR PT FALLS ASSESS DOC 0-1 FALLS W/OUT INJ PAST YR: ICD-10-PCS | Mod: HCNC,CPTII,S$GLB, | Performed by: FAMILY MEDICINE

## 2023-01-12 PROCEDURE — 96372 THER/PROPH/DIAG INJ SC/IM: CPT | Mod: HCNC,S$GLB,, | Performed by: FAMILY MEDICINE

## 2023-01-12 PROCEDURE — 99999 PR PBB SHADOW E&M-EST. PATIENT-LVL IV: ICD-10-PCS | Mod: PBBFAC,HCNC,, | Performed by: FAMILY MEDICINE

## 2023-01-12 PROCEDURE — 99999 PR PBB SHADOW E&M-EST. PATIENT-LVL IV: CPT | Mod: PBBFAC,HCNC,, | Performed by: FAMILY MEDICINE

## 2023-01-12 PROCEDURE — 1159F MED LIST DOCD IN RCRD: CPT | Mod: HCNC,CPTII,S$GLB, | Performed by: FAMILY MEDICINE

## 2023-01-12 PROCEDURE — 3075F PR MOST RECENT SYSTOLIC BLOOD PRESS GE 130-139MM HG: ICD-10-PCS | Mod: HCNC,CPTII,S$GLB, | Performed by: FAMILY MEDICINE

## 2023-01-12 PROCEDURE — 3288F PR FALLS RISK ASSESSMENT DOCUMENTED: ICD-10-PCS | Mod: HCNC,CPTII,S$GLB, | Performed by: FAMILY MEDICINE

## 2023-01-12 PROCEDURE — 99213 PR OFFICE/OUTPT VISIT, EST, LEVL III, 20-29 MIN: ICD-10-PCS | Mod: HCNC,25,S$GLB, | Performed by: FAMILY MEDICINE

## 2023-01-12 PROCEDURE — 3078F DIAST BP <80 MM HG: CPT | Mod: HCNC,CPTII,S$GLB, | Performed by: FAMILY MEDICINE

## 2023-01-12 PROCEDURE — 1126F AMNT PAIN NOTED NONE PRSNT: CPT | Mod: HCNC,CPTII,S$GLB, | Performed by: FAMILY MEDICINE

## 2023-01-12 PROCEDURE — 96372 PR INJECTION,THERAP/PROPH/DIAG2ST, IM OR SUBCUT: ICD-10-PCS | Mod: HCNC,S$GLB,, | Performed by: FAMILY MEDICINE

## 2023-01-12 PROCEDURE — 1159F PR MEDICATION LIST DOCUMENTED IN MEDICAL RECORD: ICD-10-PCS | Mod: HCNC,CPTII,S$GLB, | Performed by: FAMILY MEDICINE

## 2023-01-12 PROCEDURE — 1126F PR PAIN SEVERITY QUANTIFIED, NO PAIN PRESENT: ICD-10-PCS | Mod: HCNC,CPTII,S$GLB, | Performed by: FAMILY MEDICINE

## 2023-01-12 PROCEDURE — 3288F FALL RISK ASSESSMENT DOCD: CPT | Mod: HCNC,CPTII,S$GLB, | Performed by: FAMILY MEDICINE

## 2023-01-12 PROCEDURE — 1101F PT FALLS ASSESS-DOCD LE1/YR: CPT | Mod: HCNC,CPTII,S$GLB, | Performed by: FAMILY MEDICINE

## 2023-01-12 PROCEDURE — 3078F PR MOST RECENT DIASTOLIC BLOOD PRESSURE < 80 MM HG: ICD-10-PCS | Mod: HCNC,CPTII,S$GLB, | Performed by: FAMILY MEDICINE

## 2023-01-12 PROCEDURE — 3075F SYST BP GE 130 - 139MM HG: CPT | Mod: HCNC,CPTII,S$GLB, | Performed by: FAMILY MEDICINE

## 2023-01-12 RX ORDER — BENZONATATE 200 MG/1
200 CAPSULE ORAL 3 TIMES DAILY PRN
Qty: 30 CAPSULE | Refills: 1 | Status: SHIPPED | OUTPATIENT
Start: 2023-01-12 | End: 2023-01-22

## 2023-01-12 RX ORDER — METHYLPREDNISOLONE ACETATE 80 MG/ML
80 INJECTION, SUSPENSION INTRA-ARTICULAR; INTRALESIONAL; INTRAMUSCULAR; SOFT TISSUE ONCE
Status: COMPLETED | OUTPATIENT
Start: 2023-01-12 | End: 2023-01-12

## 2023-01-12 RX ADMIN — METHYLPREDNISOLONE ACETATE 80 MG: 80 INJECTION, SUSPENSION INTRA-ARTICULAR; INTRALESIONAL; INTRAMUSCULAR; SOFT TISSUE at 10:01

## 2023-01-12 NOTE — PROGRESS NOTES
Administered Methylprednisolone injection into the right ventrogluteal.  Patient tolerated well, no adverse reaction noted. Advise 15 min wait.

## 2023-01-15 NOTE — PROGRESS NOTES
Subjective:      Patient ID: Daniel Villeda is a 77 y.o. male.    Chief Complaint: Nasal Congestion and Cough      Patient reports continued coughing for about 3 months now.   Seen by me in November for similar complaint, completed course of doxycycline at that time, says it helped intially but soon returned.   Not using astelin or any other nasal spray - says that has never helped his symptoms   Requesting stronger longer course of antibiotic    Cough  Pertinent negatives include no fever or sore throat.   Review of Systems   Constitutional:  Negative for fatigue and fever.   HENT:  Negative for congestion, sinus pressure and sore throat.    Respiratory:  Positive for cough.    Past Medical History:   Diagnosis Date    CKD (chronic kidney disease), stage III     DM (diabetes mellitus) with complications     GERD (gastroesophageal reflux disease)     History of DVT (deep vein thrombosis)     History of renal cell carcinoma 2009    Hyperlipidemia associated with type 2 diabetes mellitus 02/26/2021    Hypertension associated with diabetes     Thrombocytopenia     Type II diabetes mellitus with renal manifestations           Past Surgical History:   Procedure Laterality Date    CLOSURE OF PERFORATED ULCER OF DUODENUM USING OMENTAL PATCH N/A 3/9/2021    Procedure: CLOSURE, ULCER, PERFORATED, DUODENUM, USING OMENTAL PATCH;  Surgeon: Edin Bradford MD;  Location: [facility];  Service: General;  Laterality: N/A;    COLONOSCOPY N/A 1/5/2022    Procedure: COLONOSCOPY;  Surgeon: Mirta Grover MD;  Location: [facility];  Service: Endoscopy;  Laterality: N/A;    ENDOSCOPIC ULTRASOUND OF UPPER GASTROINTESTINAL TRACT N/A 2/5/2021    Procedure: ULTRASOUND, UPPER GI TRACT, ENDOSCOPIC;  Surgeon: Geremias Brizuela MD;  Location: [facility];  Service: Endoscopy;  Laterality: N/A;    ESOPHAGOGASTRODUODENOSCOPY N/A 2/3/2021    Procedure: EGD (ESOPHAGOGASTRODUODENOSCOPY);  Surgeon: Mirta Grover MD;  Location: [facility];   Service: Endoscopy;  Laterality: N/A;    ESOPHAGOGASTRODUODENOSCOPY N/A 3/9/2021    Procedure: EGD (ESOPHAGOGASTRODUODENOSCOPY);  Surgeon: Edin Bradford MD;  Location: [facility] OR;  Service: General;  Laterality: N/A;    left partial nephrectomy Left 2006    ROBOT-ASSISTED SURGICAL REMOVAL OF STOMACH USING DA PERRI XI N/A 3/9/2021    Procedure: XI ROBOTIC GASTRECTOMY;  Surgeon: Edin Bradford MD;  Location: [facility] OR;  Service: General;  Laterality: N/A;  partial     Family History   Problem Relation Age of Onset    Stroke Mother     Hodgkin's lymphoma Father      Social History     Socioeconomic History    Marital status:    Tobacco Use    Smoking status: Former     Packs/day: 1.50     Years: 40.00     Pack years: 60.00     Types: Cigarettes     Quit date: 2005     Years since quittin.7    Smokeless tobacco: Never   Substance and Sexual Activity    Alcohol use: Not Currently    Drug use: Never     Review of patient's allergies indicates:  No Known Allergies    Objective:       /78 (BP Location: Right arm, Patient Position: Sitting, BP Method: Large (Manual))   Pulse 62   Temp 97.3 °F (36.3 °C) (Temporal)   Ht 6' (1.829 m)   Wt 106.5 kg (234 lb 12.6 oz)   SpO2 96%   BMI 31.84 kg/m²   Physical Exam  Vitals reviewed.   Constitutional:       General: He is not in acute distress.     Appearance: He is well-developed. He is not diaphoretic.   HENT:      Head: Normocephalic.      Right Ear: Hearing, tympanic membrane, ear canal and external ear normal.      Left Ear: Hearing, tympanic membrane, ear canal and external ear normal.      Nose: Mucosal edema present.      Right Sinus: No maxillary sinus tenderness or frontal sinus tenderness.      Left Sinus: No maxillary sinus tenderness or frontal sinus tenderness.      Mouth/Throat:      Pharynx: Uvula midline. No posterior oropharyngeal erythema.   Eyes:      Conjunctiva/sclera: Conjunctivae normal.      Pupils: Pupils are equal, round, and reactive  to light.   Cardiovascular:      Rate and Rhythm: Normal rate and regular rhythm.      Heart sounds: Normal heart sounds.   Pulmonary:      Effort: Pulmonary effort is normal. No respiratory distress.      Breath sounds: Normal breath sounds. No wheezing.   Lymphadenopathy:      Cervical: No cervical adenopathy.   Skin:     General: Skin is warm and dry.   Neurological:      Mental Status: He is alert and oriented to person, place, and time.   Psychiatric:         Behavior: Behavior normal.     Assessment:     1. Cough, unspecified type      Plan:   Cough, unspecified type  -     Cancel: X-Ray Chest PA And Lateral; Future; Expected date: 01/12/2023    Other orders  -     methylPREDNISolone acetate injection 80 mg  -     benzonatate (TESSALON) 200 MG capsule; Take 1 capsule (200 mg total) by mouth 3 (three) times daily as needed for Cough.  Dispense: 30 capsule; Refill: 1    He declined chest xray today  Discussed with patient that I see no signs of bacterial infection, a stronger antibiotic will not help    Medication List with Changes/Refills   New Medications    BENZONATATE (TESSALON) 200 MG CAPSULE    Take 1 capsule (200 mg total) by mouth 3 (three) times daily as needed for Cough.   Current Medications    ACETAMINOPHEN (TYLENOL) 650 MG TBSR    Take 650 mg by mouth every 8 (eight) hours.    ASPIRIN 81 MG CHEW    Take 81 mg by mouth once daily.    ATORVASTATIN (LIPITOR) 20 MG TABLET    TAKE 1 TABLET BY MOUTH ONCE DAILY IN THE EVENING (ONLY  TAKE  ON  MONDAY,  TUESDAY,  WEDNESDAY  AND  THURSDAY)    AZELASTINE (ASTELIN) 137 MCG (0.1 %) NASAL SPRAY    1 spray (137 mcg total) by Nasal route 2 (two) times daily.    CHOLECALCIFEROL, VITAMIN D3, (VITAMIN D3) 50 MCG (2,000 UNIT) TAB    Take 2,000 Units by mouth once daily.     FISH OIL-OMEGA-3 FATTY ACIDS 300-1,000 MG CAPSULE    Take by mouth once daily.    LOSARTAN-HYDROCHLOROTHIAZIDE 100-12.5 MG (HYZAAR) 100-12.5 MG TAB    Take 1 tablet by mouth once daily.    MAGNESIUM  OXIDE (MAG-OX) 400 MG (241.3 MG MAGNESIUM) TABLET    Take 400 mg by mouth once daily.    METFORMIN (GLUCOPHAGE) 500 MG TABLET    Take 1 tablet (500 mg total) by mouth 2 (two) times daily with meals.    METOPROLOL TARTRATE (LOPRESSOR) 50 MG TABLET    Take 2 tablets (100 mg total) by mouth 2 (two) times daily.    ONETOUCH ULTRA BLUE TEST STRIP STRP    USE 1 STRIP TO CHECK GLUCOSE 2 TO 3 TIMES DAILY    OXYBUTYNIN (DITROPAN-XL) 5 MG TR24    Take 1 tablet (5 mg total) by mouth once daily.    PANTOPRAZOLE (PROTONIX) 40 MG TABLET    Take 1 tablet (40 mg total) by mouth once daily.   Discontinued Medications    DOXYCYCLINE (VIBRAMYCIN) 100 MG CAP    Take 1 capsule (100 mg total) by mouth 2 (two) times daily.

## 2023-01-27 ENCOUNTER — OFFICE VISIT (OUTPATIENT)
Dept: HEMATOLOGY/ONCOLOGY | Facility: CLINIC | Age: 78
End: 2023-01-27
Payer: MEDICARE

## 2023-01-27 VITALS
OXYGEN SATURATION: 97 % | SYSTOLIC BLOOD PRESSURE: 131 MMHG | BODY MASS INDEX: 32.41 KG/M2 | WEIGHT: 231.5 LBS | HEART RATE: 58 BPM | DIASTOLIC BLOOD PRESSURE: 81 MMHG | HEIGHT: 71 IN | TEMPERATURE: 97 F | RESPIRATION RATE: 18 BRPM

## 2023-01-27 DIAGNOSIS — E78.5 HYPERLIPIDEMIA ASSOCIATED WITH TYPE 2 DIABETES MELLITUS: ICD-10-CM

## 2023-01-27 DIAGNOSIS — D69.6 THROMBOCYTOPENIA: ICD-10-CM

## 2023-01-27 DIAGNOSIS — I15.2 HYPERTENSION ASSOCIATED WITH DIABETES: ICD-10-CM

## 2023-01-27 DIAGNOSIS — Z85.528 HISTORY OF RENAL CELL CARCINOMA: ICD-10-CM

## 2023-01-27 DIAGNOSIS — I70.0 AORTIC ATHEROSCLEROSIS: ICD-10-CM

## 2023-01-27 DIAGNOSIS — K45.8 FLANK HERNIA: ICD-10-CM

## 2023-01-27 DIAGNOSIS — J43.1 PANLOBULAR EMPHYSEMA: ICD-10-CM

## 2023-01-27 DIAGNOSIS — Z86.718 HISTORY OF DVT (DEEP VEIN THROMBOSIS): ICD-10-CM

## 2023-01-27 DIAGNOSIS — C49.A0 GASTROINTESTINAL STROMAL TUMOR (GIST): Primary | ICD-10-CM

## 2023-01-27 DIAGNOSIS — N18.31 STAGE 3A CHRONIC KIDNEY DISEASE: ICD-10-CM

## 2023-01-27 DIAGNOSIS — E11.69 HYPERLIPIDEMIA ASSOCIATED WITH TYPE 2 DIABETES MELLITUS: ICD-10-CM

## 2023-01-27 DIAGNOSIS — E11.59 HYPERTENSION ASSOCIATED WITH DIABETES: ICD-10-CM

## 2023-01-27 DIAGNOSIS — E27.49 OTHER ADRENOCORTICAL INSUFFICIENCY: ICD-10-CM

## 2023-01-27 PROCEDURE — 1101F PT FALLS ASSESS-DOCD LE1/YR: CPT | Mod: HCNC,CPTII,S$GLB, | Performed by: INTERNAL MEDICINE

## 2023-01-27 PROCEDURE — 1101F PR PT FALLS ASSESS DOC 0-1 FALLS W/OUT INJ PAST YR: ICD-10-PCS | Mod: HCNC,CPTII,S$GLB, | Performed by: INTERNAL MEDICINE

## 2023-01-27 PROCEDURE — 99999 PR PBB SHADOW E&M-EST. PATIENT-LVL V: CPT | Mod: PBBFAC,HCNC,, | Performed by: INTERNAL MEDICINE

## 2023-01-27 PROCEDURE — 3288F PR FALLS RISK ASSESSMENT DOCUMENTED: ICD-10-PCS | Mod: HCNC,CPTII,S$GLB, | Performed by: INTERNAL MEDICINE

## 2023-01-27 PROCEDURE — 99999 PR PBB SHADOW E&M-EST. PATIENT-LVL V: ICD-10-PCS | Mod: PBBFAC,HCNC,, | Performed by: INTERNAL MEDICINE

## 2023-01-27 PROCEDURE — 99214 OFFICE O/P EST MOD 30 MIN: CPT | Mod: HCNC,S$GLB,, | Performed by: INTERNAL MEDICINE

## 2023-01-27 PROCEDURE — 1126F AMNT PAIN NOTED NONE PRSNT: CPT | Mod: HCNC,CPTII,S$GLB, | Performed by: INTERNAL MEDICINE

## 2023-01-27 PROCEDURE — 3079F PR MOST RECENT DIASTOLIC BLOOD PRESSURE 80-89 MM HG: ICD-10-PCS | Mod: HCNC,CPTII,S$GLB, | Performed by: INTERNAL MEDICINE

## 2023-01-27 PROCEDURE — 3288F FALL RISK ASSESSMENT DOCD: CPT | Mod: HCNC,CPTII,S$GLB, | Performed by: INTERNAL MEDICINE

## 2023-01-27 PROCEDURE — 3079F DIAST BP 80-89 MM HG: CPT | Mod: HCNC,CPTII,S$GLB, | Performed by: INTERNAL MEDICINE

## 2023-01-27 PROCEDURE — 1159F PR MEDICATION LIST DOCUMENTED IN MEDICAL RECORD: ICD-10-PCS | Mod: HCNC,CPTII,S$GLB, | Performed by: INTERNAL MEDICINE

## 2023-01-27 PROCEDURE — 3075F PR MOST RECENT SYSTOLIC BLOOD PRESS GE 130-139MM HG: ICD-10-PCS | Mod: HCNC,CPTII,S$GLB, | Performed by: INTERNAL MEDICINE

## 2023-01-27 PROCEDURE — 99214 PR OFFICE/OUTPT VISIT, EST, LEVL IV, 30-39 MIN: ICD-10-PCS | Mod: HCNC,S$GLB,, | Performed by: INTERNAL MEDICINE

## 2023-01-27 PROCEDURE — 3075F SYST BP GE 130 - 139MM HG: CPT | Mod: HCNC,CPTII,S$GLB, | Performed by: INTERNAL MEDICINE

## 2023-01-27 PROCEDURE — 1159F MED LIST DOCD IN RCRD: CPT | Mod: HCNC,CPTII,S$GLB, | Performed by: INTERNAL MEDICINE

## 2023-01-27 PROCEDURE — 1160F PR REVIEW ALL MEDS BY PRESCRIBER/CLIN PHARMACIST DOCUMENTED: ICD-10-PCS | Mod: HCNC,CPTII,S$GLB, | Performed by: INTERNAL MEDICINE

## 2023-01-27 PROCEDURE — 1126F PR PAIN SEVERITY QUANTIFIED, NO PAIN PRESENT: ICD-10-PCS | Mod: HCNC,CPTII,S$GLB, | Performed by: INTERNAL MEDICINE

## 2023-01-27 PROCEDURE — 1160F RVW MEDS BY RX/DR IN RCRD: CPT | Mod: HCNC,CPTII,S$GLB, | Performed by: INTERNAL MEDICINE

## 2023-01-27 NOTE — PROGRESS NOTES
Subjective:       Patient ID: Daniel Villeda is a 77 y.o. male.    Chief Complaint: Results, Pain, and Cancer    HPI:  77-year-old male history of stage I low risk gastrointestinal stromal tumor status post gastric resection patient returns for clinical follow-up with repeat imaging studies ECOG 1 patient complaining of left flank pain previous partial nephrectomy    Past Medical History:   Diagnosis Date    CKD (chronic kidney disease), stage III     DM (diabetes mellitus) with complications     GERD (gastroesophageal reflux disease)     History of DVT (deep vein thrombosis)     History of renal cell carcinoma     Hyperlipidemia associated with type 2 diabetes mellitus 2021    Hypertension associated with diabetes     Thrombocytopenia     Type II diabetes mellitus with renal manifestations      Family History   Problem Relation Age of Onset    Stroke Mother     Hodgkin's lymphoma Father      Social History     Socioeconomic History    Marital status:    Tobacco Use    Smoking status: Former     Packs/day: 1.50     Years: 40.00     Pack years: 60.00     Types: Cigarettes     Quit date: 2005     Years since quittin.8    Smokeless tobacco: Never   Substance and Sexual Activity    Alcohol use: Not Currently    Drug use: Never     Past Surgical History:   Procedure Laterality Date    CLOSURE OF PERFORATED ULCER OF DUODENUM USING OMENTAL PATCH N/A 3/9/2021    Procedure: CLOSURE, ULCER, PERFORATED, DUODENUM, USING OMENTAL PATCH;  Surgeon: Edin Bradford MD;  Location: [facility] OR;  Service: General;  Laterality: N/A;    COLONOSCOPY N/A 2022    Procedure: COLONOSCOPY;  Surgeon: Mirta Grover MD;  Location: [facility] ENDO;  Service: Endoscopy;  Laterality: N/A;    ENDOSCOPIC ULTRASOUND OF UPPER GASTROINTESTINAL TRACT N/A 2021    Procedure: ULTRASOUND, UPPER GI TRACT, ENDOSCOPIC;  Surgeon: Geremias Brizuela MD;  Location: [facility] ENDO;  Service: Endoscopy;  Laterality:  N/A;    ESOPHAGOGASTRODUODENOSCOPY N/A 2/3/2021    Procedure: EGD (ESOPHAGOGASTRODUODENOSCOPY);  Surgeon: Mirta Grover MD;  Location: [facility] ENDO;  Service: Endoscopy;  Laterality: N/A;    ESOPHAGOGASTRODUODENOSCOPY N/A 3/9/2021    Procedure: EGD (ESOPHAGOGASTRODUODENOSCOPY);  Surgeon: Edin Bradford MD;  Location: [facility] OR;  Service: General;  Laterality: N/A;    left partial nephrectomy Left 2006    ROBOT-ASSISTED SURGICAL REMOVAL OF STOMACH USING DA PERRI XI N/A 3/9/2021    Procedure: XI ROBOTIC GASTRECTOMY;  Surgeon: Edin Bradford MD;  Location: [facility] OR;  Service: General;  Laterality: N/A;  partial       Labs:  Lab Results   Component Value Date    WBC 5.88 01/03/2023    WBC 5.88 01/03/2023    HGB 14.1 01/03/2023    HGB 14.1 01/03/2023    HCT 43.4 01/03/2023    HCT 43.4 01/03/2023    MCV 90 01/03/2023    MCV 90 01/03/2023     (L) 01/03/2023     (L) 01/03/2023     BMP  Lab Results   Component Value Date     01/03/2023    K 4.3 01/03/2023     01/03/2023    CO2 27 01/03/2023    BUN 23 01/03/2023    CREATININE 1.2 01/03/2023    CALCIUM 9.5 01/03/2023    ANIONGAP 8 01/03/2023    ESTGFRAFRICA 51 (A) 05/10/2022    EGFRNONAA 44 (A) 05/10/2022     Lab Results   Component Value Date    ALT 24 01/03/2023    AST 20 01/03/2023    ALKPHOS 63 01/03/2023    BILITOT 1.0 01/03/2023       Lab Results   Component Value Date    IRON 77 07/06/2021    TIBC 327 07/06/2021    FERRITIN 128 07/06/2021     Lab Results   Component Value Date    DSUYZSAV19 355 07/06/2021     Lab Results   Component Value Date    FOLATE 5.8 07/06/2021     Lab Results   Component Value Date    TSH 1.302 04/25/2022         Review of Systems   Constitutional:  Negative for activity change, appetite change, chills, diaphoresis, fatigue, fever and unexpected weight change.   HENT:  Negative for congestion, dental problem, drooling, ear discharge, ear pain, facial swelling, hearing loss, mouth sores, nosebleeds, postnasal drip,  rhinorrhea, sinus pressure, sneezing, sore throat, tinnitus, trouble swallowing and voice change.    Eyes:  Negative for photophobia, pain, discharge, redness, itching and visual disturbance.   Respiratory:  Negative for apnea, cough, choking, chest tightness, shortness of breath, wheezing and stridor.    Cardiovascular:  Negative for chest pain, palpitations and leg swelling.   Gastrointestinal:  Negative for abdominal distention, abdominal pain, anal bleeding, blood in stool, constipation, diarrhea, nausea, rectal pain and vomiting.   Endocrine: Negative for cold intolerance, heat intolerance, polydipsia, polyphagia and polyuria.   Genitourinary:  Positive for flank pain. Negative for decreased urine volume, difficulty urinating, dysuria, enuresis, frequency, genital sores, hematuria, penile discharge, penile pain, penile swelling, scrotal swelling, testicular pain and urgency.   Musculoskeletal:  Negative for arthralgias, back pain, gait problem, joint swelling, myalgias, neck pain and neck stiffness.   Skin:  Negative for color change, pallor, rash and wound.   Allergic/Immunologic: Negative for environmental allergies, food allergies and immunocompromised state.   Neurological:  Negative for dizziness, tremors, seizures, syncope, facial asymmetry, speech difficulty, weakness, light-headedness, numbness and headaches.   Hematological:  Negative for adenopathy. Does not bruise/bleed easily.   Psychiatric/Behavioral:  Negative for agitation, behavioral problems, confusion, decreased concentration, dysphoric mood, hallucinations, self-injury, sleep disturbance and suicidal ideas. The patient is not nervous/anxious and is not hyperactive.      Objective:      Physical Exam  Vitals reviewed.   Constitutional:       General: He is not in acute distress.     Appearance: He is well-developed. He is not diaphoretic.   HENT:      Head: Normocephalic.      Right Ear: External ear normal.      Left Ear: External ear normal.       Nose: Nose normal.      Right Sinus: No maxillary sinus tenderness or frontal sinus tenderness.      Left Sinus: No maxillary sinus tenderness or frontal sinus tenderness.      Mouth/Throat:      Pharynx: No oropharyngeal exudate.   Eyes:      General: Lids are normal. No scleral icterus.        Right eye: No discharge.         Left eye: No discharge.      Extraocular Movements:      Right eye: Normal extraocular motion.      Left eye: Normal extraocular motion.      Conjunctiva/sclera:      Right eye: Right conjunctiva is not injected. No hemorrhage.     Left eye: Left conjunctiva is not injected. No hemorrhage.     Pupils: Pupils are equal, round, and reactive to light.   Neck:      Thyroid: No thyromegaly.      Vascular: No JVD.      Trachea: No tracheal deviation.   Cardiovascular:      Rate and Rhythm: Normal rate.   Pulmonary:      Effort: Pulmonary effort is normal. No respiratory distress.      Breath sounds: No stridor.   Abdominal:      General: Bowel sounds are normal.      Palpations: Abdomen is soft. There is no hepatomegaly, splenomegaly or mass.      Tenderness: There is no abdominal tenderness.   Musculoskeletal:         General: No tenderness. Normal range of motion.        Arms:       Cervical back: Normal range of motion and neck supple.      Comments: Left flank pain with bulge reducible superior aspect of flank incision   Lymphadenopathy:      Head:      Right side of head: No posterior auricular or occipital adenopathy.      Left side of head: No posterior auricular or occipital adenopathy.      Cervical: No cervical adenopathy.      Right cervical: No superficial, deep or posterior cervical adenopathy.     Left cervical: No superficial, deep or posterior cervical adenopathy.      Upper Body:      Right upper body: No supraclavicular adenopathy.      Left upper body: No supraclavicular adenopathy.   Skin:     General: Skin is dry.      Findings: No erythema or rash.      Nails: There is no  clubbing.   Neurological:      Mental Status: He is alert and oriented to person, place, and time.      Cranial Nerves: No cranial nerve deficit.      Coordination: Coordination normal.   Psychiatric:         Behavior: Behavior normal.         Thought Content: Thought content normal.         Judgment: Judgment normal.           Assessment:      1. Gastrointestinal stromal tumor (GIST)    2. Stage 3a chronic kidney disease    3. Flank hernia    4. History of renal cell carcinoma    5. History of DVT (deep vein thrombosis)    6. Thrombocytopenia    7. Hypertension associated with diabetes    8. Hyperlipidemia associated with type 2 diabetes mellitus    9. Other adrenocortical insufficiency    10. Aortic atherosclerosis    11. Panlobular emphysema           Plan:     Nothing to suggest recurrence of gastrointestinal stromal tumor on physical exam there is a bulge at the superior aspect of his left flank incision probable hernia.  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  Referral to Urology      Med Onc Chart Routing      Follow up with physician Other. Follow-up with labs and CT chest abdomen pelvis 9 months   Follow up with [facility]    Infusion scheduling note    Injection scheduling note    Labs CBC, ferritin, iron and TIBC, LDH and CMP   Lab interval:     Imaging CT chest abdomen pelvis      Pharmacy appointment    Other referrals Additional referrals needed          Artie Kolb Jr, MD FACP

## 2023-02-06 RX ORDER — LOSARTAN POTASSIUM AND HYDROCHLOROTHIAZIDE 12.5; 1 MG/1; MG/1
1 TABLET ORAL DAILY
Qty: 90 TABLET | Refills: 3 | Status: SHIPPED | OUTPATIENT
Start: 2023-02-06 | End: 2023-06-26 | Stop reason: SDUPTHER

## 2023-02-06 RX ORDER — METOPROLOL TARTRATE 50 MG/1
100 TABLET ORAL 2 TIMES DAILY
Qty: 90 TABLET | Refills: 3 | Status: SHIPPED | OUTPATIENT
Start: 2023-02-06 | End: 2023-05-10

## 2023-02-06 NOTE — TELEPHONE ENCOUNTER
Care Due:                  Date            Visit Type   Department     Provider  --------------------------------------------------------------------------------                                EP -                              PRIMARY      CEN INTERNAL  Last Visit: 01-      CARE ([facility])   MEDICINE       Esteban Glez                              EP -                              PRIMARY      CEN INTERNAL  Next Visit: 05-      CARE ([facility])   MEDICINE       Avinash Lugo                                                            Last  Test          Frequency    Reason                     Performed    Due Date  --------------------------------------------------------------------------------    HBA1C.......  6 months...  metFORMIN................  11- 05-    [facility] Embedded Care Gaps. Reference number: [id number]. 2/06/2023   10:48:05 AM CST

## 2023-02-06 NOTE — TELEPHONE ENCOUNTER
No new care gaps identified.  [facility] Embedded Care Gaps. Reference number: [id number]. 2/06/2023   1:16:44 PM CST

## 2023-03-03 ENCOUNTER — TELEPHONE (OUTPATIENT)
Dept: PAIN MEDICINE | Facility: CLINIC | Age: 78
End: 2023-03-03
Payer: MEDICARE

## 2023-03-03 ENCOUNTER — OFFICE VISIT (OUTPATIENT)
Dept: UROLOGY | Facility: CLINIC | Age: 78
End: 2023-03-03
Payer: MEDICARE

## 2023-03-03 VITALS
DIASTOLIC BLOOD PRESSURE: 79 MMHG | SYSTOLIC BLOOD PRESSURE: 135 MMHG | HEART RATE: 53 BPM | HEIGHT: 71 IN | TEMPERATURE: 99 F | RESPIRATION RATE: 18 BRPM | BODY MASS INDEX: 32.34 KG/M2 | WEIGHT: 231 LBS

## 2023-03-03 DIAGNOSIS — C64.9 RENAL CELL CARCINOMA, UNSPECIFIED LATERALITY: ICD-10-CM

## 2023-03-03 DIAGNOSIS — K45.8 FLANK HERNIA: Primary | ICD-10-CM

## 2023-03-03 DIAGNOSIS — R35.0 BENIGN PROSTATIC HYPERPLASIA WITH URINARY FREQUENCY: ICD-10-CM

## 2023-03-03 DIAGNOSIS — R39.15 URGENCY OF URINATION: ICD-10-CM

## 2023-03-03 DIAGNOSIS — N40.1 BENIGN PROSTATIC HYPERPLASIA WITH URINARY FREQUENCY: ICD-10-CM

## 2023-03-03 PROCEDURE — 99203 PR OFFICE/OUTPT VISIT, NEW, LEVL III, 30-44 MIN: ICD-10-PCS | Mod: HCNC,S$GLB,, | Performed by: UROLOGY

## 2023-03-03 PROCEDURE — 1125F AMNT PAIN NOTED PAIN PRSNT: CPT | Mod: HCNC,CPTII,S$GLB, | Performed by: UROLOGY

## 2023-03-03 PROCEDURE — 3078F PR MOST RECENT DIASTOLIC BLOOD PRESSURE < 80 MM HG: ICD-10-PCS | Mod: HCNC,CPTII,S$GLB, | Performed by: UROLOGY

## 2023-03-03 PROCEDURE — 1101F PT FALLS ASSESS-DOCD LE1/YR: CPT | Mod: HCNC,CPTII,S$GLB, | Performed by: UROLOGY

## 2023-03-03 PROCEDURE — 99999 PR PBB SHADOW E&M-EST. PATIENT-LVL V: ICD-10-PCS | Mod: PBBFAC,HCNC,, | Performed by: UROLOGY

## 2023-03-03 PROCEDURE — 3075F SYST BP GE 130 - 139MM HG: CPT | Mod: HCNC,CPTII,S$GLB, | Performed by: UROLOGY

## 2023-03-03 PROCEDURE — 1160F RVW MEDS BY RX/DR IN RCRD: CPT | Mod: HCNC,CPTII,S$GLB, | Performed by: UROLOGY

## 2023-03-03 PROCEDURE — 3288F PR FALLS RISK ASSESSMENT DOCUMENTED: ICD-10-PCS | Mod: HCNC,CPTII,S$GLB, | Performed by: UROLOGY

## 2023-03-03 PROCEDURE — 1125F PR PAIN SEVERITY QUANTIFIED, PAIN PRESENT: ICD-10-PCS | Mod: HCNC,CPTII,S$GLB, | Performed by: UROLOGY

## 2023-03-03 PROCEDURE — 3288F FALL RISK ASSESSMENT DOCD: CPT | Mod: HCNC,CPTII,S$GLB, | Performed by: UROLOGY

## 2023-03-03 PROCEDURE — 99203 OFFICE O/P NEW LOW 30 MIN: CPT | Mod: HCNC,S$GLB,, | Performed by: UROLOGY

## 2023-03-03 PROCEDURE — 3078F DIAST BP <80 MM HG: CPT | Mod: HCNC,CPTII,S$GLB, | Performed by: UROLOGY

## 2023-03-03 PROCEDURE — 1159F MED LIST DOCD IN RCRD: CPT | Mod: HCNC,CPTII,S$GLB, | Performed by: UROLOGY

## 2023-03-03 PROCEDURE — 1160F PR REVIEW ALL MEDS BY PRESCRIBER/CLIN PHARMACIST DOCUMENTED: ICD-10-PCS | Mod: HCNC,CPTII,S$GLB, | Performed by: UROLOGY

## 2023-03-03 PROCEDURE — 1159F PR MEDICATION LIST DOCUMENTED IN MEDICAL RECORD: ICD-10-PCS | Mod: HCNC,CPTII,S$GLB, | Performed by: UROLOGY

## 2023-03-03 PROCEDURE — 1101F PR PT FALLS ASSESS DOC 0-1 FALLS W/OUT INJ PAST YR: ICD-10-PCS | Mod: HCNC,CPTII,S$GLB, | Performed by: UROLOGY

## 2023-03-03 PROCEDURE — 3075F PR MOST RECENT SYSTOLIC BLOOD PRESS GE 130-139MM HG: ICD-10-PCS | Mod: HCNC,CPTII,S$GLB, | Performed by: UROLOGY

## 2023-03-03 PROCEDURE — 99999 PR PBB SHADOW E&M-EST. PATIENT-LVL V: CPT | Mod: PBBFAC,HCNC,, | Performed by: UROLOGY

## 2023-03-03 RX ORDER — SOLIFENACIN SUCCINATE 10 MG/1
10 TABLET, FILM COATED ORAL DAILY
Qty: 30 TABLET | Refills: 11 | Status: SHIPPED | OUTPATIENT
Start: 2023-03-03 | End: 2023-11-15

## 2023-03-15 ENCOUNTER — TELEPHONE (OUTPATIENT)
Dept: PAIN MEDICINE | Facility: CLINIC | Age: 78
End: 2023-03-15
Payer: MEDICARE

## 2023-03-15 ENCOUNTER — OFFICE VISIT (OUTPATIENT)
Dept: SURGERY | Facility: CLINIC | Age: 78
End: 2023-03-15
Payer: MEDICARE

## 2023-03-15 VITALS
TEMPERATURE: 98 F | BODY MASS INDEX: 33.18 KG/M2 | DIASTOLIC BLOOD PRESSURE: 94 MMHG | HEART RATE: 64 BPM | SYSTOLIC BLOOD PRESSURE: 147 MMHG | HEIGHT: 71 IN | WEIGHT: 237 LBS

## 2023-03-15 DIAGNOSIS — K45.8 FLANK HERNIA: Primary | ICD-10-CM

## 2023-03-15 PROCEDURE — 1160F PR REVIEW ALL MEDS BY PRESCRIBER/CLIN PHARMACIST DOCUMENTED: ICD-10-PCS | Mod: HCNC,CPTII,S$GLB, | Performed by: SURGERY

## 2023-03-15 PROCEDURE — 99999 PR PBB SHADOW E&M-EST. PATIENT-LVL III: ICD-10-PCS | Mod: PBBFAC,HCNC,, | Performed by: SURGERY

## 2023-03-15 PROCEDURE — 3080F PR MOST RECENT DIASTOLIC BLOOD PRESSURE >= 90 MM HG: ICD-10-PCS | Mod: HCNC,CPTII,S$GLB, | Performed by: SURGERY

## 2023-03-15 PROCEDURE — 1125F AMNT PAIN NOTED PAIN PRSNT: CPT | Mod: HCNC,CPTII,S$GLB, | Performed by: SURGERY

## 2023-03-15 PROCEDURE — 99215 OFFICE O/P EST HI 40 MIN: CPT | Mod: HCNC,S$GLB,, | Performed by: SURGERY

## 2023-03-15 PROCEDURE — 99215 PR OFFICE/OUTPT VISIT, EST, LEVL V, 40-54 MIN: ICD-10-PCS | Mod: HCNC,S$GLB,, | Performed by: SURGERY

## 2023-03-15 PROCEDURE — 1160F RVW MEDS BY RX/DR IN RCRD: CPT | Mod: HCNC,CPTII,S$GLB, | Performed by: SURGERY

## 2023-03-15 PROCEDURE — 1159F PR MEDICATION LIST DOCUMENTED IN MEDICAL RECORD: ICD-10-PCS | Mod: HCNC,CPTII,S$GLB, | Performed by: SURGERY

## 2023-03-15 PROCEDURE — 1125F PR PAIN SEVERITY QUANTIFIED, PAIN PRESENT: ICD-10-PCS | Mod: HCNC,CPTII,S$GLB, | Performed by: SURGERY

## 2023-03-15 PROCEDURE — 3077F SYST BP >= 140 MM HG: CPT | Mod: HCNC,CPTII,S$GLB, | Performed by: SURGERY

## 2023-03-15 PROCEDURE — 99999 PR PBB SHADOW E&M-EST. PATIENT-LVL III: CPT | Mod: PBBFAC,HCNC,, | Performed by: SURGERY

## 2023-03-15 PROCEDURE — 1159F MED LIST DOCD IN RCRD: CPT | Mod: HCNC,CPTII,S$GLB, | Performed by: SURGERY

## 2023-03-15 PROCEDURE — 3080F DIAST BP >= 90 MM HG: CPT | Mod: HCNC,CPTII,S$GLB, | Performed by: SURGERY

## 2023-03-15 PROCEDURE — 3077F PR MOST RECENT SYSTOLIC BLOOD PRESSURE >= 140 MM HG: ICD-10-PCS | Mod: HCNC,CPTII,S$GLB, | Performed by: SURGERY

## 2023-03-15 NOTE — TELEPHONE ENCOUNTER
----- Message from Layne Brumfield MA sent at 3/15/2023  9:08 AM CDT -----  Patient has appt 5/18/2023 and Dr Bradford would like patient seen earlier. Patient is struggling with pain and mobility. If anyone can get patient in for a sooner appt would be greatly appreciated.

## 2023-03-15 NOTE — TELEPHONE ENCOUNTER
Reached out to pt to inform him that he has the soonest appt with . I will place him on the waiting list. Pt understand.      Martin Mclaughlin   Medical

## 2023-03-15 NOTE — PROGRESS NOTES
History & Physical    SUBJECTIVE:     History of Present Illness:  Patient is a 77 y.o. male status post robotic just resection 03/09/2021 [place] for left flank hernia.  He previously underwent a partial nephrectomy on the left.  Recently he has been having pain in a bulge that is progressively becoming larger.  Prior CT scan showing hernia at the site    Initially referred for GIST of the stomach. The recently underwent an EGD/EUS with biopsy confirming GIST located in the antrum. A CT is consistent with the same findings and no signs of other disease.  Patient reports recent epigastric abdominal pain from right to left abdomen.  History of partial nephrectomy on left and laparoscopic umbilical hernia repair with mesh.    No chief complaint on file.      Review of patient's allergies indicates:  No Known Allergies    Current Outpatient Medications   Medication Sig Dispense Refill    acetaminophen (TYLENOL) 650 MG TbSR Take 650 mg by mouth every 8 (eight) hours.      aspirin 81 MG Chew Take 81 mg by mouth once daily.      atorvastatin (LIPITOR) 20 MG tablet TAKE 1 TABLET BY MOUTH ONCE DAILY IN THE EVENING (ONLY  TAKE  ON  MONDAY,  TUESDAY,  WEDNESDAY  AND  THURSDAY) 51 tablet 2    azelastine (ASTELIN) 137 mcg (0.1 %) nasal spray 1 spray (137 mcg total) by Nasal route 2 (two) times daily. 30 mL 1    cholecalciferol, vitamin D3, (VITAMIN D3) 50 mcg (2,000 unit) Tab Take 2,000 Units by mouth once daily.       fish oil-omega-3 fatty acids 300-1,000 mg capsule Take by mouth once daily.      losartan-hydrochlorothiazide 100-12.5 mg (HYZAAR) 100-12.5 mg Tab Take 1 tablet by mouth once daily. 90 tablet 3    magnesium oxide (MAG-OX) 400 mg (241.3 mg magnesium) tablet Take 400 mg by mouth once daily.      metFORMIN (GLUCOPHAGE) 500 MG tablet Take 1 tablet (500 mg total) by mouth 2 (two) times daily with meals. 180 tablet 3    metoprolol tartrate (LOPRESSOR) 50 MG tablet Take 2 tablets (100 mg total) by mouth 2 (two) times  daily. 90 tablet 3    ONETOUCH ULTRA BLUE TEST STRIP Strp USE 1 STRIP TO CHECK GLUCOSE 2 TO 3 TIMES DAILY      oxybutynin (DITROPAN-XL) 5 MG TR24 Take 1 tablet (5 mg total) by mouth once daily. 90 tablet 3    pantoprazole (PROTONIX) 40 MG tablet Take 1 tablet (40 mg total) by mouth once daily. 90 tablet 3    solifenacin (VESICARE) 10 MG tablet Take 1 tablet (10 mg total) by mouth once daily. 30 tablet 11     No current facility-administered medications for this visit.       Past Medical History:   Diagnosis Date    CKD (chronic kidney disease), stage III     DM (diabetes mellitus) with complications     GERD (gastroesophageal reflux disease)     History of DVT (deep vein thrombosis)     History of renal cell carcinoma 2009    Hyperlipidemia associated with type 2 diabetes mellitus 02/26/2021    Hypertension associated with diabetes     Thrombocytopenia     Type II diabetes mellitus with renal manifestations      Past Surgical History:   Procedure Laterality Date    CLOSURE OF PERFORATED ULCER OF DUODENUM USING OMENTAL PATCH N/A 3/9/2021    Procedure: CLOSURE, ULCER, PERFORATED, DUODENUM, USING OMENTAL PATCH;  Surgeon: Edin Bradford MD;  Location: [facility] OR;  Service: General;  Laterality: N/A;    COLONOSCOPY N/A 1/5/2022    Procedure: COLONOSCOPY;  Surgeon: Mirta Grover MD;  Location: [facility];  Service: Endoscopy;  Laterality: N/A;    ENDOSCOPIC ULTRASOUND OF UPPER GASTROINTESTINAL TRACT N/A 2/5/2021    Procedure: ULTRASOUND, UPPER GI TRACT, ENDOSCOPIC;  Surgeon: Geremias Brizuela MD;  Location: [facility] ENDO;  Service: Endoscopy;  Laterality: N/A;    ESOPHAGOGASTRODUODENOSCOPY N/A 2/3/2021    Procedure: EGD (ESOPHAGOGASTRODUODENOSCOPY);  Surgeon: Mirta Grover MD;  Location: [facility] ENDO;  Service: Endoscopy;  Laterality: N/A;    ESOPHAGOGASTRODUODENOSCOPY N/A 3/9/2021    Procedure: EGD (ESOPHAGOGASTRODUODENOSCOPY);  Surgeon: Edin Bradford MD;  Location: [facility] OR;  Service: General;  Laterality: N/A;     left partial nephrectomy Left 2006    ROBOT-ASSISTED SURGICAL REMOVAL OF STOMACH USING DA PERRI XI N/A 3/9/2021    Procedure: XI ROBOTIC GASTRECTOMY;  Surgeon: Edin Bradford MD;  Location: [facility];  Service: General;  Laterality: N/A;  partial     Family History   Problem Relation Age of Onset    Stroke Mother     Hodgkin's lymphoma Father      Social History     Tobacco Use    Smoking status: Former     Packs/day: 1.50     Years: 40.00     Pack years: 60.00     Types: Cigarettes     Quit date: 2005     Years since quittin.9    Smokeless tobacco: Never   Substance Use Topics    Alcohol use: Not Currently    Drug use: Never        Review of Systems:  Review of Systems   Constitutional:  Negative for activity change, appetite change, chills, diaphoresis, fatigue, fever and unexpected weight change.   HENT:  Negative for congestion, hearing loss, sore throat and trouble swallowing.    Eyes:  Negative for visual disturbance.   Respiratory:  Negative for apnea, cough, choking, chest tightness, shortness of breath and stridor.    Cardiovascular:  Negative for chest pain, palpitations and leg swelling.   Gastrointestinal:  Positive for abdominal pain. Negative for abdominal distention, anal bleeding, blood in stool, constipation, diarrhea, nausea, rectal pain and vomiting.   Endocrine: Negative for cold intolerance, heat intolerance, polydipsia, polyphagia and polyuria.   Genitourinary:  Negative for difficulty urinating, dysuria, frequency, hematuria and urgency.   Musculoskeletal:  Negative for arthralgias, back pain, myalgias and neck pain.   Skin:  Negative for color change, pallor, rash and wound.   Neurological:  Negative for dizziness, syncope, weakness, light-headedness, numbness and headaches.   Hematological:  Negative for adenopathy. Does not bruise/bleed easily.   Psychiatric/Behavioral:  Negative for agitation, confusion, decreased concentration and sleep disturbance. The patient is not  "nervous/anxious.      OBJECTIVE:     Vital Signs (Most Recent)  Temp: 98.3 °F (36.8 °C) (03/15/23 0835)  Pulse: 64 (03/15/23 0835)  BP: (!) 147/94 (03/15/23 0835)  5' 11" (1.803 m)  107.5 kg (236 lb 15.9 oz)     Physical Exam:  Physical Exam  Vitals reviewed.   Constitutional:       General: He is not in acute distress.     Appearance: He is well-developed. He is not diaphoretic.   HENT:      Head: Normocephalic and atraumatic.      Right Ear: External ear normal.      Left Ear: External ear normal.   Eyes:      General: No scleral icterus.     Conjunctiva/sclera: Conjunctivae normal.      Pupils: Pupils are equal, round, and reactive to light.   Neck:      Thyroid: No thyromegaly.      Trachea: No tracheal deviation.   Cardiovascular:      Rate and Rhythm: Normal rate and regular rhythm.      Heart sounds: Normal heart sounds. No murmur heard.    No friction rub. No gallop.   Pulmonary:      Effort: Pulmonary effort is normal. No respiratory distress.      Breath sounds: Normal breath sounds. No wheezing or rales.   Chest:      Chest wall: No tenderness.   Abdominal:      General: Bowel sounds are normal. There is no distension.      Palpations: Abdomen is soft.      Tenderness: There is no abdominal tenderness.      Hernia: No hernia is present.          Comments: Incisions healing well no signs of infection   Musculoskeletal:         General: No tenderness or deformity. Normal range of motion.      Cervical back: Normal range of motion and neck supple.   Lymphadenopathy:      Cervical: No cervical adenopathy.   Skin:     General: Skin is warm and dry.      Coloration: Skin is not pale.      Findings: No erythema or rash.   Neurological:      Mental Status: He is alert and oriented to person, place, and time.   Psychiatric:         Behavior: Behavior normal.         Thought Content: Thought content normal.         Judgment: Judgment normal.     Diagnostic Results:  CT:  Impression:     4.5 x 3.8 cm soft tissue " attenuation mass within the gastric antrum. Neoplasm not excluded. Recommend nonemergent esophagram and/or nonemergent direct visualization with EGD.     Moderate large volume stool throughout the colon and rectum. Advanced descending colonic and sigmoid diverticulosis without diverticulitis.     Suggestion of a small 18 mm umbilical hernia containing trace fluid versus part of the small bowel. No evidence of obstruction or strangulation.     Mild fatty infiltration of the liver.    EUS:  - An intramural (subepithelial) lesion was found in                         the antrum of the stomach. The lesion appeared to                         originate from within the submucosa (Layer 3). Fine                         needle biopsy performed.                         - Pancreatic parenchymal abnormalities consisting                         of atrophy were noted in the entire pancreas.                         - The pancreatic duct had a dilated endosonographic                         appearance in the pancreatic head. The pancreatic                         duct measured up to 3.5 mm in diameter.                         - There was no sign of significant pathology in the                         common bile duct.    Pathology:  Final Pathologic Diagnosis Stomach, partial gastrectomy:   - Gastrointestinal stromal tumor, epithelioid and spindled type (pT2)     - Low grade, Mitotic rate: Up to 3 mitosis / 5 mm^2   - Resection margin uninvolved by tumor, 0.1 cm away   - Risk assessment: Very low   - KIT () immunopositive     CAP Synoptic Checklist for Gastrointestinal Stromal Tumor Resection:     - Procedure: Partial gastrectomy     - Tumor Site: Stomach     - Tumor Size: 4.8 x 2.7 x 1.9 cm     - Tumor Focality: Unifocal     - Histologic Type: Gastrointestinal stromal tumor, spindle cell and         epithelioid types     - Mitotic Rate: Up to 3 mitosis per 5 mm^2     - Necrosis: Not identified     - Histologic Grade: G1:  Low-grade; mitotic rate < 5 / 5 mm^2     - Risk Assessment: Very low (1.9% risk of progressive disease)     - Margins: Uninvolved by GIST, 0.1 cm away from closest margin     - Regional lymph nodes: No lymph nodes submitted     - Pathologic Stage Classification: pT2     - Ancillary Studies: Additional studies available at clinician's request     - Immunohistochemical Studies: KIT () - Positive     - Preresection Treatment: No known preresection therapy     - Treatment Effect: No known presurgical therapy     - Designate Block for Future Studies: [id number]          ASSESSMENT/PLAN:     76 y/o male status post robotic GIST resection    Left incisional hernia partial nephrectomy site    PLAN:Plan     Incisional hernia - Discussed minimally invasive repair of hernia versus open repair.  I will have him see Dr. Rosen who performs minimally invasive repairs of hernias in this location    Chronic right-sided back pain/bulging disc - reports improvement with epidural/injections in the past.  Currently scheduled to see Dr. De in May.  Will assist with an earlier appointment for further evaluation    45 minutes of total time spent on the encounter, which includes face to face time and non-face to face time preparing to see the patient (eg, review of tests), Obtaining and/or reviewing separately obtained history, Documenting clinical information in the electronic or other health record, Independently interpreting results (not separately reported) and communicating results to the patient/family/caregiver, or Care coordination (not separately reported).

## 2023-03-21 ENCOUNTER — TELEPHONE (OUTPATIENT)
Dept: SURGERY | Facility: CLINIC | Age: 78
End: 2023-03-21
Payer: MEDICARE

## 2023-03-21 NOTE — TELEPHONE ENCOUNTER
Attempted to contact patient in regards to rescheduling patient due to Dr. Rosen in surgery this morning. Spouse states that patient is currently on his way to the clinic and has no phone. Will notify patient and will work patient in for appointment with Dr. Rosen. She verbalized understanding.

## 2023-03-28 ENCOUNTER — HOSPITAL ENCOUNTER (OUTPATIENT)
Dept: CARDIOLOGY | Facility: HOSPITAL | Age: 78
Discharge: HOME | End: 2023-03-28
Attending: SURGERY
Payer: MEDICARE

## 2023-03-28 ENCOUNTER — TELEPHONE (OUTPATIENT)
Dept: NEUROSURGERY | Facility: CLINIC | Age: 78
End: 2023-03-28
Payer: MEDICARE

## 2023-03-28 ENCOUNTER — OFFICE VISIT (OUTPATIENT)
Dept: SURGERY | Facility: CLINIC | Age: 78
End: 2023-03-28
Payer: MEDICARE

## 2023-03-28 VITALS
DIASTOLIC BLOOD PRESSURE: 84 MMHG | BODY MASS INDEX: 32.56 KG/M2 | WEIGHT: 233.44 LBS | SYSTOLIC BLOOD PRESSURE: 156 MMHG | HEART RATE: 59 BPM

## 2023-03-28 DIAGNOSIS — Z01.818 PREOPERATIVE TESTING: ICD-10-CM

## 2023-03-28 DIAGNOSIS — K45.8 FLANK HERNIA: ICD-10-CM

## 2023-03-28 DIAGNOSIS — M51.37 DEGENERATIVE DISC DISEASE AT L5-S1 LEVEL: ICD-10-CM

## 2023-03-28 DIAGNOSIS — K43.0 INCARCERATED INCISIONAL HERNIA: Primary | ICD-10-CM

## 2023-03-28 PROCEDURE — 93010 ELECTROCARDIOGRAM REPORT: CPT | Mod: HCNC,,, | Performed by: INTERNAL MEDICINE

## 2023-03-28 PROCEDURE — 3079F DIAST BP 80-89 MM HG: CPT | Mod: HCNC,CPTII,S$GLB, | Performed by: SURGERY

## 2023-03-28 PROCEDURE — 1125F PR PAIN SEVERITY QUANTIFIED, PAIN PRESENT: ICD-10-PCS | Mod: HCNC,CPTII,S$GLB, | Performed by: SURGERY

## 2023-03-28 PROCEDURE — 3079F PR MOST RECENT DIASTOLIC BLOOD PRESSURE 80-89 MM HG: ICD-10-PCS | Mod: HCNC,CPTII,S$GLB, | Performed by: SURGERY

## 2023-03-28 PROCEDURE — 3077F SYST BP >= 140 MM HG: CPT | Mod: HCNC,CPTII,S$GLB, | Performed by: SURGERY

## 2023-03-28 PROCEDURE — 3077F PR MOST RECENT SYSTOLIC BLOOD PRESSURE >= 140 MM HG: ICD-10-PCS | Mod: HCNC,CPTII,S$GLB, | Performed by: SURGERY

## 2023-03-28 PROCEDURE — 93005 ELECTROCARDIOGRAM TRACING: CPT | Mod: HCNC

## 2023-03-28 PROCEDURE — 99215 PR OFFICE/OUTPT VISIT, EST, LEVL V, 40-54 MIN: ICD-10-PCS | Mod: HCNC,S$GLB,, | Performed by: SURGERY

## 2023-03-28 PROCEDURE — 99215 OFFICE O/P EST HI 40 MIN: CPT | Mod: HCNC,S$GLB,, | Performed by: SURGERY

## 2023-03-28 PROCEDURE — 99999 PR PBB SHADOW E&M-EST. PATIENT-LVL IV: ICD-10-PCS | Mod: PBBFAC,HCNC,, | Performed by: SURGERY

## 2023-03-28 PROCEDURE — 1125F AMNT PAIN NOTED PAIN PRSNT: CPT | Mod: HCNC,CPTII,S$GLB, | Performed by: SURGERY

## 2023-03-28 PROCEDURE — 93010 EKG 12-LEAD: ICD-10-PCS | Mod: HCNC,,, | Performed by: INTERNAL MEDICINE

## 2023-03-28 PROCEDURE — 99999 PR PBB SHADOW E&M-EST. PATIENT-LVL IV: CPT | Mod: PBBFAC,HCNC,, | Performed by: SURGERY

## 2023-03-28 NOTE — H&P (VIEW-ONLY)
[facility] -   General Surgery History & Physical    SUBJECTIVE:     History of Present Illness:  Patient is a 77 y.o. male presents with a left-sided incisional hernia at the flank from his prior left partial nephrectomy. The hernia has become progressively larger and is becoming quite painful with radiation across the lower abdomen.  Patient also complaining of lower back pain with radiation and weakness down his right leg.    Review of patient's allergies indicates:  No Known Allergies    Current Outpatient Medications   Medication Sig Dispense Refill    acetaminophen (TYLENOL) 650 MG TbSR Take 650 mg by mouth every 8 (eight) hours.      aspirin 81 MG Chew Take 81 mg by mouth once daily.      atorvastatin (LIPITOR) 20 MG tablet TAKE 1 TABLET BY MOUTH ONCE DAILY IN THE EVENING (ONLY  TAKE  ON  MONDAY,  TUESDAY,  WEDNESDAY  AND  THURSDAY) 51 tablet 2    azelastine (ASTELIN) 137 mcg (0.1 %) nasal spray 1 spray (137 mcg total) by Nasal route 2 (two) times daily. 30 mL 1    cholecalciferol, vitamin D3, (VITAMIN D3) 50 mcg (2,000 unit) Tab Take 2,000 Units by mouth once daily.       fish oil-omega-3 fatty acids 300-1,000 mg capsule Take by mouth once daily.      losartan-hydrochlorothiazide 100-12.5 mg (HYZAAR) 100-12.5 mg Tab Take 1 tablet by mouth once daily. 90 tablet 3    magnesium oxide (MAG-OX) 400 mg (241.3 mg magnesium) tablet Take 400 mg by mouth once daily.      metFORMIN (GLUCOPHAGE) 500 MG tablet Take 1 tablet (500 mg total) by mouth 2 (two) times daily with meals. 180 tablet 3    metoprolol tartrate (LOPRESSOR) 50 MG tablet Take 2 tablets (100 mg total) by mouth 2 (two) times daily. 90 tablet 3    ONETOUCH ULTRA BLUE TEST STRIP Strp USE 1 STRIP TO CHECK GLUCOSE 2 TO 3 TIMES DAILY      oxybutynin (DITROPAN-XL) 5 MG TR24 Take 1 tablet (5 mg total) by mouth once daily. 90 tablet 3    pantoprazole (PROTONIX) 40 MG tablet Take 1 tablet (40 mg total) by mouth once daily. 90 tablet 3    solifenacin  (VESICARE) 10 MG tablet Take 1 tablet (10 mg total) by mouth once daily. 30 tablet 11     No current facility-administered medications for this visit.       Past Medical History:   Diagnosis Date    CKD (chronic kidney disease), stage III     DM (diabetes mellitus) with complications     GERD (gastroesophageal reflux disease)     History of DVT (deep vein thrombosis)     History of renal cell carcinoma 2009    Hyperlipidemia associated with type 2 diabetes mellitus 02/26/2021    Hypertension associated with diabetes     Thrombocytopenia     Type II diabetes mellitus with renal manifestations      Past Surgical History:   Procedure Laterality Date    CLOSURE OF PERFORATED ULCER OF DUODENUM USING OMENTAL PATCH N/A 3/9/2021    Procedure: CLOSURE, ULCER, PERFORATED, DUODENUM, USING OMENTAL PATCH;  Surgeon: Edin Bradford MD;  Location: [facility] OR;  Service: General;  Laterality: N/A;    COLONOSCOPY N/A 1/5/2022    Procedure: COLONOSCOPY;  Surgeon: Mirta Grover MD;  Location: [facility];  Service: Endoscopy;  Laterality: N/A;    ENDOSCOPIC ULTRASOUND OF UPPER GASTROINTESTINAL TRACT N/A 2/5/2021    Procedure: ULTRASOUND, UPPER GI TRACT, ENDOSCOPIC;  Surgeon: Geremias Brizuela MD;  Location: [facility];  Service: Endoscopy;  Laterality: N/A;    ESOPHAGOGASTRODUODENOSCOPY N/A 2/3/2021    Procedure: EGD (ESOPHAGOGASTRODUODENOSCOPY);  Surgeon: Mirta Grover MD;  Location: [facility];  Service: Endoscopy;  Laterality: N/A;    ESOPHAGOGASTRODUODENOSCOPY N/A 3/9/2021    Procedure: EGD (ESOPHAGOGASTRODUODENOSCOPY);  Surgeon: Edin Bradford MD;  Location: [facility] OR;  Service: General;  Laterality: N/A;    left partial nephrectomy Left 2006    ROBOT-ASSISTED SURGICAL REMOVAL OF STOMACH USING DA PERRI XI N/A 3/9/2021    Procedure: XI ROBOTIC GASTRECTOMY;  Surgeon: Edin Bradford MD;  Location: [facility] OR;  Service: General;  Laterality: N/A;  partial     Family History   Problem Relation Age of Onset    Stroke Mother      Hodgkin's lymphoma Father      Social History     Tobacco Use    Smoking status: Former     Packs/day: 1.50     Years: 40.00     Pack years: 60.00     Types: Cigarettes     Quit date: 2005     Years since quittin.9    Smokeless tobacco: Never   Substance Use Topics    Alcohol use: Not Currently    Drug use: Never        Review of Systems:  Review of Systems   Constitutional:  Negative for fever.   Musculoskeletal:  Positive for back pain and gait problem.     OBJECTIVE:     Vital Signs (Most Recent)  Pulse: (!) 59 (23 1033)  BP: (!) 156/84 (23 1033)     105.9 kg (233 lb 7.5 oz)     Physical Exam:  Physical Exam  Vitals and nursing note reviewed.   Constitutional:       General: He is not in acute distress.     Appearance: He is obese. He is not ill-appearing, toxic-appearing or diaphoretic.   HENT:      Head: Normocephalic.      Nose: Nose normal.      Mouth/Throat:      Mouth: Mucous membranes are moist.   Eyes:      General: No scleral icterus.        Right eye: No discharge.         Left eye: No discharge.      Extraocular Movements: Extraocular movements intact.   Cardiovascular:      Rate and Rhythm: Normal rate and regular rhythm.   Pulmonary:      Effort: No respiratory distress.      Breath sounds: No stridor.   Abdominal:      General: There is no distension.      Palpations: Abdomen is soft.      Tenderness: There is no abdominal tenderness.   Musculoskeletal:      Cervical back: No rigidity.      Comments: Left-sided flank hernia at previous incision site   Skin:     General: Skin is warm and dry.      Coloration: Skin is not jaundiced or pale.   Neurological:      Mental Status: He is alert and oriented to person, place, and time.   Psychiatric:         Mood and Affect: Mood normal.         Behavior: Behavior normal.       Laboratory  WBC   Date Value Ref Range Status   2023 5.88 3.90 - 12.70 K/uL Final   2023 5.88 3.90 - 12.70 K/uL Final     Hemoglobin   Date Value Ref  Range Status   01/03/2023 14.1 14.0 - 18.0 g/dL Final   01/03/2023 14.1 14.0 - 18.0 g/dL Final     Hematocrit   Date Value Ref Range Status   01/03/2023 43.4 40.0 - 54.0 % Final   01/03/2023 43.4 40.0 - 54.0 % Final     Platelets   Date Value Ref Range Status   01/03/2023 129 (L) 150 - 450 K/uL Final   01/03/2023 129 (L) 150 - 450 K/uL Final     Sodium   Date Value Ref Range Status   01/03/2023 141 136 - 145 mmol/L Final     Potassium   Date Value Ref Range Status   01/03/2023 4.3 3.5 - 5.1 mmol/L Final     Creatinine   Date Value Ref Range Status   01/03/2023 1.2 0.5 - 1.4 mg/dL Final     Alkaline Phosphatase   Date Value Ref Range Status   01/03/2023 63 55 - 135 U/L Final     AST   Date Value Ref Range Status   01/03/2023 20 10 - 40 U/L Final     ALT   Date Value Ref Range Status   01/03/2023 24 10 - 44 U/L Final      Hemoglobin A1C   Date Value Ref Range Status   11/02/2022 6.5 (H) 4.0 - 5.6 % Final     Comment:     ADA Screening Guidelines:  5.7-6.4%  Consistent with prediabetes  >or=6.5%  Consistent with diabetes    High levels of fetal hemoglobin interfere with the HbA1C  assay. Heterozygous hemoglobin variants (HbS, HgC, etc)do  not significantly interfere with this assay.   However, presence of multiple variants may affect accuracy.         Diagnostic Results:      ASSESSMENT/PLAN:     Daniel was seen today for hernia.    Diagnoses and all orders for this visit:    Incarcerated incisional hernia  -     Case Request Operating Room: XI ROBOTIC REPAIR, HERNIA, INCISIONAL    Degenerative disc disease at L5-S1 level  -     Ambulatory referral/consult to Neurosurgery; Future    Preoperative testing  -     EKG 12-lead; Future  -     CBC Auto Differential; Future  -     COMPREHENSIVE METABOLIC PANEL; Future    Flank hernia  -     Case Request Operating Room: XI ROBOTIC REPAIR, HERNIA, INCISIONAL         Will plan for robotic-assisted laparoscopic left incisional Grynfeltt hernia repair with mesh, possible  bilateral.  The postoperative restrictions were discussed including no heavy lifting >10 lbs, no straining/pushing/pulling, excessive bending or twisting, for at least 6 weeks otherwise there is a higher risk of recurrence.    After explaining the risks, benefits, and alternatives, patient verbalized understanding and would like to proceed with surgery. All questions were answered to their satisfaction.    Will also send referral to neurosurgery due to patient's complaint of low back pain with sciatica and lower extremity weakness symptoms.    Patient expressed understanding and is in agreement.      Yumiko Rosen,   General Surgery  [facility]  3/28/2023    I spent a total of 40 minutes on the day of the visit.  This includes face to face time and non-face to face time preparing to see the patient (eg, review of tests), obtaining and/or reviewing separately obtained history, documenting clinical information in the electronic or other health record, independently interpreting results and communicating results to the patient/family/caregiver, or care coordinator.

## 2023-03-28 NOTE — PROGRESS NOTES
[facility] -   General Surgery History & Physical    SUBJECTIVE:     History of Present Illness:  Patient is a 77 y.o. male presents with a left-sided incisional hernia at the flank from his prior left partial nephrectomy. The hernia has become progressively larger and is becoming quite painful with radiation across the lower abdomen.  Patient also complaining of lower back pain with radiation and weakness down his right leg.    Review of patient's allergies indicates:  No Known Allergies    Current Outpatient Medications   Medication Sig Dispense Refill    acetaminophen (TYLENOL) 650 MG TbSR Take 650 mg by mouth every 8 (eight) hours.      aspirin 81 MG Chew Take 81 mg by mouth once daily.      atorvastatin (LIPITOR) 20 MG tablet TAKE 1 TABLET BY MOUTH ONCE DAILY IN THE EVENING (ONLY  TAKE  ON  MONDAY,  TUESDAY,  WEDNESDAY  AND  THURSDAY) 51 tablet 2    azelastine (ASTELIN) 137 mcg (0.1 %) nasal spray 1 spray (137 mcg total) by Nasal route 2 (two) times daily. 30 mL 1    cholecalciferol, vitamin D3, (VITAMIN D3) 50 mcg (2,000 unit) Tab Take 2,000 Units by mouth once daily.       fish oil-omega-3 fatty acids 300-1,000 mg capsule Take by mouth once daily.      losartan-hydrochlorothiazide 100-12.5 mg (HYZAAR) 100-12.5 mg Tab Take 1 tablet by mouth once daily. 90 tablet 3    magnesium oxide (MAG-OX) 400 mg (241.3 mg magnesium) tablet Take 400 mg by mouth once daily.      metFORMIN (GLUCOPHAGE) 500 MG tablet Take 1 tablet (500 mg total) by mouth 2 (two) times daily with meals. 180 tablet 3    metoprolol tartrate (LOPRESSOR) 50 MG tablet Take 2 tablets (100 mg total) by mouth 2 (two) times daily. 90 tablet 3    ONETOUCH ULTRA BLUE TEST STRIP Strp USE 1 STRIP TO CHECK GLUCOSE 2 TO 3 TIMES DAILY      oxybutynin (DITROPAN-XL) 5 MG TR24 Take 1 tablet (5 mg total) by mouth once daily. 90 tablet 3    pantoprazole (PROTONIX) 40 MG tablet Take 1 tablet (40 mg total) by mouth once daily. 90 tablet 3    solifenacin  (VESICARE) 10 MG tablet Take 1 tablet (10 mg total) by mouth once daily. 30 tablet 11     No current facility-administered medications for this visit.       Past Medical History:   Diagnosis Date    CKD (chronic kidney disease), stage III     DM (diabetes mellitus) with complications     GERD (gastroesophageal reflux disease)     History of DVT (deep vein thrombosis)     History of renal cell carcinoma 2009    Hyperlipidemia associated with type 2 diabetes mellitus 02/26/2021    Hypertension associated with diabetes     Thrombocytopenia     Type II diabetes mellitus with renal manifestations      Past Surgical History:   Procedure Laterality Date    CLOSURE OF PERFORATED ULCER OF DUODENUM USING OMENTAL PATCH N/A 3/9/2021    Procedure: CLOSURE, ULCER, PERFORATED, DUODENUM, USING OMENTAL PATCH;  Surgeon: Edin Bradford MD;  Location: [facility];  Service: General;  Laterality: N/A;    COLONOSCOPY N/A 1/5/2022    Procedure: COLONOSCOPY;  Surgeon: Mirta Grover MD;  Location: [facility];  Service: Endoscopy;  Laterality: N/A;    ENDOSCOPIC ULTRASOUND OF UPPER GASTROINTESTINAL TRACT N/A 2/5/2021    Procedure: ULTRASOUND, UPPER GI TRACT, ENDOSCOPIC;  Surgeon: Geremias Brizuela MD;  Location: [facility];  Service: Endoscopy;  Laterality: N/A;    ESOPHAGOGASTRODUODENOSCOPY N/A 2/3/2021    Procedure: EGD (ESOPHAGOGASTRODUODENOSCOPY);  Surgeon: Mirta Grover MD;  Location: [facility];  Service: Endoscopy;  Laterality: N/A;    ESOPHAGOGASTRODUODENOSCOPY N/A 3/9/2021    Procedure: EGD (ESOPHAGOGASTRODUODENOSCOPY);  Surgeon: Edin Bradford MD;  Location: [facility];  Service: General;  Laterality: N/A;    left partial nephrectomy Left 2006    ROBOT-ASSISTED SURGICAL REMOVAL OF STOMACH USING DA PERRI XI N/A 3/9/2021    Procedure: XI ROBOTIC GASTRECTOMY;  Surgeon: Edin Bradford MD;  Location: [facility];  Service: General;  Laterality: N/A;  partial     Family History   Problem Relation Age of Onset    Stroke Mother      Hodgkin's lymphoma Father      Social History     Tobacco Use    Smoking status: Former     Packs/day: 1.50     Years: 40.00     Pack years: 60.00     Types: Cigarettes     Quit date: 2005     Years since quittin.9    Smokeless tobacco: Never   Substance Use Topics    Alcohol use: Not Currently    Drug use: Never        Review of Systems:  Review of Systems   Constitutional:  Negative for fever.   Musculoskeletal:  Positive for back pain and gait problem.     OBJECTIVE:     Vital Signs (Most Recent)  Pulse: (!) 59 (23 1033)  BP: (!) 156/84 (23 1033)     105.9 kg (233 lb 7.5 oz)     Physical Exam:  Physical Exam  Vitals and nursing note reviewed.   Constitutional:       General: He is not in acute distress.     Appearance: He is obese. He is not ill-appearing, toxic-appearing or diaphoretic.   HENT:      Head: Normocephalic.      Nose: Nose normal.      Mouth/Throat:      Mouth: Mucous membranes are moist.   Eyes:      General: No scleral icterus.        Right eye: No discharge.         Left eye: No discharge.      Extraocular Movements: Extraocular movements intact.   Cardiovascular:      Rate and Rhythm: Normal rate and regular rhythm.   Pulmonary:      Effort: No respiratory distress.      Breath sounds: No stridor.   Abdominal:      General: There is no distension.      Palpations: Abdomen is soft.      Tenderness: There is no abdominal tenderness.   Musculoskeletal:      Cervical back: No rigidity.      Comments: Left-sided flank hernia at previous incision site   Skin:     General: Skin is warm and dry.      Coloration: Skin is not jaundiced or pale.   Neurological:      Mental Status: He is alert and oriented to person, place, and time.   Psychiatric:         Mood and Affect: Mood normal.         Behavior: Behavior normal.       Laboratory  WBC   Date Value Ref Range Status   2023 5.88 3.90 - 12.70 K/uL Final   2023 5.88 3.90 - 12.70 K/uL Final     Hemoglobin   Date Value Ref  Range Status   01/03/2023 14.1 14.0 - 18.0 g/dL Final   01/03/2023 14.1 14.0 - 18.0 g/dL Final     Hematocrit   Date Value Ref Range Status   01/03/2023 43.4 40.0 - 54.0 % Final   01/03/2023 43.4 40.0 - 54.0 % Final     Platelets   Date Value Ref Range Status   01/03/2023 129 (L) 150 - 450 K/uL Final   01/03/2023 129 (L) 150 - 450 K/uL Final     Sodium   Date Value Ref Range Status   01/03/2023 141 136 - 145 mmol/L Final     Potassium   Date Value Ref Range Status   01/03/2023 4.3 3.5 - 5.1 mmol/L Final     Creatinine   Date Value Ref Range Status   01/03/2023 1.2 0.5 - 1.4 mg/dL Final     Alkaline Phosphatase   Date Value Ref Range Status   01/03/2023 63 55 - 135 U/L Final     AST   Date Value Ref Range Status   01/03/2023 20 10 - 40 U/L Final     ALT   Date Value Ref Range Status   01/03/2023 24 10 - 44 U/L Final      Hemoglobin A1C   Date Value Ref Range Status   11/02/2022 6.5 (H) 4.0 - 5.6 % Final     Comment:     ADA Screening Guidelines:  5.7-6.4%  Consistent with prediabetes  >or=6.5%  Consistent with diabetes    High levels of fetal hemoglobin interfere with the HbA1C  assay. Heterozygous hemoglobin variants (HbS, HgC, etc)do  not significantly interfere with this assay.   However, presence of multiple variants may affect accuracy.         Diagnostic Results:      ASSESSMENT/PLAN:     Daniel was seen today for hernia.    Diagnoses and all orders for this visit:    Incarcerated incisional hernia  -     Case Request Operating Room: XI ROBOTIC REPAIR, HERNIA, INCISIONAL    Degenerative disc disease at L5-S1 level  -     Ambulatory referral/consult to Neurosurgery; Future    Preoperative testing  -     EKG 12-lead; Future  -     CBC Auto Differential; Future  -     COMPREHENSIVE METABOLIC PANEL; Future    Flank hernia  -     Case Request Operating Room: XI ROBOTIC REPAIR, HERNIA, INCISIONAL         Will plan for robotic-assisted laparoscopic left incisional Grynfeltt hernia repair with mesh, possible  bilateral.  The postoperative restrictions were discussed including no heavy lifting >10 lbs, no straining/pushing/pulling, excessive bending or twisting, for at least 6 weeks otherwise there is a higher risk of recurrence.    After explaining the risks, benefits, and alternatives, patient verbalized understanding and would like to proceed with surgery. All questions were answered to their satisfaction.    Will also send referral to neurosurgery due to patient's complaint of low back pain with sciatica and lower extremity weakness symptoms.    Patient expressed understanding and is in agreement.      Yumiko Rosen,   General Surgery  [facility]  3/28/2023    I spent a total of 40 minutes on the day of the visit.  This includes face to face time and non-face to face time preparing to see the patient (eg, review of tests), obtaining and/or reviewing separately obtained history, documenting clinical information in the electronic or other health record, independently interpreting results and communicating results to the patient/family/caregiver, or care coordinator.

## 2023-03-30 DIAGNOSIS — Z01.818 PREOP EXAMINATION: Primary | ICD-10-CM

## 2023-04-10 ENCOUNTER — OFFICE VISIT (OUTPATIENT)
Dept: INTERNAL MEDICINE | Facility: CLINIC | Age: 78
End: 2023-04-10
Payer: MEDICARE

## 2023-04-10 ENCOUNTER — OFFICE VISIT (OUTPATIENT)
Dept: NEUROSURGERY | Facility: CLINIC | Age: 78
End: 2023-04-10
Payer: MEDICARE

## 2023-04-10 VITALS
HEART RATE: 73 BPM | OXYGEN SATURATION: 95 % | SYSTOLIC BLOOD PRESSURE: 157 MMHG | TEMPERATURE: 98 F | DIASTOLIC BLOOD PRESSURE: 88 MMHG

## 2023-04-10 VITALS
HEIGHT: 71 IN | WEIGHT: 233 LBS | HEART RATE: 73 BPM | DIASTOLIC BLOOD PRESSURE: 75 MMHG | SYSTOLIC BLOOD PRESSURE: 139 MMHG | BODY MASS INDEX: 32.62 KG/M2

## 2023-04-10 DIAGNOSIS — E11.8 DM (DIABETES MELLITUS) WITH COMPLICATIONS: ICD-10-CM

## 2023-04-10 DIAGNOSIS — I15.2 HYPERTENSION ASSOCIATED WITH DIABETES: ICD-10-CM

## 2023-04-10 DIAGNOSIS — D69.6 THROMBOCYTOPENIA: ICD-10-CM

## 2023-04-10 DIAGNOSIS — Z01.818 PREOPERATIVE EXAMINATION: ICD-10-CM

## 2023-04-10 DIAGNOSIS — E11.59 HYPERTENSION ASSOCIATED WITH DIABETES: ICD-10-CM

## 2023-04-10 DIAGNOSIS — K45.8 FLANK HERNIA: ICD-10-CM

## 2023-04-10 DIAGNOSIS — M51.36 DDD (DEGENERATIVE DISC DISEASE), LUMBAR: ICD-10-CM

## 2023-04-10 DIAGNOSIS — Z01.818 PREOP EXAMINATION: ICD-10-CM

## 2023-04-10 DIAGNOSIS — Z86.718 HISTORY OF DVT (DEEP VEIN THROMBOSIS): ICD-10-CM

## 2023-04-10 DIAGNOSIS — M51.37 DEGENERATIVE DISC DISEASE AT L5-S1 LEVEL: ICD-10-CM

## 2023-04-10 DIAGNOSIS — M43.17 ANTEROLISTHESIS OF LUMBOSACRAL SPINE: Primary | ICD-10-CM

## 2023-04-10 DIAGNOSIS — M43.10 PARS DEFECT WITH SPONDYLOLISTHESIS: ICD-10-CM

## 2023-04-10 DIAGNOSIS — M48.07 SPINAL STENOSIS, LUMBOSACRAL REGION: ICD-10-CM

## 2023-04-10 DIAGNOSIS — N40.1 BENIGN PROSTATIC HYPERPLASIA WITH URINARY FREQUENCY: ICD-10-CM

## 2023-04-10 DIAGNOSIS — N18.31 STAGE 3A CHRONIC KIDNEY DISEASE: ICD-10-CM

## 2023-04-10 DIAGNOSIS — R35.0 BENIGN PROSTATIC HYPERPLASIA WITH URINARY FREQUENCY: ICD-10-CM

## 2023-04-10 DIAGNOSIS — I45.10 RBBB: ICD-10-CM

## 2023-04-10 DIAGNOSIS — J43.1 PANLOBULAR EMPHYSEMA: ICD-10-CM

## 2023-04-10 PROCEDURE — 3075F SYST BP GE 130 - 139MM HG: CPT | Mod: HCNC,CPTII,S$GLB, | Performed by: PHYSICIAN ASSISTANT

## 2023-04-10 PROCEDURE — 99999 PR PBB SHADOW E&M-EST. PATIENT-LVL III: CPT | Mod: PBBFAC,HCNC,,

## 2023-04-10 PROCEDURE — 3079F DIAST BP 80-89 MM HG: CPT | Mod: HCNC,CPTII,S$GLB, | Performed by: SPECIALIST

## 2023-04-10 PROCEDURE — 99999 PR PBB SHADOW E&M-EST. PATIENT-LVL IV: ICD-10-PCS | Mod: PBBFAC,HCNC,, | Performed by: PHYSICIAN ASSISTANT

## 2023-04-10 PROCEDURE — 99204 OFFICE O/P NEW MOD 45 MIN: CPT | Mod: HCNC,S$GLB,, | Performed by: PHYSICIAN ASSISTANT

## 2023-04-10 PROCEDURE — 3077F SYST BP >= 140 MM HG: CPT | Mod: HCNC,CPTII,S$GLB, | Performed by: SPECIALIST

## 2023-04-10 PROCEDURE — 99204 OFFICE O/P NEW MOD 45 MIN: CPT | Mod: HCNC,S$GLB,, | Performed by: SPECIALIST

## 2023-04-10 PROCEDURE — 1160F PR REVIEW ALL MEDS BY PRESCRIBER/CLIN PHARMACIST DOCUMENTED: ICD-10-PCS | Mod: HCNC,CPTII,S$GLB, | Performed by: SPECIALIST

## 2023-04-10 PROCEDURE — 1101F PR PT FALLS ASSESS DOC 0-1 FALLS W/OUT INJ PAST YR: ICD-10-PCS | Mod: HCNC,CPTII,S$GLB, | Performed by: PHYSICIAN ASSISTANT

## 2023-04-10 PROCEDURE — 1159F MED LIST DOCD IN RCRD: CPT | Mod: HCNC,CPTII,S$GLB, | Performed by: SPECIALIST

## 2023-04-10 PROCEDURE — 1159F PR MEDICATION LIST DOCUMENTED IN MEDICAL RECORD: ICD-10-PCS | Mod: HCNC,CPTII,S$GLB, | Performed by: SPECIALIST

## 2023-04-10 PROCEDURE — 99204 PR OFFICE/OUTPT VISIT, NEW, LEVL IV, 45-59 MIN: ICD-10-PCS | Mod: HCNC,S$GLB,, | Performed by: SPECIALIST

## 2023-04-10 PROCEDURE — 1101F PT FALLS ASSESS-DOCD LE1/YR: CPT | Mod: HCNC,CPTII,S$GLB, | Performed by: PHYSICIAN ASSISTANT

## 2023-04-10 PROCEDURE — 1160F RVW MEDS BY RX/DR IN RCRD: CPT | Mod: HCNC,CPTII,S$GLB, | Performed by: SPECIALIST

## 2023-04-10 PROCEDURE — 3075F PR MOST RECENT SYSTOLIC BLOOD PRESS GE 130-139MM HG: ICD-10-PCS | Mod: HCNC,CPTII,S$GLB, | Performed by: PHYSICIAN ASSISTANT

## 2023-04-10 PROCEDURE — 1125F PR PAIN SEVERITY QUANTIFIED, PAIN PRESENT: ICD-10-PCS | Mod: HCNC,CPTII,S$GLB, | Performed by: PHYSICIAN ASSISTANT

## 2023-04-10 PROCEDURE — 99999 PR PBB SHADOW E&M-EST. PATIENT-LVL IV: CPT | Mod: PBBFAC,HCNC,, | Performed by: PHYSICIAN ASSISTANT

## 2023-04-10 PROCEDURE — 3079F PR MOST RECENT DIASTOLIC BLOOD PRESSURE 80-89 MM HG: ICD-10-PCS | Mod: HCNC,CPTII,S$GLB, | Performed by: SPECIALIST

## 2023-04-10 PROCEDURE — 99999 PR PBB SHADOW E&M-EST. PATIENT-LVL III: ICD-10-PCS | Mod: PBBFAC,HCNC,,

## 2023-04-10 PROCEDURE — 3288F PR FALLS RISK ASSESSMENT DOCUMENTED: ICD-10-PCS | Mod: HCNC,CPTII,S$GLB, | Performed by: PHYSICIAN ASSISTANT

## 2023-04-10 PROCEDURE — 1125F AMNT PAIN NOTED PAIN PRSNT: CPT | Mod: HCNC,CPTII,S$GLB, | Performed by: PHYSICIAN ASSISTANT

## 2023-04-10 PROCEDURE — 3078F DIAST BP <80 MM HG: CPT | Mod: HCNC,CPTII,S$GLB, | Performed by: PHYSICIAN ASSISTANT

## 2023-04-10 PROCEDURE — 99204 PR OFFICE/OUTPT VISIT, NEW, LEVL IV, 45-59 MIN: ICD-10-PCS | Mod: HCNC,S$GLB,, | Performed by: PHYSICIAN ASSISTANT

## 2023-04-10 PROCEDURE — 3078F PR MOST RECENT DIASTOLIC BLOOD PRESSURE < 80 MM HG: ICD-10-PCS | Mod: HCNC,CPTII,S$GLB, | Performed by: PHYSICIAN ASSISTANT

## 2023-04-10 PROCEDURE — 1159F PR MEDICATION LIST DOCUMENTED IN MEDICAL RECORD: ICD-10-PCS | Mod: HCNC,CPTII,S$GLB, | Performed by: PHYSICIAN ASSISTANT

## 2023-04-10 PROCEDURE — 1159F MED LIST DOCD IN RCRD: CPT | Mod: HCNC,CPTII,S$GLB, | Performed by: PHYSICIAN ASSISTANT

## 2023-04-10 PROCEDURE — 3288F FALL RISK ASSESSMENT DOCD: CPT | Mod: HCNC,CPTII,S$GLB, | Performed by: PHYSICIAN ASSISTANT

## 2023-04-10 PROCEDURE — 3077F PR MOST RECENT SYSTOLIC BLOOD PRESSURE >= 140 MM HG: ICD-10-PCS | Mod: HCNC,CPTII,S$GLB, | Performed by: SPECIALIST

## 2023-04-10 RX ORDER — METHYLPREDNISOLONE ACETATE 80 MG/ML
INJECTION, SUSPENSION INTRA-ARTICULAR; INTRALESIONAL; INTRAMUSCULAR; SOFT TISSUE
COMMUNITY
Start: 2023-01-12 | End: 2023-04-10 | Stop reason: ALTCHOICE

## 2023-04-10 NOTE — PRE-PROCEDURE INSTRUCTIONS
Pre op instructions reviewed with patient during Clinic Visit with Provider. Patient verbalized understanding.    To confirm, Surgery is scheduled on 4/17/23. We will call you late afternoon the business day prior to surgery with your arrival time.    *Please report to the [facility] (1st Floor) located off of [facility] (2nd Entrance/Building on the left, in front of the flag pole).  Address: [address] [place] [address] [place]. [id number]      INSTRUCTIONS IMPORTANT!!!  Do Not Eat, Drink, or Smoke after 12 midnight unless instructed otherwise by your Surgeon. OK to brush teeth, no gum, candy or mints!    *Take Only these medications with a small sip of water Morning of Surgery as instructed by Kelly Rubi NP:  Metoprolol  Pantoprazole        TAKE YOUR LAST METFORMIN, PRIOR TO SURGERY, ON SATURDAY, 4/15/23.  HOLD YOUR FISH-OIL, PRIOR TO SURGERY, TODAY.    Blood Thinners: Stop taking ASPIRIN 7 days prior to surgery per Physician Instructions! Call your Surgeon office to inquire about any questions regarding your blood thinner medication.    ____  HOLD all vitamins, herbal supplements, aspirin products & NSAIDS 7 days prior to surgery, as these can thin the blood.  ____  NO Acrylic/fake nails or nail polish worn day of surgery (specifically hand/arm & foot surgeries).  ____  NO powder, lotions, deodorants, oils or cream on body.  ____  Remove all jewelry & piercings prior to surgery.  ____  Remove Dentures, Hearing Aids & Contact Lens prior to surgery.  ____  Bring photo ID and insurance information to hospital (Leave Valuables at Home).  ____  If going home the same day, arrange for a ride home. You will not be able to drive for 24 hrs if Anesthesia was used.   ____  Males: Stop ED medications (Viagra, Cialis) 24 hrs prior to surgery.  ____  Wear clean, loose fitting clothing to allow for dressings/ bandages.            Diabetic Patients: If you take diabetic medication, do NOT take morning of  surgery unless instructed by Doctor. Metformin to be stopped 24 hrs prior to surgery time. DO NOT take long-acting insulin the evening before surgery. Blood sugars will be checked in pre-op by Nurse.    Bathing Instructions:    -Shower with anti-bacterial Soap (Hibiclens or Dial) the night before surgery and the morning of.   -Do not use Hibiclens on your face or genitals.   -Apply clean clothes after shower.  -Do not shave your face or body 2 days prior to surgery unless instructed otherwise by your Surgeon.  -Do not shave pubic hair 7 days prior to surgery (gyn pt's).    [facility] Policy:  Only 2 adults allowed (over the age of 18) to accompany you to the Hospital. You Must have a ride home from a responsible adult that you know and trust. Medical Transport, Uber or Lyft can only be used if patient has a responsible adult to accompany them during ride home.    Discharge Instructions: You will receive Post-op/Discharge instructions by your Discharge Nurse prior to going home. Please call your Surgeon's office with any post-surgery questions/concerns @ [phone].    *If you are running late or have questions the morning of surgery, please call the Surgery Dept @ [phone]  *Insurance/ Financial Questions, please call [phone]    Thank you,  -[facility] Nurse  [phone]   or [phone]  M-F 7:30 am-4:00 pm (Closed Major Holidays)

## 2023-04-10 NOTE — ASSESSMENT & PLAN NOTE
EKG completed- No Significant Changes    Sinus bradycardia with 1st degree A-V block   Low voltage QRS   Right bundle branch block   Abnormal ECG   When compared with ECG of 24-FEB-2021 13:12,   No significant change was found

## 2023-04-10 NOTE — ASSESSMENT & PLAN NOTE
Chronic, managed with oxybutynin    --Patient reports not taking solifenacin due to side effects, resumed oxybutynin

## 2023-04-10 NOTE — DISCHARGE INSTRUCTIONS
Pre op instructions reviewed with patient during Clinic Visit with Provider. Patient verbalized understanding.    To confirm, Surgery is scheduled on 4/17/23. We will call you late afternoon the business day prior to surgery with your arrival time.    *Please report to the [facility] (1st Floor) located off of [facility] (2nd Entrance/Building on the left, in front of the flag pole).  Address: [address] [place] [address] [place]. [address]      INSTRUCTIONS IMPORTANT!!!  Do Not Eat, Drink, or Smoke after 12 midnight unless instructed otherwise by your Surgeon. OK to brush teeth, no gum, candy or mints!    *Take Only these medications with a small sip of water Morning of Surgery as instructed by Kelly Rubi NP:  Metoprolol  Pantoprazole        TAKE YOUR LAST METFORMIN, PRIOR TO SURGERY, ON SATURDAY, 4/15/23.  HOLD YOUR FISH-OIL, PRIOR TO SURGERY, TODAY.    Blood Thinners: Stop taking ASPIRIN 7 days prior to surgery per Physician Instructions! Call your Surgeon office to inquire about any questions regarding your blood thinner medication.    ____  HOLD all vitamins, herbal supplements, aspirin products & NSAIDS 7 days prior to surgery, as these can thin the blood.  ____  NO Acrylic/fake nails or nail polish worn day of surgery (specifically hand/arm & foot surgeries).  ____  NO powder, lotions, deodorants, oils or cream on body.  ____  Remove all jewelry & piercings prior to surgery.  ____  Remove Dentures, Hearing Aids & Contact Lens prior to surgery.  ____  Bring photo ID and insurance information to hospital (Leave Valuables at Home).  ____  If going home the same day, arrange for a ride home. You will not be able to drive for 24 hrs if Anesthesia was used.   ____  Males: Stop ED medications (Viagra, Cialis) 24 hrs prior to surgery.  ____  Wear clean, loose fitting clothing to allow for dressings/ bandages.            Diabetic Patients: If you take diabetic medication, do NOT take morning of  surgery unless instructed by Doctor. Metformin to be stopped 24 hrs prior to surgery time. DO NOT take long-acting insulin the evening before surgery. Blood sugars will be checked in pre-op by Nurse.    Bathing Instructions:    -Shower with anti-bacterial Soap (Hibiclens or Dial) the night before surgery and the morning of.   -Do not use Hibiclens on your face or genitals.   -Apply clean clothes after shower.  -Do not shave your face or body 2 days prior to surgery unless instructed otherwise by your Surgeon.  -Do not shave pubic hair 7 days prior to surgery (gyn pt's).    [facility] Policy:  Only 2 adults allowed (over the age of 18) to accompany you to the Hospital. You Must have a ride home from a responsible adult that you know and trust. Medical Transport, Uber or Lyft can only be used if patient has a responsible adult to accompany them during ride home.    Discharge Instructions: You will receive Post-op/Discharge instructions by your Discharge Nurse prior to going home. Please call your Surgeon's office with any post-surgery questions/concerns @ [phone].    *If you are running late or have questions the morning of surgery, please call the Surgery Dept @ [phone]  *Insurance/ Financial Questions, please call [phone]    Thank you,  -[facility] Nurse  [phone]   or [phone]  M-F 7:30 am-4:00 pm (Closed Major Holidays)

## 2023-04-10 NOTE — ASSESSMENT & PLAN NOTE
Known risk factors for perioperative complications: None    Difficulty with intubation is not anticipated.    Cardiac Risk Estimation: Based on the Revised Cardiac Risk index, patient is a Class 1 risk with a 3.9% risk of a major cardiac event in a low risk procedure.    1.) Preoperative workup as follows: ECG, hemoglobin, hematocrit, electrolytes, creatinine, glucose, liver function studies.  2.) Change in medication regimen before surgery: discontinue ASA, NSAIDs 7 day before surgery, hold Metformin 24 hours prior to surgery.  3.) Prophylaxis for cardiac events with perioperative beta-blockers: Prescribed metoprolol, take morning of surgery.  4.) Invasive hemodynamic monitoring perioperatively: at the discretion of anesthesiologist.  5.) Deep vein thrombosis prophylaxis postoperatively: intermittent pneumatic compression boots and regimen to be chosen by surgical team.  6.) Surveillance for postoperative MI with ECG immediately postoperatively and on postoperati ve days 1 and 2 AND troponin levels 24 hours postoperatively and on day 4 or hospital discharge (whichever comes first): not indicated.  7.) Current medications which may produce withdrawal symptoms if withheld perioperatively: None  8.) Other measures: None

## 2023-04-10 NOTE — ASSESSMENT & PLAN NOTE
Chronic, stable. Platelet count within baseline range. Has been evaluated by hematology previously.     --Transfuse platelets for platelet count <10K or <50K for bleeding or procedures

## 2023-04-10 NOTE — ASSESSMENT & PLAN NOTE
Chronic, stable, controlled. Last HgA1c: 6.5%, 11/2/2023.  Managed by PCP    --Hold Metformin 24H prior to procedure

## 2023-04-10 NOTE — PROGRESS NOTES
"Subjective:      Patient ID: Daniel Villeda is a 77 y.o. male.    HPI  The patient is here today for evaluation of degenerative disc disease of the lumbar spine.  He is referred to our clinic by Dr. Yumiko Rosen.  He's had pain for years.  Localizes pain to R side of lower back and radiating down to his foot.  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 I've had all sorts of therapy and it does no good."  The pain he has on the L side is localized to lower back and radiates to LLQ. States Dr. Rosen will address this pain in upcoming surgery (incisional hernia repair).  Patient has numbness/tingling and weakness, mainly in the R ankle and foot.  He recalls falling in the past. He's had one fall this year. Whenever he falls, he attributes this to the weakness in the RLE. If he walks, he notices that leg drag.   He will try to catch himself in the house by holding onto walls to prevent falling.  Leaning forward makes walking more tolerable.   Whenever he places weight on the R foot, he reports increased pressure in the lower back and hip- R side.    In the past, he's had several ESIs.  Patient denies any previous lower back surgery.  At times, he does have to ambulate with a cane.    He ordered Omega XL and this did help with the pain.  Rates his pain 10/10.   Denies acute bladder/bowel changes but has an enlarged prostate.  Has a history of gastrointestinal stromal tumor and renal cell carcinoma.   Half of L kidney removed at [facility]- 12-15 yrs ago.  Patient has been retired for almost 20 years. Prior to this he worked for [employer].   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 He slipped from standing position.    Objective:     Body mass index is 32.5 kg/m².  Vitals:    04/10/23 0803   BP: 139/75   Pulse: 73          Back:  None  Paraspinal muscle spasms   None  Pain with flexion and extention   WNL Limited Range of motion    Neg + right Straight leg raise     Motor   Right " Right Left Left  Level Group   5  5  L2 Hip flexor (Psoas)   5  5  L3 Leg extension (Quads)   5 3/4 DF 5  L4 Dorsiflexion & foot inversion (Tibialis Anterior)   5 3 5  L5 Great toe extension ( EHL)   5 4 5  S1 Foot eversion (Gastroc, PL & PB)     Sensation  NL Decreased (R/L/BL) Level Sensation    X  L2 Anterio-medial thigh   X  L3 Medial thigh around knee   X  L4 Medial foot   X  L5 Dorsum foot   X  S1 Lateral foot     Back Exam     Tenderness   The patient is experiencing tenderness in the lumbar.    Other   Toe walk: abnormal  Heel walk: abnormal  Gait: drop-foot (R drop foot)                  Lab Results   Component Value Date    WBC 5.90 03/28/2023    HCT 45.8 03/28/2023     CT C/A/P     FINDINGS:  Finding: The size of the heart is within normal limits.  There is no evidence of an acute pulmonary process.  There is no pneumothorax or pleural effusion.     There is moderate generalized atrophy of the pancreas.  The liver, gallbladder, spleen, and adrenals are normal in appearance.  There are hypodense masses associated with both kidneys.  One of the larger ones measures 9 mm and is located off of the lateral aspect of the inferior pole of the right kidney.  This mass has a Hounsfield measurement of 7.  The ureters and the urinary bladder are normal in appearance.  The prostate is normal in appearance.  The appendix is normal in appearance.  There is a moderate amount of diverticulosis in the descending and sigmoid portions of the colon.  There is no abnormal mass visualized within the gastrointestinal system.  There is no free fluid within the abdomen or pelvis. There is no pneumoperitoneum.  There are old bilateral pars interarticularis fractures of L5.  There is grade 3 anterolisthesis of L5 on S1.  There are moderate degenerative changes between L5 and S1.  There is no abnormal lymphadenopathy based on CT size criteria.     Impression:     1. There is no abnormal mass visualized within the gastrointestinal  system.  2. There is no abnormal lymphadenopathy based on CT size criteria.  3. There are hypodense masses associated with both kidneys. One of the larger ones measures 9 mm and is located off of the lateral aspect of the inferior pole of the right kidney.  This mass has a Hounsfield measurement of 7.  This is characteristic of a cyst.  4. There is a moderate amount of diverticulosis in the descending and sigmoid portions of the colon.  There is no abnormal mass visualized within the gastrointestinal system.  5. There are old bilateral pars interarticularis fractures of L5.  There is grade 3 anterolisthesis of L5 on S1. There are moderate degenerative changes between L5 and S1.      INDEPENDENT INTERPRETATION OF TEST:  Relevant imaging results reviewed and interpreted by me, discussed with the patient and / or family today.  Assessment:     1. Anterolisthesis of lumbosacral spine    2. Degenerative disc disease at L5-S1 level    3. Spinal stenosis, lumbosacral region    4. Pars defect with spondylolisthesis    5. DDD (degenerative disc disease), lumbar      Plan:     Anterolisthesis of lumbosacral spine    Degenerative disc disease at L5-S1 level  -     Ambulatory referral/consult to Neurosurgery  -     X-Ray Spine Survey AP And Lateral; Future; Expected date: 04/10/2023    Spinal stenosis, lumbosacral region  -     X-Ray Spine Survey AP And Lateral; Future; Expected date: 04/10/2023  -     MRI Lumbar Spine Without Contrast; Future; Expected date: 04/10/2023    Pars defect with spondylolisthesis    DDD (degenerative disc disease), lumbar      Discussed CT C/A/P findings, along with pathology and treatment options.  Pt made aware of chronic pars defects at L5, Grade 3 anterolisthesis at L5/S1 and degenerative disc disease involving L4/5 and L5/S1.  Will recommend MRI Lumbar spine for further evaluation of nerve compression + spine survey to look at patient's overall alignment.  He will follow-up with Dr. Morales after  all studies are completed for discussion and possible preop examination.  Please reach out with any changes.    Scottie Cisse PA-C  [facility]

## 2023-04-10 NOTE — PROGRESS NOTES
Preoperative History and Physical                                                              Hospital Medicine                                                                      Chief Complaint: Preoperative evaluation     History of Present Illness:      Daniel Villeda is a 77 y.o. male who presents to the office today for a preoperative consultation at the request of Dr. Yumiko Rosen who plans on performing XI Robotic Hernia Repair on April 17.     Functional Status:      The patient is able to climb a flight of stairs. The patient is able to ambulate without difficulty. The patient's functional status is not affected by the surgical problem. The patient's functional status is not affected by shortness of breath, chest pain, dyspnea on exertion and fatigue.    MET score greater than 4    Past Medical History:      Past Medical History:   Diagnosis Date    CKD (chronic kidney disease), stage III     Deep vein thrombosis     DM (diabetes mellitus) with complications     GERD (gastroesophageal reflux disease)     History of DVT (deep vein thrombosis)     History of renal cell carcinoma 2009    Hyperlipidemia associated with type 2 diabetes mellitus 02/26/2021    Hypertension associated with diabetes     Thrombocytopenia     Type II diabetes mellitus with renal manifestations         Past Surgical History:      Past Surgical History:   Procedure Laterality Date    CLOSURE OF PERFORATED ULCER OF DUODENUM USING OMENTAL PATCH N/A 3/9/2021    Procedure: CLOSURE, ULCER, PERFORATED, DUODENUM, USING OMENTAL PATCH;  Surgeon: Edin Bradford MD;  Location: [facility] OR;  Service: General;  Laterality: N/A;    COLONOSCOPY N/A 1/5/2022    Procedure: COLONOSCOPY;  Surgeon: Mirta Grover MD;  Location: [facility] ENDO;  Service: Endoscopy;  Laterality: N/A;    ENDOSCOPIC ULTRASOUND OF UPPER GASTROINTESTINAL TRACT N/A 2/5/2021    Procedure: ULTRASOUND, UPPER GI TRACT, ENDOSCOPIC;   Surgeon: Geremias Brizuela MD;  Location: [facility] ENDO;  Service: Endoscopy;  Laterality: N/A;    ESOPHAGOGASTRODUODENOSCOPY N/A 2/3/2021    Procedure: EGD (ESOPHAGOGASTRODUODENOSCOPY);  Surgeon: Mirta Grover MD;  Location: [facility] ENDO;  Service: Endoscopy;  Laterality: N/A;    ESOPHAGOGASTRODUODENOSCOPY N/A 3/9/2021    Procedure: EGD (ESOPHAGOGASTRODUODENOSCOPY);  Surgeon: Edin Bradford MD;  Location: [facility] OR;  Service: General;  Laterality: N/A;    left partial nephrectomy Left 2006    ROBOT-ASSISTED SURGICAL REMOVAL OF STOMACH USING DA PERRI XI N/A 3/9/2021    Procedure: XI ROBOTIC GASTRECTOMY;  Surgeon: Edin Bradford MD;  Location: [facility] OR;  Service: General;  Laterality: N/A;  partial        Social History:      Social History     Socioeconomic History    Marital status:    Tobacco Use    Smoking status: Former     Packs/day: 1.50     Years: 40.00     Pack years: 60.00     Types: Cigarettes     Quit date: 2005     Years since quittin.0    Smokeless tobacco: Never   Substance and Sexual Activity    Alcohol use: Not Currently    Drug use: Never        Family History:      Family History   Problem Relation Age of Onset    Stroke Mother     Hodgkin's lymphoma Father        Allergies:      Review of patient's allergies indicates:  No Known Allergies    Medications:      Current Outpatient Medications   Medication Sig    acetaminophen (TYLENOL) 650 MG TbSR Take 650 mg by mouth every 8 (eight) hours.    aspirin 81 MG Chew Take 81 mg by mouth once daily.    atorvastatin (LIPITOR) 20 MG tablet TAKE 1 TABLET BY MOUTH ONCE DAILY IN THE EVENING (ONLY  TAKE  ON  MONDAY,  TUESDAY,  WEDNESDAY  AND  THURSDAY)    fish oil-omega-3 fatty acids 300-1,000 mg capsule Take by mouth once daily.    losartan-hydrochlorothiazide 100-12.5 mg (HYZAAR) 100-12.5 mg Tab Take 1 tablet by mouth once daily.    magnesium oxide (MAG-OX) 400 mg (241.3 mg magnesium) tablet Take 400 mg by mouth once daily.    metFORMIN  (GLUCOPHAGE) 500 MG tablet Take 1 tablet (500 mg total) by mouth 2 (two) times daily with meals.    metoprolol tartrate (LOPRESSOR) 50 MG tablet Take 2 tablets (100 mg total) by mouth 2 (two) times daily.    ONETOUCH ULTRA BLUE TEST STRIP Strp USE 1 STRIP TO CHECK GLUCOSE 2 TO 3 TIMES DAILY    oxybutynin (DITROPAN-XL) 5 MG TR24 Take 1 tablet (5 mg total) by mouth once daily.    pantoprazole (PROTONIX) 40 MG tablet Take 1 tablet (40 mg total) by mouth once daily.    cholecalciferol, vitamin D3, (VITAMIN D3) 50 mcg (2,000 unit) Tab Take 2,000 Units by mouth once daily.     solifenacin (VESICARE) 10 MG tablet Take 1 tablet (10 mg total) by mouth once daily.     No current facility-administered medications for this visit.       Vitals:      Vitals:    04/10/23 0935   BP: (!) 157/88   Pulse: 73   Temp: 97.6 °F (36.4 °C)       Review of Systems:        Constitutional: Negative for fever, chills, weight loss, malaise/fatigue and diaphoresis.   HENT: Negative for hearing loss, ear pain, nosebleeds, congestion, sore throat, neck pain, tinnitus and ear discharge.    Eyes: Negative for blurred vision, double vision, photophobia, pain, discharge and redness.   Respiratory: Negative for cough, hemoptysis, sputum production, shortness of breath, wheezing and stridor.    Cardiovascular: Negative for chest pain, palpitations, orthopnea, claudication, leg swelling and PND.   Gastrointestinal: Negative for heartburn, nausea, vomiting, abdominal pain, diarrhea, constipation, blood in stool and melena.   Genitourinary: Negative for dysuria, urgency, frequency, hematuria and flank pain.   Musculoskeletal: Negative for myalgias, back pain, joint pain and falls.   Skin: Negative for itching and rash.   Neurological: Negative for dizziness, tingling, tremors, sensory change, speech change, focal weakness, seizures, loss of consciousness, weakness and headaches.   Endo/Heme/Allergies: Negative for environmental allergies and polydipsia.  Does not bruise/bleed easily.   Psychiatric/Behavioral: Negative for depression, suicidal ideas, hallucinations, memory loss and substance abuse. The patient is not nervous/anxious and does not have insomnia.    All 14 systems reviewed and negative except as noted above.    Physical Exam:      Constitutional: Appears well-developed, well-nourished and in no acute distress.  Patient is oriented to person, place, and time.   Head: Normocephalic and atraumatic. Mucous membranes moist.  Neck: Neck supple no mass.   Cardiovascular: Normal rate and regular rhythm.  S1 S2 appreciated by ascultation.  Pulmonary/Chest: Effort normal and clear to auscultation bilaterally. No respiratory distress.   Abdomen: Soft. Non-tender and non-distended. Bowel sounds are normal.   Neurological: Patient is alert and oriented to person, place and time. Moves all extremities.  Skin: Warm and dry. No lesions.  Extremities: No clubbing, cyanosis or edema.    Laboratory data:      Reviewed and noted in plan where applicable. Please see chart for full laboratory data.    @GMIBMUIXQ30(cpk,cpkmb,troponini,mb)@ @DMYXTYXTR42(poctglucose)@     No results found for: INR, PROTIME    Lab Results   Component Value Date    WBC 5.90 03/28/2023    HGB 14.4 03/28/2023    HCT 45.8 03/28/2023    MCV 93 03/28/2023     (L) 03/28/2023       @[id number](GLU,NA,K,Cl,CO2,BUN,Creatinine,Calcium,MG)@    Predictors of intubation difficulty:       Morbid obesity? no   Anatomically abnormal facies? no   Prominent incisors? no   Receding mandible? no   Short, thick neck? no   Neck range of motion: normal   Dentition: No chipped, loose, or missing teeth.  Based on the Modified Mallampati, patient is a mallampati score: II (hard and soft palate, upper portion of tonsils anduvula visible)    Cardiographics:      ECG: no change since previous ECG dated 2/24/2021  Echocardiogram: not indicated    Imaging:      Chest x-ray: not indicated    Assessment and Plan:       Preoperative examination  Known risk factors for perioperative complications: None    Difficulty with intubation is not anticipated.    Cardiac Risk Estimation: Based on the Revised Cardiac Risk index, patient is a Class 1 risk with a 3.9% risk of a major cardiac event in a low risk procedure.    1.) Preoperative workup as follows: ECG, hemoglobin, hematocrit, electrolytes, creatinine, glucose, liver function studies.  2.) Change in medication regimen before surgery: discontinue ASA, NSAIDs 7 day before surgery, hold Metformin 24 hours prior to surgery.  3.) Prophylaxis for cardiac events with perioperative beta-blockers: Prescribed metoprolol, take morning of surgery.  4.) Invasive hemodynamic monitoring perioperatively: at the discretion of anesthesiologist.  5.) Deep vein thrombosis prophylaxis postoperatively: intermittent pneumatic compression boots and regimen to be chosen by surgical team.  6.) Surveillance for postoperative MI with ECG immediately postoperatively and on postoperati ve days 1 and 2 AND troponin levels 24 hours postoperatively and on day 4 or hospital discharge (whichever comes first): not indicated.  7.) Current medications which may produce withdrawal symptoms if withheld perioperatively: None  8.) Other measures: None    Flank hernia  Planned for XI Robotic Hernia Repair with Dr. Yumiko oRsen on 4/17/23.    --Morning of Procedure: take metoprolol, protonix  --Hold: HYZAAR, resume all medications post-operatively  --Hold ASA, NSAIDs and all vitamins/supplements 7 days prior to procedure  --Hold Metformin for 24H prior to procedure    Hypertension associated with diabetes  Chronic, Controlled    --Hold HYZAAR morning of surgery  --Take metoprolol    History of DVT (deep vein thrombosis)  Hx of DVT- Right thigh, managed with ASA    --Hold ASA    RBBB  EKG completed- No Significant Changes    Sinus bradycardia with 1st degree A-V block   Low voltage QRS   Right bundle branch block    Abnormal ECG   When compared with ECG of 24-FEB-2021 13:12,   No significant change was found    Panlobular emphysema  Chronic, not currently managed with medications.    CKD (chronic kidney disease), stage III  Chronic, followed by PCP    --CMP-stable    Benign prostatic hyperplasia with urinary frequency  Chronic, managed with oxybutynin    --Patient reports not taking solifenacin due to side effects, resumed oxybutynin    Thrombocytopenia  Chronic, stable. Platelet count within baseline range. Has been evaluated by hematology previously.     --Transfuse platelets for platelet count <10K or <50K for bleeding or procedures    DM (diabetes mellitus) with complications  Chronic, stable, controlled. Last HgA1c: 6.5%, 11/2/2023.  Managed by PCP    --Hold Metformin 24H prior to procedure          Electronically signed by Kelly Rubi NP on 4/10/2023 at 8:39 AM.

## 2023-04-10 NOTE — ASSESSMENT & PLAN NOTE
Planned for XI Robotic Hernia Repair with Dr. Yumiko Rosen on 4/17/23.    --Morning of Procedure: take metoprolol, protonix  --Hold: HYZAAR, resume all medications post-operatively  --Hold ASA, NSAIDs and all vitamins/supplements 7 days prior to procedure  --Hold Metformin for 24H prior to procedure

## 2023-04-12 ENCOUNTER — OFFICE VISIT (OUTPATIENT)
Dept: UROLOGY | Facility: CLINIC | Age: 78
End: 2023-04-12
Payer: MEDICARE

## 2023-04-12 VITALS
HEART RATE: 65 BPM | SYSTOLIC BLOOD PRESSURE: 144 MMHG | HEIGHT: 71 IN | TEMPERATURE: 99 F | DIASTOLIC BLOOD PRESSURE: 78 MMHG | WEIGHT: 232.5 LBS | BODY MASS INDEX: 32.55 KG/M2

## 2023-04-12 DIAGNOSIS — N40.1 BENIGN PROSTATIC HYPERPLASIA WITH URINARY FREQUENCY: ICD-10-CM

## 2023-04-12 DIAGNOSIS — C64.9 RENAL CELL CARCINOMA, UNSPECIFIED LATERALITY: Primary | ICD-10-CM

## 2023-04-12 DIAGNOSIS — R35.0 BENIGN PROSTATIC HYPERPLASIA WITH URINARY FREQUENCY: ICD-10-CM

## 2023-04-12 DIAGNOSIS — R39.15 URGENCY OF URINATION: ICD-10-CM

## 2023-04-12 PROCEDURE — 1159F MED LIST DOCD IN RCRD: CPT | Mod: HCNC,CPTII,S$GLB, | Performed by: UROLOGY

## 2023-04-12 PROCEDURE — 1160F PR REVIEW ALL MEDS BY PRESCRIBER/CLIN PHARMACIST DOCUMENTED: ICD-10-PCS | Mod: HCNC,CPTII,S$GLB, | Performed by: UROLOGY

## 2023-04-12 PROCEDURE — 1160F RVW MEDS BY RX/DR IN RCRD: CPT | Mod: HCNC,CPTII,S$GLB, | Performed by: UROLOGY

## 2023-04-12 PROCEDURE — 3078F PR MOST RECENT DIASTOLIC BLOOD PRESSURE < 80 MM HG: ICD-10-PCS | Mod: HCNC,CPTII,S$GLB, | Performed by: UROLOGY

## 2023-04-12 PROCEDURE — 3288F PR FALLS RISK ASSESSMENT DOCUMENTED: ICD-10-PCS | Mod: HCNC,CPTII,S$GLB, | Performed by: UROLOGY

## 2023-04-12 PROCEDURE — 3078F DIAST BP <80 MM HG: CPT | Mod: HCNC,CPTII,S$GLB, | Performed by: UROLOGY

## 2023-04-12 PROCEDURE — 1101F PR PT FALLS ASSESS DOC 0-1 FALLS W/OUT INJ PAST YR: ICD-10-PCS | Mod: HCNC,CPTII,S$GLB, | Performed by: UROLOGY

## 2023-04-12 PROCEDURE — 99999 PR PBB SHADOW E&M-EST. PATIENT-LVL IV: CPT | Mod: PBBFAC,HCNC,, | Performed by: UROLOGY

## 2023-04-12 PROCEDURE — 99214 OFFICE O/P EST MOD 30 MIN: CPT | Mod: HCNC,S$GLB,, | Performed by: UROLOGY

## 2023-04-12 PROCEDURE — 1159F PR MEDICATION LIST DOCUMENTED IN MEDICAL RECORD: ICD-10-PCS | Mod: HCNC,CPTII,S$GLB, | Performed by: UROLOGY

## 2023-04-12 PROCEDURE — 1101F PT FALLS ASSESS-DOCD LE1/YR: CPT | Mod: HCNC,CPTII,S$GLB, | Performed by: UROLOGY

## 2023-04-12 PROCEDURE — 3288F FALL RISK ASSESSMENT DOCD: CPT | Mod: HCNC,CPTII,S$GLB, | Performed by: UROLOGY

## 2023-04-12 PROCEDURE — 99214 PR OFFICE/OUTPT VISIT, EST, LEVL IV, 30-39 MIN: ICD-10-PCS | Mod: HCNC,S$GLB,, | Performed by: UROLOGY

## 2023-04-12 PROCEDURE — 3077F PR MOST RECENT SYSTOLIC BLOOD PRESSURE >= 140 MM HG: ICD-10-PCS | Mod: HCNC,CPTII,S$GLB, | Performed by: UROLOGY

## 2023-04-12 PROCEDURE — 3077F SYST BP >= 140 MM HG: CPT | Mod: HCNC,CPTII,S$GLB, | Performed by: UROLOGY

## 2023-04-12 PROCEDURE — 99999 PR PBB SHADOW E&M-EST. PATIENT-LVL IV: ICD-10-PCS | Mod: PBBFAC,HCNC,, | Performed by: UROLOGY

## 2023-04-12 NOTE — PROGRESS NOTES
Chief Complaint:   Encounter Diagnoses   Name Primary?    Renal cell carcinoma, unspecified laterality Yes    Benign prostatic hyperplasia with urinary frequency     Urgency of urination        HPI:    4/12/23- patient states the dryness is a little bit better than the oxybutynin, still not as controlled as he would like though.  Otherwise stable and voiding well.  Patient is considering transferring to our urology department for oncological follow up as he already follows with our medical oncology department.    77-year-old gentleman who comes in to discuss left flank pain, centered around a previous partial nephrectomy scar.  Patient states that he sees his primary urologist on a yearly basis, over 10 years ago had a left partial nephrectomy due to renal cell carcinoma.  In addition has had a TURP and is currently on oxybutynin 5 mL extended release.  Unfortunately this does not assist, no split stream, but he does have slow stream.  No gross hematuria, history of smoking, but persistent and aggravating urgency.  Patient also has significant back pain is requesting consultation to our pain management service for injections.  Patient has had gastric surgery by Dr. Frances in 2021, past history of hernia repair the abdominal wall.  And now his pain is centered around the medial aspect of his nephrectomy scar.    Allergies:  Patient has no known allergies.    Medications:  has a current medication list which includes the following prescription(s): acetaminophen, aspirin, atorvastatin, cholecalciferol (vitamin d3), fish oil-omega-3 fatty acids, losartan-hydrochlorothiazide 100-12.5 mg, magnesium oxide, metformin, metoprolol tartrate, onetouch ultra blue test strip, oxybutynin, pantoprazole, and solifenacin.    Review of Systems:  General: No fever, chills, fatigability, or weight loss.  Skin: No rashes, itching, or changes in color or texture of skin.  Chest: Denies BRAR, cyanosis, wheezing, cough, and sputum  production.  Abdomen: Appetite fine. No weight loss. Denies diarrhea, abdominal pain, hematemesis, or blood in stool.  Musculoskeletal: No joint stiffness or swelling. Denies back pain.  : As above.  All other review of systems negative.    PMH:   has a past medical history of CKD (chronic kidney disease), stage III, Deep vein thrombosis, DM (diabetes mellitus) with complications, GERD (gastroesophageal reflux disease), History of DVT (deep vein thrombosis), History of renal cell carcinoma (2009), Hyperlipidemia associated with type 2 diabetes mellitus (02/26/2021), Hypertension associated with diabetes, Thrombocytopenia, and Type II diabetes mellitus with renal manifestations.    PSH:   has a past surgical history that includes left partial nephrectomy (Left, 2006); Esophagogastroduodenoscopy (N/A, 2/3/2021); Endoscopic ultrasound of upper gastrointestinal tract (N/A, 2/5/2021); Robot-assisted surgical removal of stomach using da Gumaro Xi (N/A, 3/9/2021); Esophagogastroduodenoscopy (N/A, 3/9/2021); Closure of perforated ulcer of duodenum using omental patch (N/A, 3/9/2021); and Colonoscopy (N/A, 1/5/2022).    FamHx: family history includes Hodgkin's lymphoma in his father; Stroke in his mother.    SocHx:  reports that he quit smoking about 18 years ago. His smoking use included cigarettes. He has a 60.00 pack-year smoking history. He has never used smokeless tobacco. He reports that he does not currently use alcohol. He reports that he does not use drugs.      Physical Exam:  Vitals:    04/12/23 1324   BP: (!) 144/78   Pulse: 65   Temp: 98.5 °F (36.9 °C)     General: A&Ox3, no apparent distress, no deformities  Neck: No masses, normal ROM  Lungs: normal inspiration, no use of accessory muscles  Heart: normal pulse, no arrhythmias  Abdomen: Soft, NT, ND, no masses, just medial to his subcostal incision he does have significant bulge with possible herniation.  This appears to be the site of his discomfort.  Skin:  The skin is warm and dry. No jaundice.  Ext: No c/c/e.    Labs/Studies:   CT with contrast history of left partial nephrectomy, bilateral renal cysts 1/23   Creatinine 1.2 1/23    Impression/Plan:       1. Flank hernia- to be repaired by Dr. Rosen next week.      2. History of renal cell carcinoma- no evidence of recurrence, currently being followed by both Dr. Kolb and his primary urologist.  Patient states he may switch to our clinic though.  Will have him return in 6 months, imaging and labs if needed.  Otherwise yearly from there if stable.    3. BPH- remote history of TURP, currently on VESIcare 10 mg but not covering the urgency as much as he would like, but the dryness does appear to be better.  Either way he is not too concerned and may decrease it to under 5 mg to reduce dryness.  May have to modify if control is not appropriate.      .  I have informed him this can cause dry mouth, dry eyes, urinary retention or constipation.  If he has any issues he is to stop the medication and contact our office.  Otherwise re-evaluate in 2 months.  He is currently still being followed by his primary urologist.      4. Urgency- please see above.

## 2023-04-14 ENCOUNTER — ANESTHESIA EVENT (OUTPATIENT)
Dept: SURGERY | Facility: HOSPITAL | Age: 78
End: 2023-04-14
Payer: MEDICARE

## 2023-04-14 NOTE — ANESTHESIA PREPROCEDURE EVALUATION
04/14/2023  Daniel Villeda is a 77 y.o., male.    Patient Active Problem List   Diagnosis    RBBB    First degree AV block    Gastrointestinal stromal tumor (GIST)    DM (diabetes mellitus) with complications    Type II diabetes mellitus with renal manifestations    Hypertension associated with diabetes    Hyperlipidemia associated with type 2 diabetes mellitus    History of renal cell carcinoma    History of DVT (deep vein thrombosis)    Thrombocytopenia    CKD (chronic kidney disease), stage III    History of colon polyps    Other adrenocortical insufficiency    Aortic atherosclerosis    Panlobular emphysema    Flank hernia    Renal cell carcinoma    Benign prostatic hyperplasia with urinary frequency    Urgency of urination    Preoperative examination       Pre-op Assessment    I have reviewed the Patient Summary Reports.    I have reviewed the NPO Status.   I have reviewed the Medications.     Review of Systems  Anesthesia Hx:  No problems with previous Anesthesia    Social:  Non-Smoker, Former Smoker    Hematology/Oncology:  Hematology Normal      Oncology Comments: History of renal cell carcinoma  left partial nephrectomy    Cardiovascular:   Hypertension Dysrhythmias hyperlipidemia History of DVT     Hx RBBB  First degree AV block     Pulmonary:   COPD    Renal/:   Chronic Renal Disease, CKD BPH    Hepatic/GI:   GERD    Neurological:  Neurology Normal    Endocrine:   Diabetes        Physical Exam  General: Well nourished    Airway:  Mallampati: II   Mouth Opening: Normal  TM Distance: Normal  Neck ROM: Normal ROM    Dental:  Edentulous        Anesthesia Plan  Type of Anesthesia, risks & benefits discussed:    Anesthesia Type: Gen ETT  Intra-op Monitoring Plan: Standard ASA Monitors  Post Op Pain Control Plan: multimodal analgesia  Induction:  IV  Airway Plan: ,  Post-Induction  Informed Consent: Informed consent signed with the Patient and all parties understand the risks and agree with anesthesia plan.  All questions answered.   ASA Score: 2    Ready For Surgery From Anesthesia Perspective.     .      Chemistry        Component Value Date/Time     03/28/2023 1149    K 4.7 03/28/2023 1149     03/28/2023 1149    CO2 27 03/28/2023 1149    BUN 22 03/28/2023 1149    CREATININE 1.1 03/28/2023 1149     (H) 03/28/2023 1149        Component Value Date/Time    CALCIUM 9.2 03/28/2023 1149    ALKPHOS 65 03/28/2023 1149    AST 22 03/28/2023 1149    ALT 30 03/28/2023 1149    BILITOT 1.1 (H) 03/28/2023 1149    ESTGFRAFRICA 51 (A) 05/10/2022 0910    EGFRNONAA 44 (A) 05/10/2022 0910        Lab Results   Component Value Date    WBC 5.90 03/28/2023    HGB 14.4 03/28/2023    HCT 45.8 03/28/2023    MCV 93 03/28/2023     (L) 03/28/2023       Sinus bradycardia with 1st degree A-V block   Low voltage QRS   Right bundle branch block   Abnormal ECG   When compared with ECG of 24-FEB-2021 13:12,   No significant change was found   Confirmed by AVERY WILKES MD ([id number]) on 3/28/2023 2:09:19 PM     Echo 3/2/21:   The left ventricle is normal in size with normal systolic function. The estimated ejection fraction is 65%   Grade I left ventricular diastolic dysfunction.   Normal right ventricular size with normal right ventricular systolic function.

## 2023-04-17 ENCOUNTER — HOSPITAL ENCOUNTER (OUTPATIENT)
Facility: HOSPITAL | Age: 78
Discharge: HOME | End: 2023-04-17
Attending: SURGERY | Admitting: SURGERY
Payer: MEDICARE

## 2023-04-17 ENCOUNTER — ANESTHESIA (OUTPATIENT)
Dept: SURGERY | Facility: HOSPITAL | Age: 78
End: 2023-04-17
Payer: MEDICARE

## 2023-04-17 DIAGNOSIS — K43.2 INCISIONAL HERNIA, WITHOUT OBSTRUCTION OR GANGRENE: ICD-10-CM

## 2023-04-17 LAB — POCT GLUCOSE: 159 MG/DL (ref 70–110)

## 2023-04-17 PROCEDURE — 37000009 HC ANESTHESIA EA ADD 15 MINS: Mod: HCNC | Performed by: SURGERY

## 2023-04-17 PROCEDURE — C9290 INJ, BUPIVACAINE LIPOSOME: HCPCS | Mod: HCNC | Performed by: SURGERY

## 2023-04-17 PROCEDURE — 49592 RPR AA HRN 1ST < 3 NCR/STRN: CPT | Mod: HCNC,,, | Performed by: SURGERY

## 2023-04-17 PROCEDURE — 49592 PR REPAIR ANT ABD HERNIA INITIAL < 3 CM INCARC/STRANG: ICD-10-PCS | Mod: HCNC,,, | Performed by: SURGERY

## 2023-04-17 PROCEDURE — 71000033 HC RECOVERY, INTIAL HOUR: Mod: HCNC | Performed by: SURGERY

## 2023-04-17 PROCEDURE — 36000710: Mod: HCNC | Performed by: SURGERY

## 2023-04-17 PROCEDURE — 71000015 HC POSTOP RECOV 1ST HR: Mod: HCNC | Performed by: SURGERY

## 2023-04-17 PROCEDURE — 25000003 PHARM REV CODE 250: Mod: HCNC | Performed by: ANESTHESIOLOGY

## 2023-04-17 PROCEDURE — 71000039 HC RECOVERY, EACH ADD'L HOUR: Mod: HCNC | Performed by: SURGERY

## 2023-04-17 PROCEDURE — 63600175 PHARM REV CODE 636 W HCPCS: Mod: HCNC | Performed by: SURGERY

## 2023-04-17 PROCEDURE — 36000711: Mod: HCNC | Performed by: SURGERY

## 2023-04-17 PROCEDURE — 63600175 PHARM REV CODE 636 W HCPCS: Mod: HCNC | Performed by: NURSE ANESTHETIST, CERTIFIED REGISTERED

## 2023-04-17 PROCEDURE — 25000003 PHARM REV CODE 250: Mod: HCNC | Performed by: SURGERY

## 2023-04-17 PROCEDURE — C1781 MESH (IMPLANTABLE): HCPCS | Mod: HCNC | Performed by: SURGERY

## 2023-04-17 PROCEDURE — 37000008 HC ANESTHESIA 1ST 15 MINUTES: Mod: HCNC | Performed by: SURGERY

## 2023-04-17 PROCEDURE — 25000003 PHARM REV CODE 250: Mod: HCNC | Performed by: NURSE ANESTHETIST, CERTIFIED REGISTERED

## 2023-04-17 DEVICE — IMPLANTABLE DEVICE: Type: IMPLANTABLE DEVICE | Site: ABDOMEN | Status: FUNCTIONAL

## 2023-04-17 RX ORDER — BUPIVACAINE HYDROCHLORIDE 2.5 MG/ML
INJECTION, SOLUTION EPIDURAL; INFILTRATION; INTRACAUDAL
Status: DISCONTINUED | OUTPATIENT
Start: 2023-04-17 | End: 2023-04-17 | Stop reason: HOSPADM

## 2023-04-17 RX ORDER — BUPIVACAINE HYDROCHLORIDE 5 MG/ML
INJECTION, SOLUTION EPIDURAL; INTRACAUDAL
Status: DISCONTINUED | OUTPATIENT
Start: 2023-04-17 | End: 2023-04-17 | Stop reason: HOSPADM

## 2023-04-17 RX ORDER — ONDANSETRON 2 MG/ML
4 INJECTION INTRAMUSCULAR; INTRAVENOUS DAILY PRN
Status: DISCONTINUED | OUTPATIENT
Start: 2023-04-17 | End: 2023-04-17 | Stop reason: HOSPADM

## 2023-04-17 RX ORDER — SODIUM CHLORIDE, SODIUM LACTATE, POTASSIUM CHLORIDE, CALCIUM CHLORIDE 600; 310; 30; 20 MG/100ML; MG/100ML; MG/100ML; MG/100ML
INJECTION, SOLUTION INTRAVENOUS CONTINUOUS PRN
Status: DISCONTINUED | OUTPATIENT
Start: 2023-04-17 | End: 2023-04-17

## 2023-04-17 RX ORDER — ONDANSETRON 2 MG/ML
INJECTION INTRAMUSCULAR; INTRAVENOUS
Status: DISCONTINUED | OUTPATIENT
Start: 2023-04-17 | End: 2023-04-17

## 2023-04-17 RX ORDER — LIDOCAINE HYDROCHLORIDE 10 MG/ML
INJECTION, SOLUTION EPIDURAL; INFILTRATION; INTRACAUDAL; PERINEURAL
Status: DISCONTINUED | OUTPATIENT
Start: 2023-04-17 | End: 2023-04-17

## 2023-04-17 RX ORDER — HYDROCODONE BITARTRATE AND ACETAMINOPHEN 7.5; 325 MG/1; MG/1
1 TABLET ORAL
Qty: 25 TABLET | Refills: 0 | Status: SHIPPED | OUTPATIENT
Start: 2023-04-17 | End: 2023-05-10

## 2023-04-17 RX ORDER — FENTANYL CITRATE 50 UG/ML
INJECTION, SOLUTION INTRAMUSCULAR; INTRAVENOUS
Status: DISCONTINUED | OUTPATIENT
Start: 2023-04-17 | End: 2023-04-17

## 2023-04-17 RX ORDER — DEXAMETHASONE SODIUM PHOSPHATE 4 MG/ML
INJECTION, SOLUTION INTRA-ARTICULAR; INTRALESIONAL; INTRAMUSCULAR; INTRAVENOUS; SOFT TISSUE
Status: DISCONTINUED | OUTPATIENT
Start: 2023-04-17 | End: 2023-04-17

## 2023-04-17 RX ORDER — PROPOFOL 10 MG/ML
VIAL (ML) INTRAVENOUS
Status: DISCONTINUED | OUTPATIENT
Start: 2023-04-17 | End: 2023-04-17

## 2023-04-17 RX ORDER — CEFAZOLIN SODIUM 1 G/3ML
INJECTION, POWDER, FOR SOLUTION INTRAMUSCULAR; INTRAVENOUS
Status: DISCONTINUED | OUTPATIENT
Start: 2023-04-17 | End: 2023-04-17

## 2023-04-17 RX ORDER — DOCUSATE SODIUM 100 MG/1
100 CAPSULE, LIQUID FILLED ORAL 2 TIMES DAILY
Qty: 60 CAPSULE | Refills: 1 | Status: SHIPPED | OUTPATIENT
Start: 2023-04-17 | End: 2023-05-10

## 2023-04-17 RX ORDER — METHOCARBAMOL 500 MG/1
1000 TABLET, FILM COATED ORAL 4 TIMES DAILY PRN
Qty: 35 TABLET | Refills: 0 | Status: SHIPPED | OUTPATIENT
Start: 2023-04-17 | End: 2023-05-10

## 2023-04-17 RX ORDER — ROCURONIUM BROMIDE 10 MG/ML
INJECTION, SOLUTION INTRAVENOUS
Status: DISCONTINUED | OUTPATIENT
Start: 2023-04-17 | End: 2023-04-17

## 2023-04-17 RX ORDER — ACETAMINOPHEN 10 MG/ML
INJECTION, SOLUTION INTRAVENOUS
Status: DISCONTINUED | OUTPATIENT
Start: 2023-04-17 | End: 2023-04-17

## 2023-04-17 RX ORDER — OXYCODONE AND ACETAMINOPHEN 5; 325 MG/1; MG/1
1 TABLET ORAL
Status: DISCONTINUED | OUTPATIENT
Start: 2023-04-17 | End: 2023-04-17 | Stop reason: HOSPADM

## 2023-04-17 RX ORDER — HYDROMORPHONE HYDROCHLORIDE 2 MG/ML
0.2 INJECTION, SOLUTION INTRAMUSCULAR; INTRAVENOUS; SUBCUTANEOUS EVERY 5 MIN PRN
Status: DISCONTINUED | OUTPATIENT
Start: 2023-04-17 | End: 2023-04-17 | Stop reason: HOSPADM

## 2023-04-17 RX ADMIN — SODIUM CHLORIDE, SODIUM LACTATE, POTASSIUM CHLORIDE, AND CALCIUM CHLORIDE: 600; 310; 30; 20 INJECTION, SOLUTION INTRAVENOUS at 08:04

## 2023-04-17 RX ADMIN — CEFAZOLIN 2 G: 330 INJECTION, POWDER, FOR SOLUTION INTRAMUSCULAR; INTRAVENOUS at 07:04

## 2023-04-17 RX ADMIN — ONDANSETRON 4 MG: 2 INJECTION INTRAMUSCULAR; INTRAVENOUS at 09:04

## 2023-04-17 RX ADMIN — DEXAMETHASONE SODIUM PHOSPHATE 8 MG: 4 INJECTION, SOLUTION INTRA-ARTICULAR; INTRALESIONAL; INTRAMUSCULAR; INTRAVENOUS; SOFT TISSUE at 07:04

## 2023-04-17 RX ADMIN — SUGAMMADEX 200 MG: 100 INJECTION, SOLUTION INTRAVENOUS at 09:04

## 2023-04-17 RX ADMIN — ROCURONIUM BROMIDE 50 MG: 10 SOLUTION INTRAVENOUS at 07:04

## 2023-04-17 RX ADMIN — SODIUM CHLORIDE, SODIUM LACTATE, POTASSIUM CHLORIDE, AND CALCIUM CHLORIDE: 600; 310; 30; 20 INJECTION, SOLUTION INTRAVENOUS at 06:04

## 2023-04-17 RX ADMIN — OXYCODONE HYDROCHLORIDE AND ACETAMINOPHEN 1 TABLET: 5; 325 TABLET ORAL at 11:04

## 2023-04-17 RX ADMIN — PROPOFOL 110 MG: 10 INJECTION, EMULSION INTRAVENOUS at 07:04

## 2023-04-17 RX ADMIN — LIDOCAINE HYDROCHLORIDE 100 MG: 10 INJECTION, SOLUTION EPIDURAL; INFILTRATION; INTRACAUDAL; PERINEURAL at 07:04

## 2023-04-17 RX ADMIN — ROCURONIUM BROMIDE 10 MG: 10 SOLUTION INTRAVENOUS at 09:04

## 2023-04-17 RX ADMIN — ROCURONIUM BROMIDE 10 MG: 10 SOLUTION INTRAVENOUS at 08:04

## 2023-04-17 RX ADMIN — FENTANYL CITRATE 25 MCG: 50 INJECTION, SOLUTION INTRAMUSCULAR; INTRAVENOUS at 10:04

## 2023-04-17 RX ADMIN — ACETAMINOPHEN 1000 MG: 10 INJECTION, SOLUTION INTRAVENOUS at 08:04

## 2023-04-17 RX ADMIN — FENTANYL CITRATE 50 MCG: 50 INJECTION, SOLUTION INTRAMUSCULAR; INTRAVENOUS at 07:04

## 2023-04-17 RX ADMIN — FENTANYL CITRATE 25 MCG: 50 INJECTION, SOLUTION INTRAMUSCULAR; INTRAVENOUS at 09:04

## 2023-04-17 NOTE — OP NOTE
Daniel Villeda  : 1945, MRN: [id number]  Date of procedure: 2023      Procedure: Robotic-assisted laparoscopic incisional hernia repair    Pre-procedure diagnosis: Left flank incisional hernia (Grynfeltt hernia)  Post-procedure diagnosis: Same  Surgeon: Yumiko Rosen DO  Assistant: None  Anesthesia: General  EBL: 40 mL  Implants/Drains: 12 x 9 cm progrip mesh  Specimen: None  Complications: None apparent    Significant findings: 6 x 6 cm incisional hernia of the left flank    Indications for procedure: See H&P. After explaining the risks, benefits, and alternatives, the patient verbalized understanding and informed written consent was obtained. All questions were answered to their satisfaction.    Description of procedure: The patient was brought to the OR and SCDs were applied. General anesthesia was induced by the Anesthesia Department. A roman catheter was placed, Patient was in the right lateral decubitus position. An axillary roll with ample arm and leg padding and cushioning was used. The left flank and abdomen was prepped and draped in usual sterile fashion. A time out was taken to identify the correct patient, correct procedure, and correct anatomical site; all parties were in agreement.  Local anesthetic was injected into the skin. An 8 mm incision was made at Duncan's point and a Veress needle was inserted; the abdomen was insufflated to 15 mm Hg. An 8 mm robotic trochar was inserted under direct visualization. Next, two 8 mm trochars were inserted in the left mid and left lower quadrant of the abdomen under direct visualization. The Immu[employer]i robot was then docked.  A peritoneal flap was created by incising the peritoneum 4 cm anteriorly to the descendind colon. The flap was extended cephalad and caudally using the robotic lyn. The flap was dissected posteriorly towards the defect and the hernia sac was reduced. The flap was extended posteriorly to allow placement of the mesh. The defect was  then closed by reapproximating the fascia with a running 0 Stratafix suture. The center of the mesh was placed directly below the defect, making sure that there was at least 3 cm of coverage past the defect in all directions. The mesh was then sutured in placed with an interrupted 3-0 vicryl suture in the center of the mesh and at various points around the circumference. The peritoneal flap was then closed with a running 2-0 Stratafix suture. Good hemostasis was observed.  The abdomen was desufflated. The fascia of the port site incisions was closed with 2-0 vicryl. The skin was closed with 4-0 monocryl in the subcuticular layer and Dermabond was applied on top of the skin. The roman catheter was removed.     All sponge and instrument counts were deemed correct. The patient appeared to tolerate the procedure well and there were no apparent complications. The patient was transported to PACU in stable condition.    Yumiko Rosen, DO  General Surgery  [facility]

## 2023-04-17 NOTE — PATIENT INSTRUCTIONS
Discharge Instructions    Hygiene and incision care:   You may shower tomorrow but do not soak such as in a bathtub or pool. Your incisions are closed with absorbable sutures and there is surgical glue on top. The glue will eventually flake off on its own. Do not scrub your incisions, just allow warm soapy water to run over them. Do not apply neosporin, hydrogen peroxide, or alcohol on your incisions.   If you develop fevers, worsening redness and/or pus-like drainage, call the office immediately.    Pain control:   You may take Tylenol (650 mg every 4 hours) and ibuprofen (600 mg every 6 hours) as well as alternate heat and cold packs for pain and swelling. Take ibuprofen with food as it can cause stomach upset and ulcers. Do not take ibuprofen if you have an increased risk of bleeding, history of stomach ulcers, are already taking an NSAID, or have kidney issues.   If taking narcotic pain medication, such as Norco (hydrocodone-acetaminophen) or Percocet (oxycodone-acetaminophen), do not drink alcohol or drive. Each Norco and Percocet tablet contains 325 mg of Tylenol; do not take more than 4000 mg of Tylenol per day. Narcotic pain medications can be constipating so be sure to drink plenty of water and take an over the counter stool softener such as colace (100 mg twice a day) or miralax (17 g once a day).    Activity:   No heavy lifting >10 lbs or sexual activity for 6 weeks while your incisions are healing as it might result in a hernia. Avoid straining, pushing, and pulling. It is okay to walk and slowly go up and down stairs. Avoid driving for at least 3 days or longer if taking narcotic pain medicine.   Make sure to do leg and ankle exercises and take deep breaths frequently to avoid developing blood clots or pneumonia.    Diet:   You may resume your regular diet. Some people find it best to stick to soft, bland, and easily digestible foods for the first couple of days while the anesthesia is leaving their  breathing is unlabored without accessory muscle use. , normal breath sounds. system or if they're taking narcotic pain medicine to avoid nausea and vomiting. Be sure to eat good, nutritious foods such as vegetables and lean proteins to give your body the nutrients it needs to heal. I recommend also taking vitamin C as this can aid with wound healing.    For any questions or concerns, please do not hesitate to contact the office any time at [phone] or send me a [employer] message.

## 2023-04-17 NOTE — TRANSFER OF CARE
"Anesthesia Transfer of Care Note    Patient: Daniel Villeda    Procedure(s) Performed: Procedure(s) (LRB):  XI ROBOTIC REPAIR, HERNIA, INCISIONAL (Left)  BLOCK, TRANSVERSUS ABDOMINIS PLANE    Patient location: PACU    Anesthesia Type: general    Transport from OR: Transported from OR on room air with adequate spontaneous ventilation    Post pain: adequate analgesia    Post assessment: no apparent anesthetic complications    Post vital signs: stable    Level of consciousness: responds to stimulation    Nausea/Vomiting: no nausea/vomiting    Complications: none    Transfer of care protocol was followed      Last vitals:   Visit Vitals  BP (!) 169/89 (BP Location: Right arm, Patient Position: Sitting)   Pulse (!) 57   Temp 36.8 °C (98.2 °F) (Temporal)   Resp 18   Ht 5' 11.5" (1.816 m)   Wt 104.6 kg (230 lb 11.4 oz)   SpO2 98%   BMI 31.73 kg/m²     "

## 2023-04-17 NOTE — DISCHARGE SUMMARY
[facility] - Surgery (Hospital)  Discharge Note  Short Stay    Procedure(s) (LRB):  XI ROBOTIC REPAIR, HERNIA, INCISIONAL (Left)  BLOCK, TRANSVERSUS ABDOMINIS PLANE      OUTCOME: Patient tolerated treatment/procedure well without complication and is now ready for discharge.    DISPOSITION: Home or Self Care    FINAL DIAGNOSIS:  <principal problem not specified>    FOLLOWUP: In clinic    DISCHARGE INSTRUCTIONS:  No discharge procedures on file.     TIME SPENT ON DISCHARGE: 20 minutes

## 2023-04-18 VITALS
WEIGHT: 230.69 LBS | TEMPERATURE: 98 F | HEIGHT: 72 IN | SYSTOLIC BLOOD PRESSURE: 157 MMHG | BODY MASS INDEX: 31.25 KG/M2 | HEART RATE: 70 BPM | DIASTOLIC BLOOD PRESSURE: 78 MMHG | RESPIRATION RATE: 17 BRPM | OXYGEN SATURATION: 96 %

## 2023-04-19 NOTE — ANESTHESIA POSTPROCEDURE EVALUATION
Anesthesia Post Evaluation    Patient: Daniel Villeda    Procedure(s) Performed: Procedure(s) (LRB):  XI ROBOTIC REPAIR, HERNIA, INCISIONAL (Left)  BLOCK, TRANSVERSUS ABDOMINIS PLANE    Final Anesthesia Type: general      Patient location during evaluation: PACU  Patient participation: Yes- Able to Participate  Level of consciousness: awake and alert and oriented  Post-procedure vital signs: reviewed and stable  Pain management: adequate  Airway patency: patent  DILCIA mitigation strategies: Verification of full reversal of neuromuscular block  PONV status at discharge: No PONV  Anesthetic complications: no      Cardiovascular status: blood pressure returned to baseline and hemodynamically stable  Respiratory status: unassisted  Hydration status: euvolemic  Follow-up not needed.          Vitals Value Taken Time   /78 04/17/23 1125   Temp 36.6 °C (97.9 °F) 04/17/23 1125   Pulse 70 04/17/23 1125   Resp 17 04/17/23 1125   SpO2 96 % 04/17/23 1125         Event Time   Out of Recovery 11:28:23         Pain/Toni Score: No data recorded

## 2023-05-01 RX ORDER — METFORMIN HYDROCHLORIDE 500 MG/1
500 TABLET ORAL 2 TIMES DAILY WITH MEALS
Qty: 180 TABLET | Refills: 3 | Status: SHIPPED | OUTPATIENT
Start: 2023-05-01

## 2023-05-01 NOTE — TELEPHONE ENCOUNTER
No care due was identified.  [facility] Embedded Care Due Messages. Reference number: [id number].   5/01/2023 10:18:31 AM CDT

## 2023-05-02 ENCOUNTER — HOSPITAL ENCOUNTER (OUTPATIENT)
Dept: RADIOLOGY | Facility: HOSPITAL | Age: 78
Discharge: HOME | End: 2023-05-02
Attending: SURGERY
Payer: MEDICARE

## 2023-05-02 ENCOUNTER — OFFICE VISIT (OUTPATIENT)
Dept: SURGERY | Facility: CLINIC | Age: 78
End: 2023-05-02
Payer: MEDICARE

## 2023-05-02 VITALS
DIASTOLIC BLOOD PRESSURE: 72 MMHG | SYSTOLIC BLOOD PRESSURE: 114 MMHG | BODY MASS INDEX: 30.71 KG/M2 | HEART RATE: 73 BPM | WEIGHT: 223.31 LBS

## 2023-05-02 DIAGNOSIS — R10.9 LEFT SIDED ABDOMINAL PAIN: ICD-10-CM

## 2023-05-02 DIAGNOSIS — K43.0 INCARCERATED INCISIONAL HERNIA: Primary | ICD-10-CM

## 2023-05-02 DIAGNOSIS — K59.00 CONSTIPATION, UNSPECIFIED CONSTIPATION TYPE: ICD-10-CM

## 2023-05-02 DIAGNOSIS — K43.0 INCARCERATED INCISIONAL HERNIA: ICD-10-CM

## 2023-05-02 PROCEDURE — 1125F PR PAIN SEVERITY QUANTIFIED, PAIN PRESENT: ICD-10-PCS | Mod: CPTII,S$GLB,, | Performed by: SURGERY

## 2023-05-02 PROCEDURE — 3078F PR MOST RECENT DIASTOLIC BLOOD PRESSURE < 80 MM HG: ICD-10-PCS | Mod: CPTII,S$GLB,, | Performed by: SURGERY

## 2023-05-02 PROCEDURE — 99212 PR OFFICE/OUTPT VISIT, EST, LEVL II, 10-19 MIN: ICD-10-PCS | Mod: S$GLB,,, | Performed by: SURGERY

## 2023-05-02 PROCEDURE — 99999 PR PBB SHADOW E&M-EST. PATIENT-LVL IV: ICD-10-PCS | Mod: PBBFAC,,, | Performed by: SURGERY

## 2023-05-02 PROCEDURE — 74176 CT ABD & PELVIS W/O CONTRAST: CPT | Mod: TC

## 2023-05-02 PROCEDURE — 3074F PR MOST RECENT SYSTOLIC BLOOD PRESSURE < 130 MM HG: ICD-10-PCS | Mod: CPTII,S$GLB,, | Performed by: SURGERY

## 2023-05-02 PROCEDURE — 1159F PR MEDICATION LIST DOCUMENTED IN MEDICAL RECORD: ICD-10-PCS | Mod: CPTII,S$GLB,, | Performed by: SURGERY

## 2023-05-02 PROCEDURE — 1125F AMNT PAIN NOTED PAIN PRSNT: CPT | Mod: CPTII,S$GLB,, | Performed by: SURGERY

## 2023-05-02 PROCEDURE — 74176 CT ABDOMEN PELVIS WITHOUT CONTRAST: ICD-10-PCS | Mod: 26,,, | Performed by: STUDENT IN AN ORGANIZED HEALTH CARE EDUCATION/TRAINING PROGRAM

## 2023-05-02 PROCEDURE — 99999 PR PBB SHADOW E&M-EST. PATIENT-LVL IV: CPT | Mod: PBBFAC,,, | Performed by: SURGERY

## 2023-05-02 PROCEDURE — 3074F SYST BP LT 130 MM HG: CPT | Mod: CPTII,S$GLB,, | Performed by: SURGERY

## 2023-05-02 PROCEDURE — 74176 CT ABD & PELVIS W/O CONTRAST: CPT | Mod: 26,,, | Performed by: STUDENT IN AN ORGANIZED HEALTH CARE EDUCATION/TRAINING PROGRAM

## 2023-05-02 PROCEDURE — 1159F MED LIST DOCD IN RCRD: CPT | Mod: CPTII,S$GLB,, | Performed by: SURGERY

## 2023-05-02 PROCEDURE — 99212 OFFICE O/P EST SF 10 MIN: CPT | Mod: S$GLB,,, | Performed by: SURGERY

## 2023-05-02 PROCEDURE — 3078F DIAST BP <80 MM HG: CPT | Mod: CPTII,S$GLB,, | Performed by: SURGERY

## 2023-05-02 RX ORDER — POLYETHYLENE GLYCOL 3350 17 G/17G
17 POWDER, FOR SOLUTION ORAL DAILY
Qty: 510 G | Refills: 0 | Status: SHIPPED | OUTPATIENT
Start: 2023-05-02 | End: 2023-05-09 | Stop reason: SDUPTHER

## 2023-05-02 RX ORDER — IBUPROFEN 600 MG/1
600 TABLET ORAL EVERY 8 HOURS PRN
Qty: 25 TABLET | Refills: 0 | Status: SHIPPED | OUTPATIENT
Start: 2023-05-02 | End: 2023-05-10

## 2023-05-02 RX ORDER — OXYCODONE AND ACETAMINOPHEN 5; 325 MG/1; MG/1
1 TABLET ORAL EVERY 6 HOURS PRN
Qty: 20 TABLET | Refills: 0 | Status: SHIPPED | OUTPATIENT
Start: 2023-05-02 | End: 2023-05-10

## 2023-05-02 RX ORDER — CYCLOBENZAPRINE HCL 5 MG
5 TABLET ORAL 3 TIMES DAILY PRN
Qty: 15 TABLET | Refills: 0 | Status: SHIPPED | OUTPATIENT
Start: 2023-05-02 | End: 2023-05-10

## 2023-05-02 NOTE — PROGRESS NOTES
[facility] -   General Surgery Progress    SUBJECTIVE:     History of Present Illness:  Patient is a 77 y.o. male presents for follow-up after undergoing robotic repair of a left flank Grynfeltt hernia. He states the pain is getting a little better, but still remains significant.  Pain is mostly located at the actual hernia site.  He reports constipation as well.  Denies fevers.    Review of patient's allergies indicates:  No Known Allergies    Current Outpatient Medications   Medication Sig Dispense Refill    acetaminophen (TYLENOL) 650 MG TbSR Take 650 mg by mouth every 8 (eight) hours.      aspirin 81 MG Chew Take 81 mg by mouth once daily.      atorvastatin (LIPITOR) 20 MG tablet TAKE 1 TABLET BY MOUTH ONCE DAILY IN THE EVENING (ONLY  TAKE  ON  MONDAY,  TUESDAY,  WEDNESDAY  AND  THURSDAY) 51 tablet 2    cholecalciferol, vitamin D3, (VITAMIN D3) 50 mcg (2,000 unit) Tab Take 2,000 Units by mouth once daily.       docusate sodium (COLACE) 100 MG capsule Take 1 capsule (100 mg total) by mouth 2 (two) times daily. 60 capsule 1    fish oil-omega-3 fatty acids 300-1,000 mg capsule Take by mouth once daily.      losartan-hydrochlorothiazide 100-12.5 mg (HYZAAR) 100-12.5 mg Tab Take 1 tablet by mouth once daily. 90 tablet 3    magnesium oxide (MAG-OX) 400 mg (241.3 mg magnesium) tablet Take 400 mg by mouth once daily.      metFORMIN (GLUCOPHAGE) 500 MG tablet Take 1 tablet (500 mg total) by mouth 2 (two) times daily with meals. 180 tablet 3    methocarbamoL (ROBAXIN) 500 MG Tab Take 2 tablets (1,000 mg total) by mouth 4 (four) times daily as needed (Muscle spasm or pain). 35 tablet 0    metoprolol tartrate (LOPRESSOR) 50 MG tablet Take 2 tablets (100 mg total) by mouth 2 (two) times daily. 90 tablet 3    ONETOUCH ULTRA BLUE TEST STRIP Strp USE 1 STRIP TO CHECK GLUCOSE 2 TO 3 TIMES DAILY      oxybutynin (DITROPAN-XL) 5 MG TR24 Take 1 tablet (5 mg total) by mouth once daily. 90 tablet 3    pantoprazole  (PROTONIX) 40 MG tablet Take 1 tablet (40 mg total) by mouth once daily. 90 tablet 3    solifenacin (VESICARE) 10 MG tablet Take 1 tablet (10 mg total) by mouth once daily. 30 tablet 11    cyclobenzaprine (FLEXERIL) 5 MG tablet Take 1 tablet (5 mg total) by mouth 3 (three) times daily as needed for Muscle spasms. 15 tablet 0    HYDROcodone-acetaminophen (NORCO) 7.5-325 mg per tablet Take 1 tablet by mouth every 4 to 6 hours as needed for Pain. (Patient not taking: Reported on 5/2/2023) 25 tablet 0    ibuprofen (ADVIL,MOTRIN) 600 MG tablet Take 1 tablet (600 mg total) by mouth every 8 (eight) hours as needed for Pain. 25 tablet 0    oxyCODONE-acetaminophen (PERCOCET) 5-325 mg per tablet Take 1 tablet by mouth every 6 (six) hours as needed for Pain. 20 tablet 0    polyethylene glycol (GLYCOLAX) 17 gram/dose powder Take 17 g by mouth once daily. 510 g 0     No current facility-administered medications for this visit.       Past Medical History:   Diagnosis Date    CKD (chronic kidney disease), stage III     Deep vein thrombosis     DM (diabetes mellitus) with complications     GERD (gastroesophageal reflux disease)     History of DVT (deep vein thrombosis)     History of renal cell carcinoma 2009    Hyperlipidemia associated with type 2 diabetes mellitus 02/26/2021    Hypertension associated with diabetes     Thrombocytopenia     Type II diabetes mellitus with renal manifestations      Past Surgical History:   Procedure Laterality Date    CLOSURE OF PERFORATED ULCER OF DUODENUM USING OMENTAL PATCH N/A 3/9/2021    Procedure: CLOSURE, ULCER, PERFORATED, DUODENUM, USING OMENTAL PATCH;  Surgeon: Edin Bradford MD;  Location: [facility] OR;  Service: General;  Laterality: N/A;    COLONOSCOPY N/A 1/5/2022    Procedure: COLONOSCOPY;  Surgeon: Mirta Grover MD;  Location: [facility] ENDO;  Service: Endoscopy;  Laterality: N/A;    ENDOSCOPIC ULTRASOUND OF UPPER GASTROINTESTINAL TRACT N/A 2/5/2021    Procedure: ULTRASOUND, UPPER GI  TRACT, ENDOSCOPIC;  Surgeon: Geremias Brizuela MD;  Location: [facility] ENDO;  Service: Endoscopy;  Laterality: N/A;    ESOPHAGOGASTRODUODENOSCOPY N/A 2/3/2021    Procedure: EGD (ESOPHAGOGASTRODUODENOSCOPY);  Surgeon: Mirta Grover MD;  Location: [facility] ENDO;  Service: Endoscopy;  Laterality: N/A;    ESOPHAGOGASTRODUODENOSCOPY N/A 3/9/2021    Procedure: EGD (ESOPHAGOGASTRODUODENOSCOPY);  Surgeon: Edin Bradford MD;  Location: [facility] OR;  Service: General;  Laterality: N/A;    INJECTION OF ANESTHETIC AGENT INTO TISSUE PLANE DEFINED BY TRANSVERSUS ABDOMINIS MUSCLE  2023    Procedure: BLOCK, TRANSVERSUS ABDOMINIS PLANE;  Surgeon: Yumiko Rosen DO;  Location: [facility];  Service: General;;    left partial nephrectomy Left 2006    ROBOT-ASSISTED REPAIR OF INCISIONAL HERNIA USING DA PERRI XI Left 2023    Procedure: XI ROBOTIC REPAIR, HERNIA, INCISIONAL;  Surgeon: Yumiko Rosen DO;  Location: [facility] OR;  Service: General;  Laterality: Left;  Left flank. Position right lateral decubitus. Ernst.    ROBOT-ASSISTED SURGICAL REMOVAL OF STOMACH USING DA PERRI XI N/A 3/9/2021    Procedure: XI ROBOTIC GASTRECTOMY;  Surgeon: Edin Bradford MD;  Location: [facility];  Service: General;  Laterality: N/A;  partial     Family History   Problem Relation Age of Onset    Stroke Mother     Hodgkin's lymphoma Father      Social History     Tobacco Use    Smoking status: Former     Packs/day: 1.50     Years: 40.00     Pack years: 60.00     Types: Cigarettes     Quit date: 2005     Years since quittin.0    Smokeless tobacco: Never   Substance Use Topics    Alcohol use: Not Currently    Drug use: Never        Review of Systems:  Review of Systems   Constitutional:  Negative for fever.   Gastrointestinal:  Positive for abdominal pain and constipation. Negative for nausea and vomiting.     OBJECTIVE:     Vital Signs (Most Recent)  Pulse: 73 (23 1024)  BP: 114/72 (23 1024)     101.3 kg (223 lb 5.2 oz)      Physical Exam:  Physical Exam  Vitals and nursing note reviewed.   Constitutional:       General: He is not in acute distress.     Appearance: He is obese. He is not ill-appearing, toxic-appearing or diaphoretic.   HENT:      Head: Normocephalic.      Nose: Nose normal.      Mouth/Throat:      Mouth: Mucous membranes are moist.   Eyes:      General: No scleral icterus.        Right eye: No discharge.         Left eye: No discharge.      Extraocular Movements: Extraocular movements intact.   Cardiovascular:      Rate and Rhythm: Normal rate and regular rhythm.   Pulmonary:      Effort: No respiratory distress.      Breath sounds: No stridor.   Abdominal:      General: There is no distension.      Palpations: Abdomen is soft.      Comments: Left-sided port site incisions healing well without signs of infection, some mild patria-incisional tenderness.  Abdomen is soft.  No peritoneal signs.   Musculoskeletal:      Cervical back: No rigidity.      Comments: Left flank tenderness with some fullness palpated at the hernia site.    Skin:     General: Skin is warm and dry.      Coloration: Skin is not jaundiced or pale.   Neurological:      Mental Status: He is alert and oriented to person, place, and time.   Psychiatric:         Mood and Affect: Mood normal.         Behavior: Behavior normal.       Laboratory  WBC   Date Value Ref Range Status   03/28/2023 5.90 3.90 - 12.70 K/uL Final     Hemoglobin   Date Value Ref Range Status   03/28/2023 14.4 14.0 - 18.0 g/dL Final     Hematocrit   Date Value Ref Range Status   03/28/2023 45.8 40.0 - 54.0 % Final     Platelets   Date Value Ref Range Status   03/28/2023 141 (L) 150 - 450 K/uL Final     Sodium   Date Value Ref Range Status   03/28/2023 143 136 - 145 mmol/L Final     Potassium   Date Value Ref Range Status   03/28/2023 4.7 3.5 - 5.1 mmol/L Final     Creatinine   Date Value Ref Range Status   03/28/2023 1.1 0.5 - 1.4 mg/dL Final     Alkaline Phosphatase   Date Value Ref  Range Status   03/28/2023 65 55 - 135 U/L Final     AST   Date Value Ref Range Status   03/28/2023 22 10 - 40 U/L Final     ALT   Date Value Ref Range Status   03/28/2023 30 10 - 44 U/L Final      Hemoglobin A1C   Date Value Ref Range Status   11/02/2022 6.5 (H) 4.0 - 5.6 % Final     Comment:     ADA Screening Guidelines:  5.7-6.4%  Consistent with prediabetes  >or=6.5%  Consistent with diabetes    High levels of fetal hemoglobin interfere with the HbA1C  assay. Heterozygous hemoglobin variants (HbS, HgC, etc)do  not significantly interfere with this assay.   However, presence of multiple variants may affect accuracy.         Diagnostic Results:  CT Abdomen Pelvis  Without Contrast 05/02/2023    Narrative  EXAMINATION:  CT ABDOMEN PELVIS WITHOUT CONTRAST    CLINICAL HISTORY:  Left abdominal bulge and pain s/p left Grynfeltt hernia repair; Incisional hernia with obstruction, without gangrene    TECHNIQUE:  Low dose axial images, sagittal and coronal reformations were obtained from the lung bases to the pubic symphysis.  All CT scans at this location are performed using dose modulation techniques as appropriate to a performed exam including the following: Automated exposure control; adjustment of the mA and/or kV according to patient size (this includes techniques or standardized protocols for targeted exams where dose is matched to indication/reason for exam; i. e. extremities or head); use of iterative reconstruction technique.    COMPARISON:  CT abdomen pelvis 01/03/2023.    FINDINGS:  LOWER CHEST: Coronary calcification.  Aortic valve calcification.    HEPATOBILIARY: Unremarkable liver. Unremarkable gallbladder. Unremarkable bile ducts.    SPLEEN/ADRENAL/PANCREAS: Unremarkable.    GENITOURINARY: Unremarkable kidneys and ureters.  Unremarkable bladder.    RETROPERITONEUM:  Mild fat stranding at the posterior/lateral pararenal space, probably postoperative.  No pathologic adenopathy.    BOWEL: Questionable tiny  umbilical Meredith hernia involving a loop of small bowel.  No definite inflammatory changes.  No obstruction.  Small amount of free air medial to the splenic flexure of the colon and at the lesser curvature of the stomach.  No free fluid.    VASCULAR: No abdominal aortic aneurysm. Moderate scattered atherosclerotic calcification.    BODY WALL: Hernia repair changes at the left posterior/superior abdominal wall with some scattered fat stranding, most likely postoperative.  Mild focal bulging at the lateral aspect (axial series 2, image 71), possible mild reherniation.    BONES: No acute fracture or aggressive osseous lesion. Grade 2 anterolisthesis of L5 on S1, stable.  Discogenic degenerative changes.    Impression  Small amount of focal free air adjacent to the stomach and splenic flexure colon.  Possible perforated viscus versus postoperative sequela.  Consider further follow-up as warranted.    Mild focal bulging at the lateral aspect of the posterior left abdominal wall hernia repair, possible mild reherniation.    Questionable tiny umbilical Meredith hernia.  No obstruction or inflammation evident.    Additional findings as above.    This report was flagged in [employer] as abnormal.      Electronically signed by: Kelton Corbett  Date:    05/02/2023  Time:    12:01          ASSESSMENT/PLAN:     Daniel was seen today for post-op evaluation.    Diagnoses and all orders for this visit:    Incarcerated incisional hernia  -     CT Abdomen Pelvis  Without Contrast; Future    Left sided abdominal pain  -     CT Abdomen Pelvis  Without Contrast; Future    Constipation, unspecified constipation type  -     polyethylene glycol (GLYCOLAX) 17 gram/dose powder; Take 17 g by mouth once daily.    Other orders  -     oxyCODONE-acetaminophen (PERCOCET) 5-325 mg per tablet; Take 1 tablet by mouth every 6 (six) hours as needed for Pain.  -     ibuprofen (ADVIL,MOTRIN) 600 MG tablet; Take 1 tablet (600 mg total) by mouth every 8 (eight)  hours as needed for Pain.  -     cyclobenzaprine (FLEXERIL) 5 MG tablet; Take 1 tablet (5 mg total) by mouth 3 (three) times daily as needed for Muscle spasms.         Obtained STAT CT which demonstrates expected postoperative inflammation/swelling and a small seroma. I spoke with the radiologist who reported a small amount free air, but I suspect this is retained pneumoperitoneum since patient is stable, afebrile, and abdominal exam is soft without peritoneal signs.  At this time, pain is likely postoperative given the extent of his hernia. I don't appreciate an obvious hernia recurrence. He is also quite constipated. I provided a handout on a good bowel regimen and recommended he drink more water as well since wife states he does not drink much. Abdominal binder provided. Will try different pain medications. Monitor seroma--if enlarges, can consider aspiration. Apply heating pad to help with reabsorption.    Follow up in 1 week.    Patient expressed understanding and is in agreement.      Yumiko Rosen, DO  General Surgery  [facility]

## 2023-05-03 ENCOUNTER — LAB VISIT (OUTPATIENT)
Dept: LAB | Facility: HOSPITAL | Age: 78
End: 2023-05-03
Attending: INTERNAL MEDICINE
Payer: MEDICARE

## 2023-05-03 DIAGNOSIS — E11.8 DM (DIABETES MELLITUS) WITH COMPLICATIONS: ICD-10-CM

## 2023-05-03 LAB
ESTIMATED AVG GLUCOSE: 137 MG/DL (ref 68–131)
HBA1C MFR BLD: 6.4 % (ref 4–5.6)

## 2023-05-03 PROCEDURE — 84443 ASSAY THYROID STIM HORMONE: CPT | Performed by: INTERNAL MEDICINE

## 2023-05-03 PROCEDURE — 83036 HEMOGLOBIN GLYCOSYLATED A1C: CPT | Performed by: INTERNAL MEDICINE

## 2023-05-03 PROCEDURE — 85025 COMPLETE CBC W/AUTO DIFF WBC: CPT | Performed by: INTERNAL MEDICINE

## 2023-05-03 PROCEDURE — 80053 COMPREHEN METABOLIC PANEL: CPT | Performed by: INTERNAL MEDICINE

## 2023-05-03 PROCEDURE — 80061 LIPID PANEL: CPT | Performed by: INTERNAL MEDICINE

## 2023-05-03 PROCEDURE — 36415 COLL VENOUS BLD VENIPUNCTURE: CPT | Mod: PO | Performed by: INTERNAL MEDICINE

## 2023-05-04 LAB
ALBUMIN SERPL BCP-MCNC: 3.8 G/DL (ref 3.5–5.2)
ALP SERPL-CCNC: 67 U/L (ref 55–135)
ALT SERPL W/O P-5'-P-CCNC: 33 U/L (ref 10–44)
ANION GAP SERPL CALC-SCNC: 11 MMOL/L (ref 8–16)
AST SERPL-CCNC: 22 U/L (ref 10–40)
BASOPHILS # BLD AUTO: 0.06 K/UL (ref 0–0.2)
BASOPHILS NFR BLD: 0.7 % (ref 0–1.9)
BILIRUB SERPL-MCNC: 0.7 MG/DL (ref 0.1–1)
BUN SERPL-MCNC: 35 MG/DL (ref 8–23)
CALCIUM SERPL-MCNC: 9.7 MG/DL (ref 8.7–10.5)
CHLORIDE SERPL-SCNC: 104 MMOL/L (ref 95–110)
CHOLEST SERPL-MCNC: 82 MG/DL (ref 120–199)
CHOLEST/HDLC SERPL: 4.8 {RATIO} (ref 2–5)
CO2 SERPL-SCNC: 28 MMOL/L (ref 23–29)
CREAT SERPL-MCNC: 1.6 MG/DL (ref 0.5–1.4)
DIFFERENTIAL METHOD: ABNORMAL
EOSINOPHIL # BLD AUTO: 0.2 K/UL (ref 0–0.5)
EOSINOPHIL NFR BLD: 2 % (ref 0–8)
ERYTHROCYTE [DISTWIDTH] IN BLOOD BY AUTOMATED COUNT: 13.8 % (ref 11.5–14.5)
EST. GFR  (NO RACE VARIABLE): 43.6 ML/MIN/1.73 M^2
GLUCOSE SERPL-MCNC: 145 MG/DL (ref 70–110)
HCT VFR BLD AUTO: 43.6 % (ref 40–54)
HDLC SERPL-MCNC: 17 MG/DL (ref 40–75)
HDLC SERPL: 20.7 % (ref 20–50)
HGB BLD-MCNC: 14 G/DL (ref 14–18)
IMM GRANULOCYTES # BLD AUTO: 0.05 K/UL (ref 0–0.04)
IMM GRANULOCYTES NFR BLD AUTO: 0.6 % (ref 0–0.5)
LDLC SERPL CALC-MCNC: 39.4 MG/DL (ref 63–159)
LYMPHOCYTES # BLD AUTO: 1.4 K/UL (ref 1–4.8)
LYMPHOCYTES NFR BLD: 17.5 % (ref 18–48)
MCH RBC QN AUTO: 29.5 PG (ref 27–31)
MCHC RBC AUTO-ENTMCNC: 32.1 G/DL (ref 32–36)
MCV RBC AUTO: 92 FL (ref 82–98)
MONOCYTES # BLD AUTO: 0.7 K/UL (ref 0.3–1)
MONOCYTES NFR BLD: 8.1 % (ref 4–15)
NEUTROPHILS # BLD AUTO: 5.8 K/UL (ref 1.8–7.7)
NEUTROPHILS NFR BLD: 71.1 % (ref 38–73)
NONHDLC SERPL-MCNC: 65 MG/DL
NRBC BLD-RTO: 0 /100 WBC
PLATELET # BLD AUTO: 148 K/UL (ref 150–450)
PMV BLD AUTO: 11.4 FL (ref 9.2–12.9)
POTASSIUM SERPL-SCNC: 4.2 MMOL/L (ref 3.5–5.1)
PROT SERPL-MCNC: 6 G/DL (ref 6–8.4)
RBC # BLD AUTO: 4.75 M/UL (ref 4.6–6.2)
SODIUM SERPL-SCNC: 143 MMOL/L (ref 136–145)
TRIGL SERPL-MCNC: 128 MG/DL (ref 30–150)
TSH SERPL DL<=0.005 MIU/L-ACNC: 1.02 UIU/ML (ref 0.4–4)
WBC # BLD AUTO: 8.16 K/UL (ref 3.9–12.7)

## 2023-05-09 ENCOUNTER — OFFICE VISIT (OUTPATIENT)
Dept: SURGERY | Facility: CLINIC | Age: 78
End: 2023-05-09
Payer: MEDICARE

## 2023-05-09 VITALS
SYSTOLIC BLOOD PRESSURE: 125 MMHG | HEART RATE: 64 BPM | WEIGHT: 223.75 LBS | DIASTOLIC BLOOD PRESSURE: 77 MMHG | BODY MASS INDEX: 30.77 KG/M2

## 2023-05-09 DIAGNOSIS — K59.00 CONSTIPATION, UNSPECIFIED CONSTIPATION TYPE: ICD-10-CM

## 2023-05-09 PROCEDURE — 1125F PR PAIN SEVERITY QUANTIFIED, PAIN PRESENT: ICD-10-PCS | Mod: CPTII,S$GLB,, | Performed by: SURGERY

## 2023-05-09 PROCEDURE — 3074F PR MOST RECENT SYSTOLIC BLOOD PRESSURE < 130 MM HG: ICD-10-PCS | Mod: CPTII,S$GLB,, | Performed by: SURGERY

## 2023-05-09 PROCEDURE — 99999 PR PBB SHADOW E&M-EST. PATIENT-LVL IV: CPT | Mod: PBBFAC,,, | Performed by: SURGERY

## 2023-05-09 PROCEDURE — 99024 POSTOP FOLLOW-UP VISIT: CPT | Mod: S$GLB,,, | Performed by: SURGERY

## 2023-05-09 PROCEDURE — 1159F MED LIST DOCD IN RCRD: CPT | Mod: CPTII,S$GLB,, | Performed by: SURGERY

## 2023-05-09 PROCEDURE — 99024 PR POST-OP FOLLOW-UP VISIT: ICD-10-PCS | Mod: S$GLB,,, | Performed by: SURGERY

## 2023-05-09 PROCEDURE — 3078F DIAST BP <80 MM HG: CPT | Mod: CPTII,S$GLB,, | Performed by: SURGERY

## 2023-05-09 PROCEDURE — 3074F SYST BP LT 130 MM HG: CPT | Mod: CPTII,S$GLB,, | Performed by: SURGERY

## 2023-05-09 PROCEDURE — 1159F PR MEDICATION LIST DOCUMENTED IN MEDICAL RECORD: ICD-10-PCS | Mod: CPTII,S$GLB,, | Performed by: SURGERY

## 2023-05-09 PROCEDURE — 99999 PR PBB SHADOW E&M-EST. PATIENT-LVL IV: ICD-10-PCS | Mod: PBBFAC,,, | Performed by: SURGERY

## 2023-05-09 PROCEDURE — 3078F PR MOST RECENT DIASTOLIC BLOOD PRESSURE < 80 MM HG: ICD-10-PCS | Mod: CPTII,S$GLB,, | Performed by: SURGERY

## 2023-05-09 PROCEDURE — 1125F AMNT PAIN NOTED PAIN PRSNT: CPT | Mod: CPTII,S$GLB,, | Performed by: SURGERY

## 2023-05-09 RX ORDER — POLYETHYLENE GLYCOL 3350 17 G/17G
17 POWDER, FOR SOLUTION ORAL 2 TIMES DAILY
Qty: 595 G | Refills: 5 | Status: SHIPPED | OUTPATIENT
Start: 2023-05-09

## 2023-05-09 NOTE — PROGRESS NOTES
[facility] -   General Surgery Progress    SUBJECTIVE:     History of Present Illness:  5/2/23 Patient is a 77 y.o. male presents for follow-up after undergoing robotic repair of a left flank Grynfeltt hernia. He states the pain is getting a little better, but still remains significant.  Pain is mostly located at the actual hernia site.  He reports constipation as well.  Denies fevers.    5/9/23 Since his last visit, he does report some improvement in the discomfort. It seems to be mostly located along the lower left flank to his left lower abdomen. He does note that there is some relief after bowel movements. Miralax is helping him be more regular. He denies fevers.     Review of patient's allergies indicates:  No Known Allergies    Current Outpatient Medications   Medication Sig Dispense Refill    acetaminophen (TYLENOL) 650 MG TbSR Take 650 mg by mouth every 8 (eight) hours.      aspirin 81 MG Chew Take 81 mg by mouth once daily.      atorvastatin (LIPITOR) 20 MG tablet TAKE 1 TABLET BY MOUTH ONCE DAILY IN THE EVENING (ONLY  TAKE  ON  MONDAY,  TUESDAY,  WEDNESDAY  AND  THURSDAY) 51 tablet 2    cholecalciferol, vitamin D3, (VITAMIN D3) 50 mcg (2,000 unit) Tab Take 2,000 Units by mouth once daily.       cyclobenzaprine (FLEXERIL) 5 MG tablet Take 1 tablet (5 mg total) by mouth 3 (three) times daily as needed for Muscle spasms. 15 tablet 0    docusate sodium (COLACE) 100 MG capsule Take 1 capsule (100 mg total) by mouth 2 (two) times daily. 60 capsule 1    fish oil-omega-3 fatty acids 300-1,000 mg capsule Take by mouth once daily.      ibuprofen (ADVIL,MOTRIN) 600 MG tablet Take 1 tablet (600 mg total) by mouth every 8 (eight) hours as needed for Pain. 25 tablet 0    losartan-hydrochlorothiazide 100-12.5 mg (HYZAAR) 100-12.5 mg Tab Take 1 tablet by mouth once daily. 90 tablet 3    magnesium oxide (MAG-OX) 400 mg (241.3 mg magnesium) tablet Take 400 mg by mouth once daily.      metFORMIN (GLUCOPHAGE)  500 MG tablet Take 1 tablet (500 mg total) by mouth 2 (two) times daily with meals. 180 tablet 3    methocarbamoL (ROBAXIN) 500 MG Tab Take 2 tablets (1,000 mg total) by mouth 4 (four) times daily as needed (Muscle spasm or pain). 35 tablet 0    metoprolol tartrate (LOPRESSOR) 50 MG tablet Take 2 tablets (100 mg total) by mouth 2 (two) times daily. 90 tablet 3    ONETOUCH ULTRA BLUE TEST STRIP Strp USE 1 STRIP TO CHECK GLUCOSE 2 TO 3 TIMES DAILY      oxybutynin (DITROPAN-XL) 5 MG TR24 Take 1 tablet (5 mg total) by mouth once daily. 90 tablet 3    oxyCODONE-acetaminophen (PERCOCET) 5-325 mg per tablet Take 1 tablet by mouth every 6 (six) hours as needed for Pain. 20 tablet 0    pantoprazole (PROTONIX) 40 MG tablet Take 1 tablet (40 mg total) by mouth once daily. 90 tablet 3    polyethylene glycol (GLYCOLAX) 17 gram/dose powder Take 17 g by mouth 2 (two) times daily. 595 g 5    solifenacin (VESICARE) 10 MG tablet Take 1 tablet (10 mg total) by mouth once daily. 30 tablet 11    HYDROcodone-acetaminophen (NORCO) 7.5-325 mg per tablet Take 1 tablet by mouth every 4 to 6 hours as needed for Pain. (Patient not taking: Reported on 5/2/2023) 25 tablet 0     No current facility-administered medications for this visit.       Past Medical History:   Diagnosis Date    CKD (chronic kidney disease), stage III     Deep vein thrombosis     DM (diabetes mellitus) with complications     GERD (gastroesophageal reflux disease)     History of DVT (deep vein thrombosis)     History of renal cell carcinoma 2009    Hyperlipidemia associated with type 2 diabetes mellitus 02/26/2021    Hypertension associated with diabetes     Thrombocytopenia     Type II diabetes mellitus with renal manifestations      Past Surgical History:   Procedure Laterality Date    CLOSURE OF PERFORATED ULCER OF DUODENUM USING OMENTAL PATCH N/A 3/9/2021    Procedure: CLOSURE, ULCER, PERFORATED, DUODENUM, USING OMENTAL PATCH;  Surgeon: Edin Bradford MD;  Location: [facility]  OR;  Service: General;  Laterality: N/A;    COLONOSCOPY N/A 2022    Procedure: COLONOSCOPY;  Surgeon: Mirta Grover MD;  Location: [facility] ENDO;  Service: Endoscopy;  Laterality: N/A;    ENDOSCOPIC ULTRASOUND OF UPPER GASTROINTESTINAL TRACT N/A 2021    Procedure: ULTRASOUND, UPPER GI TRACT, ENDOSCOPIC;  Surgeon: Geremias Brizuela MD;  Location: [facility] ENDO;  Service: Endoscopy;  Laterality: N/A;    ESOPHAGOGASTRODUODENOSCOPY N/A 2/3/2021    Procedure: EGD (ESOPHAGOGASTRODUODENOSCOPY);  Surgeon: Mirta Grover MD;  Location: [facility] ENDO;  Service: Endoscopy;  Laterality: N/A;    ESOPHAGOGASTRODUODENOSCOPY N/A 3/9/2021    Procedure: EGD (ESOPHAGOGASTRODUODENOSCOPY);  Surgeon: Edin Bradford MD;  Location: [facility] OR;  Service: General;  Laterality: N/A;    INJECTION OF ANESTHETIC AGENT INTO TISSUE PLANE DEFINED BY TRANSVERSUS ABDOMINIS MUSCLE  2023    Procedure: BLOCK, TRANSVERSUS ABDOMINIS PLANE;  Surgeon: Yumiko Rosen DO;  Location: [facility] OR;  Service: General;;    left partial nephrectomy Left     ROBOT-ASSISTED REPAIR OF INCISIONAL HERNIA USING DA PERRI XI Left 2023    Procedure: XI ROBOTIC REPAIR, HERNIA, INCISIONAL;  Surgeon: Yumiko Rosen DO;  Location: [facility] OR;  Service: General;  Laterality: Left;  Left flank. Position right lateral decubitus. Ernst.    ROBOT-ASSISTED SURGICAL REMOVAL OF STOMACH USING DA PERRI XI N/A 3/9/2021    Procedure: XI ROBOTIC GASTRECTOMY;  Surgeon: Edin Bradford MD;  Location: [facility] OR;  Service: General;  Laterality: N/A;  partial     Family History   Problem Relation Age of Onset    Stroke Mother     Hodgkin's lymphoma Father      Social History     Tobacco Use    Smoking status: Former     Packs/day: 1.50     Years: 40.00     Pack years: 60.00     Types: Cigarettes     Quit date: 2005     Years since quittin.0    Smokeless tobacco: Never   Substance Use Topics    Alcohol use: Not Currently    Drug use: Never        Review of  Systems:  Review of Systems   Constitutional:  Negative for fever.   Gastrointestinal:  Positive for abdominal pain and constipation. Negative for nausea and vomiting.     OBJECTIVE:     Vital Signs (Most Recent)  Pulse: 64 (05/09/23 0920)  BP: 125/77 (05/09/23 0920)     101.5 kg (223 lb 12.3 oz)     Physical Exam:  Physical Exam  Vitals and nursing note reviewed.   Constitutional:       General: He is not in acute distress.     Appearance: He is obese. He is not ill-appearing, toxic-appearing or diaphoretic.   HENT:      Head: Normocephalic.      Nose: Nose normal.      Mouth/Throat:      Mouth: Mucous membranes are moist.   Eyes:      General: No scleral icterus.        Right eye: No discharge.         Left eye: No discharge.      Extraocular Movements: Extraocular movements intact.   Cardiovascular:      Rate and Rhythm: Normal rate and regular rhythm.   Pulmonary:      Effort: No respiratory distress.      Breath sounds: No stridor.   Abdominal:      General: There is no distension.      Palpations: Abdomen is soft.      Comments: Left-sided port site incisions healing well without signs of infection.  Abdomen is soft.  No peritoneal signs.   Musculoskeletal:      Cervical back: No rigidity.      Comments: Left flank tenderness with some fullness palpated at the hernia site consistent with seroma--stable in size since last visit. No overlying skin changes.   Skin:     General: Skin is warm and dry.      Coloration: Skin is not jaundiced or pale.   Neurological:      Mental Status: He is alert and oriented to person, place, and time.   Psychiatric:         Mood and Affect: Mood normal.         Behavior: Behavior normal.       Laboratory  WBC   Date Value Ref Range Status   05/03/2023 8.16 3.90 - 12.70 K/uL Final     Hemoglobin   Date Value Ref Range Status   05/03/2023 14.0 14.0 - 18.0 g/dL Final     Hematocrit   Date Value Ref Range Status   05/03/2023 43.6 40.0 - 54.0 % Final     Platelets   Date Value Ref  Range Status   05/03/2023 148 (L) 150 - 450 K/uL Final     Sodium   Date Value Ref Range Status   05/03/2023 143 136 - 145 mmol/L Final     Potassium   Date Value Ref Range Status   05/03/2023 4.2 3.5 - 5.1 mmol/L Final     Creatinine   Date Value Ref Range Status   05/03/2023 1.6 (H) 0.5 - 1.4 mg/dL Final     Alkaline Phosphatase   Date Value Ref Range Status   05/03/2023 67 55 - 135 U/L Final     AST   Date Value Ref Range Status   05/03/2023 22 10 - 40 U/L Final     ALT   Date Value Ref Range Status   05/03/2023 33 10 - 44 U/L Final      Hemoglobin A1C   Date Value Ref Range Status   05/03/2023 6.4 (H) 4.0 - 5.6 % Final     Comment:     ADA Screening Guidelines:  5.7-6.4%  Consistent with prediabetes  >or=6.5%  Consistent with diabetes    High levels of fetal hemoglobin interfere with the HbA1C  assay. Heterozygous hemoglobin variants (HbS, HgC, etc)do  not significantly interfere with this assay.   However, presence of multiple variants may affect accuracy.         Diagnostic Results:  CT Abdomen Pelvis  Without Contrast 05/02/2023    Narrative  EXAMINATION:  CT ABDOMEN PELVIS WITHOUT CONTRAST    CLINICAL HISTORY:  Left abdominal bulge and pain s/p left Grynfeltt hernia repair; Incisional hernia with obstruction, without gangrene    TECHNIQUE:  Low dose axial images, sagittal and coronal reformations were obtained from the lung bases to the pubic symphysis.  All CT scans at this location are performed using dose modulation techniques as appropriate to a performed exam including the following: Automated exposure control; adjustment of the mA and/or kV according to patient size (this includes techniques or standardized protocols for targeted exams where dose is matched to indication/reason for exam; i. e. extremities or head); use of iterative reconstruction technique.    COMPARISON:  CT abdomen pelvis 01/03/2023.    FINDINGS:  LOWER CHEST: Coronary calcification.  Aortic valve calcification.    HEPATOBILIARY:  Unremarkable liver. Unremarkable gallbladder. Unremarkable bile ducts.    SPLEEN/ADRENAL/PANCREAS: Unremarkable.    GENITOURINARY: Unremarkable kidneys and ureters.  Unremarkable bladder.    RETROPERITONEUM:  Mild fat stranding at the posterior/lateral pararenal space, probably postoperative.  No pathologic adenopathy.    BOWEL: Questionable tiny umbilical Meredith hernia involving a loop of small bowel.  No definite inflammatory changes.  No obstruction.  Small amount of free air medial to the splenic flexure of the colon and at the lesser curvature of the stomach.  No free fluid.    VASCULAR: No abdominal aortic aneurysm. Moderate scattered atherosclerotic calcification.    BODY WALL: Hernia repair changes at the left posterior/superior abdominal wall with some scattered fat stranding, most likely postoperative.  Mild focal bulging at the lateral aspect (axial series 2, image 71), possible mild reherniation.    BONES: No acute fracture or aggressive osseous lesion. Grade 2 anterolisthesis of L5 on S1, stable.  Discogenic degenerative changes.    Impression  Small amount of focal free air adjacent to the stomach and splenic flexure colon.  Possible perforated viscus versus postoperative sequela.  Consider further follow-up as warranted.    Mild focal bulging at the lateral aspect of the posterior left abdominal wall hernia repair, possible mild reherniation.    Questionable tiny umbilical Meredith hernia.  No obstruction or inflammation evident.    Additional findings as above.    This report was flagged in Epic as abnormal.      Electronically signed by: Kelton Corbett  Date:    05/02/2023  Time:    12:01          ASSESSMENT/PLAN:     Daniel was seen today for follow-up.    Diagnoses and all orders for this visit:    Constipation, unspecified constipation type  -     polyethylene glycol (GLYCOLAX) 17 gram/dose powder; Take 17 g by mouth 2 (two) times daily.     S/p robotic repair of left flank incisional hernia  with mesh 4/17/23      Still having some discomfort but improving. Seroma feels stable. He does not improvement after bowel movements and there is a significant amount of stool throughout the colon on his recent CT. He was instructed to continue using the miralax and drink plenty of water.  Follow up in 3 weeks or sooner as needed. If the seroma feels like it is enlarging, then at that point we can try aspirating it.      Yumiko Rosen, DO  General Surgery  [facility]  5/9/2023

## 2023-05-10 ENCOUNTER — OFFICE VISIT (OUTPATIENT)
Dept: INTERNAL MEDICINE | Facility: CLINIC | Age: 78
End: 2023-05-10
Payer: MEDICARE

## 2023-05-10 VITALS
OXYGEN SATURATION: 96 % | WEIGHT: 223.13 LBS | HEIGHT: 71 IN | DIASTOLIC BLOOD PRESSURE: 70 MMHG | SYSTOLIC BLOOD PRESSURE: 130 MMHG | BODY MASS INDEX: 31.24 KG/M2 | HEART RATE: 68 BPM

## 2023-05-10 DIAGNOSIS — C49.A0 GASTROINTESTINAL STROMAL TUMOR (GIST): ICD-10-CM

## 2023-05-10 DIAGNOSIS — I45.10 RBBB: ICD-10-CM

## 2023-05-10 DIAGNOSIS — R35.0 BENIGN PROSTATIC HYPERPLASIA WITH URINARY FREQUENCY: ICD-10-CM

## 2023-05-10 DIAGNOSIS — Z86.718 HISTORY OF DVT (DEEP VEIN THROMBOSIS): ICD-10-CM

## 2023-05-10 DIAGNOSIS — N18.30 CKD STAGE 3 DUE TO TYPE 2 DIABETES MELLITUS: ICD-10-CM

## 2023-05-10 DIAGNOSIS — N18.32 TYPE 2 DIABETES MELLITUS WITH STAGE 3B CHRONIC KIDNEY DISEASE, WITHOUT LONG-TERM CURRENT USE OF INSULIN: ICD-10-CM

## 2023-05-10 DIAGNOSIS — I15.2 HYPERTENSION ASSOCIATED WITH DIABETES: ICD-10-CM

## 2023-05-10 DIAGNOSIS — I44.0 FIRST DEGREE AV BLOCK: ICD-10-CM

## 2023-05-10 DIAGNOSIS — E78.5 HYPERLIPIDEMIA ASSOCIATED WITH TYPE 2 DIABETES MELLITUS: ICD-10-CM

## 2023-05-10 DIAGNOSIS — Z86.010 HISTORY OF COLON POLYPS: ICD-10-CM

## 2023-05-10 DIAGNOSIS — E11.59 HYPERTENSION ASSOCIATED WITH DIABETES: ICD-10-CM

## 2023-05-10 DIAGNOSIS — I70.0 AORTIC ATHEROSCLEROSIS: ICD-10-CM

## 2023-05-10 DIAGNOSIS — N40.1 BENIGN PROSTATIC HYPERPLASIA WITH URINARY FREQUENCY: ICD-10-CM

## 2023-05-10 DIAGNOSIS — E11.22 CKD STAGE 3 DUE TO TYPE 2 DIABETES MELLITUS: ICD-10-CM

## 2023-05-10 DIAGNOSIS — E11.22 TYPE 2 DIABETES MELLITUS WITH STAGE 3B CHRONIC KIDNEY DISEASE, WITHOUT LONG-TERM CURRENT USE OF INSULIN: ICD-10-CM

## 2023-05-10 DIAGNOSIS — Z85.528 HISTORY OF RENAL CELL CARCINOMA: ICD-10-CM

## 2023-05-10 DIAGNOSIS — D69.6 THROMBOCYTOPENIA: ICD-10-CM

## 2023-05-10 DIAGNOSIS — E11.8 DM (DIABETES MELLITUS) WITH COMPLICATIONS: ICD-10-CM

## 2023-05-10 DIAGNOSIS — E11.69 HYPERLIPIDEMIA ASSOCIATED WITH TYPE 2 DIABETES MELLITUS: ICD-10-CM

## 2023-05-10 DIAGNOSIS — Z00.00 ROUTINE GENERAL MEDICAL EXAMINATION AT A HEALTH CARE FACILITY: Primary | ICD-10-CM

## 2023-05-10 PROCEDURE — 1159F MED LIST DOCD IN RCRD: CPT | Mod: CPTII,S$GLB,, | Performed by: INTERNAL MEDICINE

## 2023-05-10 PROCEDURE — 3075F PR MOST RECENT SYSTOLIC BLOOD PRESS GE 130-139MM HG: ICD-10-PCS | Mod: CPTII,S$GLB,, | Performed by: INTERNAL MEDICINE

## 2023-05-10 PROCEDURE — 1101F PR PT FALLS ASSESS DOC 0-1 FALLS W/OUT INJ PAST YR: ICD-10-PCS | Mod: CPTII,S$GLB,, | Performed by: INTERNAL MEDICINE

## 2023-05-10 PROCEDURE — 3288F PR FALLS RISK ASSESSMENT DOCUMENTED: ICD-10-PCS | Mod: CPTII,S$GLB,, | Performed by: INTERNAL MEDICINE

## 2023-05-10 PROCEDURE — 99397 PR PREVENTIVE VISIT,EST,65 & OVER: ICD-10-PCS | Mod: S$GLB,,, | Performed by: INTERNAL MEDICINE

## 2023-05-10 PROCEDURE — 3078F PR MOST RECENT DIASTOLIC BLOOD PRESSURE < 80 MM HG: ICD-10-PCS | Mod: CPTII,S$GLB,, | Performed by: INTERNAL MEDICINE

## 2023-05-10 PROCEDURE — 99397 PER PM REEVAL EST PAT 65+ YR: CPT | Mod: S$GLB,,, | Performed by: INTERNAL MEDICINE

## 2023-05-10 PROCEDURE — 1101F PT FALLS ASSESS-DOCD LE1/YR: CPT | Mod: CPTII,S$GLB,, | Performed by: INTERNAL MEDICINE

## 2023-05-10 PROCEDURE — 99999 PR PBB SHADOW E&M-EST. PATIENT-LVL IV: CPT | Mod: PBBFAC,,, | Performed by: INTERNAL MEDICINE

## 2023-05-10 PROCEDURE — 99999 PR PBB SHADOW E&M-EST. PATIENT-LVL IV: ICD-10-PCS | Mod: PBBFAC,,, | Performed by: INTERNAL MEDICINE

## 2023-05-10 PROCEDURE — 1126F PR PAIN SEVERITY QUANTIFIED, NO PAIN PRESENT: ICD-10-PCS | Mod: CPTII,S$GLB,, | Performed by: INTERNAL MEDICINE

## 2023-05-10 PROCEDURE — 3075F SYST BP GE 130 - 139MM HG: CPT | Mod: CPTII,S$GLB,, | Performed by: INTERNAL MEDICINE

## 2023-05-10 PROCEDURE — 3078F DIAST BP <80 MM HG: CPT | Mod: CPTII,S$GLB,, | Performed by: INTERNAL MEDICINE

## 2023-05-10 PROCEDURE — 1126F AMNT PAIN NOTED NONE PRSNT: CPT | Mod: CPTII,S$GLB,, | Performed by: INTERNAL MEDICINE

## 2023-05-10 PROCEDURE — 1159F PR MEDICATION LIST DOCUMENTED IN MEDICAL RECORD: ICD-10-PCS | Mod: CPTII,S$GLB,, | Performed by: INTERNAL MEDICINE

## 2023-05-10 PROCEDURE — 3288F FALL RISK ASSESSMENT DOCD: CPT | Mod: CPTII,S$GLB,, | Performed by: INTERNAL MEDICINE

## 2023-05-10 RX ORDER — METOPROLOL TARTRATE 100 MG/1
100 TABLET ORAL 2 TIMES DAILY
Qty: 180 TABLET | Refills: 3 | Status: SHIPPED | OUTPATIENT
Start: 2023-05-10 | End: 2024-05-09

## 2023-05-10 NOTE — PROGRESS NOTES
HPI:  Patient is a 77-year-old man who comes today for follow-up of his diabetes, hypertension, lipids, chronic kidney disease, and for his annual physical.  He is doing well.  He denies any hypoglycemic problems.  His blood sugars have been well controlled.  His blood pressure home has also been very well controlled.  He denies any chest pains or shortness of breath.  He has had no problems with his emphysema at all.  He is not on any inhalers.  To his knowledge he has never had problems with emphysema.  He has had no recurrence of his renal cell carcinoma are he recurrence of the GIST tumor.  He denies any other new problems.    Current MEDS: medcard review, verified and update  Allergies: Per the electronic medical record    Past Medical History:   Diagnosis Date    CKD stage 3 due to type 2 diabetes mellitus     Deep vein thrombosis     DM (diabetes mellitus) with complications     GERD (gastroesophageal reflux disease)     History of DVT (deep vein thrombosis)     History of renal cell carcinoma 2009    Hyperlipidemia associated with type 2 diabetes mellitus 02/26/2021    Hypertension associated with diabetes     Thrombocytopenia     Type II diabetes mellitus with renal manifestations        Past Surgical History:   Procedure Laterality Date    CLOSURE OF PERFORATED ULCER OF DUODENUM USING OMENTAL PATCH N/A 3/9/2021    Procedure: CLOSURE, ULCER, PERFORATED, DUODENUM, USING OMENTAL PATCH;  Surgeon: Edin Bradford MD;  Location: [facility] OR;  Service: General;  Laterality: N/A;    COLONOSCOPY N/A 1/5/2022    Procedure: COLONOSCOPY;  Surgeon: Mirta Grover MD;  Location: [facility];  Service: Endoscopy;  Laterality: N/A;    ENDOSCOPIC ULTRASOUND OF UPPER GASTROINTESTINAL TRACT N/A 2/5/2021    Procedure: ULTRASOUND, UPPER GI TRACT, ENDOSCOPIC;  Surgeon: Geremias Brizuela MD;  Location: [facility] ENDO;  Service: Endoscopy;  Laterality: N/A;    ESOPHAGOGASTRODUODENOSCOPY N/A 2/3/2021    Procedure: EGD  "(ESOPHAGOGASTRODUODENOSCOPY);  Surgeon: Mrita Grover MD;  Location: [facility] ENDO;  Service: Endoscopy;  Laterality: N/A;    ESOPHAGOGASTRODUODENOSCOPY N/A 3/9/2021    Procedure: EGD (ESOPHAGOGASTRODUODENOSCOPY);  Surgeon: Edin Bradford MD;  Location: [facility] OR;  Service: General;  Laterality: N/A;    INJECTION OF ANESTHETIC AGENT INTO TISSUE PLANE DEFINED BY TRANSVERSUS ABDOMINIS MUSCLE  4/17/2023    Procedure: BLOCK, TRANSVERSUS ABDOMINIS PLANE;  Surgeon: Yumiko Rosen DO;  Location: [facility] OR;  Service: General;;    left partial nephrectomy Left 2006    ROBOT-ASSISTED REPAIR OF INCISIONAL HERNIA USING DA PERRI XI Left 4/17/2023    Procedure: XI ROBOTIC REPAIR, HERNIA, INCISIONAL;  Surgeon: Yumiko Rosen DO;  Location: [facility] OR;  Service: General;  Laterality: Left;  Left flank. Position right lateral decubitus. Ernst.    ROBOT-ASSISTED SURGICAL REMOVAL OF STOMACH USING DA PERRI XI N/A 3/9/2021    Procedure: XI ROBOTIC GASTRECTOMY;  Surgeon: Edin Bradford MD;  Location: [facility] OR;  Service: General;  Laterality: N/A;  partial       SHx: per the electronic medical record    FHx: recorded in the electronic medical record    ROS:    denies any chest pains or shortness of breath. Denies any nausea, vomiting or diarrhea. Denies any fever, chills or sweats. Denies any change in weight, voice, stool, skin or hair. Denies any dysuria, dyspepsia or dysphagia. Denies any change in vision, hearing or headaches. Denies any swollen lymph nodes or loss of memory.    PE:  /70 (BP Location: Right arm)   Pulse 68   Ht 5' 11" (1.803 m)   Wt 101.2 kg (223 lb 1.7 oz)   SpO2 96%   BMI 31.12 kg/m²   Gen: Well-developed, well-nourished, male, in no acute distress, oriented x3  HEENT: neck is supple, no adenopathy, carotids 2+ equal without bruits, thyroid exam normal size without nodules.  CHEST: clear to auscultation and percussion  CVS: regular rate and rhythm without significant murmur, gallop, or rubs  ABD: soft, " benign, no rebound no guarding, no distention.  Bowel sounds are normal.     nontender.  No palpable masses.  No organomegaly and no audible bruits.  RECTAL:  Deferred.  EXT: no clubbing, cyanosis, or edema  LYMPH: no cervical, inguinal, or axillary adenopathy  FEET: no loss of sensation.  No ulcers or pressure sores.  Monofilament testing normal  NEURO: gait normal.  Cranial nerves II- XII intact. No nystagmus.  Speech normal.   Gross motor and sensory unremarkable.    Lab Results   Component Value Date    WBC 8.16 05/03/2023    HGB 14.0 05/03/2023    HCT 43.6 05/03/2023     (L) 05/03/2023    CHOL 82 (L) 05/03/2023    TRIG 128 05/03/2023    HDL 17 (L) 05/03/2023    ALT 33 05/03/2023    AST 22 05/03/2023     05/03/2023    K 4.2 05/03/2023     05/03/2023    CREATININE 1.6 (H) 05/03/2023    BUN 35 (H) 05/03/2023    CO2 28 05/03/2023    TSH 1.022 05/03/2023    HGBA1C 6.4 (H) 05/03/2023       Impression:  Diabetes, hypertension, lipids all very well controlled  Chronic kidney disease, stable, unchanged  History renal cell carcinoma and also gastrointestinal stromal tumor, both followed by Oncology, both have no evidence of the disease  Aortic atherosclerosis,, unchanged , asymptomatic  Patient Active Problem List   Diagnosis    RBBB    First degree AV block    Gastrointestinal stromal tumor (GIST)    DM (diabetes mellitus) with complications    Type II diabetes mellitus with renal manifestations    Hypertension associated with diabetes    Hyperlipidemia associated with type 2 diabetes mellitus    History of renal cell carcinoma    History of DVT (deep vein thrombosis)    Thrombocytopenia    CKD stage 3 due to type 2 diabetes mellitus    History of colon polyps    Aortic atherosclerosis    Renal cell carcinoma    Benign prostatic hyperplasia with urinary frequency       Plan:   Orders Placed This Encounter    Hemoglobin A1C    Basic Metabolic Panel    Lipid Panel    TSH    CBC Auto Differential     Ambulatory referral/consult to Optometry    metoprolol tartrate (LOPRESSOR) 100 MG tablet     Patient is due to skin an eye exam.  Patient will see me again in 6 months with the above lab work.  His medications remain the same  This note is generated with speech recognition software and is subject to transcription error and sound alike phrases that may be missed by proofreading.

## 2023-05-22 RX ORDER — PANTOPRAZOLE SODIUM 40 MG/1
40 TABLET, DELAYED RELEASE ORAL DAILY
Qty: 90 TABLET | Refills: 3 | Status: SHIPPED | OUTPATIENT
Start: 2023-05-22

## 2023-05-22 RX ORDER — ATORVASTATIN CALCIUM 20 MG/1
20 TABLET, FILM COATED ORAL NIGHTLY
Qty: 52 TABLET | Refills: 3 | Status: SHIPPED | OUTPATIENT
Start: 2023-05-22 | End: 2024-02-12 | Stop reason: SDUPTHER

## 2023-05-22 NOTE — TELEPHONE ENCOUNTER
No care due was identified.  [facility] Embedded Care Due Messages. Reference number: [id number].   5/22/2023 11:33:47 AM CDT

## 2023-05-30 ENCOUNTER — OFFICE VISIT (OUTPATIENT)
Dept: SURGERY | Facility: CLINIC | Age: 78
End: 2023-05-30
Payer: MEDICARE

## 2023-05-30 DIAGNOSIS — K43.2 INCISIONAL HERNIA, WITHOUT OBSTRUCTION OR GANGRENE: Primary | ICD-10-CM

## 2023-05-30 PROCEDURE — 99212 OFFICE O/P EST SF 10 MIN: CPT | Mod: S$GLB,,, | Performed by: SURGERY

## 2023-05-30 PROCEDURE — 99999 PR PBB SHADOW E&M-EST. PATIENT-LVL III: CPT | Mod: PBBFAC,,, | Performed by: SURGERY

## 2023-05-30 PROCEDURE — 1159F MED LIST DOCD IN RCRD: CPT | Mod: CPTII,S$GLB,, | Performed by: SURGERY

## 2023-05-30 PROCEDURE — 1159F PR MEDICATION LIST DOCUMENTED IN MEDICAL RECORD: ICD-10-PCS | Mod: CPTII,S$GLB,, | Performed by: SURGERY

## 2023-05-30 PROCEDURE — 99999 PR PBB SHADOW E&M-EST. PATIENT-LVL III: ICD-10-PCS | Mod: PBBFAC,,, | Performed by: SURGERY

## 2023-05-30 PROCEDURE — 99212 PR OFFICE/OUTPT VISIT, EST, LEVL II, 10-19 MIN: ICD-10-PCS | Mod: S$GLB,,, | Performed by: SURGERY

## 2023-05-30 NOTE — PROGRESS NOTES
[facility] -   General Surgery Progress    SUBJECTIVE:     History of Present Illness:  5/2/23 Patient is a 77 y.o. male presents for follow-up after undergoing robotic repair of a left flank Grynfeltt hernia. He states the pain is getting a little better, but still remains significant.  Pain is mostly located at the actual hernia site.  He reports constipation as well.  Denies fevers.    5/9/23 Since his last visit, he does report some improvement in the discomfort. It seems to be mostly located along the lower left flank to his left lower abdomen. He does note that there is some relief after bowel movements. Miralax is helping him be more regular. He denies fevers.     5/30/23 Doing much better. States that the discomfort in his left side is minimal to none, he only really notices it when he sits in certain chairs that push up against the seroma. No fevers. States his bowel movements have been much more regular on miralax.    Review of patient's allergies indicates:  No Known Allergies    Current Outpatient Medications   Medication Sig Dispense Refill    acetaminophen (TYLENOL) 650 MG TbSR Take 650 mg by mouth every 8 (eight) hours.      aspirin 81 MG Chew Take 81 mg by mouth once daily.      atorvastatin (LIPITOR) 20 MG tablet Take 1 tablet (20 mg total) by mouth every evening. 52 tablet 3    cholecalciferol, vitamin D3, (VITAMIN D3) 50 mcg (2,000 unit) Tab Take 2,000 Units by mouth once daily.       fish oil-omega-3 fatty acids 300-1,000 mg capsule Take by mouth once daily.      losartan-hydrochlorothiazide 100-12.5 mg (HYZAAR) 100-12.5 mg Tab Take 1 tablet by mouth once daily. 90 tablet 3    magnesium oxide (MAG-OX) 400 mg (241.3 mg magnesium) tablet Take 400 mg by mouth once daily.      metFORMIN (GLUCOPHAGE) 500 MG tablet Take 1 tablet (500 mg total) by mouth 2 (two) times daily with meals. 180 tablet 3    metoprolol tartrate (LOPRESSOR) 100 MG tablet Take 1 tablet (100 mg total) by mouth 2  (two) times daily. 180 tablet 3    pantoprazole (PROTONIX) 40 MG tablet Take 1 tablet (40 mg total) by mouth once daily. 90 tablet 3    polyethylene glycol (GLYCOLAX) 17 gram/dose powder Take 17 g by mouth 2 (two) times daily. 595 g 5    solifenacin (VESICARE) 10 MG tablet Take 1 tablet (10 mg total) by mouth once daily. 30 tablet 11     No current facility-administered medications for this visit.       Past Medical History:   Diagnosis Date    CKD stage 3 due to type 2 diabetes mellitus     Deep vein thrombosis     DM (diabetes mellitus) with complications     Gastrointestinal stromal tumor (GIST) 3/9/2021    GERD (gastroesophageal reflux disease)     History of DVT (deep vein thrombosis)     History of renal cell carcinoma 2009    Hyperlipidemia associated with type 2 diabetes mellitus 02/26/2021    Hypertension associated with diabetes     Panlobular emphysema     Thrombocytopenia     Type II diabetes mellitus with renal manifestations      Past Surgical History:   Procedure Laterality Date    CLOSURE OF PERFORATED ULCER OF DUODENUM USING OMENTAL PATCH N/A 3/9/2021    Procedure: CLOSURE, ULCER, PERFORATED, DUODENUM, USING OMENTAL PATCH;  Surgeon: Edin Bardford MD;  Location: [facility];  Service: General;  Laterality: N/A;    COLONOSCOPY N/A 1/5/2022    Procedure: COLONOSCOPY;  Surgeon: Mirta Grover MD;  Location: [facility];  Service: Endoscopy;  Laterality: N/A;    ENDOSCOPIC ULTRASOUND OF UPPER GASTROINTESTINAL TRACT N/A 2/5/2021    Procedure: ULTRASOUND, UPPER GI TRACT, ENDOSCOPIC;  Surgeon: Geremias Brizuela MD;  Location: [facility];  Service: Endoscopy;  Laterality: N/A;    ESOPHAGOGASTRODUODENOSCOPY N/A 2/3/2021    Procedure: EGD (ESOPHAGOGASTRODUODENOSCOPY);  Surgeon: Mirta Grover MD;  Location: [facility];  Service: Endoscopy;  Laterality: N/A;    ESOPHAGOGASTRODUODENOSCOPY N/A 3/9/2021    Procedure: EGD (ESOPHAGOGASTRODUODENOSCOPY);  Surgeon: Edin Bradford MD;  Location: [facility];   "Service: General;  Laterality: N/A;    INJECTION OF ANESTHETIC AGENT INTO TISSUE PLANE DEFINED BY TRANSVERSUS ABDOMINIS MUSCLE  2023    Procedure: BLOCK, TRANSVERSUS ABDOMINIS PLANE;  Surgeon: Yumiko Rosen DO;  Location: [facility];  Service: General;;    left partial nephrectomy Left 2006    ROBOT-ASSISTED REPAIR OF INCISIONAL HERNIA USING DA PERRI XI Left 2023    Procedure: XI ROBOTIC REPAIR, HERNIA, INCISIONAL;  Surgeon: Yumiko Rosen DO;  Location: [facility];  Service: General;  Laterality: Left;  Left flank. Position right lateral decubitus. Ernst.    ROBOT-ASSISTED SURGICAL REMOVAL OF STOMACH USING DA PERRI XI N/A 3/9/2021    Procedure: XI ROBOTIC GASTRECTOMY;  Surgeon: Edin Bradford MD;  Location: [facility];  Service: General;  Laterality: N/A;  partial     Family History   Problem Relation Age of Onset    Stroke Mother     Hodgkin's lymphoma Father      Social History     Tobacco Use    Smoking status: Former     Packs/day: 1.50     Years: 40.00     Pack years: 60.00     Types: Cigarettes     Quit date: 2005     Years since quittin.1    Smokeless tobacco: Never   Substance Use Topics    Alcohol use: Not Currently    Drug use: Never        Review of Systems:  Review of Systems   Constitutional:  Negative for fever.   Gastrointestinal:  Negative for abdominal pain, constipation, nausea and vomiting.     OBJECTIVE:     Vital Signs (Most Recent)  Temp: (P) 98.7 °F (37.1 °C) (23 1440)  (P) 5' 11" (1.803 m)  (P) 103.3 kg (227 lb 11.8 oz)     Physical Exam:  Physical Exam  Vitals and nursing note reviewed.   Constitutional:       General: He is not in acute distress.     Appearance: He is obese. He is not ill-appearing, toxic-appearing or diaphoretic.   HENT:      Head: Normocephalic.      Nose: Nose normal.      Mouth/Throat:      Mouth: Mucous membranes are moist.   Eyes:      General: No scleral icterus.        Right eye: No discharge.         Left eye: No discharge.      " Extraocular Movements: Extraocular movements intact.   Cardiovascular:      Rate and Rhythm: Normal rate and regular rhythm.   Pulmonary:      Effort: No respiratory distress.      Breath sounds: No stridor.   Abdominal:      General: There is no distension.      Palpations: Abdomen is soft.      Comments: Left-sided port site incisions healing well without signs of infection.  Abdomen is soft.  No peritoneal signs.   Musculoskeletal:      Cervical back: No rigidity.      Comments: Left flank seroma feels stable if not improved in size. No overlying skin changes.   Skin:     General: Skin is warm and dry.      Coloration: Skin is not jaundiced or pale.   Neurological:      Mental Status: He is alert and oriented to person, place, and time.   Psychiatric:         Mood and Affect: Mood normal.         Behavior: Behavior normal.       Laboratory  WBC   Date Value Ref Range Status   05/03/2023 8.16 3.90 - 12.70 K/uL Final     Hemoglobin   Date Value Ref Range Status   05/03/2023 14.0 14.0 - 18.0 g/dL Final     Hematocrit   Date Value Ref Range Status   05/03/2023 43.6 40.0 - 54.0 % Final     Platelets   Date Value Ref Range Status   05/03/2023 148 (L) 150 - 450 K/uL Final     Sodium   Date Value Ref Range Status   05/03/2023 143 136 - 145 mmol/L Final     Potassium   Date Value Ref Range Status   05/03/2023 4.2 3.5 - 5.1 mmol/L Final     Creatinine   Date Value Ref Range Status   05/03/2023 1.6 (H) 0.5 - 1.4 mg/dL Final     Alkaline Phosphatase   Date Value Ref Range Status   05/03/2023 67 55 - 135 U/L Final     AST   Date Value Ref Range Status   05/03/2023 22 10 - 40 U/L Final     ALT   Date Value Ref Range Status   05/03/2023 33 10 - 44 U/L Final      Hemoglobin A1C   Date Value Ref Range Status   05/03/2023 6.4 (H) 4.0 - 5.6 % Final     Comment:     ADA Screening Guidelines:  5.7-6.4%  Consistent with prediabetes  >or=6.5%  Consistent with diabetes    High levels of fetal hemoglobin interfere with the HbA1C  assay.  Heterozygous hemoglobin variants (HbS, HgC, etc)do  not significantly interfere with this assay.   However, presence of multiple variants may affect accuracy.         Diagnostic Results:  CT Abdomen Pelvis  Without Contrast 05/02/2023    Narrative  EXAMINATION:  CT ABDOMEN PELVIS WITHOUT CONTRAST    CLINICAL HISTORY:  Left abdominal bulge and pain s/p left Grynfeltt hernia repair; Incisional hernia with obstruction, without gangrene    TECHNIQUE:  Low dose axial images, sagittal and coronal reformations were obtained from the lung bases to the pubic symphysis.  All CT scans at this location are performed using dose modulation techniques as appropriate to a performed exam including the following: Automated exposure control; adjustment of the mA and/or kV according to patient size (this includes techniques or standardized protocols for targeted exams where dose is matched to indication/reason for exam; i. e. extremities or head); use of iterative reconstruction technique.    COMPARISON:  CT abdomen pelvis 01/03/2023.    FINDINGS:  LOWER CHEST: Coronary calcification.  Aortic valve calcification.    HEPATOBILIARY: Unremarkable liver. Unremarkable gallbladder. Unremarkable bile ducts.    SPLEEN/ADRENAL/PANCREAS: Unremarkable.    GENITOURINARY: Unremarkable kidneys and ureters.  Unremarkable bladder.    RETROPERITONEUM:  Mild fat stranding at the posterior/lateral pararenal space, probably postoperative.  No pathologic adenopathy.    BOWEL: Questionable tiny umbilical Meredith hernia involving a loop of small bowel.  No definite inflammatory changes.  No obstruction.  Small amount of free air medial to the splenic flexure of the colon and at the lesser curvature of the stomach.  No free fluid.    VASCULAR: No abdominal aortic aneurysm. Moderate scattered atherosclerotic calcification.    BODY WALL: Hernia repair changes at the left posterior/superior abdominal wall with some scattered fat stranding, most likely  postoperative.  Mild focal bulging at the lateral aspect (axial series 2, image 71), possible mild reherniation.    BONES: No acute fracture or aggressive osseous lesion. Grade 2 anterolisthesis of L5 on S1, stable.  Discogenic degenerative changes.    Impression  Small amount of focal free air adjacent to the stomach and splenic flexure colon.  Possible perforated viscus versus postoperative sequela.  Consider further follow-up as warranted.    Mild focal bulging at the lateral aspect of the posterior left abdominal wall hernia repair, possible mild reherniation.    Questionable tiny umbilical Meredith hernia.  No obstruction or inflammation evident.    Additional findings as above.    This report was flagged in Epic as abnormal.      Electronically signed by: Kelton Corbett  Date:    05/02/2023  Time:    12:01          ASSESSMENT/PLAN:     Daniel was seen today for follow-up.    Diagnoses and all orders for this visit:    Incisional hernia, without obstruction or gangrene    S/p robotic repair of left flank incisional hernia with mesh 4/17/23        Doing much better. Pain minimal to none. Seroma feels stable and slightly smaller in size. Recommend continuing heating pad and monitoring. Return to clinic if worsens. Follow up prn.        Yumiko Rosen,   General Surgery  [facility]  5/30/2023

## 2023-06-20 ENCOUNTER — OFFICE VISIT (OUTPATIENT)
Dept: OPHTHALMOLOGY | Facility: CLINIC | Age: 78
End: 2023-06-20
Payer: MEDICARE

## 2023-06-20 DIAGNOSIS — I15.2 HYPERTENSION ASSOCIATED WITH DIABETES: ICD-10-CM

## 2023-06-20 DIAGNOSIS — Z96.1 PSEUDOPHAKIA OF BOTH EYES: ICD-10-CM

## 2023-06-20 DIAGNOSIS — H52.7 REFRACTIVE ERRORS: ICD-10-CM

## 2023-06-20 DIAGNOSIS — E11.9 DIABETES MELLITUS TYPE 2 WITHOUT RETINOPATHY: Primary | ICD-10-CM

## 2023-06-20 DIAGNOSIS — E11.59 HYPERTENSION ASSOCIATED WITH DIABETES: ICD-10-CM

## 2023-06-20 DIAGNOSIS — E11.8 DM (DIABETES MELLITUS) WITH COMPLICATIONS: ICD-10-CM

## 2023-06-20 PROCEDURE — 92015 DETERMINE REFRACTIVE STATE: CPT | Mod: S$GLB,,, | Performed by: OPTOMETRIST

## 2023-06-20 PROCEDURE — 1159F PR MEDICATION LIST DOCUMENTED IN MEDICAL RECORD: ICD-10-PCS | Mod: CPTII,S$GLB,, | Performed by: OPTOMETRIST

## 2023-06-20 PROCEDURE — 99999 PR PBB SHADOW E&M-EST. PATIENT-LVL III: ICD-10-PCS | Mod: PBBFAC,,, | Performed by: OPTOMETRIST

## 2023-06-20 PROCEDURE — 99999 PR PBB SHADOW E&M-EST. PATIENT-LVL III: CPT | Mod: PBBFAC,,, | Performed by: OPTOMETRIST

## 2023-06-20 PROCEDURE — 92004 PR EYE EXAM, NEW PATIENT,COMPREHESV: ICD-10-PCS | Mod: S$GLB,,, | Performed by: OPTOMETRIST

## 2023-06-20 PROCEDURE — 92004 COMPRE OPH EXAM NEW PT 1/>: CPT | Mod: S$GLB,,, | Performed by: OPTOMETRIST

## 2023-06-20 PROCEDURE — 2023F PR DILATED RETINAL EXAM W/O EVID OF RETINOPATHY: ICD-10-PCS | Mod: CPTII,S$GLB,, | Performed by: OPTOMETRIST

## 2023-06-20 PROCEDURE — 1159F MED LIST DOCD IN RCRD: CPT | Mod: CPTII,S$GLB,, | Performed by: OPTOMETRIST

## 2023-06-20 PROCEDURE — 92015 PR REFRACTION: ICD-10-PCS | Mod: S$GLB,,, | Performed by: OPTOMETRIST

## 2023-06-20 PROCEDURE — 2023F DILAT RTA XM W/O RTNOPTHY: CPT | Mod: CPTII,S$GLB,, | Performed by: OPTOMETRIST

## 2023-06-20 NOTE — PROGRESS NOTES
HPI    New Patient   Last eye exam in 2022.  PCIOL OU 8-10 years ago.    Lab Results       Component                Value               Date                       HGBA1C                   6.4 (H)             05/03/2023              Patient states no visual complaints.  No ocular pain/discomfort.  Not using any otc drops.  Wear otc readers   Last edited by Areli Mann on 6/20/2023  8:48 AM.            Assessment /Plan     For exam results, see Encounter Report.    Diabetes mellitus type 2 without retinopathy    DM (diabetes mellitus) with complications  -     Ambulatory referral/consult to Optometry    Hypertension associated with diabetes    Pseudophakia of both eyes    Refractive errors      No Background Diabetic Retinopathy  Strict BG control, f/u w/ PCP, and annual DFE  Stressed importance of DM control to preserve vision    No HTN Retinopathy    Stable IOL OU.    Dispense Final Rx for glasses or OTC  RTC 1 year  Discussed above and answered questions.

## 2023-06-26 RX ORDER — LOSARTAN POTASSIUM AND HYDROCHLOROTHIAZIDE 12.5; 1 MG/1; MG/1
1 TABLET ORAL DAILY
Qty: 90 TABLET | Refills: 1 | Status: SHIPPED | OUTPATIENT
Start: 2023-06-26 | End: 2023-12-18 | Stop reason: SDUPTHER

## 2023-06-26 NOTE — TELEPHONE ENCOUNTER
No care due was identified.  [facility] Embedded Care Due Messages. Reference number: [id number].   6/26/2023 9:45:45 AM CDT

## 2023-08-06 ENCOUNTER — TELEPHONE (OUTPATIENT)
Dept: ADMINISTRATIVE | Facility: HOSPITAL | Age: 78
End: 2023-08-06
Payer: MEDICARE

## 2023-09-15 NOTE — H&P
----- Message from Sharath Orr III, PA-C sent at 9/15/2023  9:01 AM CDT -----  The urine looks infected. Is Ms Vivas symptomatic?   Short Stay Endoscopy History and Physical    PCP - Avinash Lugo MD    Procedure - Colonoscopy  ASA - 2  Mallampati - per anesthesia  History of Anesthesia problems - no  Family history Anesthesia problems -  no     HPI:  This is a 76 y.o. male here for evaluation of :   Active Hospital Problems    Diagnosis  POA    *Rectal bleeding [K62.5]  No    History of colon polyps [Z86.010]  Not Applicable      Resolved Hospital Problems   No resolved problems to display.         Health Maintenance       Date Due Completion Date    Hepatitis C Screening Never done ---    Foot Exam Never done ---    Eye Exam Never done ---    Shingles Vaccine (1 of 2) Never done ---    TETANUS VACCINE 07/12/2021 7/12/2011    Hemoglobin A1c 04/25/2022 10/25/2021    Diabetes Urine Screening 10/25/2022 10/25/2021    Lipid Panel 10/25/2022 10/25/2021          ROS:  CONSTITUTIONAL: Denies weight change,  fatigue, fevers, chills, night sweats.  CARDIOVASCULAR: Denies chest pain, shortness of breath, orthopnea and edema.  RESPIRATORY: Denies cough, hemoptysis, dyspnea, and wheezing.  GI: See HPI.    Medical History:   Past Medical History:   Diagnosis Date    CKD (chronic kidney disease), stage III     DM (diabetes mellitus) with complications     GERD (gastroesophageal reflux disease)     History of DVT (deep vein thrombosis)     History of renal cell carcinoma 2009    Hyperlipidemia associated with type 2 diabetes mellitus 02/26/2021    Hypertension associated with diabetes     Thrombocytopenia     Type II diabetes mellitus with renal manifestations        Surgical History:   Past Surgical History:   Procedure Laterality Date    CLOSURE OF PERFORATED ULCER OF DUODENUM USING OMENTAL PATCH N/A 3/9/2021    Procedure: CLOSURE, ULCER, PERFORATED, DUODENUM, USING OMENTAL PATCH;  Surgeon: Edin Bradford MD;  Location: [facility];  Service: General;  Laterality: N/A;    ENDOSCOPIC ULTRASOUND OF UPPER GASTROINTESTINAL TRACT N/A 2/5/2021     Procedure: ULTRASOUND, UPPER GI TRACT, ENDOSCOPIC;  Surgeon: Geremias Brizuela MD;  Location: [facility] ENDO;  Service: Endoscopy;  Laterality: N/A;    ESOPHAGOGASTRODUODENOSCOPY N/A 2/3/2021    Procedure: EGD (ESOPHAGOGASTRODUODENOSCOPY);  Surgeon: Mirta Grover MD;  Location: [facility] ENDO;  Service: Endoscopy;  Laterality: N/A;    ESOPHAGOGASTRODUODENOSCOPY N/A 3/9/2021    Procedure: EGD (ESOPHAGOGASTRODUODENOSCOPY);  Surgeon: Edin Bradford MD;  Location: [facility] OR;  Service: General;  Laterality: N/A;    left partial nephrectomy Left     ROBOT-ASSISTED SURGICAL REMOVAL OF STOMACH USING DA PERRI XI N/A 3/9/2021    Procedure: XI ROBOTIC GASTRECTOMY;  Surgeon: Edin Bradford MD;  Location: [facility] OR;  Service: General;  Laterality: N/A;  partial       Family History:   Family History   Problem Relation Age of Onset    Stroke Mother     Hodgkin's lymphoma Father        Social History:   Social History     Tobacco Use    Smoking status: Former Smoker     Packs/day: 1.50     Years: 40.00     Pack years: 60.00     Types: Cigarettes     Quit date: 2005     Years since quittin.7    Smokeless tobacco: Never Used   Substance Use Topics    Alcohol use: Not Currently    Drug use: Never       Allergies:   Review of patient's allergies indicates:  No Known Allergies    Medications:   No current facility-administered medications on file prior to encounter.     Current Outpatient Medications on File Prior to Encounter   Medication Sig Dispense Refill    acetaminophen (TYLENOL) 650 MG TbSR Take 650 mg by mouth every 8 (eight) hours.      aspirin 81 MG Chew Take 81 mg by mouth once daily.      atorvastatin (LIPITOR) 20 MG tablet Take 1 tablet (20 mg total) by mouth every evening. Only taking Mon.,Tues, Wed. And Thursday 90 tablet 3    cholecalciferol, vitamin D3, (VITAMIN D3) 50 mcg (2,000 unit) Tab Take 2,000 Units by mouth once daily.       fish oil-omega-3 fatty acids 300-1,000 mg capsule Take by  mouth once daily.      losartan-hydrochlorothiazide 100-12.5 mg (HYZAAR) 100-12.5 mg Tab Take 1 tablet by mouth once daily. 90 tablet 3    magnesium oxide (MAG-OX) 400 mg (241.3 mg magnesium) tablet Take 400 mg by mouth once daily.      metFORMIN (GLUCOPHAGE) 500 MG tablet Take 1 tablet (500 mg total) by mouth daily with breakfast. 90 tablet 3    metoprolol tartrate (LOPRESSOR) 50 MG tablet Take 2 tablets (100 mg total) by mouth 2 (two) times daily. 90 tablet 3    ONETOUCH ULTRA BLUE TEST STRIP Strp USE 1 STRIP TO CHECK GLUCOSE 2 TO 3 TIMES DAILY      oxybutynin (DITROPAN-XL) 5 MG TR24 Take 1 tablet (5 mg total) by mouth once daily. 90 tablet 3    pantoprazole (PROTONIX) 40 MG tablet Take 1 tablet (40 mg total) by mouth once daily. 90 tablet 3       Physical Exam:  Vital Signs: There were no vitals filed for this visit.  General Appearance: Well appearing in no acute distress  ENT: OP clear  Chest: CTA B  CV: RRR, no m/r/g  Abd: s/nt/nd/nabs  Ext: no edema    Labs:Reviewed    IMP:  Active Hospital Problems    Diagnosis  POA    *Rectal bleeding [K62.5]  No    History of colon polyps [Z86.010]  Not Applicable      Resolved Hospital Problems   No resolved problems to display.         Plan:   I have explained the risks and benefits of colonoscopy to the patient including but not limited to bleeding, perforation, infection, and death. The patient wishes to proceed.

## 2023-09-20 NOTE — H&P (VIEW-ONLY)
New Patient Chronic Pain Note (Initial Visit)    Referring Physician: Emerita Bundy    PCP: Avinash Lugo MD    Chief Complaint:   Chief Complaint   Patient presents with    Low-back Pain     Right         SUBJECTIVE:    Daniel Villeda is a 78 y.o. male with past medical history significant for first-degree AV block, type 2 diabetes, hypertension, hyperlipidemia, right bundle branch block, stage 3 chronic kidney disease, history of DVT, history of renal cell carcinoma and gastrointestinal stromal tumor, GERD who presents to the clinic for the evaluation of lower back and right leg pain.  Patient reports pain has been present for over a decade.  He believes pain began in high school when he was taking a ice box up the stairs when he pulled his lower back.  Patient reports injury was further exacerbated when he works for [employer] and was pulling cable through conduit which was uneven.  Today he reports pain which is constant which is rated a 7/10.  Pain is described as aching and throbbing in nature.  Patient reports pain in a bandlike distribution in the lower back which radiates down the lateral and posterior aspect of the right lower extremity in L4-5 distribution to the calf.  Pain is exacerbated with lumbar flexion as well as with ambulation.  Patient reports he is able to ambulate approximately half a block before requiring rest.  Patient reports reduced range of motion in the right ankle and weakness in the right lower extremity.  Pain is improved with rest.  Patient has completed conventional physical therapy 6-10 years prior with no improvement in his pain.  He is continued physician directed physical therapy exercises at home over the last 6 weeks without meaningful improvement in his pain.  Patient has trialed membrane stabilizing agents in the past, he can not recall which in particular, but did not like the cognitive side effects.  Of note patient has received numerous lumbar epidural  "steroid injections, 8-10 years prior with Dr. Hester." Which gave him several years of significant relief.  Patient is interested in trialing interventional therapy.  He would like to refrain from any pharmacologic management.    Of note patient saw Ms. Cisse PAC, neurosurgery 04/10/2023 with the following evaluation      Patient reports significant motor weakness and loss of sensations.  Patient denies night fever/night sweats, urinary incontinence, bowel incontinence, and significant weight loss.      Pain Disability Index Review:         9/21/2023     8:18 AM   Last 3 PDI Scores   Pain Disability Index (PDI) 55       Non-Pharmacologic Treatments:  Physical Therapy/Home Exercise: yes  Ice/Heat:yes  TENS: no  Acupuncture: no  Massage: no  Chiropractic: no    Other: no      Pain Medications:  - Adjuvant Medications: Tylenol (Acetaminophen)  - Anti-Coagulants: Aspirin    Pain Procedures:   DEBBIE: NASRIN    Past Medical History:   Diagnosis Date    CKD stage 3 due to type 2 diabetes mellitus     Deep vein thrombosis     DM (diabetes mellitus) with complications     Gastrointestinal stromal tumor (GIST) 3/9/2021    GERD (gastroesophageal reflux disease)     History of DVT (deep vein thrombosis)     History of renal cell carcinoma 2009    Hyperlipidemia associated with type 2 diabetes mellitus 02/26/2021    Hypertension associated with diabetes     Panlobular emphysema     Thrombocytopenia     Type II diabetes mellitus with renal manifestations      Past Surgical History:   Procedure Laterality Date    CLOSURE OF PERFORATED ULCER OF DUODENUM USING OMENTAL PATCH N/A 3/9/2021    Procedure: CLOSURE, ULCER, PERFORATED, DUODENUM, USING OMENTAL PATCH;  Surgeon: Edin Bradford MD;  Location: [facility] OR;  Service: General;  Laterality: N/A;    COLONOSCOPY N/A 1/5/2022    Procedure: COLONOSCOPY;  Surgeon: Mirta Grover MD;  Location: [facility] ENDO;  Service: Endoscopy;  Laterality: N/A;    ENDOSCOPIC ULTRASOUND OF UPPER " GASTROINTESTINAL TRACT N/A 2/5/2021    Procedure: ULTRASOUND, UPPER GI TRACT, ENDOSCOPIC;  Surgeon: Geremias Brizuela MD;  Location: [facility] ENDO;  Service: Endoscopy;  Laterality: N/A;    ESOPHAGOGASTRODUODENOSCOPY N/A 2/3/2021    Procedure: EGD (ESOPHAGOGASTRODUODENOSCOPY);  Surgeon: Mirta Grover MD;  Location: [facility] ENDO;  Service: Endoscopy;  Laterality: N/A;    ESOPHAGOGASTRODUODENOSCOPY N/A 3/9/2021    Procedure: EGD (ESOPHAGOGASTRODUODENOSCOPY);  Surgeon: Edin Bradford MD;  Location: [facility] OR;  Service: General;  Laterality: N/A;    INJECTION OF ANESTHETIC AGENT INTO TISSUE PLANE DEFINED BY TRANSVERSUS ABDOMINIS MUSCLE  4/17/2023    Procedure: BLOCK, TRANSVERSUS ABDOMINIS PLANE;  Surgeon: Yumiko Rosen DO;  Location: [facility] OR;  Service: General;;    left partial nephrectomy Left 2006    ROBOT-ASSISTED REPAIR OF INCISIONAL HERNIA USING DA PERRI XI Left 4/17/2023    Procedure: XI ROBOTIC REPAIR, HERNIA, INCISIONAL;  Surgeon: Yumiko Rosen DO;  Location: [facility] OR;  Service: General;  Laterality: Left;  Left flank. Position right lateral decubitus. Ernst.    ROBOT-ASSISTED SURGICAL REMOVAL OF STOMACH USING DA PERRI XI N/A 3/9/2021    Procedure: XI ROBOTIC GASTRECTOMY;  Surgeon: Edin Bradford MD;  Location: [facility] OR;  Service: General;  Laterality: N/A;  partial     Review of patient's allergies indicates:  No Known Allergies    Current Outpatient Medications   Medication Sig    acetaminophen (TYLENOL) 650 MG TbSR Take 650 mg by mouth every 8 (eight) hours.    aspirin 81 MG Chew Take 81 mg by mouth once daily.    atorvastatin (LIPITOR) 20 MG tablet Take 1 tablet (20 mg total) by mouth every evening.    cholecalciferol, vitamin D3, (VITAMIN D3) 50 mcg (2,000 unit) Tab Take 2,000 Units by mouth once daily.     fish oil-omega-3 fatty acids 300-1,000 mg capsule Take by mouth once daily.    losartan-hydrochlorothiazide 100-12.5 mg (HYZAAR) 100-12.5 mg Tab Take 1 tablet by mouth once daily.     "magnesium oxide (MAG-OX) 400 mg (241.3 mg magnesium) tablet Take 400 mg by mouth once daily.    metFORMIN (GLUCOPHAGE) 500 MG tablet Take 1 tablet (500 mg total) by mouth 2 (two) times daily with meals.    metoprolol tartrate (LOPRESSOR) 100 MG tablet Take 1 tablet (100 mg total) by mouth 2 (two) times daily.    pantoprazole (PROTONIX) 40 MG tablet Take 1 tablet (40 mg total) by mouth once daily.    polyethylene glycol (GLYCOLAX) 17 gram/dose powder Take 17 g by mouth 2 (two) times daily.    solifenacin (VESICARE) 10 MG tablet Take 1 tablet (10 mg total) by mouth once daily.     No current facility-administered medications for this visit.       Review of Systems     GENERAL:  No weight loss, malaise or fevers.  HEENT:   No recent changes in vision or hearing  NECK:  Negative for lumps, no difficulty with swallowing.  RESPIRATORY:  Negative for cough, wheezing or shortness of breath, patient denies any recent URI.  CARDIOVASCULAR:  Negative for chest pain or palpitations.  GI:  Negative for abdominal discomfort, blood in stools or black stools or change in bowel habits.  MUSCULOSKELETAL:  See HPI.  SKIN:  Negative for lesions, rash, and itching.  PSYCH:  No mood disorder or recent psychosocial stressors.   HEMATOLOGY/LYMPHOLOGY:  Negative for prolonged bleeding, bruising easily or swollen nodes.    NEURO:   No history of syncope, paralysis, seizures or tremors.  All other reviewed and negative other than HPI.    OBJECTIVE:    Resp 17   Ht 5' 11" (1.803 m)   Wt 102 kg (224 lb 13.9 oz)   BMI 31.36 kg/m²       Physical Exam    GENERAL: Well appearing, in no acute distress, alert and oriented x3.  PSYCH:  Mood and affect appropriate.  SKIN: Skin color, texture, turgor normal, no rashes or lesions.  HEAD/FACE:  Normocephalic, atraumatic. Cranial nerves grossly intact.    CV: RRR with palpation of the radial artery.  PULM: No evidence of respiratory difficulty, symmetric chest rise.  GI:  Soft and non-tender.    BACK: " Straight leg raising in the sitting and supine positions is negative to radicular pain. pain to palpation over the facet joints of the lumbar spine or spinous processes. Normal range of motion without pain reproduction.  EXTREMITIES: Peripheral joint ROM is full and pain free without obvious instability or laxity in all four extremities. No deformities, edema, or skin discoloration. Good capillary refill.  MUSCULOSKELETAL: Able to stand on heels & toes.   hip, and knee provocative maneuvers are negative.  There is no pain with palpation over the sacroiliac joints bilaterally.  Gaenslen's, Distraction/Compression and  FABERs test is negative.  Facet loading test is positive bilaterally.   Bilateral upper and lower extremity strength is normal and symmetric.  No atrophy or tone abnormalities are noted.    RIGHT Lower extremity: Hip flexion 5/5, Hip Abduction 5/5, Hip Adduction 5/5, Knee extension 5/5, Knee flexion 5/5, Ankle dorsiflexion5/5, Extensor hallucis longus 5/5, Ankle plantarflexion 5/5  LEFT Lower extremity:  Hip flexion 5/5, Hip Abduction 5/5,Hip Adduction 5/5, Knee extension 5/5, Knee flexion 5/5, Ankle dorsiflexion 5/5, Extensor hallucis longus 5/5, Ankle plantarflexion 5/5  -Normal testing knee (patellar) jerk and ankle (achilles) jerk    NEURO: Bilateral upper and lower extremity coordination and muscle stretch reflexes are physiologic and symmetric. No loss of sensation is noted.  GAIT: normal.    Imaging:       CT C/A/P 01/03/2023     FINDINGS:  Finding: The size of the heart is within normal limits.  There is no evidence of an acute pulmonary process.  There is no pneumothorax or pleural effusion.     There is moderate generalized atrophy of the pancreas.  The liver, gallbladder, spleen, and adrenals are normal in appearance.  There are hypodense masses associated with both kidneys.  One of the larger ones measures 9 mm and is located off of the lateral aspect of the inferior pole of the right  kidney.  This mass has a Hounsfield measurement of 7.  The ureters and the urinary bladder are normal in appearance.  The prostate is normal in appearance.  The appendix is normal in appearance.  There is a moderate amount of diverticulosis in the descending and sigmoid portions of the colon.  There is no abnormal mass visualized within the gastrointestinal system.  There is no free fluid within the abdomen or pelvis. There is no pneumoperitoneum.  There are old bilateral pars interarticularis fractures of L5.  There is grade 3 anterolisthesis of L5 on S1.  There are moderate degenerative changes between L5 and S1.  There is no abnormal lymphadenopathy based on CT size criteria.     Impression:     1. There is no abnormal mass visualized within the gastrointestinal system.  2. There is no abnormal lymphadenopathy based on CT size criteria.  3. There are hypodense masses associated with both kidneys. One of the larger ones measures 9 mm and is located off of the lateral aspect of the inferior pole of the right kidney.  This mass has a Hounsfield measurement of 7.  This is characteristic of a cyst.  4. There is a moderate amount of diverticulosis in the descending and sigmoid portions of the colon.  There is no abnormal mass visualized within the gastrointestinal system.  5. There are old bilateral pars interarticularis fractures of L5.  There is grade 3 anterolisthesis of L5 on S1. There are moderate degenerative changes between L5 and S1.      ASSESSMENT: 78 y.o. year old male with     1. DDD (degenerative disc disease), lumbar        2. Anterolisthesis of lumbosacral spine        3. Dorsalgia, unspecified        4. Lumbar radiculopathy, chronic  MRI Lumbar Spine Without Contrast    EMG W/ ULTRASOUND AND NERVE CONDUCTION TEST 2 Extremities      5. Lumbar spondylosis  IR Facet Inj Lumbar 2nd Vert Bi    IR Facet Inj Lumbar 1st Vert Bi    Case Request-RAD/Other Procedure Area: Bilateral L3-5 MBB            PLAN:   -  Interventions:  Schedule for bilateral L3-5 lumbar medial branch block to see if this helps with axial back pain.. Explained the risks and benefits of the procedure in detail with the patient today in clinic along with alternative treatment options, and the patient elected to pursue the intervention at this time.      - Anticoagulation use: Yes aspirin  Per JOSE guidelines, for secondary prophylaxis (prior DVT), patient can continue aspirin for lumbar medial branch block.     report:  Reviewed and consistent with medication use as prescribed.    - Medications:  -patient would like to refrain from pharmacologic management at this time.    - Therapy:   We discussed continuing physician directed at home physical therapy to help manage the patient/s painful condition. The patient was counseled that muscle strengthening will improve the long term prognosis in regards to pain and may also help increase range of motion and mobility.     - Imaging: Reviewed available imaging with patient and answered any questions they had regarding study.  MRI lumbar spine to better evaluate pain and weakness    - Consults/Referrals:  Physical Medicine Rehabilitation for lower extremity electromyography studies to help guide diagnosis and treatment    - Records: Obtain old records from outside physicians and imaging    - Follow up visit: return to clinic in 4-6 weeks      The above plan and management options were discussed at length with patient. Patient is in agreement with the above and verbalized understanding.    - I discussed the goals of interventional chronic pain management with the patient on today's visit. We discussed a multimodal and systematic approach to pain.  This includes diagnostic and therapeutic injections, adjuvant pharmacologic treatment, physical therapy, and at times psychiatry.  I emphasized the importance of regular exercise, core strengthening and stretching, diet and weight loss as a cornerstone of long-term  pain management.    - This condition does not require this patient to take time off of work, and the primary goal of our Pain Management services is to improve the patient's functional capacity.  - Patient Questions: Answered all of the patient's questions regarding diagnoses, therapy, treatment and next steps        Debra De MD  Interventional Pain Management  [facility]    Disclaimer:  This note was prepared using voice recognition system and is likely to have sound alike errors that may have been overlooked even after proof reading.  Please call me with any questions

## 2023-09-20 NOTE — PROGRESS NOTES
New Patient Chronic Pain Note (Initial Visit)    Referring Physician: Emerita Bundy    PCP: Avinash Lugo MD    Chief Complaint:   Chief Complaint   Patient presents with    Low-back Pain     Right         SUBJECTIVE:    Daniel Villeda is a 78 y.o. male with past medical history significant for first-degree AV block, type 2 diabetes, hypertension, hyperlipidemia, right bundle branch block, stage 3 chronic kidney disease, history of DVT, history of renal cell carcinoma and gastrointestinal stromal tumor, GERD who presents to the clinic for the evaluation of lower back and right leg pain.  Patient reports pain has been present for over a decade.  He believes pain began in high school when he was taking a ice box up the stairs when he pulled his lower back.  Patient reports injury was further exacerbated when he works for [employer] and was pulling cable through conduit which was uneven.  Today he reports pain which is constant which is rated a 7/10.  Pain is described as aching and throbbing in nature.  Patient reports pain in a bandlike distribution in the lower back which radiates down the lateral and posterior aspect of the right lower extremity in L4-5 distribution to the calf.  Pain is exacerbated with lumbar flexion as well as with ambulation.  Patient reports he is able to ambulate approximately half a block before requiring rest.  Patient reports reduced range of motion in the right ankle and weakness in the right lower extremity.  Pain is improved with rest.  Patient has completed conventional physical therapy 6-10 years prior with no improvement in his pain.  He is continued physician directed physical therapy exercises at home over the last 6 weeks without meaningful improvement in his pain.  Patient has trialed membrane stabilizing agents in the past, he can not recall which in particular, but did not like the cognitive side effects.  Of note patient has received numerous lumbar epidural  "steroid injections, 8-10 years prior with Dr. Hester." Which gave him several years of significant relief.  Patient is interested in trialing interventional therapy.  He would like to refrain from any pharmacologic management.    Of note patient saw Ms. Cisse PAC, neurosurgery 04/10/2023 with the following evaluation      Patient reports significant motor weakness and loss of sensations.  Patient denies night fever/night sweats, urinary incontinence, bowel incontinence, and significant weight loss.      Pain Disability Index Review:         9/21/2023     8:18 AM   Last 3 PDI Scores   Pain Disability Index (PDI) 55       Non-Pharmacologic Treatments:  Physical Therapy/Home Exercise: yes  Ice/Heat:yes  TENS: no  Acupuncture: no  Massage: no  Chiropractic: no    Other: no      Pain Medications:  - Adjuvant Medications: Tylenol (Acetaminophen)  - Anti-Coagulants: Aspirin    Pain Procedures:   DEBBIE: NASRIN    Past Medical History:   Diagnosis Date    CKD stage 3 due to type 2 diabetes mellitus     Deep vein thrombosis     DM (diabetes mellitus) with complications     Gastrointestinal stromal tumor (GIST) 3/9/2021    GERD (gastroesophageal reflux disease)     History of DVT (deep vein thrombosis)     History of renal cell carcinoma 2009    Hyperlipidemia associated with type 2 diabetes mellitus 02/26/2021    Hypertension associated with diabetes     Panlobular emphysema     Thrombocytopenia     Type II diabetes mellitus with renal manifestations      Past Surgical History:   Procedure Laterality Date    CLOSURE OF PERFORATED ULCER OF DUODENUM USING OMENTAL PATCH N/A 3/9/2021    Procedure: CLOSURE, ULCER, PERFORATED, DUODENUM, USING OMENTAL PATCH;  Surgeon: Edin Bradford MD;  Location: [facility] OR;  Service: General;  Laterality: N/A;    COLONOSCOPY N/A 1/5/2022    Procedure: COLONOSCOPY;  Surgeon: Mirta Grover MD;  Location: [facility] ENDO;  Service: Endoscopy;  Laterality: N/A;    ENDOSCOPIC ULTRASOUND OF UPPER " GASTROINTESTINAL TRACT N/A 2/5/2021    Procedure: ULTRASOUND, UPPER GI TRACT, ENDOSCOPIC;  Surgeon: Geremias Brizuela MD;  Location: [facility] ENDO;  Service: Endoscopy;  Laterality: N/A;    ESOPHAGOGASTRODUODENOSCOPY N/A 2/3/2021    Procedure: EGD (ESOPHAGOGASTRODUODENOSCOPY);  Surgeon: Mirta Grover MD;  Location: [facility] ENDO;  Service: Endoscopy;  Laterality: N/A;    ESOPHAGOGASTRODUODENOSCOPY N/A 3/9/2021    Procedure: EGD (ESOPHAGOGASTRODUODENOSCOPY);  Surgeon: Edin Bradford MD;  Location: [facility] OR;  Service: General;  Laterality: N/A;    INJECTION OF ANESTHETIC AGENT INTO TISSUE PLANE DEFINED BY TRANSVERSUS ABDOMINIS MUSCLE  4/17/2023    Procedure: BLOCK, TRANSVERSUS ABDOMINIS PLANE;  Surgeon: Yumiko Rosen DO;  Location: [facility] OR;  Service: General;;    left partial nephrectomy Left 2006    ROBOT-ASSISTED REPAIR OF INCISIONAL HERNIA USING DA PERRI XI Left 4/17/2023    Procedure: XI ROBOTIC REPAIR, HERNIA, INCISIONAL;  Surgeon: Yumiko Rosen DO;  Location: [facility] OR;  Service: General;  Laterality: Left;  Left flank. Position right lateral decubitus. Ernst.    ROBOT-ASSISTED SURGICAL REMOVAL OF STOMACH USING DA PERRI XI N/A 3/9/2021    Procedure: XI ROBOTIC GASTRECTOMY;  Surgeon: Edin Bradford MD;  Location: [facility] OR;  Service: General;  Laterality: N/A;  partial     Review of patient's allergies indicates:  No Known Allergies    Current Outpatient Medications   Medication Sig    acetaminophen (TYLENOL) 650 MG TbSR Take 650 mg by mouth every 8 (eight) hours.    aspirin 81 MG Chew Take 81 mg by mouth once daily.    atorvastatin (LIPITOR) 20 MG tablet Take 1 tablet (20 mg total) by mouth every evening.    cholecalciferol, vitamin D3, (VITAMIN D3) 50 mcg (2,000 unit) Tab Take 2,000 Units by mouth once daily.     fish oil-omega-3 fatty acids 300-1,000 mg capsule Take by mouth once daily.    losartan-hydrochlorothiazide 100-12.5 mg (HYZAAR) 100-12.5 mg Tab Take 1 tablet by mouth once daily.     "magnesium oxide (MAG-OX) 400 mg (241.3 mg magnesium) tablet Take 400 mg by mouth once daily.    metFORMIN (GLUCOPHAGE) 500 MG tablet Take 1 tablet (500 mg total) by mouth 2 (two) times daily with meals.    metoprolol tartrate (LOPRESSOR) 100 MG tablet Take 1 tablet (100 mg total) by mouth 2 (two) times daily.    pantoprazole (PROTONIX) 40 MG tablet Take 1 tablet (40 mg total) by mouth once daily.    polyethylene glycol (GLYCOLAX) 17 gram/dose powder Take 17 g by mouth 2 (two) times daily.    solifenacin (VESICARE) 10 MG tablet Take 1 tablet (10 mg total) by mouth once daily.     No current facility-administered medications for this visit.       Review of Systems     GENERAL:  No weight loss, malaise or fevers.  HEENT:   No recent changes in vision or hearing  NECK:  Negative for lumps, no difficulty with swallowing.  RESPIRATORY:  Negative for cough, wheezing or shortness of breath, patient denies any recent URI.  CARDIOVASCULAR:  Negative for chest pain or palpitations.  GI:  Negative for abdominal discomfort, blood in stools or black stools or change in bowel habits.  MUSCULOSKELETAL:  See HPI.  SKIN:  Negative for lesions, rash, and itching.  PSYCH:  No mood disorder or recent psychosocial stressors.   HEMATOLOGY/LYMPHOLOGY:  Negative for prolonged bleeding, bruising easily or swollen nodes.    NEURO:   No history of syncope, paralysis, seizures or tremors.  All other reviewed and negative other than HPI.    OBJECTIVE:    Resp 17   Ht 5' 11" (1.803 m)   Wt 102 kg (224 lb 13.9 oz)   BMI 31.36 kg/m²       Physical Exam    GENERAL: Well appearing, in no acute distress, alert and oriented x3.  PSYCH:  Mood and affect appropriate.  SKIN: Skin color, texture, turgor normal, no rashes or lesions.  HEAD/FACE:  Normocephalic, atraumatic. Cranial nerves grossly intact.    CV: RRR with palpation of the radial artery.  PULM: No evidence of respiratory difficulty, symmetric chest rise.  GI:  Soft and non-tender.    BACK: " Straight leg raising in the sitting and supine positions is negative to radicular pain. pain to palpation over the facet joints of the lumbar spine or spinous processes. Normal range of motion without pain reproduction.  EXTREMITIES: Peripheral joint ROM is full and pain free without obvious instability or laxity in all four extremities. No deformities, edema, or skin discoloration. Good capillary refill.  MUSCULOSKELETAL: Able to stand on heels & toes.   hip, and knee provocative maneuvers are negative.  There is no pain with palpation over the sacroiliac joints bilaterally.  Gaenslen's, Distraction/Compression and  FABERs test is negative.  Facet loading test is positive bilaterally.   Bilateral upper and lower extremity strength is normal and symmetric.  No atrophy or tone abnormalities are noted.    RIGHT Lower extremity: Hip flexion 5/5, Hip Abduction 5/5, Hip Adduction 5/5, Knee extension 5/5, Knee flexion 5/5, Ankle dorsiflexion5/5, Extensor hallucis longus 5/5, Ankle plantarflexion 5/5  LEFT Lower extremity:  Hip flexion 5/5, Hip Abduction 5/5,Hip Adduction 5/5, Knee extension 5/5, Knee flexion 5/5, Ankle dorsiflexion 5/5, Extensor hallucis longus 5/5, Ankle plantarflexion 5/5  -Normal testing knee (patellar) jerk and ankle (achilles) jerk    NEURO: Bilateral upper and lower extremity coordination and muscle stretch reflexes are physiologic and symmetric. No loss of sensation is noted.  GAIT: normal.    Imaging:       CT C/A/P 01/03/2023     FINDINGS:  Finding: The size of the heart is within normal limits.  There is no evidence of an acute pulmonary process.  There is no pneumothorax or pleural effusion.     There is moderate generalized atrophy of the pancreas.  The liver, gallbladder, spleen, and adrenals are normal in appearance.  There are hypodense masses associated with both kidneys.  One of the larger ones measures 9 mm and is located off of the lateral aspect of the inferior pole of the right  kidney.  This mass has a Hounsfield measurement of 7.  The ureters and the urinary bladder are normal in appearance.  The prostate is normal in appearance.  The appendix is normal in appearance.  There is a moderate amount of diverticulosis in the descending and sigmoid portions of the colon.  There is no abnormal mass visualized within the gastrointestinal system.  There is no free fluid within the abdomen or pelvis. There is no pneumoperitoneum.  There are old bilateral pars interarticularis fractures of L5.  There is grade 3 anterolisthesis of L5 on S1.  There are moderate degenerative changes between L5 and S1.  There is no abnormal lymphadenopathy based on CT size criteria.     Impression:     1. There is no abnormal mass visualized within the gastrointestinal system.  2. There is no abnormal lymphadenopathy based on CT size criteria.  3. There are hypodense masses associated with both kidneys. One of the larger ones measures 9 mm and is located off of the lateral aspect of the inferior pole of the right kidney.  This mass has a Hounsfield measurement of 7.  This is characteristic of a cyst.  4. There is a moderate amount of diverticulosis in the descending and sigmoid portions of the colon.  There is no abnormal mass visualized within the gastrointestinal system.  5. There are old bilateral pars interarticularis fractures of L5.  There is grade 3 anterolisthesis of L5 on S1. There are moderate degenerative changes between L5 and S1.      ASSESSMENT: 78 y.o. year old male with     1. DDD (degenerative disc disease), lumbar        2. Anterolisthesis of lumbosacral spine        3. Dorsalgia, unspecified        4. Lumbar radiculopathy, chronic  MRI Lumbar Spine Without Contrast    EMG W/ ULTRASOUND AND NERVE CONDUCTION TEST 2 Extremities      5. Lumbar spondylosis  IR Facet Inj Lumbar 2nd Vert Bi    IR Facet Inj Lumbar 1st Vert Bi    Case Request-RAD/Other Procedure Area: Bilateral L3-5 MBB            PLAN:   -  Interventions:  Schedule for bilateral L3-5 lumbar medial branch block to see if this helps with axial back pain.. Explained the risks and benefits of the procedure in detail with the patient today in clinic along with alternative treatment options, and the patient elected to pursue the intervention at this time.      - Anticoagulation use: Yes aspirin  Per JOSE guidelines, for secondary prophylaxis (prior DVT), patient can continue aspirin for lumbar medial branch block.     report:  Reviewed and consistent with medication use as prescribed.    - Medications:  -patient would like to refrain from pharmacologic management at this time.    - Therapy:   We discussed continuing physician directed at home physical therapy to help manage the patient/s painful condition. The patient was counseled that muscle strengthening will improve the long term prognosis in regards to pain and may also help increase range of motion and mobility.     - Imaging: Reviewed available imaging with patient and answered any questions they had regarding study.  MRI lumbar spine to better evaluate pain and weakness    - Consults/Referrals:  Physical Medicine Rehabilitation for lower extremity electromyography studies to help guide diagnosis and treatment    - Records: Obtain old records from outside physicians and imaging    - Follow up visit: return to clinic in 4-6 weeks      The above plan and management options were discussed at length with patient. Patient is in agreement with the above and verbalized understanding.    - I discussed the goals of interventional chronic pain management with the patient on today's visit. We discussed a multimodal and systematic approach to pain.  This includes diagnostic and therapeutic injections, adjuvant pharmacologic treatment, physical therapy, and at times psychiatry.  I emphasized the importance of regular exercise, core strengthening and stretching, diet and weight loss as a cornerstone of long-term  pain management.    - This condition does not require this patient to take time off of work, and the primary goal of our Pain Management services is to improve the patient's functional capacity.  - Patient Questions: Answered all of the patient's questions regarding diagnoses, therapy, treatment and next steps        Debra De MD  Interventional Pain Management  [facility]    Disclaimer:  This note was prepared using voice recognition system and is likely to have sound alike errors that may have been overlooked even after proof reading.  Please call me with any questions

## 2023-09-21 ENCOUNTER — TELEPHONE (OUTPATIENT)
Dept: PHYSICAL MEDICINE AND REHAB | Facility: CLINIC | Age: 78
End: 2023-09-21
Payer: MEDICARE

## 2023-09-21 ENCOUNTER — OFFICE VISIT (OUTPATIENT)
Dept: PAIN MEDICINE | Facility: CLINIC | Age: 78
End: 2023-09-21
Payer: MEDICARE

## 2023-09-21 VITALS — RESPIRATION RATE: 17 BRPM | WEIGHT: 224.88 LBS | HEIGHT: 71 IN | BODY MASS INDEX: 31.48 KG/M2

## 2023-09-21 DIAGNOSIS — M47.816 LUMBAR SPONDYLOSIS: ICD-10-CM

## 2023-09-21 DIAGNOSIS — M54.9 DORSALGIA, UNSPECIFIED: ICD-10-CM

## 2023-09-21 DIAGNOSIS — M54.16 LUMBAR RADICULOPATHY, CHRONIC: ICD-10-CM

## 2023-09-21 DIAGNOSIS — M43.17 ANTEROLISTHESIS OF LUMBOSACRAL SPINE: ICD-10-CM

## 2023-09-21 DIAGNOSIS — M51.36 DDD (DEGENERATIVE DISC DISEASE), LUMBAR: Primary | ICD-10-CM

## 2023-09-21 PROCEDURE — 99204 PR OFFICE/OUTPT VISIT, NEW, LEVL IV, 45-59 MIN: ICD-10-PCS | Mod: HCNC,S$GLB,, | Performed by: ANESTHESIOLOGY

## 2023-09-21 PROCEDURE — 3288F PR FALLS RISK ASSESSMENT DOCUMENTED: ICD-10-PCS | Mod: HCNC,CPTII,S$GLB, | Performed by: ANESTHESIOLOGY

## 2023-09-21 PROCEDURE — 99999 PR PBB SHADOW E&M-EST. PATIENT-LVL III: ICD-10-PCS | Mod: PBBFAC,HCNC,, | Performed by: ANESTHESIOLOGY

## 2023-09-21 PROCEDURE — 1101F PR PT FALLS ASSESS DOC 0-1 FALLS W/OUT INJ PAST YR: ICD-10-PCS | Mod: HCNC,CPTII,S$GLB, | Performed by: ANESTHESIOLOGY

## 2023-09-21 PROCEDURE — 3288F FALL RISK ASSESSMENT DOCD: CPT | Mod: HCNC,CPTII,S$GLB, | Performed by: ANESTHESIOLOGY

## 2023-09-21 PROCEDURE — 1125F PR PAIN SEVERITY QUANTIFIED, PAIN PRESENT: ICD-10-PCS | Mod: HCNC,CPTII,S$GLB, | Performed by: ANESTHESIOLOGY

## 2023-09-21 PROCEDURE — 1159F MED LIST DOCD IN RCRD: CPT | Mod: HCNC,CPTII,S$GLB, | Performed by: ANESTHESIOLOGY

## 2023-09-21 PROCEDURE — 99204 OFFICE O/P NEW MOD 45 MIN: CPT | Mod: HCNC,S$GLB,, | Performed by: ANESTHESIOLOGY

## 2023-09-21 PROCEDURE — 1101F PT FALLS ASSESS-DOCD LE1/YR: CPT | Mod: HCNC,CPTII,S$GLB, | Performed by: ANESTHESIOLOGY

## 2023-09-21 PROCEDURE — 1159F PR MEDICATION LIST DOCUMENTED IN MEDICAL RECORD: ICD-10-PCS | Mod: HCNC,CPTII,S$GLB, | Performed by: ANESTHESIOLOGY

## 2023-09-21 PROCEDURE — 99999 PR PBB SHADOW E&M-EST. PATIENT-LVL III: CPT | Mod: PBBFAC,HCNC,, | Performed by: ANESTHESIOLOGY

## 2023-09-21 PROCEDURE — 1160F PR REVIEW ALL MEDS BY PRESCRIBER/CLIN PHARMACIST DOCUMENTED: ICD-10-PCS | Mod: HCNC,CPTII,S$GLB, | Performed by: ANESTHESIOLOGY

## 2023-09-21 PROCEDURE — 1125F AMNT PAIN NOTED PAIN PRSNT: CPT | Mod: HCNC,CPTII,S$GLB, | Performed by: ANESTHESIOLOGY

## 2023-09-21 PROCEDURE — 1160F RVW MEDS BY RX/DR IN RCRD: CPT | Mod: HCNC,CPTII,S$GLB, | Performed by: ANESTHESIOLOGY

## 2023-09-21 NOTE — TELEPHONE ENCOUNTER
----- Message from Shreyas Conrad sent at 9/21/2023 10:03 AM CDT -----  Contact: patient  Type:  Patient Returning Call    Who Called:Daniel Villeda   Who Left Message for Patient: nurse   Does the patient know what this is regarding?: scheduling EMG   Would the patient rather a call back or a response via [facility]?  Call back   Best Call Back Number: [phone]   Additional Information:

## 2023-10-02 ENCOUNTER — HOSPITAL ENCOUNTER (OUTPATIENT)
Dept: RADIOLOGY | Facility: HOSPITAL | Age: 78
Discharge: HOME | End: 2023-10-02
Attending: PHYSICIAN ASSISTANT
Payer: MEDICARE

## 2023-10-02 DIAGNOSIS — M54.16 LUMBAR RADICULOPATHY, CHRONIC: ICD-10-CM

## 2023-10-02 PROCEDURE — 72148 MRI LUMBAR SPINE WITHOUT CONTRAST: ICD-10-PCS | Mod: 26,HCNC,, | Performed by: RADIOLOGY

## 2023-10-02 PROCEDURE — 72148 MRI LUMBAR SPINE W/O DYE: CPT | Mod: TC,HCNC

## 2023-10-02 PROCEDURE — 72148 MRI LUMBAR SPINE W/O DYE: CPT | Mod: 26,HCNC,, | Performed by: RADIOLOGY

## 2023-10-04 ENCOUNTER — OFFICE VISIT (OUTPATIENT)
Dept: PHYSICAL MEDICINE AND REHAB | Facility: CLINIC | Age: 78
End: 2023-10-04
Payer: MEDICARE

## 2023-10-04 VITALS — RESPIRATION RATE: 13 BRPM | WEIGHT: 224 LBS | BODY MASS INDEX: 31.36 KG/M2 | HEIGHT: 71 IN

## 2023-10-04 DIAGNOSIS — M21.371 FOOT DROP, RIGHT FOOT: ICD-10-CM

## 2023-10-04 DIAGNOSIS — M54.16 LUMBAR RADICULOPATHY: Primary | ICD-10-CM

## 2023-10-04 PROCEDURE — 95910 PR NERVE CONDUCTION STUDY; 7-8 STUDIES: ICD-10-PCS | Mod: HCNC,S$GLB,, | Performed by: PHYSICAL MEDICINE & REHABILITATION

## 2023-10-04 PROCEDURE — 99499 UNLISTED E&M SERVICE: CPT | Mod: HCNC,S$GLB,, | Performed by: PHYSICAL MEDICINE & REHABILITATION

## 2023-10-04 PROCEDURE — 95886 PR EMG COMPLETE, W/ NERVE CONDUCTION STUDIES, 5+ MUSCLES: ICD-10-PCS | Mod: HCNC,S$GLB,, | Performed by: PHYSICAL MEDICINE & REHABILITATION

## 2023-10-04 PROCEDURE — 95910 NRV CNDJ TEST 7-8 STUDIES: CPT | Mod: HCNC,S$GLB,, | Performed by: PHYSICAL MEDICINE & REHABILITATION

## 2023-10-04 PROCEDURE — 99999 PR PBB SHADOW E&M-EST. PATIENT-LVL III: ICD-10-PCS | Mod: PBBFAC,HCNC,, | Performed by: PHYSICAL MEDICINE & REHABILITATION

## 2023-10-04 PROCEDURE — 99499 NO LOS: ICD-10-PCS | Mod: HCNC,S$GLB,, | Performed by: PHYSICAL MEDICINE & REHABILITATION

## 2023-10-04 PROCEDURE — 95886 MUSC TEST DONE W/N TEST COMP: CPT | Mod: HCNC,S$GLB,, | Performed by: PHYSICAL MEDICINE & REHABILITATION

## 2023-10-04 PROCEDURE — 99999 PR PBB SHADOW E&M-EST. PATIENT-LVL III: CPT | Mod: PBBFAC,HCNC,, | Performed by: PHYSICAL MEDICINE & REHABILITATION

## 2023-10-04 NOTE — PROGRESS NOTES
[facility]   [address]  [place] [address]  Phone: [phone]        Full Name: Daniel Villeda YOB: 1945  Patient ID: [id number]      Visit Date: 10/4/2023 10:44  Age: 78 Years 0 Months Old  Examining Physician: Anna Marie Bass M.D.  Referring Physician: Dr De  Reason for Referral: lower ext        Chief Complaint   Patient presents with    Back Pain     Into legs       HPI: This is a 78 y.o.  male being seen in clinic today for evaluation of  chronic low back achy pain with numbness/tingling and weakness in his legs-worse on the right but the left is worsening after a fall.  He has to change position for some relief.     History obtained from patient    Past family, medical, social, and surgical history reviewed in chart    Review of Systems:     General- denies lethargy, weight change, fever, chills  Head/neck- denies swallowing difficulties  ENT- denies hearing changes  Cardiovascular-denies chest pain  Pulmonary- denies shortness of breath  GI- denies constipation or bowel incontinence  - denies bladder incontinence  Skin- denies wounds or rashes  Musculoskeletal- + weakness, + pain  Neurologic- + numbness and tingling  Psychiatric- denies depressive or psychotic features, denies anxiety  Lymphatic-denies swelling  Endocrine- denies hypoglycemic symptoms/DM history  All other pertinent systems negative     Physical Examination:  General: Well developed, well nourished male, NAD  HEENT:NCAT EOMI bilaterally   Pulmonary:Normal respirations    Spinal Examination: CERVICAL  Active ROM is within normal limits.  Inspection: No deformity of spinal alignment.  Palpation: No vertebral tenderness to percussion.      Spinal Examination: LUMBAR or THORACIC  Active ROM is limited in all planes.  Inspection: No deformity of spinal alignment.        Bilateral Upper and Lower Extremities:  Pulses are 2+ at radial bilaterally.  Shoulder/Elbow/Wrist/Hand ROM   Hip/Knee/Ankle ROM wnl  except limited at right ankle  Bilateral Extremities show normal capillary refill.  No signs of cyanosis, rubor, edema, skin changes, or dysvascular changes of appendages.  Nails appear intact.    Neurological Exam:  Cranial Nerves:  II-XII grossly intact    Manual Muscle Testing: (Motor 5=normal)  4/5 strength bilateral lower extremities except 1/5 right adf. 3+/5 right hip flex    No focal atrophy is noted of either lower extremity.    Bilateral Reflexes:  No clonus at knee or ankle.    Sensation: tested to light touch  - intact inlegs except dec at right lower ext    Gait: short stride, right foot drop with hip hike    Entire procedure explained to patient prior to proceeding.  Verbal consent obtained      SNC      Nerve / Sites Rec. Site Onset Lat Peak Lat Amp Segments Distance Velocity     ms ms µV  mm m/s   R Sural - Ankle (Calf)      Calf Ankle NR NR NR Calf - Ankle 140 NR   L Sural - Ankle (Calf)      Calf Ankle NR NR NR Calf - Ankle 140 NR   R Superficial peroneal - Ankle      Lat leg Ankle NR NR NR Lat leg - Ankle 140 NR   Superficial peroneal - Ankle      Lat leg Ankle NR NR NR Lat leg - Ankle 140 NR       MNC      Nerve / Sites Muscle Latency Amplitude Duration Rel Amp Segments Distance Lat Diff Velocity     ms mV ms %  mm ms m/s   R Peroneal - EDB      Ankle EDB     Ankle - EDB 80        Fibular head EDB     Fibular head - Ankle         Pop fossa EDB NR NR NR NR Pop fossa - Fibular head  NR    L Peroneal - EDB      Ankle EDB 7.1 1.0 5.9 100 Ankle - EDB 80        Fibular head EDB 15.3 0.9  90.1 Fibular head - Ankle 320 8.1 39      Pop fossa EDB 17.6 0.6 9.0 67 Pop fossa - Fibular head 80 2.3 34   R Tibial - AH      Ankle AH 6.5 0.2 2.2 100 Ankle - Ankle 80        Pop fossa AH 20.1 0.1  46.4 Pop fossa - Ankle 430 13.6 32   L Tibial - AH      Ankle AH 6.4 0.3 4.0 100 Ankle - Ankle 80        Pop fossa AH 21.7 0.1  22.6 Pop fossa - Ankle 430 15.3 28       EMG         EMG Summary Table     Spontaneous MUAP  Recruitment   Muscle IA Fib PSW Fasc Other Dur. Dur Amp Dur Polys Pattern Effort   R. Rectus femoris Incr None None None . _NFT_ _NFT_ N N 1+ sl red Max   R. Tibialis anterior Decr 1+ 1+ None . _NFT_ _NFT_    No Activity Max   R. Gastrocnemius (Medial head) Decr 1+ None None . _NFT_ _NFT_ N N 1+ Reduced Max   R. Vastus lateralis Incr 1+ 1+ None . _NFT_ _NFT_ N N 1+ sl red Max   R. Biceps femoris (short head) Incr None None None . _NFT_ _NFT_ N N 1+ Reduced Max   L. Rectus femoris N None None None . _NFT_ _NFT_ N N 1+ sl red Max   L. Gastrocnemius (Medial head) N None None None . _NFT_ _NFT_ N N N sl red Max   L. Tibialis anterior N None None None . _NFT_ _NFT_ N N 1+ sl red Max   L. Extensor digitorum brevis Incr None None None . _NFT_ _NFT_ N N 1+ Reduced Max   L. Abductor hallucis Incr None None None . _NFT_ _NFT_ N N 1+ Reduced Max                                        INTERPRETATION  -Bilateral superficial peroneal sensorynerve conduction study showed no response  -Bilateral sural sensory nerve conduction study showed no response  -Left peroneal motor nerve conduction study showed prolonged latency, normal amplitude, and dec conduction velocity  -right peroneal showed no response  -Bilateral tibial motor nerve conduction study showed prolonged latency, dec amplitude, and dec conduction velocity      IMPRESSION  ABNORMAL study  2. There is electrodiagnostic evidence of an acute on chronic radiculopathy of the right L2-S1 nerve roots, a subacute on chronic radiculopathy of the left L5,S1 nerve roots, and a chronic radiculopathy of the left L2-4 nerve roots  3. There is clinical evidence of right foot drop    PLAN  Discussed in detail for greater than 30 minutes about diagnosis and treatment plan    1. Follow up with referring provider: Dr. Debra De  2. Handouts on lower ext strengthening, radic, foot drop provided  3. This study is good for one year. If symptoms worsen or do not improve, please  re-consult.    Anna Marie Bass M.D.  Physical Medicine and Rehab

## 2023-10-05 NOTE — PRE-PROCEDURE INSTRUCTIONS
Spoke with patient regarding procedure scheduled on 10.17     Arrival time 0745     Has patient been sick with fever or on antibiotics within the last 7 days? No     Does the patient have any open wounds, sores or rashes? No     Does the patient have any recent fractures? no     Has patient received a vaccination within the last 7 days? No     Received the COVID vaccination? yes     Has the patient stopped all medications as directed? na     Does patient have a pacemaker, defibrillator, or implantable stimulator? No     Does the patient have a ride to and from procedure and someone reliable to remain with patient? wife     Is the patient diabetic? yes     Does the patient have sleep apnea? Or use O2 at home? no     Is the patient receiving sedation? yes     Is the patient instructed to remain NPO beginning at midnight the night before their procedure? yes     Procedure location confirmed with patient? Yes     Covid- Denies signs/symptoms. Instructed to notify PAT/MD if any changes.

## 2023-10-17 ENCOUNTER — HOSPITAL ENCOUNTER (OUTPATIENT)
Facility: HOSPITAL | Age: 78
Discharge: HOME | End: 2023-10-17
Attending: ANESTHESIOLOGY | Admitting: ANESTHESIOLOGY
Payer: MEDICARE

## 2023-10-17 VITALS
OXYGEN SATURATION: 100 % | SYSTOLIC BLOOD PRESSURE: 186 MMHG | HEIGHT: 71 IN | HEART RATE: 58 BPM | WEIGHT: 228.19 LBS | BODY MASS INDEX: 31.95 KG/M2 | RESPIRATION RATE: 16 BRPM | TEMPERATURE: 98 F | DIASTOLIC BLOOD PRESSURE: 90 MMHG

## 2023-10-17 DIAGNOSIS — M47.816 LUMBAR SPONDYLOSIS: ICD-10-CM

## 2023-10-17 LAB — POCT GLUCOSE: 147 MG/DL (ref 70–110)

## 2023-10-17 PROCEDURE — 64494 INJ PARAVERT F JNT L/S 2 LEV: CPT | Mod: 50,HCNC,, | Performed by: ANESTHESIOLOGY

## 2023-10-17 PROCEDURE — 64494 PR INJ DX/THER AGNT PARAVERT FACET JOINT,IMG GUIDE,LUMBAR/SAC, 2ND LEVEL: ICD-10-PCS | Mod: 50,HCNC,, | Performed by: ANESTHESIOLOGY

## 2023-10-17 PROCEDURE — 25000003 PHARM REV CODE 250: Mod: HCNC | Performed by: ANESTHESIOLOGY

## 2023-10-17 PROCEDURE — 64494 INJ PARAVERT F JNT L/S 2 LEV: CPT | Mod: 50,HCNC | Performed by: ANESTHESIOLOGY

## 2023-10-17 PROCEDURE — 82962 GLUCOSE BLOOD TEST: CPT | Mod: HCNC | Performed by: ANESTHESIOLOGY

## 2023-10-17 PROCEDURE — 63600175 PHARM REV CODE 636 W HCPCS: Mod: HCNC | Performed by: ANESTHESIOLOGY

## 2023-10-17 PROCEDURE — 64493 PR INJ DX/THER AGNT PARAVERT FACET JOINT,IMG GUIDE,LUMBAR/SAC,1ST LVL: ICD-10-PCS | Mod: 50,HCNC,, | Performed by: ANESTHESIOLOGY

## 2023-10-17 PROCEDURE — 64493 INJ PARAVERT F JNT L/S 1 LEV: CPT | Mod: 50,HCNC,, | Performed by: ANESTHESIOLOGY

## 2023-10-17 PROCEDURE — 64493 INJ PARAVERT F JNT L/S 1 LEV: CPT | Mod: 50,HCNC | Performed by: ANESTHESIOLOGY

## 2023-10-17 RX ORDER — SODIUM BICARBONATE 1 MEQ/ML
SYRINGE (ML) INTRAVENOUS
Status: DISCONTINUED | OUTPATIENT
Start: 2023-10-17 | End: 2023-10-17 | Stop reason: HOSPADM

## 2023-10-17 RX ORDER — BUPIVACAINE HYDROCHLORIDE 5 MG/ML
INJECTION, SOLUTION EPIDURAL; INTRACAUDAL
Status: DISCONTINUED | OUTPATIENT
Start: 2023-10-17 | End: 2023-10-17 | Stop reason: HOSPADM

## 2023-10-17 RX ORDER — METHYLPREDNISOLONE ACETATE 40 MG/ML
INJECTION, SUSPENSION INTRA-ARTICULAR; INTRALESIONAL; INTRAMUSCULAR; SOFT TISSUE
Status: DISCONTINUED | OUTPATIENT
Start: 2023-10-17 | End: 2023-10-17 | Stop reason: HOSPADM

## 2023-10-17 RX ORDER — FENTANYL CITRATE 50 UG/ML
INJECTION, SOLUTION INTRAMUSCULAR; INTRAVENOUS
Status: DISCONTINUED | OUTPATIENT
Start: 2023-10-17 | End: 2023-10-17 | Stop reason: HOSPADM

## 2023-10-17 NOTE — OP NOTE
Daniel Villeda  78 y.o. male      Vitals:    10/17/23 0855   BP: (!) 180/90   Pulse: (!) 55   Resp: 19   Temp:        Procedure Date 10/17/2023        INFORMED CONSENT: The procedure, risks, benefits and options were discussed with patient. There are no contraindications to the procedure. The patient expressed understanding and agreed to proceed. The personnel performing the procedure was discussed. I verify that I personally obtained consent prior to the start of the procedure and the signed consent can be found on the patient's chart.       Anesthesia:   Conscious sedation provided by M.D    The patient was monitored with continuous pulse oximetry, EKG, and intermittent blood pressure monitors.  The patient was hemodynamically stable throughout the entire process was responsive to voice, and breathing spontaneously.  Supplemental O2 was provided at 2L/min via nasal cannula.  Patient was comfortable for the duration of the procedure. (See nurse documentation and case log for sedation time)    There was a total of 0mg IV Midazolam and 50mcg Fentanyl titrated for the procedure     Pre Procedure diagnosis: Lumbar spondylosis [M47.816]  Post-Procedure diagnosis: SAME     PROCEDURE: bilateral L3,4,5 LUMBAR FACET MEDIAL BRANCH NERVE BLOCK        DESCRIPTION OF PROCEDURE:The patient was brought to the procedure room. After performing time out. IV access was obtained prior to the procedure. The patient was positioned prone on the fluoroscopy table. Continuous hemodynamic monitoring was initiated including blood pressure, EKG, and pulse oximetry. The area of the lumbar spine was prepped chlorhexidine and draped into a sterile field. Fluoroscopy was used to identify the location of the bilateral side L3, L4, and L5 medial branch nerves at the junctions of the superior articular process and the transverse processes of L4, L5, and the sacral ala respectively. Skin anesthesia was achieved using 5 cc of Lidocaine 1% over the  "injection sites. A 22 gauge, 3 1/2" spinal needle was slowly inserted at each level using AP, lateral and oblique fluoroscopic imaging. Negative aspiration for blood or CSF was confirmed.  8 ml bupivacaine 0.25% with 1 mL Decadron was injected at all sites in divided doses. The needles were removed and bleeding was nil. A sterile dressing was applied. No specimens collected. Patient was taken back to the PACU for observation .       Blood Loss: Nill  Specimen: None    Debra De    "

## 2023-10-17 NOTE — DISCHARGE INSTRUCTIONS

## 2023-11-03 ENCOUNTER — LAB VISIT (OUTPATIENT)
Dept: LAB | Facility: HOSPITAL | Age: 78
End: 2023-11-03
Attending: INTERNAL MEDICINE
Payer: MEDICARE

## 2023-11-03 DIAGNOSIS — E11.8 DM (DIABETES MELLITUS) WITH COMPLICATIONS: ICD-10-CM

## 2023-11-03 LAB
ANION GAP SERPL CALC-SCNC: 9 MMOL/L (ref 8–16)
BASOPHILS # BLD AUTO: 0.04 K/UL (ref 0–0.2)
BASOPHILS NFR BLD: 0.8 % (ref 0–1.9)
BUN SERPL-MCNC: 22 MG/DL (ref 8–23)
CALCIUM SERPL-MCNC: 8.7 MG/DL (ref 8.7–10.5)
CHLORIDE SERPL-SCNC: 107 MMOL/L (ref 95–110)
CHOLEST SERPL-MCNC: 88 MG/DL (ref 120–199)
CHOLEST/HDLC SERPL: 3.5 {RATIO} (ref 2–5)
CO2 SERPL-SCNC: 26 MMOL/L (ref 23–29)
CREAT SERPL-MCNC: 1.3 MG/DL (ref 0.5–1.4)
DIFFERENTIAL METHOD: ABNORMAL
EOSINOPHIL # BLD AUTO: 0.1 K/UL (ref 0–0.5)
EOSINOPHIL NFR BLD: 1.1 % (ref 0–8)
ERYTHROCYTE [DISTWIDTH] IN BLOOD BY AUTOMATED COUNT: 13.4 % (ref 11.5–14.5)
EST. GFR  (NO RACE VARIABLE): 55.5 ML/MIN/1.73 M^2
GLUCOSE SERPL-MCNC: 161 MG/DL (ref 70–110)
HCT VFR BLD AUTO: 43.5 % (ref 40–54)
HDLC SERPL-MCNC: 25 MG/DL (ref 40–75)
HDLC SERPL: 28.4 % (ref 20–50)
HGB BLD-MCNC: 13.9 G/DL (ref 14–18)
IMM GRANULOCYTES # BLD AUTO: 0.05 K/UL (ref 0–0.04)
IMM GRANULOCYTES NFR BLD AUTO: 0.9 % (ref 0–0.5)
LDLC SERPL CALC-MCNC: 41.4 MG/DL (ref 63–159)
LYMPHOCYTES # BLD AUTO: 1.7 K/UL (ref 1–4.8)
LYMPHOCYTES NFR BLD: 31.3 % (ref 18–48)
MCH RBC QN AUTO: 28.8 PG (ref 27–31)
MCHC RBC AUTO-ENTMCNC: 32 G/DL (ref 32–36)
MCV RBC AUTO: 90 FL (ref 82–98)
MONOCYTES # BLD AUTO: 0.5 K/UL (ref 0.3–1)
MONOCYTES NFR BLD: 8.5 % (ref 4–15)
NEUTROPHILS # BLD AUTO: 3 K/UL (ref 1.8–7.7)
NEUTROPHILS NFR BLD: 57.4 % (ref 38–73)
NONHDLC SERPL-MCNC: 63 MG/DL
NRBC BLD-RTO: 0 /100 WBC
PLATELET # BLD AUTO: 124 K/UL (ref 150–450)
PMV BLD AUTO: 10.9 FL (ref 9.2–12.9)
POTASSIUM SERPL-SCNC: 4.6 MMOL/L (ref 3.5–5.1)
RBC # BLD AUTO: 4.83 M/UL (ref 4.6–6.2)
SODIUM SERPL-SCNC: 142 MMOL/L (ref 136–145)
TRIGL SERPL-MCNC: 108 MG/DL (ref 30–150)
WBC # BLD AUTO: 5.3 K/UL (ref 3.9–12.7)

## 2023-11-03 PROCEDURE — 36415 COLL VENOUS BLD VENIPUNCTURE: CPT | Mod: HCNC,PO | Performed by: INTERNAL MEDICINE

## 2023-11-03 PROCEDURE — 80048 BASIC METABOLIC PNL TOTAL CA: CPT | Mod: HCNC | Performed by: INTERNAL MEDICINE

## 2023-11-03 PROCEDURE — 83036 HEMOGLOBIN GLYCOSYLATED A1C: CPT | Mod: HCNC | Performed by: INTERNAL MEDICINE

## 2023-11-03 PROCEDURE — 85025 COMPLETE CBC W/AUTO DIFF WBC: CPT | Mod: HCNC | Performed by: INTERNAL MEDICINE

## 2023-11-03 PROCEDURE — 84443 ASSAY THYROID STIM HORMONE: CPT | Mod: HCNC | Performed by: INTERNAL MEDICINE

## 2023-11-03 PROCEDURE — 80061 LIPID PANEL: CPT | Mod: HCNC | Performed by: INTERNAL MEDICINE

## 2023-11-04 LAB
ESTIMATED AVG GLUCOSE: 143 MG/DL (ref 68–131)
HBA1C MFR BLD: 6.6 % (ref 4–5.6)
TSH SERPL DL<=0.005 MIU/L-ACNC: 1.23 UIU/ML (ref 0.4–4)

## 2023-11-06 ENCOUNTER — TELEPHONE (OUTPATIENT)
Dept: HEMATOLOGY/ONCOLOGY | Facility: CLINIC | Age: 78
End: 2023-11-06
Payer: MEDICARE

## 2023-11-06 NOTE — TELEPHONE ENCOUNTER
Returned patient phone call. Scheduled f/u as requested in January. Patient verbalized understanding and had no other issues at this time.

## 2023-11-06 NOTE — TELEPHONE ENCOUNTER
----- Message from Hallie Jroge sent at 11/6/2023 10:33 AM CST -----  Contact: self  Pt is asking for an return call in reference to following up to see if he is needing an apt or not ,please call back at [phone] Thx CJ

## 2023-11-15 ENCOUNTER — OFFICE VISIT (OUTPATIENT)
Dept: INTERNAL MEDICINE | Facility: CLINIC | Age: 78
End: 2023-11-15
Payer: MEDICARE

## 2023-11-15 VITALS
HEIGHT: 71 IN | HEART RATE: 52 BPM | BODY MASS INDEX: 32.23 KG/M2 | SYSTOLIC BLOOD PRESSURE: 140 MMHG | OXYGEN SATURATION: 99 % | DIASTOLIC BLOOD PRESSURE: 90 MMHG | WEIGHT: 230.19 LBS

## 2023-11-15 DIAGNOSIS — N18.30 CKD STAGE 3 DUE TO TYPE 2 DIABETES MELLITUS: ICD-10-CM

## 2023-11-15 DIAGNOSIS — M47.816 LUMBAR SPONDYLOSIS: ICD-10-CM

## 2023-11-15 DIAGNOSIS — I70.0 AORTIC ATHEROSCLEROSIS: ICD-10-CM

## 2023-11-15 DIAGNOSIS — E11.8 DM (DIABETES MELLITUS) WITH COMPLICATIONS: Primary | ICD-10-CM

## 2023-11-15 DIAGNOSIS — E11.22 TYPE 2 DIABETES MELLITUS WITH STAGE 3B CHRONIC KIDNEY DISEASE, WITHOUT LONG-TERM CURRENT USE OF INSULIN: ICD-10-CM

## 2023-11-15 DIAGNOSIS — N18.32 TYPE 2 DIABETES MELLITUS WITH STAGE 3B CHRONIC KIDNEY DISEASE, WITHOUT LONG-TERM CURRENT USE OF INSULIN: ICD-10-CM

## 2023-11-15 DIAGNOSIS — Z85.528 HISTORY OF RENAL CELL CARCINOMA: ICD-10-CM

## 2023-11-15 DIAGNOSIS — E11.69 HYPERLIPIDEMIA ASSOCIATED WITH TYPE 2 DIABETES MELLITUS: ICD-10-CM

## 2023-11-15 DIAGNOSIS — C49.A0 GASTROINTESTINAL STROMAL TUMOR (GIST): ICD-10-CM

## 2023-11-15 DIAGNOSIS — Z86.718 HISTORY OF DVT (DEEP VEIN THROMBOSIS): ICD-10-CM

## 2023-11-15 DIAGNOSIS — Z23 NEED FOR VACCINATION: ICD-10-CM

## 2023-11-15 DIAGNOSIS — E11.22 CKD STAGE 3 DUE TO TYPE 2 DIABETES MELLITUS: ICD-10-CM

## 2023-11-15 DIAGNOSIS — E78.5 HYPERLIPIDEMIA ASSOCIATED WITH TYPE 2 DIABETES MELLITUS: ICD-10-CM

## 2023-11-15 DIAGNOSIS — Z86.010 HISTORY OF COLON POLYPS: ICD-10-CM

## 2023-11-15 DIAGNOSIS — D69.6 THROMBOCYTOPENIA: ICD-10-CM

## 2023-11-15 DIAGNOSIS — E11.59 HYPERTENSION ASSOCIATED WITH DIABETES: ICD-10-CM

## 2023-11-15 DIAGNOSIS — I15.2 HYPERTENSION ASSOCIATED WITH DIABETES: ICD-10-CM

## 2023-11-15 PROCEDURE — 99214 PR OFFICE/OUTPT VISIT, EST, LEVL IV, 30-39 MIN: ICD-10-PCS | Mod: 25,HCNC,S$GLB, | Performed by: INTERNAL MEDICINE

## 2023-11-15 PROCEDURE — 1126F AMNT PAIN NOTED NONE PRSNT: CPT | Mod: HCNC,CPTII,S$GLB, | Performed by: INTERNAL MEDICINE

## 2023-11-15 PROCEDURE — 3288F FALL RISK ASSESSMENT DOCD: CPT | Mod: HCNC,CPTII,S$GLB, | Performed by: INTERNAL MEDICINE

## 2023-11-15 PROCEDURE — G0008 FLU VACCINE - QUADRIVALENT - ADJUVANTED: ICD-10-PCS | Mod: HCNC,S$GLB,, | Performed by: INTERNAL MEDICINE

## 2023-11-15 PROCEDURE — 3077F SYST BP >= 140 MM HG: CPT | Mod: HCNC,CPTII,S$GLB, | Performed by: INTERNAL MEDICINE

## 2023-11-15 PROCEDURE — 90694 FLU VACCINE - QUADRIVALENT - ADJUVANTED: ICD-10-PCS | Mod: HCNC,S$GLB,, | Performed by: INTERNAL MEDICINE

## 2023-11-15 PROCEDURE — G0008 ADMIN INFLUENZA VIRUS VAC: HCPCS | Mod: HCNC,S$GLB,, | Performed by: INTERNAL MEDICINE

## 2023-11-15 PROCEDURE — 1159F PR MEDICATION LIST DOCUMENTED IN MEDICAL RECORD: ICD-10-PCS | Mod: HCNC,CPTII,S$GLB, | Performed by: INTERNAL MEDICINE

## 2023-11-15 PROCEDURE — 1101F PT FALLS ASSESS-DOCD LE1/YR: CPT | Mod: HCNC,CPTII,S$GLB, | Performed by: INTERNAL MEDICINE

## 2023-11-15 PROCEDURE — 1159F MED LIST DOCD IN RCRD: CPT | Mod: HCNC,CPTII,S$GLB, | Performed by: INTERNAL MEDICINE

## 2023-11-15 PROCEDURE — 99999 PR PBB SHADOW E&M-EST. PATIENT-LVL IV: ICD-10-PCS | Mod: PBBFAC,HCNC,, | Performed by: INTERNAL MEDICINE

## 2023-11-15 PROCEDURE — 99214 OFFICE O/P EST MOD 30 MIN: CPT | Mod: 25,HCNC,S$GLB, | Performed by: INTERNAL MEDICINE

## 2023-11-15 PROCEDURE — 1126F PR PAIN SEVERITY QUANTIFIED, NO PAIN PRESENT: ICD-10-PCS | Mod: HCNC,CPTII,S$GLB, | Performed by: INTERNAL MEDICINE

## 2023-11-15 PROCEDURE — 3080F DIAST BP >= 90 MM HG: CPT | Mod: HCNC,CPTII,S$GLB, | Performed by: INTERNAL MEDICINE

## 2023-11-15 PROCEDURE — 3080F PR MOST RECENT DIASTOLIC BLOOD PRESSURE >= 90 MM HG: ICD-10-PCS | Mod: HCNC,CPTII,S$GLB, | Performed by: INTERNAL MEDICINE

## 2023-11-15 PROCEDURE — 1101F PR PT FALLS ASSESS DOC 0-1 FALLS W/OUT INJ PAST YR: ICD-10-PCS | Mod: HCNC,CPTII,S$GLB, | Performed by: INTERNAL MEDICINE

## 2023-11-15 PROCEDURE — 99999 PR PBB SHADOW E&M-EST. PATIENT-LVL IV: CPT | Mod: PBBFAC,HCNC,, | Performed by: INTERNAL MEDICINE

## 2023-11-15 PROCEDURE — 3288F PR FALLS RISK ASSESSMENT DOCUMENTED: ICD-10-PCS | Mod: HCNC,CPTII,S$GLB, | Performed by: INTERNAL MEDICINE

## 2023-11-15 PROCEDURE — 3077F PR MOST RECENT SYSTOLIC BLOOD PRESSURE >= 140 MM HG: ICD-10-PCS | Mod: HCNC,CPTII,S$GLB, | Performed by: INTERNAL MEDICINE

## 2023-11-15 PROCEDURE — 90694 VACC AIIV4 NO PRSRV 0.5ML IM: CPT | Mod: HCNC,S$GLB,, | Performed by: INTERNAL MEDICINE

## 2023-11-15 RX ORDER — OXYBUTYNIN CHLORIDE 5 MG/1
5 TABLET ORAL 2 TIMES DAILY
Qty: 180 TABLET | Refills: 3
Start: 2023-11-15 | End: 2024-11-14

## 2023-11-15 NOTE — PROGRESS NOTES
"HPI:    Patient is an 78-year-old gentleman who comes today for follow-up of diabetes, hypertension, lipids, chronic kidney disease, and history of renal cell carcinoma.  Patient has been doing extremely well.  He denies any chest pains or shortness of breath.  He states his blood pressures been controlled at home.  He has had no hypoglycemic problems.   He denies any other new complaints.    Current meds have been verified and updated per the EMR  Exam:BP (!) 140/90 (BP Location: Right arm)   Pulse (!) 52   Ht 5' 11" (1.803 m)   Wt 104.4 kg (230 lb 2.6 oz)   SpO2 99%   BMI 32.10 kg/m²     Carotids 2+ equal without bruits   Chest clear   Cardiovascular regular rate and rhythm without any significant murmur gallop or rub   Extremities without edema    Lab Results   Component Value Date    WBC 5.30 11/03/2023    HGB 13.9 (L) 11/03/2023    HCT 43.5 11/03/2023     (L) 11/03/2023    CHOL 88 (L) 11/03/2023    TRIG 108 11/03/2023    HDL 25 (L) 11/03/2023    ALT 33 05/03/2023    AST 22 05/03/2023     11/03/2023    K 4.6 11/03/2023     11/03/2023    CREATININE 1.3 11/03/2023    BUN 22 11/03/2023    CO2 26 11/03/2023    TSH 1.232 11/03/2023    HGBA1C 6.6 (H) 11/03/2023       Impression:   Diabetes, hypertension, lipids, chronic kidney disease all very well controlled on current therapy.    History of renal cell carcinoma, followed by his  urologist and oncologist.    Other medical problems as outlined below, stable  Patient Active Problem List   Diagnosis    RBBB    First degree AV block    Gastrointestinal stromal tumor (GIST)    DM (diabetes mellitus) with complications    Type II diabetes mellitus with renal manifestations    Hypertension associated with diabetes    Hyperlipidemia associated with type 2 diabetes mellitus    History of renal cell carcinoma    History of DVT (deep vein thrombosis)    Thrombocytopenia    CKD stage 3 due to type 2 diabetes mellitus    History of colon polyps    Aortic " atherosclerosis    Renal cell carcinoma    Benign prostatic hyperplasia with urinary frequency    Lumbar spondylosis       Plan:  Orders Placed This Encounter    Influenza - Quadrivalent (Adjuvanted)    Comprehensive Metabolic Panel    Lipid Panel    TSH    CBC Auto Differential    Hemoglobin A1C    Microalbumin/Creatinine Ratio, Urine    oxybutynin (DITROPAN) 5 MG Tab      Patient was given high-dose influenza vaccine today.  He will be seen again in 6 months with above lab work.  Medications remain the same.    This note is generated with speech recognition software and is subject to transcription error and sound alike phrases that may be missed by proofreading.

## 2023-11-16 ENCOUNTER — TELEPHONE (OUTPATIENT)
Dept: HEMATOLOGY/ONCOLOGY | Facility: CLINIC | Age: 78
End: 2023-11-16
Payer: MEDICARE

## 2023-11-16 NOTE — TELEPHONE ENCOUNTER
Returned patient phone call and spoke with wife. Informed her to ct scan date, time, and location. Patient spouse verbalized understanding and had no other issues at this time.

## 2023-11-16 NOTE — TELEPHONE ENCOUNTER
----- Message from Evita Sullivan MA sent at 11/15/2023  4:36 PM CST -----  Regarding: FW: CT scan    ----- Message -----  From: Alina Zurita  Sent: 11/15/2023  11:03 AM CST  To: [facility]  Subject: CT scan                                          Name of who is calling:     Daniel    What is the request in detail: Pt is requesting a call back in ref to no CT appt scheduled and usually it is one before appt. Please clarify      Can the clinic reply by [facility]:no      What number to call back if not [facility]: [phone]

## 2023-12-18 RX ORDER — LOSARTAN POTASSIUM AND HYDROCHLOROTHIAZIDE 12.5; 1 MG/1; MG/1
1 TABLET ORAL DAILY
Qty: 90 TABLET | Refills: 3 | Status: SHIPPED | OUTPATIENT
Start: 2023-12-18

## 2024-01-09 ENCOUNTER — TELEPHONE (OUTPATIENT)
Dept: HEMATOLOGY/ONCOLOGY | Facility: CLINIC | Age: 79
End: 2024-01-09
Payer: MEDICARE

## 2024-01-09 NOTE — TELEPHONE ENCOUNTER
Attempted to contact patient. Patient wife stated that he had just left and she could reschedule appointment for him. Offered next available at [facility] per wife request. Reviewed date, time, and location. Patient wife verbalized understanding and had no other issues at this time.

## 2024-01-10 ENCOUNTER — HOSPITAL ENCOUNTER (OUTPATIENT)
Dept: RADIOLOGY | Facility: HOSPITAL | Age: 79
Discharge: HOME | End: 2024-01-10
Attending: INTERNAL MEDICINE
Payer: MEDICARE

## 2024-01-10 DIAGNOSIS — C49.A0 GASTROINTESTINAL STROMAL TUMOR (GIST): ICD-10-CM

## 2024-01-10 DIAGNOSIS — N18.31 STAGE 3A CHRONIC KIDNEY DISEASE: ICD-10-CM

## 2024-01-10 PROCEDURE — 74177 CT ABD & PELVIS W/CONTRAST: CPT | Mod: 26,,, | Performed by: RADIOLOGY

## 2024-01-10 PROCEDURE — 71260 CT THORAX DX C+: CPT | Mod: 26,,, | Performed by: RADIOLOGY

## 2024-01-10 PROCEDURE — 74177 CT ABD & PELVIS W/CONTRAST: CPT | Mod: TC

## 2024-01-10 PROCEDURE — 25500020 PHARM REV CODE 255: Performed by: INTERNAL MEDICINE

## 2024-01-10 PROCEDURE — A9698 NON-RAD CONTRAST MATERIALNOC: HCPCS | Performed by: INTERNAL MEDICINE

## 2024-01-10 RX ADMIN — IOHEXOL 100 ML: 350 INJECTION, SOLUTION INTRAVENOUS at 01:01

## 2024-01-10 RX ADMIN — IOHEXOL 1000 ML: 9 SOLUTION ORAL at 01:01

## 2024-02-06 ENCOUNTER — OFFICE VISIT (OUTPATIENT)
Dept: HEMATOLOGY/ONCOLOGY | Facility: CLINIC | Age: 79
End: 2024-02-06
Payer: MEDICARE

## 2024-02-06 VITALS
HEIGHT: 72 IN | BODY MASS INDEX: 31.65 KG/M2 | TEMPERATURE: 99 F | OXYGEN SATURATION: 97 % | HEART RATE: 58 BPM | DIASTOLIC BLOOD PRESSURE: 70 MMHG | WEIGHT: 233.69 LBS | SYSTOLIC BLOOD PRESSURE: 125 MMHG

## 2024-02-06 DIAGNOSIS — E11.22 CKD STAGE 3 DUE TO TYPE 2 DIABETES MELLITUS: ICD-10-CM

## 2024-02-06 DIAGNOSIS — Z85.528 HISTORY OF RENAL CELL CARCINOMA: ICD-10-CM

## 2024-02-06 DIAGNOSIS — C49.A0 GASTROINTESTINAL STROMAL TUMOR (GIST): Primary | ICD-10-CM

## 2024-02-06 DIAGNOSIS — N18.30 CKD STAGE 3 DUE TO TYPE 2 DIABETES MELLITUS: ICD-10-CM

## 2024-02-06 DIAGNOSIS — D69.6 THROMBOCYTOPENIA: ICD-10-CM

## 2024-02-06 DIAGNOSIS — Z86.718 HISTORY OF DVT (DEEP VEIN THROMBOSIS): ICD-10-CM

## 2024-02-06 DIAGNOSIS — I70.0 AORTIC ATHEROSCLEROSIS: ICD-10-CM

## 2024-02-06 PROBLEM — C64.9 RENAL CELL CARCINOMA: Status: RESOLVED | Noted: 2023-03-03 | Resolved: 2024-02-06

## 2024-02-06 PROCEDURE — 3078F DIAST BP <80 MM HG: CPT | Mod: CPTII,S$GLB,, | Performed by: INTERNAL MEDICINE

## 2024-02-06 PROCEDURE — 99999 PR PBB SHADOW E&M-EST. PATIENT-LVL IV: CPT | Mod: PBBFAC,,, | Performed by: INTERNAL MEDICINE

## 2024-02-06 PROCEDURE — 1160F RVW MEDS BY RX/DR IN RCRD: CPT | Mod: CPTII,S$GLB,, | Performed by: INTERNAL MEDICINE

## 2024-02-06 PROCEDURE — 3072F LOW RISK FOR RETINOPATHY: CPT | Mod: CPTII,S$GLB,, | Performed by: INTERNAL MEDICINE

## 2024-02-06 PROCEDURE — 1125F AMNT PAIN NOTED PAIN PRSNT: CPT | Mod: CPTII,S$GLB,, | Performed by: INTERNAL MEDICINE

## 2024-02-06 PROCEDURE — 1101F PT FALLS ASSESS-DOCD LE1/YR: CPT | Mod: CPTII,S$GLB,, | Performed by: INTERNAL MEDICINE

## 2024-02-06 PROCEDURE — 3288F FALL RISK ASSESSMENT DOCD: CPT | Mod: CPTII,S$GLB,, | Performed by: INTERNAL MEDICINE

## 2024-02-06 PROCEDURE — 1159F MED LIST DOCD IN RCRD: CPT | Mod: CPTII,S$GLB,, | Performed by: INTERNAL MEDICINE

## 2024-02-06 PROCEDURE — 99214 OFFICE O/P EST MOD 30 MIN: CPT | Mod: S$GLB,,, | Performed by: INTERNAL MEDICINE

## 2024-02-06 PROCEDURE — 3074F SYST BP LT 130 MM HG: CPT | Mod: CPTII,S$GLB,, | Performed by: INTERNAL MEDICINE

## 2024-02-06 NOTE — PROGRESS NOTES
Subjective:       Patient ID: Daniel Villeda is a 78 y.o. male.    Chief Complaint: Results and Cancer    HPI:  78-year-old male history of stage I gastrointestinal stromal tumor recent CT chest abdomen pelvis completed 2024.  Patient returns for review ECOG status 2 planned nerve ablation of in back in next month    Past Medical History:   Diagnosis Date    CKD stage 3 due to type 2 diabetes mellitus     Deep vein thrombosis     DM (diabetes mellitus) with complications     Gastrointestinal stromal tumor (GIST) 3/9/2021    GERD (gastroesophageal reflux disease)     History of DVT (deep vein thrombosis)     History of renal cell carcinoma     Hyperlipidemia associated with type 2 diabetes mellitus 2021    Hypertension associated with diabetes     Panlobular emphysema     Thrombocytopenia     Type II diabetes mellitus with renal manifestations      Family History   Problem Relation Age of Onset    Stroke Mother     Hodgkin's lymphoma Father      Social History     Socioeconomic History    Marital status:    Tobacco Use    Smoking status: Former     Current packs/day: 0.00     Average packs/day: 1.5 packs/day for 40.0 years (60.0 ttl pk-yrs)     Types: Cigarettes     Start date: 1965     Quit date: 2005     Years since quittin.8    Smokeless tobacco: Never   Substance and Sexual Activity    Alcohol use: Not Currently    Drug use: Never     Past Surgical History:   Procedure Laterality Date    CLOSURE OF PERFORATED ULCER OF DUODENUM USING OMENTAL PATCH N/A 3/9/2021    Procedure: CLOSURE, ULCER, PERFORATED, DUODENUM, USING OMENTAL PATCH;  Surgeon: Edin Bradford MD;  Location: [facility] OR;  Service: General;  Laterality: N/A;    COLONOSCOPY N/A 2022    Procedure: COLONOSCOPY;  Surgeon: Mirta Grover MD;  Location: [facility] ENDO;  Service: Endoscopy;  Laterality: N/A;    ENDOSCOPIC ULTRASOUND OF UPPER GASTROINTESTINAL TRACT N/A 2021    Procedure: ULTRASOUND, UPPER GI TRACT,  ENDOSCOPIC;  Surgeon: Geremias Brizuela MD;  Location: [facility] ENDO;  Service: Endoscopy;  Laterality: N/A;    ESOPHAGOGASTRODUODENOSCOPY N/A 2/3/2021    Procedure: EGD (ESOPHAGOGASTRODUODENOSCOPY);  Surgeon: Mirta Grover MD;  Location: [facility] ENDO;  Service: Endoscopy;  Laterality: N/A;    ESOPHAGOGASTRODUODENOSCOPY N/A 3/9/2021    Procedure: EGD (ESOPHAGOGASTRODUODENOSCOPY);  Surgeon: Edin Bradford MD;  Location: [facility] OR;  Service: General;  Laterality: N/A;    INJECTION OF ANESTHETIC AGENT AROUND MEDIAL BRANCH NERVES INNERVATING LUMBAR FACET JOINT Bilateral 10/17/2023    Procedure: Bilateral L3-5 MBB;  Surgeon: Debra De MD;  Location: [facility] MGT;  Service: Pain Management;  Laterality: Bilateral;    INJECTION OF ANESTHETIC AGENT INTO TISSUE PLANE DEFINED BY TRANSVERSUS ABDOMINIS MUSCLE  4/17/2023    Procedure: BLOCK, TRANSVERSUS ABDOMINIS PLANE;  Surgeon: Yumiko Rosen DO;  Location: [facility];  Service: General;;    left partial nephrectomy Left 2006    ROBOT-ASSISTED REPAIR OF INCISIONAL HERNIA USING DA PERRI XI Left 4/17/2023    Procedure: XI ROBOTIC REPAIR, HERNIA, INCISIONAL;  Surgeon: Yumiko Rosen DO;  Location: [facility];  Service: General;  Laterality: Left;  Left flank. Position right lateral decubitus. Ernst.    ROBOT-ASSISTED SURGICAL REMOVAL OF STOMACH USING DA PERRI XI N/A 3/9/2021    Procedure: XI ROBOTIC GASTRECTOMY;  Surgeon: Edin Bradford MD;  Location: [facility];  Service: General;  Laterality: N/A;  partial       Labs:  Lab Results   Component Value Date    WBC 4.77 01/10/2024    HGB 13.9 (L) 01/10/2024    HCT 41.8 01/10/2024    MCV 88 01/10/2024     (L) 01/10/2024     BMP  Lab Results   Component Value Date     01/10/2024    K 4.4 01/10/2024     01/10/2024    CO2 26 01/10/2024    BUN 19 01/10/2024    CREATININE 1.2 01/10/2024    CALCIUM 9.0 01/10/2024    ANIONGAP 11 01/10/2024    ESTGFRAFRICA 51 (A) 05/10/2022    EGFRNONAA 44 (A) 05/10/2022     Lab  Results   Component Value Date    ALT 25 01/10/2024    AST 26 01/10/2024    ALKPHOS 62 01/10/2024    BILITOT 1.0 01/10/2024       Lab Results   Component Value Date    IRON 77 07/06/2021    TIBC 327 07/06/2021    FERRITIN 128 07/06/2021     Lab Results   Component Value Date    VELBRXOG15 355 07/06/2021     Lab Results   Component Value Date    FOLATE 5.8 07/06/2021     Lab Results   Component Value Date    TSH 1.232 11/03/2023         Review of Systems   Constitutional:  Negative for activity change, appetite change, chills, diaphoresis, fatigue, fever and unexpected weight change.   HENT:  Negative for congestion, dental problem, drooling, ear discharge, ear pain, facial swelling, hearing loss, mouth sores, nosebleeds, postnasal drip, rhinorrhea, sinus pressure, sneezing, sore throat, tinnitus, trouble swallowing and voice change.    Eyes:  Negative for photophobia, pain, discharge, redness, itching and visual disturbance.   Respiratory:  Negative for apnea, cough, choking, chest tightness, shortness of breath, wheezing and stridor.    Cardiovascular:  Negative for chest pain, palpitations and leg swelling.   Gastrointestinal:  Negative for abdominal distention, abdominal pain, anal bleeding, blood in stool, constipation, diarrhea, nausea, rectal pain and vomiting.   Endocrine: Negative for cold intolerance, heat intolerance, polydipsia, polyphagia and polyuria.   Genitourinary:  Negative for decreased urine volume, difficulty urinating, dysuria, enuresis, flank pain, frequency, genital sores, hematuria, penile discharge, penile pain, penile swelling, scrotal swelling, testicular pain and urgency.   Musculoskeletal:  Positive for back pain and gait problem. Negative for arthralgias, joint swelling, myalgias, neck pain and neck stiffness.   Skin:  Negative for color change, pallor, rash and wound.   Allergic/Immunologic: Negative for environmental allergies, food allergies and immunocompromised state.    Neurological:  Negative for dizziness, tremors, seizures, syncope, facial asymmetry, speech difficulty, weakness, light-headedness, numbness and headaches.   Hematological:  Negative for adenopathy. Does not bruise/bleed easily.   Psychiatric/Behavioral:  Negative for agitation, behavioral problems, confusion, decreased concentration, dysphoric mood, hallucinations, self-injury, sleep disturbance and suicidal ideas. The patient is not nervous/anxious and is not hyperactive.        Objective:      Physical Exam  Vitals reviewed.   Constitutional:       General: He is not in acute distress.     Appearance: He is well-developed. He is not diaphoretic.   HENT:      Head: Normocephalic.      Right Ear: External ear normal.      Left Ear: External ear normal.      Nose: Nose normal.      Right Sinus: No maxillary sinus tenderness or frontal sinus tenderness.      Left Sinus: No maxillary sinus tenderness or frontal sinus tenderness.      Mouth/Throat:      Pharynx: No oropharyngeal exudate.   Eyes:      General: Lids are normal. No scleral icterus.        Right eye: No discharge.         Left eye: No discharge.      Extraocular Movements:      Right eye: Normal extraocular motion.      Left eye: Normal extraocular motion.      Conjunctiva/sclera:      Right eye: Right conjunctiva is not injected. No hemorrhage.     Left eye: Left conjunctiva is not injected. No hemorrhage.     Pupils: Pupils are equal, round, and reactive to light.   Neck:      Thyroid: No thyromegaly.      Vascular: No JVD.      Trachea: No tracheal deviation.   Cardiovascular:      Rate and Rhythm: Normal rate.   Pulmonary:      Effort: Pulmonary effort is normal. No respiratory distress.      Breath sounds: No stridor.   Abdominal:      General: Bowel sounds are normal.      Palpations: Abdomen is soft. There is no hepatomegaly, splenomegaly or mass.      Tenderness: There is no abdominal tenderness.   Musculoskeletal:         General: No tenderness.  Normal range of motion.      Cervical back: Normal range of motion and neck supple.   Lymphadenopathy:      Head:      Right side of head: No posterior auricular or occipital adenopathy.      Left side of head: No posterior auricular or occipital adenopathy.      Cervical: No cervical adenopathy.      Right cervical: No superficial, deep or posterior cervical adenopathy.     Left cervical: No superficial, deep or posterior cervical adenopathy.      Upper Body:      Right upper body: No supraclavicular adenopathy.      Left upper body: No supraclavicular adenopathy.   Skin:     General: Skin is dry.      Findings: No erythema or rash.      Nails: There is no clubbing.   Neurological:      Mental Status: He is alert and oriented to person, place, and time.      Cranial Nerves: No cranial nerve deficit.      Coordination: Coordination normal.      Gait: Gait abnormal.   Psychiatric:         Behavior: Behavior normal.         Thought Content: Thought content normal.         Judgment: Judgment normal.             Assessment:      1. Gastrointestinal stromal tumor (GIST)    2. Thrombocytopenia    3. CKD stage 3 due to type 2 diabetes mellitus    4. History of DVT (deep vein thrombosis)    5. History of renal cell carcinoma    6. Aortic atherosclerosis           Med Onc Chart Routing      Follow up with physician . Return to clinic in 1 year with CBC CMP C-reactive protein and LDH prior   Follow up with [facility]    Infusion scheduling note    Injection scheduling note    Labs    Imaging    Pharmacy appointment    Other referrals                   Plan:     Results of CT demonstrates no evidence of 2nd malignancy.  Atherosclerosis noted.  Patient is return in 1 year with standing labs.  CBC CMP C-reactive protein and LDH prior can be done within 2 months of any laboratory studies done by PCP.        Artie Kolb Jr, MD FACP

## 2024-02-12 RX ORDER — ATORVASTATIN CALCIUM 20 MG/1
20 TABLET, FILM COATED ORAL NIGHTLY
Qty: 52 TABLET | Refills: 3 | Status: SHIPPED | OUTPATIENT
Start: 2024-02-12

## 2024-02-21 NOTE — H&P (VIEW-ONLY)
Established Patient Chronic Pain Note     Referring Physician: No ref. provider found    PCP: Avinash Lugo MD    Chief Complaint:   Chief Complaint   Patient presents with    Low-back Pain        SUBJECTIVE:  Interval history 02/22/2024  Patient presents status post bilateral L3-5 lumbar medial branch block 10/17/2023 and for MRI lumbar spine review.  EMG ordered, not completed.  Patient reports 90% relief lasting 2 days in duration following his lumbar medial branch block he does report pain is improved from baseline.  Today he reports pain is rated a 6/10.  Patient reports pain in a bandlike distribution in the lower back.  He does report intermittent radiation into the right buttock and periodically down the right lower extremity in L5-S1 distribution to the calf.  Majority, 90% of his pain remains axial in the lower back.  Patient has continued physician directed physical therapy exercises over the last 8 weeks from 12- 02/22/2024 with marginal improvement in pain, range of motion and functionality.  Patient continues to ambulate with a cane to help offload weight in the right lower extremity.  He does endorse associated weakness in the right lower extremity associated with his pain.  Of note patient reports receiving a series of 3 epidurals with , several years prior which gave him significant relief of radicular pain lasting several months in duration and improvement in ambulation.  Today he emphasizes majority of his pain remains in the lower back and is axial in nature.      HPI 09/21/2023  Daniel Villeda is a 78 y.o. male with past medical history significant for first-degree AV block, type 2 diabetes, hypertension, hyperlipidemia, right bundle branch block, stage 3 chronic kidney disease, history of DVT, history of renal cell carcinoma and gastrointestinal stromal tumor, GERD who presents to the clinic for the evaluation of lower back and right leg pain.  Patient reports pain has  "been present for over a decade.  He believes pain began in high school when he was taking a ice box up the stairs when he pulled his lower back.  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  Today he reports pain which is constant which is rated a 7/10.  Pain is described as aching and throbbing in nature.  Patient reports pain in a bandlike distribution in the lower back which radiates down the lateral and posterior aspect of the right lower extremity in L4-5 distribution to the calf.  Pain is exacerbated with lumbar flexion as well as with ambulation.  Patient reports he is able to ambulate approximately half a block before requiring rest.  Patient reports reduced range of motion in the right ankle and weakness in the right lower extremity.  Pain is improved with rest.  Patient has completed conventional physical therapy 6-10 years prior with no improvement in his pain.  He is continued physician directed physical therapy exercises at home over the last 6 weeks without meaningful improvement in his pain.  Patient has trialed membrane stabilizing agents in the past, he can not recall which in particular, but did not like the cognitive side effects.  Of note patient has received numerous lumbar epidural steroid injections, 8-10 years prior with Dr. Hester." Which gave him several years of significant relief.  Patient is interested in trialing interventional therapy.  He would like to refrain from any pharmacologic management.    Of note patient saw DERRICK Garduno, neurosurgery 04/10/2023 with the following evaluation      Patient reports significant motor weakness and loss of sensations.  Patient denies night fever/night sweats, urinary incontinence, bowel incontinence, and significant weight loss.      Pain Disability Index Review:         2/22/2024     9:29 AM 9/21/2023     8:18 AM   Last 3 PDI Scores   Pain Disability " Index (PDI) 36 55       Non-Pharmacologic Treatments:  Physical Therapy/Home Exercise: yes  Ice/Heat:yes  TENS: no  Acupuncture: no  Massage: no  Chiropractic: no    Other: no      Pain Medications:  - Adjuvant Medications: Tylenol (Acetaminophen)  - Anti-Coagulants: Aspirin    Pain Procedures:   Dr. De:  -10/17/2023: Bilateral L3-5 lumbar medial branch block      DEBBIE: NASRIN    Past Medical History:   Diagnosis Date    CKD stage 3 due to type 2 diabetes mellitus     Deep vein thrombosis     DM (diabetes mellitus) with complications     Gastrointestinal stromal tumor (GIST) 3/9/2021    GERD (gastroesophageal reflux disease)     History of DVT (deep vein thrombosis)     History of renal cell carcinoma 2009    Hyperlipidemia associated with type 2 diabetes mellitus 02/26/2021    Hypertension associated with diabetes     Panlobular emphysema     Thrombocytopenia     Type II diabetes mellitus with renal manifestations      Past Surgical History:   Procedure Laterality Date    CLOSURE OF PERFORATED ULCER OF DUODENUM USING OMENTAL PATCH N/A 3/9/2021    Procedure: CLOSURE, ULCER, PERFORATED, DUODENUM, USING OMENTAL PATCH;  Surgeon: Edin Bradford MD;  Location: [facility] OR;  Service: General;  Laterality: N/A;    COLONOSCOPY N/A 1/5/2022    Procedure: COLONOSCOPY;  Surgeon: Mirta Grover MD;  Location: [facility];  Service: Endoscopy;  Laterality: N/A;    ENDOSCOPIC ULTRASOUND OF UPPER GASTROINTESTINAL TRACT N/A 2/5/2021    Procedure: ULTRASOUND, UPPER GI TRACT, ENDOSCOPIC;  Surgeon: Geremias Brizuela MD;  Location: [facility];  Service: Endoscopy;  Laterality: N/A;    ESOPHAGOGASTRODUODENOSCOPY N/A 2/3/2021    Procedure: EGD (ESOPHAGOGASTRODUODENOSCOPY);  Surgeon: Mirta Grover MD;  Location: [facility] ENDO;  Service: Endoscopy;  Laterality: N/A;    ESOPHAGOGASTRODUODENOSCOPY N/A 3/9/2021    Procedure: EGD (ESOPHAGOGASTRODUODENOSCOPY);  Surgeon: Edin Bradford MD;  Location: [facility] OR;  Service: General;  Laterality:  N/A;    INJECTION OF ANESTHETIC AGENT AROUND MEDIAL BRANCH NERVES INNERVATING LUMBAR FACET JOINT Bilateral 10/17/2023    Procedure: Bilateral L3-5 MBB;  Surgeon: Debra De MD;  Location: [facility];  Service: Pain Management;  Laterality: Bilateral;    INJECTION OF ANESTHETIC AGENT INTO TISSUE PLANE DEFINED BY TRANSVERSUS ABDOMINIS MUSCLE  4/17/2023    Procedure: BLOCK, TRANSVERSUS ABDOMINIS PLANE;  Surgeon: Yumiko Rosen DO;  Location: [facility] OR;  Service: General;;    left partial nephrectomy Left 2006    ROBOT-ASSISTED REPAIR OF INCISIONAL HERNIA USING DA PERRI XI Left 4/17/2023    Procedure: XI ROBOTIC REPAIR, HERNIA, INCISIONAL;  Surgeon: Yumiko Rosen DO;  Location: [facility] OR;  Service: General;  Laterality: Left;  Left flank. Position right lateral decubitus. Ernst.    ROBOT-ASSISTED SURGICAL REMOVAL OF STOMACH USING DA PERRI XI N/A 3/9/2021    Procedure: XI ROBOTIC GASTRECTOMY;  Surgeon: Edin Bradford MD;  Location: [facility] OR;  Service: General;  Laterality: N/A;  partial     Review of patient's allergies indicates:  No Known Allergies    Current Outpatient Medications   Medication Sig    acetaminophen (TYLENOL) 650 MG TbSR Take 650 mg by mouth every 8 (eight) hours.    aspirin 81 MG Chew Take 81 mg by mouth once daily.    atorvastatin (LIPITOR) 20 MG tablet Take 1 tablet (20 mg total) by mouth every evening.    cholecalciferol, vitamin D3, (VITAMIN D3) 50 mcg (2,000 unit) Tab Take 2,000 Units by mouth once daily.     fish oil-omega-3 fatty acids 300-1,000 mg capsule Take by mouth once daily.    losartan-hydrochlorothiazide 100-12.5 mg (HYZAAR) 100-12.5 mg Tab Take 1 tablet by mouth once daily.    magnesium oxide (MAG-OX) 400 mg (241.3 mg magnesium) tablet Take 400 mg by mouth once daily.    metFORMIN (GLUCOPHAGE) 500 MG tablet Take 1 tablet (500 mg total) by mouth 2 (two) times daily with meals.    metoprolol tartrate (LOPRESSOR) 100 MG tablet Take 1 tablet (100 mg total) by mouth 2 (two)  "times daily.    oxybutynin (DITROPAN) 5 MG Tab Take 1 tablet (5 mg total) by mouth 2 (two) times daily.    pantoprazole (PROTONIX) 40 MG tablet Take 1 tablet (40 mg total) by mouth once daily.    polyethylene glycol (GLYCOLAX) 17 gram/dose powder Take 17 g by mouth 2 (two) times daily.     No current facility-administered medications for this visit.       Review of Systems     GENERAL:  No weight loss, malaise or fevers.  HEENT:   No recent changes in vision or hearing  NECK:  Negative for lumps, no difficulty with swallowing.  RESPIRATORY:  Negative for cough, wheezing or shortness of breath, patient denies any recent URI.  CARDIOVASCULAR:  Negative for chest pain or palpitations.  GI:  Negative for abdominal discomfort, blood in stools or black stools or change in bowel habits.  MUSCULOSKELETAL:  See HPI.  SKIN:  Negative for lesions, rash, and itching.  PSYCH:  No mood disorder or recent psychosocial stressors.   HEMATOLOGY/LYMPHOLOGY:  Negative for prolonged bleeding, bruising easily or swollen nodes.    NEURO:   No history of syncope, paralysis, seizures or tremors.  All other reviewed and negative other than HPI.    OBJECTIVE:    BP (!) 144/92   Pulse (P) 63   Resp 17   Ht 5' 11" (1.803 m)   Wt 106 kg (233 lb 11 oz)   BMI 32.59 kg/m²       Physical Exam    GENERAL: Well appearing, in no acute distress, alert and oriented x3.  PSYCH:  Mood and affect appropriate.  SKIN: Skin color, texture, turgor normal, no rashes or lesions.  HEAD/FACE:  Normocephalic, atraumatic. Cranial nerves grossly intact.    CV: RRR with palpation of the radial artery.  PULM: No evidence of respiratory difficulty, symmetric chest rise.  GI:  Soft and non-tender.    BACK: Straight leg raising in the sitting and supine positions is negative to radicular pain. pain to palpation over the facet joints of the lumbar spine or spinous processes. Normal range of motion without pain reproduction.  EXTREMITIES: Peripheral joint ROM is full " and pain free without obvious instability or laxity in all four extremities. No deformities, edema, or skin discoloration. Good capillary refill.  MUSCULOSKELETAL: Able to stand on heels & toes.   hip, and knee provocative maneuvers are negative.  There is no pain with palpation over the sacroiliac joints bilaterally.  Gaenslen's, Distraction/Compression and  FABERs test is negative.  Facet loading test is positive bilaterally.   Bilateral upper and lower extremity strength is normal and symmetric.  No atrophy or tone abnormalities are noted.    RIGHT Lower extremity: Hip flexion 5/5, Hip Abduction 5/5, Hip Adduction 5/5, Knee extension 5/5, Knee flexion 5/5, Ankle dorsiflexion5/5, Extensor hallucis longus 5/5, Ankle plantarflexion 5/5  LEFT Lower extremity:  Hip flexion 5/5, Hip Abduction 5/5,Hip Adduction 5/5, Knee extension 5/5, Knee flexion 5/5, Ankle dorsiflexion 5/5, Extensor hallucis longus 5/5, Ankle plantarflexion 5/5  -Normal testing knee (patellar) jerk and ankle (achilles) jerk    NEURO: Bilateral upper and lower extremity coordination and muscle stretch reflexes are physiologic and symmetric. No loss of sensation is noted.  GAIT: normal.    Imaging:         MRI lumbar spine 10/02/2023  FINDINGS:  Alignment: Grade 2 L5-S1 anterolisthesis likely secondary to bilateral pars defects.  Minor L1-L2, L2-L3 and L4-L5 retrolisthesis.     Vertebrae: Lumbar vertebral body heights are maintained.  There are mild multilevel endplate degenerative changes.  No significant endplate edema.  No evidence of an acute fracture.  Marrow signal is otherwise within normal limits.     Discs: Disc desiccation throughout the lumbar spine with height loss most pronounced at T12-L1, L1-L2 and L5-S1.     Cord: Within normal limits.  Conus terminates at L1-L2.     Degenerative findings:     T12-L1: Minor broad-based posterior disc bulge with a left subarticular zones/neural foraminal disc protrusion, which appears to contact the  exited left T12 nerve root.  No significant spinal canal stenosis.  There is narrowing of the left lateral recess with severe left-sided neural foraminal stenosis.     L1-L2: Minor anterolisthesis.  Mild broad-based posterior disc bulge with a tiny left subarticular zone disc protrusion narrowing the left lateral recess.  No significant spinal canal stenosis.  Facet arthropathy contributes to mild right and moderate left-sided neural foraminal stenosis.     L2-L3: Minor retrolisthesis.  Mild broad-based posterior disc bulge and mild bilateral facet arthropathy ligamentum flavum hypertrophy contributes to mild spinal canal stenosis and mild-to-moderate bilateral neural foraminal stenosis.     L3-L4: Minimal broad-based posterior disc bulge and mild bilateral facet arthropathy.  No significant spinal canal stenosis.  There is mild-to-moderate right and mild left-sided neural foraminal stenosis.  There is a far right lateral disc osteophyte complex.     L4-L5: Minor retrolisthesis.  Mild broad-based posterior disc bulge and mild bilateral facet arthropathy.  No significant spinal canal stenosis.  There is moderate right and mild left-sided neural foraminal stenosis.     L5-S1: Grade 2 anterolisthesis with significant uncovering of the posterior disc space.  Broad-based posterior disc bulge, facet arthropathy ligamentum flavum hypertrophy contribute to severe spinal canal stenosis and severe bilateral neural foraminal stenosis.     Paraspinal muscles & soft tissues: Fatty atrophy of the posterior paraspinous musculature.     Impression:     1. L5-S1 grade 2 anterolisthesis likely secondary to bilateral pars defects contributes to severe spinal canal stenosis and severe bilateral neural foraminal stenosis.  2. Mild L2-L3 spinal canal stenosis with mild-to-moderate bilateral neural foraminal stenosis.  3. Small left T12-L1 subarticular zone/neural foraminal disc protrusion contributes to severe left-sided neural foraminal  stenosis.        CT C/A/P 01/03/2023     FINDINGS:  Finding: The size of the heart is within normal limits.  There is no evidence of an acute pulmonary process.  There is no pneumothorax or pleural effusion.     There is moderate generalized atrophy of the pancreas.  The liver, gallbladder, spleen, and adrenals are normal in appearance.  There are hypodense masses associated with both kidneys.  One of the larger ones measures 9 mm and is located off of the lateral aspect of the inferior pole of the right kidney.  This mass has a Hounsfield measurement of 7.  The ureters and the urinary bladder are normal in appearance.  The prostate is normal in appearance.  The appendix is normal in appearance.  There is a moderate amount of diverticulosis in the descending and sigmoid portions of the colon.  There is no abnormal mass visualized within the gastrointestinal system.  There is no free fluid within the abdomen or pelvis. There is no pneumoperitoneum.  There are old bilateral pars interarticularis fractures of L5.  There is grade 3 anterolisthesis of L5 on S1.  There are moderate degenerative changes between L5 and S1.  There is no abnormal lymphadenopathy based on CT size criteria.     Impression:     1. There is no abnormal mass visualized within the gastrointestinal system.  2. There is no abnormal lymphadenopathy based on CT size criteria.  3. There are hypodense masses associated with both kidneys. One of the larger ones measures 9 mm and is located off of the lateral aspect of the inferior pole of the right kidney.  This mass has a Hounsfield measurement of 7.  This is characteristic of a cyst.  4. There is a moderate amount of diverticulosis in the descending and sigmoid portions of the colon.  There is no abnormal mass visualized within the gastrointestinal system.  5. There are old bilateral pars interarticularis fractures of L5.  There is grade 3 anterolisthesis of L5 on S1. There are moderate degenerative  changes between L5 and S1.      ASSESSMENT: 78 y.o. year old male with     1. Lumbar spondylosis  Case Request-RAD/Other Procedure Area: Bilateral L3-5 MBB      2. DDD (degenerative disc disease), lumbar        3. Anterolisthesis of lumbosacral spine            PLAN:   - Interventions:  Schedule for bilateral #2 DIAGNOSTIC L3-5 lumbar medial branch block for axial back pain.  Of note, patient received 90% relief lasting 48 hours in duration with 1st L3-5 bilateral lumbar medial branch block.  Explained the risks and benefits of the procedure in detail with the patient today in clinic along with alternative treatment options, and the patient elected to pursue the intervention at this time.  We have discussed with repeat significant relief, he may be a candidate for bilateral L3-5 lumbar hi he radiofrequency ablation for more sustained relief.    - Anticoagulation use: Yes aspirin  Per JOSE guidelines, for secondary prophylaxis (prior DVT), patient can continue aspirin for lumbar medial branch block, and for potential lumbar radiofrequency ablation.     report:  Reviewed and consistent with medication use as prescribed.    - Medications:  -patient would like to refrain from pharmacologic management at this time.    - Therapy:   We discussed continuing physician directed at home physical therapy to help manage the patient/s painful condition. The patient was counseled that muscle strengthening will improve the long term prognosis in regards to pain and may also help increase range of motion and mobility.     - Imaging: Reviewed available imaging (MRI lumbar spine) with patient and answered any questions they had regarding study.     - Consults/Referrals:  Physical Medicine Rehabilitation for lower extremity electromyography studies to help guide diagnosis and treatment    - Records: Obtain old records from outside physicians and imaging      - Follow up visit:  Set reminder to call patient 24 hours after 2nd medial  branch block for evaluation for radiofrequency ablation.      The above plan and management options were discussed at length with patient. Patient is in agreement with the above and verbalized understanding.    - I discussed the goals of interventional chronic pain management with the patient on today's visit. We discussed a multimodal and systematic approach to pain.  This includes diagnostic and therapeutic injections, adjuvant pharmacologic treatment, physical therapy, and at times psychiatry.  I emphasized the importance of regular exercise, core strengthening and stretching, diet and weight loss as a cornerstone of long-term pain management.    - This condition does not require this patient to take time off of work, and the primary goal of our Pain Management services is to improve the patient's functional capacity.  - Patient Questions: Answered all of the patient's questions regarding diagnoses, therapy, treatment and next steps        Debra De MD  Interventional Pain Management  [facility]    Disclaimer:  This note was prepared using voice recognition system and is likely to have sound alike errors that may have been overlooked even after proof reading.  Please call me with any questions

## 2024-02-21 NOTE — PROGRESS NOTES
Established Patient Chronic Pain Note     Referring Physician: No ref. provider found    PCP: Avinash Lugo MD    Chief Complaint:   Chief Complaint   Patient presents with    Low-back Pain        SUBJECTIVE:  Interval history 02/22/2024  Patient presents status post bilateral L3-5 lumbar medial branch block 10/17/2023 and for MRI lumbar spine review.  EMG ordered, not completed.  Patient reports 90% relief lasting 2 days in duration following his lumbar medial branch block he does report pain is improved from baseline.  Today he reports pain is rated a 6/10.  Patient reports pain in a bandlike distribution in the lower back.  He does report intermittent radiation into the right buttock and periodically down the right lower extremity in L5-S1 distribution to the calf.  Majority, 90% of his pain remains axial in the lower back.  Patient has continued physician directed physical therapy exercises over the last 8 weeks from 12- 02/22/2024 with marginal improvement in pain, range of motion and functionality.  Patient continues to ambulate with a cane to help offload weight in the right lower extremity.  He does endorse associated weakness in the right lower extremity associated with his pain.  Of note patient reports receiving a series of 3 epidurals with , several years prior which gave him significant relief of radicular pain lasting several months in duration and improvement in ambulation.  Today he emphasizes majority of his pain remains in the lower back and is axial in nature.      HPI 09/21/2023  Daniel Villeda is a 78 y.o. male with past medical history significant for first-degree AV block, type 2 diabetes, hypertension, hyperlipidemia, right bundle branch block, stage 3 chronic kidney disease, history of DVT, history of renal cell carcinoma and gastrointestinal stromal tumor, GERD who presents to the clinic for the evaluation of lower back and right leg pain.  Patient reports pain has  "been present for over a decade.  He believes pain began in high school when he was taking a ice box up the stairs when he pulled his lower back.  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  Today he reports pain which is constant which is rated a 7/10.  Pain is described as aching and throbbing in nature.  Patient reports pain in a bandlike distribution in the lower back which radiates down the lateral and posterior aspect of the right lower extremity in L4-5 distribution to the calf.  Pain is exacerbated with lumbar flexion as well as with ambulation.  Patient reports he is able to ambulate approximately half a block before requiring rest.  Patient reports reduced range of motion in the right ankle and weakness in the right lower extremity.  Pain is improved with rest.  Patient has completed conventional physical therapy 6-10 years prior with no improvement in his pain.  He is continued physician directed physical therapy exercises at home over the last 6 weeks without meaningful improvement in his pain.  Patient has trialed membrane stabilizing agents in the past, he can not recall which in particular, but did not like the cognitive side effects.  Of note patient has received numerous lumbar epidural steroid injections, 8-10 years prior with Dr. Hester." Which gave him several years of significant relief.  Patient is interested in trialing interventional therapy.  He would like to refrain from any pharmacologic management.    Of note patient saw DERRICK Garduno, neurosurgery 04/10/2023 with the following evaluation      Patient reports significant motor weakness and loss of sensations.  Patient denies night fever/night sweats, urinary incontinence, bowel incontinence, and significant weight loss.      Pain Disability Index Review:         2/22/2024     9:29 AM 9/21/2023     8:18 AM   Last 3 PDI Scores   Pain Disability " Index (PDI) 36 55       Non-Pharmacologic Treatments:  Physical Therapy/Home Exercise: yes  Ice/Heat:yes  TENS: no  Acupuncture: no  Massage: no  Chiropractic: no    Other: no      Pain Medications:  - Adjuvant Medications: Tylenol (Acetaminophen)  - Anti-Coagulants: Aspirin    Pain Procedures:   Dr. De:  -10/17/2023: Bilateral L3-5 lumbar medial branch block      DEBBIE: NASRIN    Past Medical History:   Diagnosis Date    CKD stage 3 due to type 2 diabetes mellitus     Deep vein thrombosis     DM (diabetes mellitus) with complications     Gastrointestinal stromal tumor (GIST) 3/9/2021    GERD (gastroesophageal reflux disease)     History of DVT (deep vein thrombosis)     History of renal cell carcinoma 2009    Hyperlipidemia associated with type 2 diabetes mellitus 02/26/2021    Hypertension associated with diabetes     Panlobular emphysema     Thrombocytopenia     Type II diabetes mellitus with renal manifestations      Past Surgical History:   Procedure Laterality Date    CLOSURE OF PERFORATED ULCER OF DUODENUM USING OMENTAL PATCH N/A 3/9/2021    Procedure: CLOSURE, ULCER, PERFORATED, DUODENUM, USING OMENTAL PATCH;  Surgeon: Edin Bradford MD;  Location: [facility] OR;  Service: General;  Laterality: N/A;    COLONOSCOPY N/A 1/5/2022    Procedure: COLONOSCOPY;  Surgeon: Mirta Grover MD;  Location: [facility];  Service: Endoscopy;  Laterality: N/A;    ENDOSCOPIC ULTRASOUND OF UPPER GASTROINTESTINAL TRACT N/A 2/5/2021    Procedure: ULTRASOUND, UPPER GI TRACT, ENDOSCOPIC;  Surgeon: Geremias Brizuela MD;  Location: [facility];  Service: Endoscopy;  Laterality: N/A;    ESOPHAGOGASTRODUODENOSCOPY N/A 2/3/2021    Procedure: EGD (ESOPHAGOGASTRODUODENOSCOPY);  Surgeon: Mirta Grover MD;  Location: [facility] ENDO;  Service: Endoscopy;  Laterality: N/A;    ESOPHAGOGASTRODUODENOSCOPY N/A 3/9/2021    Procedure: EGD (ESOPHAGOGASTRODUODENOSCOPY);  Surgeon: Edin Bradford MD;  Location: [facility] OR;  Service: General;  Laterality:  N/A;    INJECTION OF ANESTHETIC AGENT AROUND MEDIAL BRANCH NERVES INNERVATING LUMBAR FACET JOINT Bilateral 10/17/2023    Procedure: Bilateral L3-5 MBB;  Surgeon: Debra De MD;  Location: [facility];  Service: Pain Management;  Laterality: Bilateral;    INJECTION OF ANESTHETIC AGENT INTO TISSUE PLANE DEFINED BY TRANSVERSUS ABDOMINIS MUSCLE  4/17/2023    Procedure: BLOCK, TRANSVERSUS ABDOMINIS PLANE;  Surgeon: Yumiko Rosen DO;  Location: [facility] OR;  Service: General;;    left partial nephrectomy Left 2006    ROBOT-ASSISTED REPAIR OF INCISIONAL HERNIA USING DA PERRI XI Left 4/17/2023    Procedure: XI ROBOTIC REPAIR, HERNIA, INCISIONAL;  Surgeon: Yumiko Rosen DO;  Location: [facility] OR;  Service: General;  Laterality: Left;  Left flank. Position right lateral decubitus. Ernst.    ROBOT-ASSISTED SURGICAL REMOVAL OF STOMACH USING DA PERRI XI N/A 3/9/2021    Procedure: XI ROBOTIC GASTRECTOMY;  Surgeon: Edin Bradford MD;  Location: [facility] OR;  Service: General;  Laterality: N/A;  partial     Review of patient's allergies indicates:  No Known Allergies    Current Outpatient Medications   Medication Sig    acetaminophen (TYLENOL) 650 MG TbSR Take 650 mg by mouth every 8 (eight) hours.    aspirin 81 MG Chew Take 81 mg by mouth once daily.    atorvastatin (LIPITOR) 20 MG tablet Take 1 tablet (20 mg total) by mouth every evening.    cholecalciferol, vitamin D3, (VITAMIN D3) 50 mcg (2,000 unit) Tab Take 2,000 Units by mouth once daily.     fish oil-omega-3 fatty acids 300-1,000 mg capsule Take by mouth once daily.    losartan-hydrochlorothiazide 100-12.5 mg (HYZAAR) 100-12.5 mg Tab Take 1 tablet by mouth once daily.    magnesium oxide (MAG-OX) 400 mg (241.3 mg magnesium) tablet Take 400 mg by mouth once daily.    metFORMIN (GLUCOPHAGE) 500 MG tablet Take 1 tablet (500 mg total) by mouth 2 (two) times daily with meals.    metoprolol tartrate (LOPRESSOR) 100 MG tablet Take 1 tablet (100 mg total) by mouth 2 (two)  "times daily.    oxybutynin (DITROPAN) 5 MG Tab Take 1 tablet (5 mg total) by mouth 2 (two) times daily.    pantoprazole (PROTONIX) 40 MG tablet Take 1 tablet (40 mg total) by mouth once daily.    polyethylene glycol (GLYCOLAX) 17 gram/dose powder Take 17 g by mouth 2 (two) times daily.     No current facility-administered medications for this visit.       Review of Systems     GENERAL:  No weight loss, malaise or fevers.  HEENT:   No recent changes in vision or hearing  NECK:  Negative for lumps, no difficulty with swallowing.  RESPIRATORY:  Negative for cough, wheezing or shortness of breath, patient denies any recent URI.  CARDIOVASCULAR:  Negative for chest pain or palpitations.  GI:  Negative for abdominal discomfort, blood in stools or black stools or change in bowel habits.  MUSCULOSKELETAL:  See HPI.  SKIN:  Negative for lesions, rash, and itching.  PSYCH:  No mood disorder or recent psychosocial stressors.   HEMATOLOGY/LYMPHOLOGY:  Negative for prolonged bleeding, bruising easily or swollen nodes.    NEURO:   No history of syncope, paralysis, seizures or tremors.  All other reviewed and negative other than HPI.    OBJECTIVE:    BP (!) 144/92   Pulse (P) 63   Resp 17   Ht 5' 11" (1.803 m)   Wt 106 kg (233 lb 11 oz)   BMI 32.59 kg/m²       Physical Exam    GENERAL: Well appearing, in no acute distress, alert and oriented x3.  PSYCH:  Mood and affect appropriate.  SKIN: Skin color, texture, turgor normal, no rashes or lesions.  HEAD/FACE:  Normocephalic, atraumatic. Cranial nerves grossly intact.    CV: RRR with palpation of the radial artery.  PULM: No evidence of respiratory difficulty, symmetric chest rise.  GI:  Soft and non-tender.    BACK: Straight leg raising in the sitting and supine positions is negative to radicular pain. pain to palpation over the facet joints of the lumbar spine or spinous processes. Normal range of motion without pain reproduction.  EXTREMITIES: Peripheral joint ROM is full " and pain free without obvious instability or laxity in all four extremities. No deformities, edema, or skin discoloration. Good capillary refill.  MUSCULOSKELETAL: Able to stand on heels & toes.   hip, and knee provocative maneuvers are negative.  There is no pain with palpation over the sacroiliac joints bilaterally.  Gaenslen's, Distraction/Compression and  FABERs test is negative.  Facet loading test is positive bilaterally.   Bilateral upper and lower extremity strength is normal and symmetric.  No atrophy or tone abnormalities are noted.    RIGHT Lower extremity: Hip flexion 5/5, Hip Abduction 5/5, Hip Adduction 5/5, Knee extension 5/5, Knee flexion 5/5, Ankle dorsiflexion5/5, Extensor hallucis longus 5/5, Ankle plantarflexion 5/5  LEFT Lower extremity:  Hip flexion 5/5, Hip Abduction 5/5,Hip Adduction 5/5, Knee extension 5/5, Knee flexion 5/5, Ankle dorsiflexion 5/5, Extensor hallucis longus 5/5, Ankle plantarflexion 5/5  -Normal testing knee (patellar) jerk and ankle (achilles) jerk    NEURO: Bilateral upper and lower extremity coordination and muscle stretch reflexes are physiologic and symmetric. No loss of sensation is noted.  GAIT: normal.    Imaging:         MRI lumbar spine 10/02/2023  FINDINGS:  Alignment: Grade 2 L5-S1 anterolisthesis likely secondary to bilateral pars defects.  Minor L1-L2, L2-L3 and L4-L5 retrolisthesis.     Vertebrae: Lumbar vertebral body heights are maintained.  There are mild multilevel endplate degenerative changes.  No significant endplate edema.  No evidence of an acute fracture.  Marrow signal is otherwise within normal limits.     Discs: Disc desiccation throughout the lumbar spine with height loss most pronounced at T12-L1, L1-L2 and L5-S1.     Cord: Within normal limits.  Conus terminates at L1-L2.     Degenerative findings:     T12-L1: Minor broad-based posterior disc bulge with a left subarticular zones/neural foraminal disc protrusion, which appears to contact the  exited left T12 nerve root.  No significant spinal canal stenosis.  There is narrowing of the left lateral recess with severe left-sided neural foraminal stenosis.     L1-L2: Minor anterolisthesis.  Mild broad-based posterior disc bulge with a tiny left subarticular zone disc protrusion narrowing the left lateral recess.  No significant spinal canal stenosis.  Facet arthropathy contributes to mild right and moderate left-sided neural foraminal stenosis.     L2-L3: Minor retrolisthesis.  Mild broad-based posterior disc bulge and mild bilateral facet arthropathy ligamentum flavum hypertrophy contributes to mild spinal canal stenosis and mild-to-moderate bilateral neural foraminal stenosis.     L3-L4: Minimal broad-based posterior disc bulge and mild bilateral facet arthropathy.  No significant spinal canal stenosis.  There is mild-to-moderate right and mild left-sided neural foraminal stenosis.  There is a far right lateral disc osteophyte complex.     L4-L5: Minor retrolisthesis.  Mild broad-based posterior disc bulge and mild bilateral facet arthropathy.  No significant spinal canal stenosis.  There is moderate right and mild left-sided neural foraminal stenosis.     L5-S1: Grade 2 anterolisthesis with significant uncovering of the posterior disc space.  Broad-based posterior disc bulge, facet arthropathy ligamentum flavum hypertrophy contribute to severe spinal canal stenosis and severe bilateral neural foraminal stenosis.     Paraspinal muscles & soft tissues: Fatty atrophy of the posterior paraspinous musculature.     Impression:     1. L5-S1 grade 2 anterolisthesis likely secondary to bilateral pars defects contributes to severe spinal canal stenosis and severe bilateral neural foraminal stenosis.  2. Mild L2-L3 spinal canal stenosis with mild-to-moderate bilateral neural foraminal stenosis.  3. Small left T12-L1 subarticular zone/neural foraminal disc protrusion contributes to severe left-sided neural foraminal  stenosis.        CT C/A/P 01/03/2023     FINDINGS:  Finding: The size of the heart is within normal limits.  There is no evidence of an acute pulmonary process.  There is no pneumothorax or pleural effusion.     There is moderate generalized atrophy of the pancreas.  The liver, gallbladder, spleen, and adrenals are normal in appearance.  There are hypodense masses associated with both kidneys.  One of the larger ones measures 9 mm and is located off of the lateral aspect of the inferior pole of the right kidney.  This mass has a Hounsfield measurement of 7.  The ureters and the urinary bladder are normal in appearance.  The prostate is normal in appearance.  The appendix is normal in appearance.  There is a moderate amount of diverticulosis in the descending and sigmoid portions of the colon.  There is no abnormal mass visualized within the gastrointestinal system.  There is no free fluid within the abdomen or pelvis. There is no pneumoperitoneum.  There are old bilateral pars interarticularis fractures of L5.  There is grade 3 anterolisthesis of L5 on S1.  There are moderate degenerative changes between L5 and S1.  There is no abnormal lymphadenopathy based on CT size criteria.     Impression:     1. There is no abnormal mass visualized within the gastrointestinal system.  2. There is no abnormal lymphadenopathy based on CT size criteria.  3. There are hypodense masses associated with both kidneys. One of the larger ones measures 9 mm and is located off of the lateral aspect of the inferior pole of the right kidney.  This mass has a Hounsfield measurement of 7.  This is characteristic of a cyst.  4. There is a moderate amount of diverticulosis in the descending and sigmoid portions of the colon.  There is no abnormal mass visualized within the gastrointestinal system.  5. There are old bilateral pars interarticularis fractures of L5.  There is grade 3 anterolisthesis of L5 on S1. There are moderate degenerative  changes between L5 and S1.      ASSESSMENT: 78 y.o. year old male with     1. Lumbar spondylosis  Case Request-RAD/Other Procedure Area: Bilateral L3-5 MBB      2. DDD (degenerative disc disease), lumbar        3. Anterolisthesis of lumbosacral spine            PLAN:   - Interventions:  Schedule for bilateral #2 DIAGNOSTIC L3-5 lumbar medial branch block for axial back pain.  Of note, patient received 90% relief lasting 48 hours in duration with 1st L3-5 bilateral lumbar medial branch block.  Explained the risks and benefits of the procedure in detail with the patient today in clinic along with alternative treatment options, and the patient elected to pursue the intervention at this time.  We have discussed with repeat significant relief, he may be a candidate for bilateral L3-5 lumbar hi he radiofrequency ablation for more sustained relief.    - Anticoagulation use: Yes aspirin  Per JOSE guidelines, for secondary prophylaxis (prior DVT), patient can continue aspirin for lumbar medial branch block, and for potential lumbar radiofrequency ablation.     report:  Reviewed and consistent with medication use as prescribed.    - Medications:  -patient would like to refrain from pharmacologic management at this time.    - Therapy:   We discussed continuing physician directed at home physical therapy to help manage the patient/s painful condition. The patient was counseled that muscle strengthening will improve the long term prognosis in regards to pain and may also help increase range of motion and mobility.     - Imaging: Reviewed available imaging (MRI lumbar spine) with patient and answered any questions they had regarding study.     - Consults/Referrals:  Physical Medicine Rehabilitation for lower extremity electromyography studies to help guide diagnosis and treatment    - Records: Obtain old records from outside physicians and imaging      - Follow up visit:  Set reminder to call patient 24 hours after 2nd medial  branch block for evaluation for radiofrequency ablation.      The above plan and management options were discussed at length with patient. Patient is in agreement with the above and verbalized understanding.    - I discussed the goals of interventional chronic pain management with the patient on today's visit. We discussed a multimodal and systematic approach to pain.  This includes diagnostic and therapeutic injections, adjuvant pharmacologic treatment, physical therapy, and at times psychiatry.  I emphasized the importance of regular exercise, core strengthening and stretching, diet and weight loss as a cornerstone of long-term pain management.    - This condition does not require this patient to take time off of work, and the primary goal of our Pain Management services is to improve the patient's functional capacity.  - Patient Questions: Answered all of the patient's questions regarding diagnoses, therapy, treatment and next steps        Debra De MD  Interventional Pain Management  [facility]    Disclaimer:  This note was prepared using voice recognition system and is likely to have sound alike errors that may have been overlooked even after proof reading.  Please call me with any questions

## 2024-02-21 NOTE — H&P (VIEW-ONLY)
Established Patient Chronic Pain Note     Referring Physician: No ref. provider found    PCP: Avinash Lugo MD    Chief Complaint:   Chief Complaint   Patient presents with    Low-back Pain        SUBJECTIVE:  Interval history 02/22/2024  Patient presents status post bilateral L3-5 lumbar medial branch block 10/17/2023 and for MRI lumbar spine review.  EMG ordered, not completed.  Patient reports 90% relief lasting 2 days in duration following his lumbar medial branch block he does report pain is improved from baseline.  Today he reports pain is rated a 6/10.  Patient reports pain in a bandlike distribution in the lower back.  He does report intermittent radiation into the right buttock and periodically down the right lower extremity in L5-S1 distribution to the calf.  Majority, 90% of his pain remains axial in the lower back.  Patient has continued physician directed physical therapy exercises over the last 8 weeks from 12- 02/22/2024 with marginal improvement in pain, range of motion and functionality.  Patient continues to ambulate with a cane to help offload weight in the right lower extremity.  He does endorse associated weakness in the right lower extremity associated with his pain.  Of note patient reports receiving a series of 3 epidurals with , several years prior which gave him significant relief of radicular pain lasting several months in duration and improvement in ambulation.  Today he emphasizes majority of his pain remains in the lower back and is axial in nature.      HPI 09/21/2023  Daniel Villeda is a 78 y.o. male with past medical history significant for first-degree AV block, type 2 diabetes, hypertension, hyperlipidemia, right bundle branch block, stage 3 chronic kidney disease, history of DVT, history of renal cell carcinoma and gastrointestinal stromal tumor, GERD who presents to the clinic for the evaluation of lower back and right leg pain.  Patient reports pain has  "been present for over a decade.  He believes pain began in high school when he was taking a ice box up the stairs when he pulled his lower back.  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  Today he reports pain which is constant which is rated a 7/10.  Pain is described as aching and throbbing in nature.  Patient reports pain in a bandlike distribution in the lower back which radiates down the lateral and posterior aspect of the right lower extremity in L4-5 distribution to the calf.  Pain is exacerbated with lumbar flexion as well as with ambulation.  Patient reports he is able to ambulate approximately half a block before requiring rest.  Patient reports reduced range of motion in the right ankle and weakness in the right lower extremity.  Pain is improved with rest.  Patient has completed conventional physical therapy 6-10 years prior with no improvement in his pain.  He is continued physician directed physical therapy exercises at home over the last 6 weeks without meaningful improvement in his pain.  Patient has trialed membrane stabilizing agents in the past, he can not recall which in particular, but did not like the cognitive side effects.  Of note patient has received numerous lumbar epidural steroid injections, 8-10 years prior with Dr. Hester." Which gave him several years of significant relief.  Patient is interested in trialing interventional therapy.  He would like to refrain from any pharmacologic management.    Of note patient saw DERRICK Garduno, neurosurgery 04/10/2023 with the following evaluation      Patient reports significant motor weakness and loss of sensations.  Patient denies night fever/night sweats, urinary incontinence, bowel incontinence, and significant weight loss.      Pain Disability Index Review:         2/22/2024     9:29 AM 9/21/2023     8:18 AM   Last 3 PDI Scores   Pain Disability " Index (PDI) 36 55       Non-Pharmacologic Treatments:  Physical Therapy/Home Exercise: yes  Ice/Heat:yes  TENS: no  Acupuncture: no  Massage: no  Chiropractic: no    Other: no      Pain Medications:  - Adjuvant Medications: Tylenol (Acetaminophen)  - Anti-Coagulants: Aspirin    Pain Procedures:   Dr. De:  -10/17/2023: Bilateral L3-5 lumbar medial branch block      DEBBIE: NASRIN    Past Medical History:   Diagnosis Date    CKD stage 3 due to type 2 diabetes mellitus     Deep vein thrombosis     DM (diabetes mellitus) with complications     Gastrointestinal stromal tumor (GIST) 3/9/2021    GERD (gastroesophageal reflux disease)     History of DVT (deep vein thrombosis)     History of renal cell carcinoma 2009    Hyperlipidemia associated with type 2 diabetes mellitus 02/26/2021    Hypertension associated with diabetes     Panlobular emphysema     Thrombocytopenia     Type II diabetes mellitus with renal manifestations      Past Surgical History:   Procedure Laterality Date    CLOSURE OF PERFORATED ULCER OF DUODENUM USING OMENTAL PATCH N/A 3/9/2021    Procedure: CLOSURE, ULCER, PERFORATED, DUODENUM, USING OMENTAL PATCH;  Surgeon: Edin Bradford MD;  Location: [facility] OR;  Service: General;  Laterality: N/A;    COLONOSCOPY N/A 1/5/2022    Procedure: COLONOSCOPY;  Surgeon: Mirta Grover MD;  Location: [facility];  Service: Endoscopy;  Laterality: N/A;    ENDOSCOPIC ULTRASOUND OF UPPER GASTROINTESTINAL TRACT N/A 2/5/2021    Procedure: ULTRASOUND, UPPER GI TRACT, ENDOSCOPIC;  Surgeon: Geremias Brizuela MD;  Location: [facility];  Service: Endoscopy;  Laterality: N/A;    ESOPHAGOGASTRODUODENOSCOPY N/A 2/3/2021    Procedure: EGD (ESOPHAGOGASTRODUODENOSCOPY);  Surgeon: Mirta Grover MD;  Location: [facility] ENDO;  Service: Endoscopy;  Laterality: N/A;    ESOPHAGOGASTRODUODENOSCOPY N/A 3/9/2021    Procedure: EGD (ESOPHAGOGASTRODUODENOSCOPY);  Surgeon: Edin Bradford MD;  Location: [facility] OR;  Service: General;  Laterality:  N/A;    INJECTION OF ANESTHETIC AGENT AROUND MEDIAL BRANCH NERVES INNERVATING LUMBAR FACET JOINT Bilateral 10/17/2023    Procedure: Bilateral L3-5 MBB;  Surgeon: Debra De MD;  Location: [facility];  Service: Pain Management;  Laterality: Bilateral;    INJECTION OF ANESTHETIC AGENT INTO TISSUE PLANE DEFINED BY TRANSVERSUS ABDOMINIS MUSCLE  4/17/2023    Procedure: BLOCK, TRANSVERSUS ABDOMINIS PLANE;  Surgeon: Yumiko Rosen DO;  Location: [facility] OR;  Service: General;;    left partial nephrectomy Left 2006    ROBOT-ASSISTED REPAIR OF INCISIONAL HERNIA USING DA PERRI XI Left 4/17/2023    Procedure: XI ROBOTIC REPAIR, HERNIA, INCISIONAL;  Surgeon: Yumiko Rosen DO;  Location: [facility] OR;  Service: General;  Laterality: Left;  Left flank. Position right lateral decubitus. Ernst.    ROBOT-ASSISTED SURGICAL REMOVAL OF STOMACH USING DA PERRI XI N/A 3/9/2021    Procedure: XI ROBOTIC GASTRECTOMY;  Surgeon: Edin Bradford MD;  Location: [facility] OR;  Service: General;  Laterality: N/A;  partial     Review of patient's allergies indicates:  No Known Allergies    Current Outpatient Medications   Medication Sig    acetaminophen (TYLENOL) 650 MG TbSR Take 650 mg by mouth every 8 (eight) hours.    aspirin 81 MG Chew Take 81 mg by mouth once daily.    atorvastatin (LIPITOR) 20 MG tablet Take 1 tablet (20 mg total) by mouth every evening.    cholecalciferol, vitamin D3, (VITAMIN D3) 50 mcg (2,000 unit) Tab Take 2,000 Units by mouth once daily.     fish oil-omega-3 fatty acids 300-1,000 mg capsule Take by mouth once daily.    losartan-hydrochlorothiazide 100-12.5 mg (HYZAAR) 100-12.5 mg Tab Take 1 tablet by mouth once daily.    magnesium oxide (MAG-OX) 400 mg (241.3 mg magnesium) tablet Take 400 mg by mouth once daily.    metFORMIN (GLUCOPHAGE) 500 MG tablet Take 1 tablet (500 mg total) by mouth 2 (two) times daily with meals.    metoprolol tartrate (LOPRESSOR) 100 MG tablet Take 1 tablet (100 mg total) by mouth 2 (two)  "times daily.    oxybutynin (DITROPAN) 5 MG Tab Take 1 tablet (5 mg total) by mouth 2 (two) times daily.    pantoprazole (PROTONIX) 40 MG tablet Take 1 tablet (40 mg total) by mouth once daily.    polyethylene glycol (GLYCOLAX) 17 gram/dose powder Take 17 g by mouth 2 (two) times daily.     No current facility-administered medications for this visit.       Review of Systems     GENERAL:  No weight loss, malaise or fevers.  HEENT:   No recent changes in vision or hearing  NECK:  Negative for lumps, no difficulty with swallowing.  RESPIRATORY:  Negative for cough, wheezing or shortness of breath, patient denies any recent URI.  CARDIOVASCULAR:  Negative for chest pain or palpitations.  GI:  Negative for abdominal discomfort, blood in stools or black stools or change in bowel habits.  MUSCULOSKELETAL:  See HPI.  SKIN:  Negative for lesions, rash, and itching.  PSYCH:  No mood disorder or recent psychosocial stressors.   HEMATOLOGY/LYMPHOLOGY:  Negative for prolonged bleeding, bruising easily or swollen nodes.    NEURO:   No history of syncope, paralysis, seizures or tremors.  All other reviewed and negative other than HPI.    OBJECTIVE:    BP (!) 144/92   Pulse (P) 63   Resp 17   Ht 5' 11" (1.803 m)   Wt 106 kg (233 lb 11 oz)   BMI 32.59 kg/m²       Physical Exam    GENERAL: Well appearing, in no acute distress, alert and oriented x3.  PSYCH:  Mood and affect appropriate.  SKIN: Skin color, texture, turgor normal, no rashes or lesions.  HEAD/FACE:  Normocephalic, atraumatic. Cranial nerves grossly intact.    CV: RRR with palpation of the radial artery.  PULM: No evidence of respiratory difficulty, symmetric chest rise.  GI:  Soft and non-tender.    BACK: Straight leg raising in the sitting and supine positions is negative to radicular pain. pain to palpation over the facet joints of the lumbar spine or spinous processes. Normal range of motion without pain reproduction.  EXTREMITIES: Peripheral joint ROM is full " and pain free without obvious instability or laxity in all four extremities. No deformities, edema, or skin discoloration. Good capillary refill.  MUSCULOSKELETAL: Able to stand on heels & toes.   hip, and knee provocative maneuvers are negative.  There is no pain with palpation over the sacroiliac joints bilaterally.  Gaenslen's, Distraction/Compression and  FABERs test is negative.  Facet loading test is positive bilaterally.   Bilateral upper and lower extremity strength is normal and symmetric.  No atrophy or tone abnormalities are noted.    RIGHT Lower extremity: Hip flexion 5/5, Hip Abduction 5/5, Hip Adduction 5/5, Knee extension 5/5, Knee flexion 5/5, Ankle dorsiflexion5/5, Extensor hallucis longus 5/5, Ankle plantarflexion 5/5  LEFT Lower extremity:  Hip flexion 5/5, Hip Abduction 5/5,Hip Adduction 5/5, Knee extension 5/5, Knee flexion 5/5, Ankle dorsiflexion 5/5, Extensor hallucis longus 5/5, Ankle plantarflexion 5/5  -Normal testing knee (patellar) jerk and ankle (achilles) jerk    NEURO: Bilateral upper and lower extremity coordination and muscle stretch reflexes are physiologic and symmetric. No loss of sensation is noted.  GAIT: normal.    Imaging:         MRI lumbar spine 10/02/2023  FINDINGS:  Alignment: Grade 2 L5-S1 anterolisthesis likely secondary to bilateral pars defects.  Minor L1-L2, L2-L3 and L4-L5 retrolisthesis.     Vertebrae: Lumbar vertebral body heights are maintained.  There are mild multilevel endplate degenerative changes.  No significant endplate edema.  No evidence of an acute fracture.  Marrow signal is otherwise within normal limits.     Discs: Disc desiccation throughout the lumbar spine with height loss most pronounced at T12-L1, L1-L2 and L5-S1.     Cord: Within normal limits.  Conus terminates at L1-L2.     Degenerative findings:     T12-L1: Minor broad-based posterior disc bulge with a left subarticular zones/neural foraminal disc protrusion, which appears to contact the  exited left T12 nerve root.  No significant spinal canal stenosis.  There is narrowing of the left lateral recess with severe left-sided neural foraminal stenosis.     L1-L2: Minor anterolisthesis.  Mild broad-based posterior disc bulge with a tiny left subarticular zone disc protrusion narrowing the left lateral recess.  No significant spinal canal stenosis.  Facet arthropathy contributes to mild right and moderate left-sided neural foraminal stenosis.     L2-L3: Minor retrolisthesis.  Mild broad-based posterior disc bulge and mild bilateral facet arthropathy ligamentum flavum hypertrophy contributes to mild spinal canal stenosis and mild-to-moderate bilateral neural foraminal stenosis.     L3-L4: Minimal broad-based posterior disc bulge and mild bilateral facet arthropathy.  No significant spinal canal stenosis.  There is mild-to-moderate right and mild left-sided neural foraminal stenosis.  There is a far right lateral disc osteophyte complex.     L4-L5: Minor retrolisthesis.  Mild broad-based posterior disc bulge and mild bilateral facet arthropathy.  No significant spinal canal stenosis.  There is moderate right and mild left-sided neural foraminal stenosis.     L5-S1: Grade 2 anterolisthesis with significant uncovering of the posterior disc space.  Broad-based posterior disc bulge, facet arthropathy ligamentum flavum hypertrophy contribute to severe spinal canal stenosis and severe bilateral neural foraminal stenosis.     Paraspinal muscles & soft tissues: Fatty atrophy of the posterior paraspinous musculature.     Impression:     1. L5-S1 grade 2 anterolisthesis likely secondary to bilateral pars defects contributes to severe spinal canal stenosis and severe bilateral neural foraminal stenosis.  2. Mild L2-L3 spinal canal stenosis with mild-to-moderate bilateral neural foraminal stenosis.  3. Small left T12-L1 subarticular zone/neural foraminal disc protrusion contributes to severe left-sided neural foraminal  stenosis.        CT C/A/P 01/03/2023     FINDINGS:  Finding: The size of the heart is within normal limits.  There is no evidence of an acute pulmonary process.  There is no pneumothorax or pleural effusion.     There is moderate generalized atrophy of the pancreas.  The liver, gallbladder, spleen, and adrenals are normal in appearance.  There are hypodense masses associated with both kidneys.  One of the larger ones measures 9 mm and is located off of the lateral aspect of the inferior pole of the right kidney.  This mass has a Hounsfield measurement of 7.  The ureters and the urinary bladder are normal in appearance.  The prostate is normal in appearance.  The appendix is normal in appearance.  There is a moderate amount of diverticulosis in the descending and sigmoid portions of the colon.  There is no abnormal mass visualized within the gastrointestinal system.  There is no free fluid within the abdomen or pelvis. There is no pneumoperitoneum.  There are old bilateral pars interarticularis fractures of L5.  There is grade 3 anterolisthesis of L5 on S1.  There are moderate degenerative changes between L5 and S1.  There is no abnormal lymphadenopathy based on CT size criteria.     Impression:     1. There is no abnormal mass visualized within the gastrointestinal system.  2. There is no abnormal lymphadenopathy based on CT size criteria.  3. There are hypodense masses associated with both kidneys. One of the larger ones measures 9 mm and is located off of the lateral aspect of the inferior pole of the right kidney.  This mass has a Hounsfield measurement of 7.  This is characteristic of a cyst.  4. There is a moderate amount of diverticulosis in the descending and sigmoid portions of the colon.  There is no abnormal mass visualized within the gastrointestinal system.  5. There are old bilateral pars interarticularis fractures of L5.  There is grade 3 anterolisthesis of L5 on S1. There are moderate degenerative  changes between L5 and S1.      ASSESSMENT: 78 y.o. year old male with     1. Lumbar spondylosis  Case Request-RAD/Other Procedure Area: Bilateral L3-5 MBB      2. DDD (degenerative disc disease), lumbar        3. Anterolisthesis of lumbosacral spine            PLAN:   - Interventions:  Schedule for bilateral #2 DIAGNOSTIC L3-5 lumbar medial branch block for axial back pain.  Of note, patient received 90% relief lasting 48 hours in duration with 1st L3-5 bilateral lumbar medial branch block.  Explained the risks and benefits of the procedure in detail with the patient today in clinic along with alternative treatment options, and the patient elected to pursue the intervention at this time.  We have discussed with repeat significant relief, he may be a candidate for bilateral L3-5 lumbar hi he radiofrequency ablation for more sustained relief.    - Anticoagulation use: Yes aspirin  Per JOSE guidelines, for secondary prophylaxis (prior DVT), patient can continue aspirin for lumbar medial branch block, and for potential lumbar radiofrequency ablation.     report:  Reviewed and consistent with medication use as prescribed.    - Medications:  -patient would like to refrain from pharmacologic management at this time.    - Therapy:   We discussed continuing physician directed at home physical therapy to help manage the patient/s painful condition. The patient was counseled that muscle strengthening will improve the long term prognosis in regards to pain and may also help increase range of motion and mobility.     - Imaging: Reviewed available imaging (MRI lumbar spine) with patient and answered any questions they had regarding study.     - Consults/Referrals:  Physical Medicine Rehabilitation for lower extremity electromyography studies to help guide diagnosis and treatment    - Records: Obtain old records from outside physicians and imaging      - Follow up visit:  Set reminder to call patient 24 hours after 2nd medial  branch block for evaluation for radiofrequency ablation.      The above plan and management options were discussed at length with patient. Patient is in agreement with the above and verbalized understanding.    - I discussed the goals of interventional chronic pain management with the patient on today's visit. We discussed a multimodal and systematic approach to pain.  This includes diagnostic and therapeutic injections, adjuvant pharmacologic treatment, physical therapy, and at times psychiatry.  I emphasized the importance of regular exercise, core strengthening and stretching, diet and weight loss as a cornerstone of long-term pain management.    - This condition does not require this patient to take time off of work, and the primary goal of our Pain Management services is to improve the patient's functional capacity.  - Patient Questions: Answered all of the patient's questions regarding diagnoses, therapy, treatment and next steps        Debra De MD  Interventional Pain Management  [facility]    Disclaimer:  This note was prepared using voice recognition system and is likely to have sound alike errors that may have been overlooked even after proof reading.  Please call me with any questions

## 2024-02-22 ENCOUNTER — OFFICE VISIT (OUTPATIENT)
Dept: PAIN MEDICINE | Facility: CLINIC | Age: 79
End: 2024-02-22
Payer: MEDICARE

## 2024-02-22 VITALS
BODY MASS INDEX: 32.72 KG/M2 | RESPIRATION RATE: 17 BRPM | DIASTOLIC BLOOD PRESSURE: 92 MMHG | WEIGHT: 233.69 LBS | HEIGHT: 71 IN | SYSTOLIC BLOOD PRESSURE: 144 MMHG

## 2024-02-22 DIAGNOSIS — M51.36 DDD (DEGENERATIVE DISC DISEASE), LUMBAR: ICD-10-CM

## 2024-02-22 DIAGNOSIS — M47.816 LUMBAR SPONDYLOSIS: Primary | ICD-10-CM

## 2024-02-22 DIAGNOSIS — M43.17 ANTEROLISTHESIS OF LUMBOSACRAL SPINE: ICD-10-CM

## 2024-02-22 PROCEDURE — 3080F DIAST BP >= 90 MM HG: CPT | Mod: CPTII,S$GLB,, | Performed by: ANESTHESIOLOGY

## 2024-02-22 PROCEDURE — 1101F PT FALLS ASSESS-DOCD LE1/YR: CPT | Mod: CPTII,S$GLB,, | Performed by: ANESTHESIOLOGY

## 2024-02-22 PROCEDURE — 3072F LOW RISK FOR RETINOPATHY: CPT | Mod: CPTII,S$GLB,, | Performed by: ANESTHESIOLOGY

## 2024-02-22 PROCEDURE — 1159F MED LIST DOCD IN RCRD: CPT | Mod: CPTII,S$GLB,, | Performed by: ANESTHESIOLOGY

## 2024-02-22 PROCEDURE — 3288F FALL RISK ASSESSMENT DOCD: CPT | Mod: CPTII,S$GLB,, | Performed by: ANESTHESIOLOGY

## 2024-02-22 PROCEDURE — 1160F RVW MEDS BY RX/DR IN RCRD: CPT | Mod: CPTII,S$GLB,, | Performed by: ANESTHESIOLOGY

## 2024-02-22 PROCEDURE — 99214 OFFICE O/P EST MOD 30 MIN: CPT | Mod: S$GLB,,, | Performed by: ANESTHESIOLOGY

## 2024-02-22 PROCEDURE — 99999 PR PBB SHADOW E&M-EST. PATIENT-LVL III: CPT | Mod: PBBFAC,,, | Performed by: ANESTHESIOLOGY

## 2024-02-22 PROCEDURE — 1125F AMNT PAIN NOTED PAIN PRSNT: CPT | Mod: CPTII,S$GLB,, | Performed by: ANESTHESIOLOGY

## 2024-02-22 PROCEDURE — 3077F SYST BP >= 140 MM HG: CPT | Mod: CPTII,S$GLB,, | Performed by: ANESTHESIOLOGY

## 2024-02-28 NOTE — PRE-PROCEDURE INSTRUCTIONS
Spoke with patient regarding procedure scheduled on 3.1     Arrival time 0945     Has patient been sick with fever or on antibiotics within the last 7 days? No     Does the patient have any open wounds, sores or rashes? No     Does the patient have any recent fractures? no     Has patient received a vaccination within the last 7 days? No     Received the COVID vaccination? yes     Has the patient stopped all medications as directed? na     Does patient have a pacemaker, defibrillator, or implantable stimulator? No     Does the patient have a ride to and from procedure and someone reliable to remain with patient?  wife     Is the patient diabetic? yes     Does the patient have sleep apnea? Or use O2 at home? no     Is the patient receiving sedation? yes     Is the patient instructed to remain NPO beginning at midnight the night before their procedure? yes     Procedure location confirmed with patient? Yes     Covid- Denies signs/symptoms. Instructed to notify PAT/MD if any changes.

## 2024-03-01 ENCOUNTER — HOSPITAL ENCOUNTER (OUTPATIENT)
Facility: HOSPITAL | Age: 79
Discharge: HOME | End: 2024-03-01
Attending: ANESTHESIOLOGY | Admitting: ANESTHESIOLOGY
Payer: MEDICARE

## 2024-03-01 VITALS
WEIGHT: 229.25 LBS | HEIGHT: 71 IN | HEART RATE: 67 BPM | RESPIRATION RATE: 18 BRPM | BODY MASS INDEX: 32.1 KG/M2 | SYSTOLIC BLOOD PRESSURE: 176 MMHG | TEMPERATURE: 97 F | DIASTOLIC BLOOD PRESSURE: 91 MMHG | OXYGEN SATURATION: 97 %

## 2024-03-01 DIAGNOSIS — M47.816 LUMBAR SPONDYLOSIS: ICD-10-CM

## 2024-03-01 LAB — POCT GLUCOSE: 178 MG/DL (ref 70–110)

## 2024-03-01 PROCEDURE — 64493 INJ PARAVERT F JNT L/S 1 LEV: CPT | Mod: 50 | Performed by: ANESTHESIOLOGY

## 2024-03-01 PROCEDURE — 82962 GLUCOSE BLOOD TEST: CPT | Performed by: ANESTHESIOLOGY

## 2024-03-01 PROCEDURE — 25000003 PHARM REV CODE 250: Performed by: ANESTHESIOLOGY

## 2024-03-01 PROCEDURE — 64494 INJ PARAVERT F JNT L/S 2 LEV: CPT | Mod: 50 | Performed by: ANESTHESIOLOGY

## 2024-03-01 PROCEDURE — 63600175 PHARM REV CODE 636 W HCPCS: Performed by: ANESTHESIOLOGY

## 2024-03-01 PROCEDURE — 64494 INJ PARAVERT F JNT L/S 2 LEV: CPT | Mod: 50,,, | Performed by: ANESTHESIOLOGY

## 2024-03-01 PROCEDURE — 64493 INJ PARAVERT F JNT L/S 1 LEV: CPT | Mod: 50,,, | Performed by: ANESTHESIOLOGY

## 2024-03-01 RX ORDER — INDOMETHACIN 25 MG/1
CAPSULE ORAL
Status: DISCONTINUED | OUTPATIENT
Start: 2024-03-01 | End: 2024-03-01 | Stop reason: HOSPADM

## 2024-03-01 RX ORDER — BUPIVACAINE HYDROCHLORIDE 5 MG/ML
INJECTION, SOLUTION EPIDURAL; INTRACAUDAL
Status: DISCONTINUED | OUTPATIENT
Start: 2024-03-01 | End: 2024-03-01 | Stop reason: HOSPADM

## 2024-03-01 RX ORDER — FENTANYL CITRATE 50 UG/ML
INJECTION, SOLUTION INTRAMUSCULAR; INTRAVENOUS
Status: DISCONTINUED | OUTPATIENT
Start: 2024-03-01 | End: 2024-03-01 | Stop reason: HOSPADM

## 2024-03-01 NOTE — OP NOTE
Daniel Villeda  78 y.o. male      Vitals:    03/01/24 1017   BP: (!) 151/91   Pulse: 69   Resp: 17   Temp:        Procedure Date: 03/01/2024        INFORMED CONSENT: The procedure, risks, benefits and options were discussed with patient. There are no contraindications to the procedure. The patient expressed understanding and agreed to proceed. The personnel performing the procedure was discussed. I verify that I personally obtained consent prior to the start of the procedure and the signed consent can be found on the patient's chart.       Anesthesia:   Conscious sedation provided by M.D    The patient was monitored with continuous pulse oximetry, EKG, and intermittent blood pressure monitors.  The patient was hemodynamically stable throughout the entire process was responsive to voice, and breathing spontaneously.  Supplemental O2 was provided at 2L/min via nasal cannula.  Patient was comfortable for the duration of the procedure. (See nurse documentation and case log for sedation time)    There was a total of 0mg IV Midazolam and 50mcg Fentanyl titrated for the procedure     Pre Procedure diagnosis: Lumbar spondylosis [M47.816]  Post-Procedure diagnosis: SAME     PROCEDURE: bilateral L3,4,5 LUMBAR FACET MEDIAL BRANCH NERVE BLOCK        DESCRIPTION OF PROCEDURE:The patient was brought to the procedure room. After performing time out. IV access was obtained prior to the procedure. The patient was positioned prone on the fluoroscopy table. Continuous hemodynamic monitoring was initiated including blood pressure, EKG, and pulse oximetry. The area of the lumbar spine was prepped chlorhexidine and draped into a sterile field. Fluoroscopy was used to identify the location of the bilateral side, L3, L4, and L5 medial branch nerves at the junctions of the superior articular process and the transverse processes of  L4, L5, and the sacral ala respectively. Skin anesthesia was achieved using 5 cc of Lidocaine 1% over the  "injection sites. A 22 gauge, 3 1/2" spinal needle was slowly inserted at each level using AP, lateral and oblique fluoroscopic imaging. Negative aspiration for blood or CSF was confirmed.  8 ml bupivacaine 0.25% ONLY was injected at all sites in divided doses. The needles were removed and bleeding was nil. A sterile dressing was applied. No specimens collected. Patient was taken back to the PACU for observation .       Blood Loss: Nill  Specimen: None    Debra De    "

## 2024-03-01 NOTE — DISCHARGE INSTRUCTIONS

## 2024-03-01 NOTE — DISCHARGE SUMMARY
Discharge Note  Short Stay      SUMMARY     Admit Date: 3/1/2024    Attending Physician: Debra De MD        Discharge Physician: Debra De MD        Discharge Date: 3/1/2024 10:22 AM    Procedure(s) (LRB):  Bilateral L3-5 MBB Dignostic (Bilateral)    Final Diagnosis: Lumbar spondylosis [M47.816]    Disposition: Home or self care    Patient Instructions:   Current Discharge Medication List        CONTINUE these medications which have NOT CHANGED    Details   atorvastatin (LIPITOR) 20 MG tablet Take 1 tablet (20 mg total) by mouth every evening.  Qty: 52 tablet, Refills: 3      losartan-hydrochlorothiazide 100-12.5 mg (HYZAAR) 100-12.5 mg Tab Take 1 tablet by mouth once daily.  Qty: 90 tablet, Refills: 3    Comments: .      metFORMIN (GLUCOPHAGE) 500 MG tablet Take 1 tablet (500 mg total) by mouth 2 (two) times daily with meals.  Qty: 180 tablet, Refills: 3      metoprolol tartrate (LOPRESSOR) 100 MG tablet Take 1 tablet (100 mg total) by mouth 2 (two) times daily.  Qty: 180 tablet, Refills: 3    Comments: .      oxybutynin (DITROPAN) 5 MG Tab Take 1 tablet (5 mg total) by mouth 2 (two) times daily.  Qty: 180 tablet, Refills: 3      pantoprazole (PROTONIX) 40 MG tablet Take 1 tablet (40 mg total) by mouth once daily.  Qty: 90 tablet, Refills: 3      acetaminophen (TYLENOL) 650 MG TbSR Take 650 mg by mouth every 8 (eight) hours.      aspirin 81 MG Chew Take 81 mg by mouth once daily.      cholecalciferol, vitamin D3, (VITAMIN D3) 50 mcg (2,000 unit) Tab Take 2,000 Units by mouth once daily.       fish oil-omega-3 fatty acids 300-1,000 mg capsule Take by mouth once daily.      magnesium oxide (MAG-OX) 400 mg (241.3 mg magnesium) tablet Take 400 mg by mouth once daily.      polyethylene glycol (GLYCOLAX) 17 gram/dose powder Take 17 g by mouth 2 (two) times daily.  Qty: 595 g, Refills: 5    Associated Diagnoses: Constipation, unspecified constipation type                 Discharge Diagnosis: Lumbar spondylosis  [O59.083]  Condition on Discharge: Stable with no complications to procedure   Diet on Discharge: Same as before.  Activity: as per instruction sheet.  Discharge to: Home with a responsible adult.  Follow up: 2-4 weeks       Please call the office at [phone] if you experience any weakness or loss of sensation, fever > 101.5, pain uncontrolled with oral medications, persistent nausea/vomiting/or diarrhea, redness or drainage from the incisions, or any other worrisome concerns. If physician on call was not reached or could not communicate with our office for any reason please go to the nearest emergency department

## 2024-03-04 ENCOUNTER — TELEPHONE (OUTPATIENT)
Dept: PAIN MEDICINE | Facility: CLINIC | Age: 79
End: 2024-03-04
Payer: MEDICARE

## 2024-03-04 NOTE — TELEPHONE ENCOUNTER
Reach out to the patient to received their % relief from the MBB procedure. Pt reported 80 %. Inform patient that I will inform the provider and  I will give them a call with more information once the provides responds back. Pt understood.

## 2024-03-05 DIAGNOSIS — M47.816 LUMBAR SPONDYLOSIS: Primary | ICD-10-CM

## 2024-03-19 ENCOUNTER — HOSPITAL ENCOUNTER (OUTPATIENT)
Facility: HOSPITAL | Age: 79
Discharge: HOME | End: 2024-03-19
Attending: ANESTHESIOLOGY | Admitting: ANESTHESIOLOGY
Payer: MEDICARE

## 2024-03-19 VITALS
WEIGHT: 233 LBS | BODY MASS INDEX: 32.62 KG/M2 | HEIGHT: 71 IN | SYSTOLIC BLOOD PRESSURE: 145 MMHG | DIASTOLIC BLOOD PRESSURE: 77 MMHG | RESPIRATION RATE: 17 BRPM | TEMPERATURE: 97 F | OXYGEN SATURATION: 100 % | HEART RATE: 66 BPM

## 2024-03-19 DIAGNOSIS — M47.816 LUMBAR SPONDYLOSIS: ICD-10-CM

## 2024-03-19 LAB — POCT GLUCOSE: 159 MG/DL (ref 70–110)

## 2024-03-19 PROCEDURE — 64636 DESTROY L/S FACET JNT ADDL: CPT | Mod: 50 | Performed by: ANESTHESIOLOGY

## 2024-03-19 PROCEDURE — 82962 GLUCOSE BLOOD TEST: CPT | Performed by: ANESTHESIOLOGY

## 2024-03-19 PROCEDURE — 64636 DESTROY L/S FACET JNT ADDL: CPT | Mod: 50,,, | Performed by: ANESTHESIOLOGY

## 2024-03-19 PROCEDURE — 64635 DESTROY LUMB/SAC FACET JNT: CPT | Mod: 50,,, | Performed by: ANESTHESIOLOGY

## 2024-03-19 PROCEDURE — 63600175 PHARM REV CODE 636 W HCPCS: Mod: JZ,JG | Performed by: ANESTHESIOLOGY

## 2024-03-19 PROCEDURE — 99152 MOD SED SAME PHYS/QHP 5/>YRS: CPT | Performed by: ANESTHESIOLOGY

## 2024-03-19 PROCEDURE — 25000003 PHARM REV CODE 250: Performed by: ANESTHESIOLOGY

## 2024-03-19 PROCEDURE — 64635 DESTROY LUMB/SAC FACET JNT: CPT | Mod: 50 | Performed by: ANESTHESIOLOGY

## 2024-03-19 RX ORDER — INDOMETHACIN 25 MG/1
CAPSULE ORAL
Status: DISCONTINUED | OUTPATIENT
Start: 2024-03-19 | End: 2024-03-19 | Stop reason: HOSPADM

## 2024-03-19 RX ORDER — METHYLPREDNISOLONE ACETATE 40 MG/ML
INJECTION, SUSPENSION INTRA-ARTICULAR; INTRALESIONAL; INTRAMUSCULAR; SOFT TISSUE
Status: DISCONTINUED | OUTPATIENT
Start: 2024-03-19 | End: 2024-03-19 | Stop reason: HOSPADM

## 2024-03-19 RX ORDER — BUPIVACAINE HYDROCHLORIDE 2.5 MG/ML
INJECTION, SOLUTION EPIDURAL; INFILTRATION; INTRACAUDAL
Status: DISCONTINUED | OUTPATIENT
Start: 2024-03-19 | End: 2024-03-19 | Stop reason: HOSPADM

## 2024-03-19 RX ORDER — LIDOCAINE HYDROCHLORIDE 20 MG/ML
INJECTION, SOLUTION EPIDURAL; INFILTRATION; INTRACAUDAL; PERINEURAL
Status: DISCONTINUED | OUTPATIENT
Start: 2024-03-19 | End: 2024-03-19 | Stop reason: HOSPADM

## 2024-03-19 RX ORDER — MIDAZOLAM HYDROCHLORIDE 1 MG/ML
INJECTION, SOLUTION INTRAMUSCULAR; INTRAVENOUS
Status: DISCONTINUED | OUTPATIENT
Start: 2024-03-19 | End: 2024-03-19 | Stop reason: HOSPADM

## 2024-03-19 RX ORDER — FENTANYL CITRATE 50 UG/ML
INJECTION, SOLUTION INTRAMUSCULAR; INTRAVENOUS
Status: DISCONTINUED | OUTPATIENT
Start: 2024-03-19 | End: 2024-03-19 | Stop reason: HOSPADM

## 2024-03-19 NOTE — OP NOTE
Daniel Villeda  78 y.o. male      Vitals:    03/19/24 1021   BP: (!) 157/74   Pulse: 64   Resp: 18   Temp:         INFORMED CONSENT: The procedure, risks, benefits and options were discussed with patient. There are no contraindications to the procedure. The patient expressed understanding and agreed to proceed. The personnel performing the procedure was discussed. I verify that I personally obtained the patient's consent prior to the start of the procedure and the signed consent can be found on the patient's chart.     Procedure Date 03/19/2024       Pre Procedure diagnosis: Lumbar spondylosis [M47.816]  Post-Procedure diagnosis:         Sedation:  Conscious sedation provided by M.D    The patient was monitored with continuous pulse oximetry, EKG, and intermittent blood pressure monitors.  The patient was hemodynamically stable throughout the entire process was responsive to voice, and breathing spontaneously.  Supplemental O2 was provided at 2L/min via nasal cannula.  Patient was comfortable for the duration of the procedure. (See nurse documentation and case log for sedation time)    There was a total of 2mg IV Midazolam and 100mcg Fentanyl titrated for the procedure        Conscious sedation ordered by MD.  Patient reevaluated and sedation administered by MD and monitored by RN.  Total sedation time was lmore than 15 min. (See nurse documentation and case log for sedation time)      PROCEDURE: bilateral L3,4,5  FACET MEDIAL BRANCH NERVE RADIOFREQUENCY NEUROTOMY (lumbar)         DESCRIPTION OF PROCEDURE: The patient was brought to the procedure room.  After performing time out IV access was obtained prior to the procedure. The patient was positioned prone on the fluoroscopy table. Continuous hemodynamic monitoring was initiated including blood pressure and pulse oximetry. IV sedation was administered incrementally to allow the patient to remain comfortable and conversant throughout the procedure. The area of the  lumbar spine was prepped chlorhexidine three times and draped into a sterile field.  Fluoroscopy was used to identify the location of the RT side  L3, L4, and L5 medial branch nerves at the junctions of the superior articular process and the transverse processes of  L4, L5, and the sacral ala respectively.  Skin anesthesia was achieved using 3 cc of Lidocaine 1% over the injection sites. A 20 gauge, 100mm (10mm active tip) curved RF needle was slowly inserted at each level using AP, lateral and oblique fluoroscopic imaging. Negative aspiration for blood or CSF was confirmed.  Sensory stimulation at 50Hz below 0.5V was achieved at every level. Motor stimulation at 2Hz up to 1.5V did not cause any radicular symptoms at any level. Each level was anesthetized with 1.5 cc of lidocaine 1%.  Radiofrequency lesioning was performed for 90 seconds at 80 degrees in two different positions at each level.  Total of 3 cc of bupivacaine 0.25% and 10 mg of Decadron was injected was injected at all levels.. The needles were removed and bleeding was nil.  A sterile dressing was applied. Patient was taken back to the recovery room for further observation.      Stimulation Results:     L3 = Sensory positive @ 0.5, Motor negative @ 1.5  L4 = Sensory positive @ 0.7, Motor negative @ 1.5  L5 = Sensory positive @ 0.5, Motor negative @ 1.5     Blood Loss: Nill  Specimen: None

## 2024-03-19 NOTE — DISCHARGE SUMMARY
Discharge Note  Short Stay      SUMMARY     Admit Date: 3/19/2024    Attending Physician: Debra De MD        Discharge Physician: Debra De MD        Discharge Date: 3/19/2024 10:25 AM    Procedure(s) (LRB):  Bilateral L3-5 Lumbar RFA (Bilateral)    Final Diagnosis: Lumbar spondylosis [M47.816]    Disposition: Home or self care    Patient Instructions:   Current Discharge Medication List        CONTINUE these medications which have NOT CHANGED    Details   acetaminophen (TYLENOL) 650 MG TbSR Take 650 mg by mouth every 8 (eight) hours.      aspirin 81 MG Chew Take 81 mg by mouth once daily.      atorvastatin (LIPITOR) 20 MG tablet Take 1 tablet (20 mg total) by mouth every evening.  Qty: 52 tablet, Refills: 3      cholecalciferol, vitamin D3, (VITAMIN D3) 50 mcg (2,000 unit) Tab Take 2,000 Units by mouth once daily.       fish oil-omega-3 fatty acids 300-1,000 mg capsule Take by mouth once daily.      losartan-hydrochlorothiazide 100-12.5 mg (HYZAAR) 100-12.5 mg Tab Take 1 tablet by mouth once daily.  Qty: 90 tablet, Refills: 3    Comments: .      magnesium oxide (MAG-OX) 400 mg (241.3 mg magnesium) tablet Take 400 mg by mouth once daily.      metFORMIN (GLUCOPHAGE) 500 MG tablet Take 1 tablet (500 mg total) by mouth 2 (two) times daily with meals.  Qty: 180 tablet, Refills: 3      metoprolol tartrate (LOPRESSOR) 100 MG tablet Take 1 tablet (100 mg total) by mouth 2 (two) times daily.  Qty: 180 tablet, Refills: 3    Comments: .      oxybutynin (DITROPAN) 5 MG Tab Take 1 tablet (5 mg total) by mouth 2 (two) times daily.  Qty: 180 tablet, Refills: 3      pantoprazole (PROTONIX) 40 MG tablet Take 1 tablet (40 mg total) by mouth once daily.  Qty: 90 tablet, Refills: 3      polyethylene glycol (GLYCOLAX) 17 gram/dose powder Take 17 g by mouth 2 (two) times daily.  Qty: 595 g, Refills: 5    Associated Diagnoses: Constipation, unspecified constipation type                 Discharge Diagnosis: Lumbar spondylosis  [B22.805]  Condition on Discharge: Stable with no complications to procedure   Diet on Discharge: Same as before.  Activity: as per instruction sheet.  Discharge to: Home with a responsible adult.  Follow up: 2-4 weeks       Please call the office at [phone] if you experience any weakness or loss of sensation, fever > 101.5, pain uncontrolled with oral medications, persistent nausea/vomiting/or diarrhea, redness or drainage from the incisions, or any other worrisome concerns. If physician on call was not reached or could not communicate with our office for any reason please go to the nearest emergency department

## 2024-03-19 NOTE — DISCHARGE INSTRUCTIONS

## 2024-04-17 NOTE — PROGRESS NOTES
"Established Patient Chronic Pain Note     Referring Physician: No ref. provider found    PCP: Avinash Lugo MD    Chief Complaint:   LBP       SUBJECTIVE:  Interval history 04/18/2024  Patient presents status post bilateral L3-5 lumbar RFA 03/19/2024 and bilateral L3-5 lumbar diagnostic medial branch block 03/01/2024.  Patient reports 95% sustained relief in lower axial back pain following bilateral L3-5 lumbar radiofrequency ablation.  Patient reports he feels like a different person!" He does report residual radicular pain with prolonged standing and with ambulation.  Pain can be rated a 2/10 on the best day or 6/10 on the worst day.  Pain can radiate into the buttocks in L5-S1 distribution. Of note patient reports receiving a series of 3 epidurals with , several years prior which gave him significant relief of radicular pain lasting several years in duration and improvement in ambulation.  Patient has continued physician directed physical therapy exercises over the last 8 weeks from 02/18/2024 through 04/18/2024 with marginal improvement in pain, range of motion and functionality.      Interval history 02/22/2024  Patient presents status post bilateral L3-5 lumbar medial branch block 10/17/2023 and for MRI lumbar spine review.  EMG ordered, not completed.  Patient reports 90% relief lasting 2 days in duration following his lumbar medial branch block he does report pain is improved from baseline.  Today he reports pain is rated a 6/10.  Patient reports pain in a bandlike distribution in the lower back.  He does report intermittent radiation into the right buttock and periodically down the right lower extremity in L5-S1 distribution to the calf.  Majority, 90% of his pain remains axial in the lower back.  Patient has continued physician directed physical therapy exercises over the last 8 weeks from 12- 02/22/2024 with marginal improvement in pain, range of motion and functionality.  Patient " continues to ambulate with a cane to help offload weight in the right lower extremity.  He does endorse associated weakness in the right lower extremity associated with his pain.  Of note patient reports receiving a series of 3 epidurals with , several years prior which gave him significant relief of radicular pain lasting several months in duration and improvement in ambulation.  Today he emphasizes majority of his pain remains in the lower back and is axial in nature.      HPI 09/21/2023  Daniel Villeda is a 78 y.o. male with past medical history significant for first-degree AV block, type 2 diabetes, hypertension, hyperlipidemia, right bundle branch block, stage 3 chronic kidney disease, history of DVT, history of renal cell carcinoma and gastrointestinal stromal tumor, GERD who presents to the clinic for the evaluation of lower back and right leg pain.  Patient reports pain has been present for over a decade.  He believes pain began in high school when he was taking a ice box up the stairs when he pulled his lower back.  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  Today he reports pain which is constant which is rated a 7/10.  Pain is described as aching and throbbing in nature.  Patient reports pain in a bandlike distribution in the lower back which radiates down the lateral and posterior aspect of the right lower extremity in L4-5 distribution to the calf.  Pain is exacerbated with lumbar flexion as well as with ambulation.  Patient reports he is able to ambulate approximately half a block before requiring rest.  Patient reports reduced range of motion in the right ankle and weakness in the right lower extremity.  Pain is improved with rest.  Patient has completed conventional physical therapy 6-10 years prior with no improvement in his pain.  He is continued physician directed physical therapy exercises  "at home over the last 6 weeks without meaningful improvement in his pain.  Patient has trialed membrane stabilizing agents in the past, he can not recall which in particular, but did not like the cognitive side effects.  Of note patient has received numerous lumbar epidural steroid injections, 8-10 years prior with Dr. Hester." Which gave him several years of significant relief.  Patient is interested in trialing interventional therapy.  He would like to refrain from any pharmacologic management.    Of note patient saw Ms. BetitoDERRICK, neurosurgery 04/10/2023 with the following evaluation      Patient reports significant motor weakness and loss of sensations.  Patient denies night fever/night sweats, urinary incontinence, bowel incontinence, and significant weight loss.      Pain Disability Index Review:         4/18/2024    12:50 PM 2/22/2024     9:29 AM 9/21/2023     8:18 AM   Last 3 PDI Scores   Pain Disability Index (PDI) 14 36 55       Non-Pharmacologic Treatments:  Physical Therapy/Home Exercise: yes  Ice/Heat:yes  TENS: no  Acupuncture: no  Massage: no  Chiropractic: no    Other: no      Pain Medications:  - Adjuvant Medications: Tylenol (Acetaminophen)  - Anti-Coagulants: Aspirin    Pain Procedures:   Dr. De:  -03/19/2024: Bilateral L3-5 lumbar radiofrequency ablation  -03/01/2024: Bilateral L3-5 diagnostic lumbar medial branch block  -10/17/2023: Bilateral L3-5 lumbar medial branch block      DEBBIE: NASRIN    Past Medical History:   Diagnosis Date    CKD stage 3 due to type 2 diabetes mellitus     Deep vein thrombosis     DM (diabetes mellitus) with complications     Gastrointestinal stromal tumor (GIST) 3/9/2021    GERD (gastroesophageal reflux disease)     History of DVT (deep vein thrombosis)     History of renal cell carcinoma 2009    Hyperlipidemia associated with type 2 diabetes mellitus 02/26/2021    Hypertension associated with diabetes     Panlobular emphysema     Thrombocytopenia     Type II " diabetes mellitus with renal manifestations      Past Surgical History:   Procedure Laterality Date    CLOSURE OF PERFORATED ULCER OF DUODENUM USING OMENTAL PATCH N/A 3/9/2021    Procedure: CLOSURE, ULCER, PERFORATED, DUODENUM, USING OMENTAL PATCH;  Surgeon: Edin Bradford MD;  Location: [facility] OR;  Service: General;  Laterality: N/A;    COLONOSCOPY N/A 1/5/2022    Procedure: COLONOSCOPY;  Surgeon: Mirta Grover MD;  Location: [facility] ENDO;  Service: Endoscopy;  Laterality: N/A;    ENDOSCOPIC ULTRASOUND OF UPPER GASTROINTESTINAL TRACT N/A 2/5/2021    Procedure: ULTRASOUND, UPPER GI TRACT, ENDOSCOPIC;  Surgeon: Geremias Brizuela MD;  Location: [facility] ENDO;  Service: Endoscopy;  Laterality: N/A;    ESOPHAGOGASTRODUODENOSCOPY N/A 2/3/2021    Procedure: EGD (ESOPHAGOGASTRODUODENOSCOPY);  Surgeon: Mirta Grover MD;  Location: [facility] ENDO;  Service: Endoscopy;  Laterality: N/A;    ESOPHAGOGASTRODUODENOSCOPY N/A 3/9/2021    Procedure: EGD (ESOPHAGOGASTRODUODENOSCOPY);  Surgeon: Edin Bradford MD;  Location: [facility] OR;  Service: General;  Laterality: N/A;    INJECTION OF ANESTHETIC AGENT AROUND MEDIAL BRANCH NERVES INNERVATING LUMBAR FACET JOINT Bilateral 10/17/2023    Procedure: Bilateral L3-5 MBB;  Surgeon: Debra De MD;  Location: [facility]IN MGT;  Service: Pain Management;  Laterality: Bilateral;    INJECTION OF ANESTHETIC AGENT AROUND MEDIAL BRANCH NERVES INNERVATING LUMBAR FACET JOINT Bilateral 3/1/2024    Procedure: Bilateral L3-5 MBB Dignostic;  Surgeon: Debra De MD;  Location: [facility]GT;  Service: Pain Management;  Laterality: Bilateral;    INJECTION OF ANESTHETIC AGENT INTO TISSUE PLANE DEFINED BY TRANSVERSUS ABDOMINIS MUSCLE  4/17/2023    Procedure: BLOCK, TRANSVERSUS ABDOMINIS PLANE;  Surgeon: Yumiko Rosen DO;  Location: [facility];  Service: General;;    left partial nephrectomy Left 2006    RADIOFREQUENCY THERMOCOAGULATION Bilateral 3/19/2024    Procedure: Bilateral L3-5 Lumbar RFA;   Surgeon: Debra De MD;  Location: [facility];  Service: Pain Management;  Laterality: Bilateral;    ROBOT-ASSISTED REPAIR OF INCISIONAL HERNIA USING DA PERRI XI Left 4/17/2023    Procedure: XI ROBOTIC REPAIR, HERNIA, INCISIONAL;  Surgeon: Yumiko Rosen DO;  Location: [facility] OR;  Service: General;  Laterality: Left;  Left flank. Position right lateral decubitus. Ernst.    ROBOT-ASSISTED SURGICAL REMOVAL OF STOMACH USING DA PERRI XI N/A 3/9/2021    Procedure: XI ROBOTIC GASTRECTOMY;  Surgeon: Edin Bradford MD;  Location: [facility] OR;  Service: General;  Laterality: N/A;  partial     Review of patient's allergies indicates:  No Known Allergies    Current Outpatient Medications   Medication Sig Dispense Refill    acetaminophen (TYLENOL) 650 MG TbSR Take 650 mg by mouth every 8 (eight) hours.      aspirin 81 MG Chew Take 81 mg by mouth once daily.      atorvastatin (LIPITOR) 20 MG tablet Take 1 tablet (20 mg total) by mouth every evening. 52 tablet 3    cholecalciferol, vitamin D3, (VITAMIN D3) 50 mcg (2,000 unit) Tab Take 2,000 Units by mouth once daily.       fish oil-omega-3 fatty acids 300-1,000 mg capsule Take by mouth once daily.      losartan-hydrochlorothiazide 100-12.5 mg (HYZAAR) 100-12.5 mg Tab Take 1 tablet by mouth once daily. 90 tablet 3    magnesium oxide (MAG-OX) 400 mg (241.3 mg magnesium) tablet Take 400 mg by mouth once daily.      metFORMIN (GLUCOPHAGE) 500 MG tablet Take 1 tablet (500 mg total) by mouth 2 (two) times daily with meals. 180 tablet 3    metoprolol tartrate (LOPRESSOR) 100 MG tablet Take 1 tablet (100 mg total) by mouth 2 (two) times daily. 180 tablet 3    oxybutynin (DITROPAN) 5 MG Tab Take 1 tablet (5 mg total) by mouth 2 (two) times daily. 180 tablet 3    pantoprazole (PROTONIX) 40 MG tablet Take 1 tablet (40 mg total) by mouth once daily. 90 tablet 3     No current facility-administered medications for this visit.       Review of Systems     GENERAL:  No weight loss, malaise  "or fevers.  HEENT:   No recent changes in vision or hearing  NECK:  Negative for lumps, no difficulty with swallowing.  RESPIRATORY:  Negative for cough, wheezing or shortness of breath, patient denies any recent URI.  CARDIOVASCULAR:  Negative for chest pain or palpitations.  GI:  Negative for abdominal discomfort, blood in stools or black stools or change in bowel habits.  MUSCULOSKELETAL:  See HPI.  SKIN:  Negative for lesions, rash, and itching.  PSYCH:  No mood disorder or recent psychosocial stressors.   HEMATOLOGY/LYMPHOLOGY:  Negative for prolonged bleeding, bruising easily or swollen nodes.    NEURO:   No history of syncope, paralysis, seizures or tremors.  All other reviewed and negative other than HPI.    OBJECTIVE:    /76   Pulse 75   Resp 17   Ht 5' 11" (1.803 m)   Wt 105 kg (231 lb 7.7 oz)   BMI 32.29 kg/m²       Physical Exam    GENERAL: Well appearing, in no acute distress, alert and oriented x3.  PSYCH:  Mood and affect appropriate.  SKIN: Skin color, texture, turgor normal, no rashes or lesions.  HEAD/FACE:  Normocephalic, atraumatic. Cranial nerves grossly intact.    CV: RRR with palpation of the radial artery.  PULM: No evidence of respiratory difficulty, symmetric chest rise.  GI:  Soft and non-tender.    BACK: Straight leg raising in the sitting and supine positions is negative to radicular pain. pain to palpation over the facet joints of the lumbar spine or spinous processes. Normal range of motion without pain reproduction.  EXTREMITIES: Peripheral joint ROM is full and pain free without obvious instability or laxity in all four extremities. No deformities, edema, or skin discoloration. Good capillary refill.  MUSCULOSKELETAL: Able to stand on heels & toes.   hip, and knee provocative maneuvers are negative.  There is no pain with palpation over the sacroiliac joints bilaterally.  Gaenslen's, Distraction/Compression and  FABERs test is negative.  Facet loading test is positive " bilaterally.   Bilateral upper and lower extremity strength is normal and symmetric.  No atrophy or tone abnormalities are noted.    RIGHT Lower extremity: Hip flexion 5/5, Hip Abduction 5/5, Hip Adduction 5/5, Knee extension 5/5, Knee flexion 5/5, Ankle dorsiflexion5/5, Extensor hallucis longus 5/5, Ankle plantarflexion 5/5  LEFT Lower extremity:  Hip flexion 5/5, Hip Abduction 5/5,Hip Adduction 5/5, Knee extension 5/5, Knee flexion 5/5, Ankle dorsiflexion 5/5, Extensor hallucis longus 5/5, Ankle plantarflexion 5/5  -Normal testing knee (patellar) jerk and ankle (achilles) jerk    NEURO: Bilateral upper and lower extremity coordination and muscle stretch reflexes are physiologic and symmetric. No loss of sensation is noted.  GAIT: normal.    Imaging:         MRI lumbar spine 10/02/2023  FINDINGS:  Alignment: Grade 2 L5-S1 anterolisthesis likely secondary to bilateral pars defects.  Minor L1-L2, L2-L3 and L4-L5 retrolisthesis.     Vertebrae: Lumbar vertebral body heights are maintained.  There are mild multilevel endplate degenerative changes.  No significant endplate edema.  No evidence of an acute fracture.  Marrow signal is otherwise within normal limits.     Discs: Disc desiccation throughout the lumbar spine with height loss most pronounced at T12-L1, L1-L2 and L5-S1.     Cord: Within normal limits.  Conus terminates at L1-L2.     Degenerative findings:     T12-L1: Minor broad-based posterior disc bulge with a left subarticular zones/neural foraminal disc protrusion, which appears to contact the exited left T12 nerve root.  No significant spinal canal stenosis.  There is narrowing of the left lateral recess with severe left-sided neural foraminal stenosis.     L1-L2: Minor anterolisthesis.  Mild broad-based posterior disc bulge with a tiny left subarticular zone disc protrusion narrowing the left lateral recess.  No significant spinal canal stenosis.  Facet arthropathy contributes to mild right and moderate  left-sided neural foraminal stenosis.     L2-L3: Minor retrolisthesis.  Mild broad-based posterior disc bulge and mild bilateral facet arthropathy ligamentum flavum hypertrophy contributes to mild spinal canal stenosis and mild-to-moderate bilateral neural foraminal stenosis.     L3-L4: Minimal broad-based posterior disc bulge and mild bilateral facet arthropathy.  No significant spinal canal stenosis.  There is mild-to-moderate right and mild left-sided neural foraminal stenosis.  There is a far right lateral disc osteophyte complex.     L4-L5: Minor retrolisthesis.  Mild broad-based posterior disc bulge and mild bilateral facet arthropathy.  No significant spinal canal stenosis.  There is moderate right and mild left-sided neural foraminal stenosis.     L5-S1: Grade 2 anterolisthesis with significant uncovering of the posterior disc space.  Broad-based posterior disc bulge, facet arthropathy ligamentum flavum hypertrophy contribute to severe spinal canal stenosis and severe bilateral neural foraminal stenosis.     Paraspinal muscles & soft tissues: Fatty atrophy of the posterior paraspinous musculature.     Impression:     1. L5-S1 grade 2 anterolisthesis likely secondary to bilateral pars defects contributes to severe spinal canal stenosis and severe bilateral neural foraminal stenosis.  2. Mild L2-L3 spinal canal stenosis with mild-to-moderate bilateral neural foraminal stenosis.  3. Small left T12-L1 subarticular zone/neural foraminal disc protrusion contributes to severe left-sided neural foraminal stenosis.        CT C/A/P 01/03/2023     FINDINGS:  Finding: The size of the heart is within normal limits.  There is no evidence of an acute pulmonary process.  There is no pneumothorax or pleural effusion.     There is moderate generalized atrophy of the pancreas.  The liver, gallbladder, spleen, and adrenals are normal in appearance.  There are hypodense masses associated with both kidneys.  One of the larger  ones measures 9 mm and is located off of the lateral aspect of the inferior pole of the right kidney.  This mass has a Hounsfield measurement of 7.  The ureters and the urinary bladder are normal in appearance.  The prostate is normal in appearance.  The appendix is normal in appearance.  There is a moderate amount of diverticulosis in the descending and sigmoid portions of the colon.  There is no abnormal mass visualized within the gastrointestinal system.  There is no free fluid within the abdomen or pelvis. There is no pneumoperitoneum.  There are old bilateral pars interarticularis fractures of L5.  There is grade 3 anterolisthesis of L5 on S1.  There are moderate degenerative changes between L5 and S1.  There is no abnormal lymphadenopathy based on CT size criteria.     Impression:     1. There is no abnormal mass visualized within the gastrointestinal system.  2. There is no abnormal lymphadenopathy based on CT size criteria.  3. There are hypodense masses associated with both kidneys. One of the larger ones measures 9 mm and is located off of the lateral aspect of the inferior pole of the right kidney.  This mass has a Hounsfield measurement of 7.  This is characteristic of a cyst.  4. There is a moderate amount of diverticulosis in the descending and sigmoid portions of the colon.  There is no abnormal mass visualized within the gastrointestinal system.  5. There are old bilateral pars interarticularis fractures of L5.  There is grade 3 anterolisthesis of L5 on S1. There are moderate degenerative changes between L5 and S1.      ASSESSMENT: 78 y.o. year old male with     1. Lumbar spondylosis        2. DDD (degenerative disc disease), lumbar        3. Anterolisthesis of lumbosacral spine        4. Lumbar radiculopathy, chronic  Case Request-RAD/Other Procedure Area: Lumbar L5/S1 IL JASON            PLAN:   - Interventions:  Schedule for L5-S1 interlaminar epidural steroid injection for lumbar radiculopathy.   Explained the risks and benefits of the procedure in detail with the patient today in clinic along with alternative treatment options, and the patient elected to pursue the intervention at this time.      - Anticoagulation use: Yes aspirin  -Per JOSE guidelines, for secondary prophylaxis (prior DVT), patient can continue aspirin for lumbar epidural steroid injection    -Patient has 95% sustained relief in axial back pain following bilateral L3-5 lumbar radiofrequency ablation.  We have discussed repeating this procedure, no sooner than 6 months from the original procedure date should axial back pain exacerbate.     report:  Reviewed and consistent with medication use as prescribed.    - Medications:  -Patient would like to refrain from pharmacologic management at this time.    - Therapy:   We discussed continuing physician directed at home physical therapy to help manage the patient/s painful condition. The patient was counseled that muscle strengthening will improve the long term prognosis in regards to pain and may also help increase range of motion and mobility.     - Imaging: Reviewed available imaging (MRI lumbar spine) with patient and answered any questions they had regarding study.     - Consults/Referrals:  Physical Medicine Rehabilitation for lower extremity electromyography studies to help guide diagnosis and treatment (orders placed 10/4/2023)    - Records: Obtain old records from outside physicians and imaging --not received (04/18/2024)        - Follow up visit:  4-6 weeks status post injection    The above plan and management options were discussed at length with patient. Patient is in agreement with the above and verbalized understanding.    - I discussed the goals of interventional chronic pain management with the patient on today's visit. We discussed a multimodal and systematic approach to pain.  This includes diagnostic and therapeutic injections, adjuvant pharmacologic treatment, physical  therapy, and at times psychiatry.  I emphasized the importance of regular exercise, core strengthening and stretching, diet and weight loss as a cornerstone of long-term pain management.    - This condition does not require this patient to take time off of work, and the primary goal of our Pain Management services is to improve the patient's functional capacity.  - Patient Questions: Answered all of the patient's questions regarding diagnoses, therapy, treatment and next steps        Debra De MD  Interventional Pain Management  [facility]    Disclaimer:  This note was prepared using voice recognition system and is likely to have sound alike errors that may have been overlooked even after proof reading.  Please call me with any questions

## 2024-04-17 NOTE — H&P (VIEW-ONLY)
"Established Patient Chronic Pain Note     Referring Physician: No ref. provider found    PCP: Avinash Lugo MD    Chief Complaint:   LBP       SUBJECTIVE:  Interval history 04/18/2024  Patient presents status post bilateral L3-5 lumbar RFA 03/19/2024 and bilateral L3-5 lumbar diagnostic medial branch block 03/01/2024.  Patient reports 95% sustained relief in lower axial back pain following bilateral L3-5 lumbar radiofrequency ablation.  Patient reports he feels like a different person!" He does report residual radicular pain with prolonged standing and with ambulation.  Pain can be rated a 2/10 on the best day or 6/10 on the worst day.  Pain can radiate into the buttocks in L5-S1 distribution. Of note patient reports receiving a series of 3 epidurals with , several years prior which gave him significant relief of radicular pain lasting several years in duration and improvement in ambulation.  Patient has continued physician directed physical therapy exercises over the last 8 weeks from 02/18/2024 through 04/18/2024 with marginal improvement in pain, range of motion and functionality.      Interval history 02/22/2024  Patient presents status post bilateral L3-5 lumbar medial branch block 10/17/2023 and for MRI lumbar spine review.  EMG ordered, not completed.  Patient reports 90% relief lasting 2 days in duration following his lumbar medial branch block he does report pain is improved from baseline.  Today he reports pain is rated a 6/10.  Patient reports pain in a bandlike distribution in the lower back.  He does report intermittent radiation into the right buttock and periodically down the right lower extremity in L5-S1 distribution to the calf.  Majority, 90% of his pain remains axial in the lower back.  Patient has continued physician directed physical therapy exercises over the last 8 weeks from 12- 02/22/2024 with marginal improvement in pain, range of motion and functionality.  Patient " continues to ambulate with a cane to help offload weight in the right lower extremity.  He does endorse associated weakness in the right lower extremity associated with his pain.  Of note patient reports receiving a series of 3 epidurals with , several years prior which gave him significant relief of radicular pain lasting several months in duration and improvement in ambulation.  Today he emphasizes majority of his pain remains in the lower back and is axial in nature.      HPI 09/21/2023  Daniel Villeda is a 78 y.o. male with past medical history significant for first-degree AV block, type 2 diabetes, hypertension, hyperlipidemia, right bundle branch block, stage 3 chronic kidney disease, history of DVT, history of renal cell carcinoma and gastrointestinal stromal tumor, GERD who presents to the clinic for the evaluation of lower back and right leg pain.  Patient reports pain has been present for over a decade.  He believes pain began in high school when he was taking a ice box up the stairs when he pulled his lower back.  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  Today he reports pain which is constant which is rated a 7/10.  Pain is described as aching and throbbing in nature.  Patient reports pain in a bandlike distribution in the lower back which radiates down the lateral and posterior aspect of the right lower extremity in L4-5 distribution to the calf.  Pain is exacerbated with lumbar flexion as well as with ambulation.  Patient reports he is able to ambulate approximately half a block before requiring rest.  Patient reports reduced range of motion in the right ankle and weakness in the right lower extremity.  Pain is improved with rest.  Patient has completed conventional physical therapy 6-10 years prior with no improvement in his pain.  He is continued physician directed physical therapy exercises  "at home over the last 6 weeks without meaningful improvement in his pain.  Patient has trialed membrane stabilizing agents in the past, he can not recall which in particular, but did not like the cognitive side effects.  Of note patient has received numerous lumbar epidural steroid injections, 8-10 years prior with Dr. Hester." Which gave him several years of significant relief.  Patient is interested in trialing interventional therapy.  He would like to refrain from any pharmacologic management.    Of note patient saw Ms. BetitoDERRICK, neurosurgery 04/10/2023 with the following evaluation      Patient reports significant motor weakness and loss of sensations.  Patient denies night fever/night sweats, urinary incontinence, bowel incontinence, and significant weight loss.      Pain Disability Index Review:         4/18/2024    12:50 PM 2/22/2024     9:29 AM 9/21/2023     8:18 AM   Last 3 PDI Scores   Pain Disability Index (PDI) 14 36 55       Non-Pharmacologic Treatments:  Physical Therapy/Home Exercise: yes  Ice/Heat:yes  TENS: no  Acupuncture: no  Massage: no  Chiropractic: no    Other: no      Pain Medications:  - Adjuvant Medications: Tylenol (Acetaminophen)  - Anti-Coagulants: Aspirin    Pain Procedures:   Dr. De:  -03/19/2024: Bilateral L3-5 lumbar radiofrequency ablation  -03/01/2024: Bilateral L3-5 diagnostic lumbar medial branch block  -10/17/2023: Bilateral L3-5 lumbar medial branch block      DEBBIE: NASRIN    Past Medical History:   Diagnosis Date    CKD stage 3 due to type 2 diabetes mellitus     Deep vein thrombosis     DM (diabetes mellitus) with complications     Gastrointestinal stromal tumor (GIST) 3/9/2021    GERD (gastroesophageal reflux disease)     History of DVT (deep vein thrombosis)     History of renal cell carcinoma 2009    Hyperlipidemia associated with type 2 diabetes mellitus 02/26/2021    Hypertension associated with diabetes     Panlobular emphysema     Thrombocytopenia     Type II " diabetes mellitus with renal manifestations      Past Surgical History:   Procedure Laterality Date    CLOSURE OF PERFORATED ULCER OF DUODENUM USING OMENTAL PATCH N/A 3/9/2021    Procedure: CLOSURE, ULCER, PERFORATED, DUODENUM, USING OMENTAL PATCH;  Surgeon: Edin Bradford MD;  Location: [facility];  Service: General;  Laterality: N/A;    COLONOSCOPY N/A 1/5/2022    Procedure: COLONOSCOPY;  Surgeon: Mirta Grover MD;  Location: [facility];  Service: Endoscopy;  Laterality: N/A;    ENDOSCOPIC ULTRASOUND OF UPPER GASTROINTESTINAL TRACT N/A 2/5/2021    Procedure: ULTRASOUND, UPPER GI TRACT, ENDOSCOPIC;  Surgeon: Geremias Brizuela MD;  Location: [facility];  Service: Endoscopy;  Laterality: N/A;    ESOPHAGOGASTRODUODENOSCOPY N/A 2/3/2021    Procedure: EGD (ESOPHAGOGASTRODUODENOSCOPY);  Surgeon: Mirta Grover MD;  Location: [facility];  Service: Endoscopy;  Laterality: N/A;    ESOPHAGOGASTRODUODENOSCOPY N/A 3/9/2021    Procedure: EGD (ESOPHAGOGASTRODUODENOSCOPY);  Surgeon: Edin Bradford MD;  Location: [facility];  Service: General;  Laterality: N/A;    INJECTION OF ANESTHETIC AGENT AROUND MEDIAL BRANCH NERVES INNERVATING LUMBAR FACET JOINT Bilateral 10/17/2023    Procedure: Bilateral L3-5 MBB;  Surgeon: Debra De MD;  Location: [facility];  Service: Pain Management;  Laterality: Bilateral;    INJECTION OF ANESTHETIC AGENT AROUND MEDIAL BRANCH NERVES INNERVATING LUMBAR FACET JOINT Bilateral 3/1/2024    Procedure: Bilateral L3-5 MBB Dignostic;  Surgeon: Debra De MD;  Location: [facility];  Service: Pain Management;  Laterality: Bilateral;    INJECTION OF ANESTHETIC AGENT INTO TISSUE PLANE DEFINED BY TRANSVERSUS ABDOMINIS MUSCLE  4/17/2023    Procedure: BLOCK, TRANSVERSUS ABDOMINIS PLANE;  Surgeon: Yumiko Rosen DO;  Location: [facility];  Service: General;;    left partial nephrectomy Left 2006    RADIOFREQUENCY THERMOCOAGULATION Bilateral 3/19/2024    Procedure: Bilateral L3-5 Lumbar RFA;   Surgeon: Debra De MD;  Location: [facility];  Service: Pain Management;  Laterality: Bilateral;    ROBOT-ASSISTED REPAIR OF INCISIONAL HERNIA USING DA PERRI XI Left 4/17/2023    Procedure: XI ROBOTIC REPAIR, HERNIA, INCISIONAL;  Surgeon: Yumiko Rosen DO;  Location: [facility];  Service: General;  Laterality: Left;  Left flank. Position right lateral decubitus. Ernst.    ROBOT-ASSISTED SURGICAL REMOVAL OF STOMACH USING DA PERRI XI N/A 3/9/2021    Procedure: XI ROBOTIC GASTRECTOMY;  Surgeon: Edin Bradford MD;  Location: [facility];  Service: General;  Laterality: N/A;  partial     Review of patient's allergies indicates:  No Known Allergies    Current Outpatient Medications   Medication Sig Dispense Refill    acetaminophen (TYLENOL) 650 MG TbSR Take 650 mg by mouth every 8 (eight) hours.      aspirin 81 MG Chew Take 81 mg by mouth once daily.      atorvastatin (LIPITOR) 20 MG tablet Take 1 tablet (20 mg total) by mouth every evening. 52 tablet 3    cholecalciferol, vitamin D3, (VITAMIN D3) 50 mcg (2,000 unit) Tab Take 2,000 Units by mouth once daily.       fish oil-omega-3 fatty acids 300-1,000 mg capsule Take by mouth once daily.      losartan-hydrochlorothiazide 100-12.5 mg (HYZAAR) 100-12.5 mg Tab Take 1 tablet by mouth once daily. 90 tablet 3    magnesium oxide (MAG-OX) 400 mg (241.3 mg magnesium) tablet Take 400 mg by mouth once daily.      metFORMIN (GLUCOPHAGE) 500 MG tablet Take 1 tablet (500 mg total) by mouth 2 (two) times daily with meals. 180 tablet 3    metoprolol tartrate (LOPRESSOR) 100 MG tablet Take 1 tablet (100 mg total) by mouth 2 (two) times daily. 180 tablet 3    oxybutynin (DITROPAN) 5 MG Tab Take 1 tablet (5 mg total) by mouth 2 (two) times daily. 180 tablet 3    pantoprazole (PROTONIX) 40 MG tablet Take 1 tablet (40 mg total) by mouth once daily. 90 tablet 3     No current facility-administered medications for this visit.       Review of Systems     GENERAL:  No weight loss, malaise  "or fevers.  HEENT:   No recent changes in vision or hearing  NECK:  Negative for lumps, no difficulty with swallowing.  RESPIRATORY:  Negative for cough, wheezing or shortness of breath, patient denies any recent URI.  CARDIOVASCULAR:  Negative for chest pain or palpitations.  GI:  Negative for abdominal discomfort, blood in stools or black stools or change in bowel habits.  MUSCULOSKELETAL:  See HPI.  SKIN:  Negative for lesions, rash, and itching.  PSYCH:  No mood disorder or recent psychosocial stressors.   HEMATOLOGY/LYMPHOLOGY:  Negative for prolonged bleeding, bruising easily or swollen nodes.    NEURO:   No history of syncope, paralysis, seizures or tremors.  All other reviewed and negative other than HPI.    OBJECTIVE:    /76   Pulse 75   Resp 17   Ht 5' 11" (1.803 m)   Wt 105 kg (231 lb 7.7 oz)   BMI 32.29 kg/m²       Physical Exam    GENERAL: Well appearing, in no acute distress, alert and oriented x3.  PSYCH:  Mood and affect appropriate.  SKIN: Skin color, texture, turgor normal, no rashes or lesions.  HEAD/FACE:  Normocephalic, atraumatic. Cranial nerves grossly intact.    CV: RRR with palpation of the radial artery.  PULM: No evidence of respiratory difficulty, symmetric chest rise.  GI:  Soft and non-tender.    BACK: Straight leg raising in the sitting and supine positions is negative to radicular pain. pain to palpation over the facet joints of the lumbar spine or spinous processes. Normal range of motion without pain reproduction.  EXTREMITIES: Peripheral joint ROM is full and pain free without obvious instability or laxity in all four extremities. No deformities, edema, or skin discoloration. Good capillary refill.  MUSCULOSKELETAL: Able to stand on heels & toes.   hip, and knee provocative maneuvers are negative.  There is no pain with palpation over the sacroiliac joints bilaterally.  Gaenslen's, Distraction/Compression and  FABERs test is negative.  Facet loading test is positive " bilaterally.   Bilateral upper and lower extremity strength is normal and symmetric.  No atrophy or tone abnormalities are noted.    RIGHT Lower extremity: Hip flexion 5/5, Hip Abduction 5/5, Hip Adduction 5/5, Knee extension 5/5, Knee flexion 5/5, Ankle dorsiflexion5/5, Extensor hallucis longus 5/5, Ankle plantarflexion 5/5  LEFT Lower extremity:  Hip flexion 5/5, Hip Abduction 5/5,Hip Adduction 5/5, Knee extension 5/5, Knee flexion 5/5, Ankle dorsiflexion 5/5, Extensor hallucis longus 5/5, Ankle plantarflexion 5/5  -Normal testing knee (patellar) jerk and ankle (achilles) jerk    NEURO: Bilateral upper and lower extremity coordination and muscle stretch reflexes are physiologic and symmetric. No loss of sensation is noted.  GAIT: normal.    Imaging:         MRI lumbar spine 10/02/2023  FINDINGS:  Alignment: Grade 2 L5-S1 anterolisthesis likely secondary to bilateral pars defects.  Minor L1-L2, L2-L3 and L4-L5 retrolisthesis.     Vertebrae: Lumbar vertebral body heights are maintained.  There are mild multilevel endplate degenerative changes.  No significant endplate edema.  No evidence of an acute fracture.  Marrow signal is otherwise within normal limits.     Discs: Disc desiccation throughout the lumbar spine with height loss most pronounced at T12-L1, L1-L2 and L5-S1.     Cord: Within normal limits.  Conus terminates at L1-L2.     Degenerative findings:     T12-L1: Minor broad-based posterior disc bulge with a left subarticular zones/neural foraminal disc protrusion, which appears to contact the exited left T12 nerve root.  No significant spinal canal stenosis.  There is narrowing of the left lateral recess with severe left-sided neural foraminal stenosis.     L1-L2: Minor anterolisthesis.  Mild broad-based posterior disc bulge with a tiny left subarticular zone disc protrusion narrowing the left lateral recess.  No significant spinal canal stenosis.  Facet arthropathy contributes to mild right and moderate  left-sided neural foraminal stenosis.     L2-L3: Minor retrolisthesis.  Mild broad-based posterior disc bulge and mild bilateral facet arthropathy ligamentum flavum hypertrophy contributes to mild spinal canal stenosis and mild-to-moderate bilateral neural foraminal stenosis.     L3-L4: Minimal broad-based posterior disc bulge and mild bilateral facet arthropathy.  No significant spinal canal stenosis.  There is mild-to-moderate right and mild left-sided neural foraminal stenosis.  There is a far right lateral disc osteophyte complex.     L4-L5: Minor retrolisthesis.  Mild broad-based posterior disc bulge and mild bilateral facet arthropathy.  No significant spinal canal stenosis.  There is moderate right and mild left-sided neural foraminal stenosis.     L5-S1: Grade 2 anterolisthesis with significant uncovering of the posterior disc space.  Broad-based posterior disc bulge, facet arthropathy ligamentum flavum hypertrophy contribute to severe spinal canal stenosis and severe bilateral neural foraminal stenosis.     Paraspinal muscles & soft tissues: Fatty atrophy of the posterior paraspinous musculature.     Impression:     1. L5-S1 grade 2 anterolisthesis likely secondary to bilateral pars defects contributes to severe spinal canal stenosis and severe bilateral neural foraminal stenosis.  2. Mild L2-L3 spinal canal stenosis with mild-to-moderate bilateral neural foraminal stenosis.  3. Small left T12-L1 subarticular zone/neural foraminal disc protrusion contributes to severe left-sided neural foraminal stenosis.        CT C/A/P 01/03/2023     FINDINGS:  Finding: The size of the heart is within normal limits.  There is no evidence of an acute pulmonary process.  There is no pneumothorax or pleural effusion.     There is moderate generalized atrophy of the pancreas.  The liver, gallbladder, spleen, and adrenals are normal in appearance.  There are hypodense masses associated with both kidneys.  One of the larger  ones measures 9 mm and is located off of the lateral aspect of the inferior pole of the right kidney.  This mass has a Hounsfield measurement of 7.  The ureters and the urinary bladder are normal in appearance.  The prostate is normal in appearance.  The appendix is normal in appearance.  There is a moderate amount of diverticulosis in the descending and sigmoid portions of the colon.  There is no abnormal mass visualized within the gastrointestinal system.  There is no free fluid within the abdomen or pelvis. There is no pneumoperitoneum.  There are old bilateral pars interarticularis fractures of L5.  There is grade 3 anterolisthesis of L5 on S1.  There are moderate degenerative changes between L5 and S1.  There is no abnormal lymphadenopathy based on CT size criteria.     Impression:     1. There is no abnormal mass visualized within the gastrointestinal system.  2. There is no abnormal lymphadenopathy based on CT size criteria.  3. There are hypodense masses associated with both kidneys. One of the larger ones measures 9 mm and is located off of the lateral aspect of the inferior pole of the right kidney.  This mass has a Hounsfield measurement of 7.  This is characteristic of a cyst.  4. There is a moderate amount of diverticulosis in the descending and sigmoid portions of the colon.  There is no abnormal mass visualized within the gastrointestinal system.  5. There are old bilateral pars interarticularis fractures of L5.  There is grade 3 anterolisthesis of L5 on S1. There are moderate degenerative changes between L5 and S1.      ASSESSMENT: 78 y.o. year old male with     1. Lumbar spondylosis        2. DDD (degenerative disc disease), lumbar        3. Anterolisthesis of lumbosacral spine        4. Lumbar radiculopathy, chronic  Case Request-RAD/Other Procedure Area: Lumbar L5/S1 IL JASON            PLAN:   - Interventions:  Schedule for L5-S1 interlaminar epidural steroid injection for lumbar radiculopathy.   Explained the risks and benefits of the procedure in detail with the patient today in clinic along with alternative treatment options, and the patient elected to pursue the intervention at this time.      - Anticoagulation use: Yes aspirin  -Per JOSE guidelines, for secondary prophylaxis (prior DVT), patient can continue aspirin for lumbar epidural steroid injection    -Patient has 95% sustained relief in axial back pain following bilateral L3-5 lumbar radiofrequency ablation.  We have discussed repeating this procedure, no sooner than 6 months from the original procedure date should axial back pain exacerbate.     report:  Reviewed and consistent with medication use as prescribed.    - Medications:  -Patient would like to refrain from pharmacologic management at this time.    - Therapy:   We discussed continuing physician directed at home physical therapy to help manage the patient/s painful condition. The patient was counseled that muscle strengthening will improve the long term prognosis in regards to pain and may also help increase range of motion and mobility.     - Imaging: Reviewed available imaging (MRI lumbar spine) with patient and answered any questions they had regarding study.     - Consults/Referrals:  Physical Medicine Rehabilitation for lower extremity electromyography studies to help guide diagnosis and treatment (orders placed 10/4/2023)    - Records: Obtain old records from outside physicians and imaging --not received (04/18/2024)        - Follow up visit:  4-6 weeks status post injection    The above plan and management options were discussed at length with patient. Patient is in agreement with the above and verbalized understanding.    - I discussed the goals of interventional chronic pain management with the patient on today's visit. We discussed a multimodal and systematic approach to pain.  This includes diagnostic and therapeutic injections, adjuvant pharmacologic treatment, physical  therapy, and at times psychiatry.  I emphasized the importance of regular exercise, core strengthening and stretching, diet and weight loss as a cornerstone of long-term pain management.    - This condition does not require this patient to take time off of work, and the primary goal of our Pain Management services is to improve the patient's functional capacity.  - Patient Questions: Answered all of the patient's questions regarding diagnoses, therapy, treatment and next steps        Debra De MD  Interventional Pain Management  [facility]    Disclaimer:  This note was prepared using voice recognition system and is likely to have sound alike errors that may have been overlooked even after proof reading.  Please call me with any questions

## 2024-04-18 ENCOUNTER — OFFICE VISIT (OUTPATIENT)
Dept: PAIN MEDICINE | Facility: CLINIC | Age: 79
End: 2024-04-18
Payer: MEDICARE

## 2024-04-18 VITALS
HEIGHT: 71 IN | DIASTOLIC BLOOD PRESSURE: 76 MMHG | HEART RATE: 75 BPM | BODY MASS INDEX: 32.41 KG/M2 | RESPIRATION RATE: 17 BRPM | WEIGHT: 231.5 LBS | SYSTOLIC BLOOD PRESSURE: 133 MMHG

## 2024-04-18 DIAGNOSIS — M43.17 ANTEROLISTHESIS OF LUMBOSACRAL SPINE: ICD-10-CM

## 2024-04-18 DIAGNOSIS — M47.816 LUMBAR SPONDYLOSIS: Primary | ICD-10-CM

## 2024-04-18 DIAGNOSIS — M54.16 LUMBAR RADICULOPATHY, CHRONIC: ICD-10-CM

## 2024-04-18 DIAGNOSIS — M51.36 DDD (DEGENERATIVE DISC DISEASE), LUMBAR: ICD-10-CM

## 2024-04-18 PROCEDURE — 99999 PR PBB SHADOW E&M-EST. PATIENT-LVL IV: CPT | Mod: PBBFAC,,, | Performed by: ANESTHESIOLOGY

## 2024-04-18 PROCEDURE — 3075F SYST BP GE 130 - 139MM HG: CPT | Mod: CPTII,S$GLB,, | Performed by: ANESTHESIOLOGY

## 2024-04-18 PROCEDURE — 1125F AMNT PAIN NOTED PAIN PRSNT: CPT | Mod: CPTII,S$GLB,, | Performed by: ANESTHESIOLOGY

## 2024-04-18 PROCEDURE — 99214 OFFICE O/P EST MOD 30 MIN: CPT | Mod: S$GLB,,, | Performed by: ANESTHESIOLOGY

## 2024-04-18 PROCEDURE — 3078F DIAST BP <80 MM HG: CPT | Mod: CPTII,S$GLB,, | Performed by: ANESTHESIOLOGY

## 2024-04-18 PROCEDURE — 1159F MED LIST DOCD IN RCRD: CPT | Mod: CPTII,S$GLB,, | Performed by: ANESTHESIOLOGY

## 2024-04-18 PROCEDURE — 1101F PT FALLS ASSESS-DOCD LE1/YR: CPT | Mod: CPTII,S$GLB,, | Performed by: ANESTHESIOLOGY

## 2024-04-18 PROCEDURE — 3072F LOW RISK FOR RETINOPATHY: CPT | Mod: CPTII,S$GLB,, | Performed by: ANESTHESIOLOGY

## 2024-04-18 PROCEDURE — 3288F FALL RISK ASSESSMENT DOCD: CPT | Mod: CPTII,S$GLB,, | Performed by: ANESTHESIOLOGY

## 2024-04-22 RX ORDER — METFORMIN HYDROCHLORIDE 500 MG/1
500 TABLET ORAL 2 TIMES DAILY WITH MEALS
Qty: 180 TABLET | Refills: 3 | Status: SHIPPED | OUTPATIENT
Start: 2024-04-22

## 2024-04-22 NOTE — TELEPHONE ENCOUNTER
Requested Prescriptions     Pending Prescriptions Disp Refills    metFORMIN (GLUCOPHAGE) 500 MG tablet [Pharmacy Med Name: metFORMIN HCl 500 MG Oral Tablet] 180 tablet 0     Sig: TAKE 1 TABLET BY MOUTH TWICE DAILY WITH MEALS     LV 11/15/2023   NV 05/22/2024  LF 05/01/2023

## 2024-05-01 NOTE — PRE-PROCEDURE INSTRUCTIONS
Spoke with patient regarding procedure scheduled on 5.3     Arrival time 0945     Has patient been sick with fever or on antibiotics within the last 7 days? No     Does the patient have any open wounds, sores or rashes? No     Does the patient have any recent fractures? no     Has patient received a vaccination within the last 7 days? No     Received the COVID vaccination?      Has the patient stopped all medications as directed? na     Does patient have a pacemaker, defibrillator, or implantable stimulator? No     Does the patient have a ride to and from procedure and someone reliable to remain with patient? wife     Is the patient diabetic? yes     Does the patient have sleep apnea? Or use O2 at home? no     Is the patient receiving sedation?      Is the patient instructed to remain NPO beginning at midnight the night before their procedure? yes     Procedure location confirmed with patient? Yes     Covid- Denies signs/symptoms. Instructed to notify PAT/MD if any changes.

## 2024-05-03 ENCOUNTER — TELEPHONE (OUTPATIENT)
Dept: NEUROSURGERY | Facility: CLINIC | Age: 79
End: 2024-05-03
Payer: MEDICARE

## 2024-05-03 ENCOUNTER — HOSPITAL ENCOUNTER (OUTPATIENT)
Facility: HOSPITAL | Age: 79
Discharge: HOME | End: 2024-05-03
Attending: ANESTHESIOLOGY | Admitting: ANESTHESIOLOGY
Payer: MEDICARE

## 2024-05-03 VITALS
DIASTOLIC BLOOD PRESSURE: 73 MMHG | HEIGHT: 71 IN | WEIGHT: 230.5 LBS | SYSTOLIC BLOOD PRESSURE: 151 MMHG | OXYGEN SATURATION: 98 % | BODY MASS INDEX: 32.27 KG/M2 | HEART RATE: 61 BPM | RESPIRATION RATE: 15 BRPM | TEMPERATURE: 97 F

## 2024-05-03 DIAGNOSIS — M54.16 LUMBAR RADICULOPATHY, CHRONIC: ICD-10-CM

## 2024-05-03 DIAGNOSIS — M43.17 ANTEROLISTHESIS OF LUMBOSACRAL SPINE: Primary | ICD-10-CM

## 2024-05-03 DIAGNOSIS — M51.36 DDD (DEGENERATIVE DISC DISEASE), LUMBAR: ICD-10-CM

## 2024-05-03 LAB — POCT GLUCOSE: 177 MG/DL (ref 70–110)

## 2024-05-03 PROCEDURE — 25500020 PHARM REV CODE 255: Performed by: ANESTHESIOLOGY

## 2024-05-03 PROCEDURE — 25000003 PHARM REV CODE 250: Performed by: ANESTHESIOLOGY

## 2024-05-03 PROCEDURE — 64484 NJX AA&/STRD TFRM EPI L/S EA: CPT | Mod: RT | Performed by: ANESTHESIOLOGY

## 2024-05-03 PROCEDURE — 63600175 PHARM REV CODE 636 W HCPCS: Performed by: ANESTHESIOLOGY

## 2024-05-03 PROCEDURE — 64483 NJX AA&/STRD TFRM EPI L/S 1: CPT | Mod: RT | Performed by: ANESTHESIOLOGY

## 2024-05-03 PROCEDURE — 99152 MOD SED SAME PHYS/QHP 5/>YRS: CPT | Performed by: ANESTHESIOLOGY

## 2024-05-03 PROCEDURE — 99153 MOD SED SAME PHYS/QHP EA: CPT | Performed by: ANESTHESIOLOGY

## 2024-05-03 PROCEDURE — 64483 NJX AA&/STRD TFRM EPI L/S 1: CPT | Mod: RT,,, | Performed by: ANESTHESIOLOGY

## 2024-05-03 PROCEDURE — 64484 NJX AA&/STRD TFRM EPI L/S EA: CPT | Mod: RT,,, | Performed by: ANESTHESIOLOGY

## 2024-05-03 RX ORDER — DEXAMETHASONE SODIUM PHOSPHATE 10 MG/ML
INJECTION INTRAMUSCULAR; INTRAVENOUS
Status: DISCONTINUED | OUTPATIENT
Start: 2024-05-03 | End: 2024-05-03 | Stop reason: HOSPADM

## 2024-05-03 RX ORDER — METHYLPREDNISOLONE ACETATE 40 MG/ML
INJECTION, SUSPENSION INTRA-ARTICULAR; INTRALESIONAL; INTRAMUSCULAR; SOFT TISSUE
Status: DISCONTINUED | OUTPATIENT
Start: 2024-05-03 | End: 2024-05-03 | Stop reason: HOSPADM

## 2024-05-03 RX ORDER — MIDAZOLAM HYDROCHLORIDE 1 MG/ML
INJECTION, SOLUTION INTRAMUSCULAR; INTRAVENOUS
Status: DISCONTINUED | OUTPATIENT
Start: 2024-05-03 | End: 2024-05-03 | Stop reason: HOSPADM

## 2024-05-03 RX ORDER — BUPIVACAINE HYDROCHLORIDE 2.5 MG/ML
INJECTION, SOLUTION EPIDURAL; INFILTRATION; INTRACAUDAL
Status: DISCONTINUED | OUTPATIENT
Start: 2024-05-03 | End: 2024-05-03 | Stop reason: HOSPADM

## 2024-05-03 RX ORDER — SODIUM BICARBONATE 1 MEQ/ML
SYRINGE (ML) INTRAVENOUS
Status: DISCONTINUED | OUTPATIENT
Start: 2024-05-03 | End: 2024-05-03 | Stop reason: HOSPADM

## 2024-05-03 RX ORDER — FENTANYL CITRATE 50 UG/ML
INJECTION, SOLUTION INTRAMUSCULAR; INTRAVENOUS
Status: DISCONTINUED | OUTPATIENT
Start: 2024-05-03 | End: 2024-05-03 | Stop reason: HOSPADM

## 2024-05-03 NOTE — DISCHARGE INSTRUCTIONS

## 2024-05-03 NOTE — DISCHARGE SUMMARY
Discharge Note  Short Stay      SUMMARY     Admit Date: 5/3/2024    Attending Physician: Debra De MD        Discharge Physician: Debra De MD        Discharge Date: 5/3/2024 11:13 AM    Procedure(s) (LRB):  Lumbar L5/S1 IL JASON (N/A)    Final Diagnosis: Lumbar radiculopathy, chronic [M54.16]    Disposition: Home or self care    Patient Instructions:   Current Discharge Medication List        CONTINUE these medications which have NOT CHANGED    Details   aspirin 81 MG Chew Take 81 mg by mouth once daily.      atorvastatin (LIPITOR) 20 MG tablet Take 1 tablet (20 mg total) by mouth every evening.  Qty: 52 tablet, Refills: 3      losartan-hydrochlorothiazide 100-12.5 mg (HYZAAR) 100-12.5 mg Tab Take 1 tablet by mouth once daily.  Qty: 90 tablet, Refills: 3    Comments: .      magnesium oxide (MAG-OX) 400 mg (241.3 mg magnesium) tablet Take 400 mg by mouth once daily.      metFORMIN (GLUCOPHAGE) 500 MG tablet TAKE 1 TABLET BY MOUTH TWICE DAILY WITH MEALS  Qty: 180 tablet, Refills: 3      metoprolol tartrate (LOPRESSOR) 100 MG tablet Take 1 tablet (100 mg total) by mouth 2 (two) times daily.  Qty: 180 tablet, Refills: 3    Comments: .      oxybutynin (DITROPAN) 5 MG Tab Take 1 tablet (5 mg total) by mouth 2 (two) times daily.  Qty: 180 tablet, Refills: 3      pantoprazole (PROTONIX) 40 MG tablet Take 1 tablet (40 mg total) by mouth once daily.  Qty: 90 tablet, Refills: 3      acetaminophen (TYLENOL) 650 MG TbSR Take 650 mg by mouth every 8 (eight) hours.      cholecalciferol, vitamin D3, (VITAMIN D3) 50 mcg (2,000 unit) Tab Take 2,000 Units by mouth once daily.       fish oil-omega-3 fatty acids 300-1,000 mg capsule Take by mouth once daily.                 Discharge Diagnosis: Lumbar radiculopathy, chronic [M54.16]  Condition on Discharge: Stable with no complications to procedure   Diet on Discharge: Same as before.  Activity: as per instruction sheet.  Discharge to: Home with a responsible adult.  Follow up:  2-4 weeks       Please call the office at [phone] if you experience any weakness or loss of sensation, fever > 101.5, pain uncontrolled with oral medications, persistent nausea/vomiting/or diarrhea, redness or drainage from the incisions, or any other worrisome concerns. If physician on call was not reached or could not communicate with our office for any reason please go to the nearest emergency department

## 2024-05-03 NOTE — OP NOTE
Daniel Vilelda  78 y.o. male      Vitals:    05/03/24 1057   BP: (!) 135/90   Pulse: 62   Resp: 15   Temp:      Procedure Date: 05/03/2024        INFORMED CONSENT: The procedure, risks, benefits and options were discussed with patient. There are no contraindications to the procedure. The patient expressed understanding and agreed to proceed. The personnel performing the procedure was discussed. I verify that I personally obtained consent prior to the start of the procedure and the signed consent can be found on the patient's chart.       Anesthesia:   Conscious sedation provided by M.D    The patient was monitored with continuous pulse oximetry, EKG, and intermittent blood pressure monitors.  The patient was hemodynamically stable throughout the entire process was responsive to voice, and breathing spontaneously.  Supplemental O2 was provided at 2L/min via nasal cannula.  Patient was comfortable for the duration of the procedure. (See nurse documentation and case log for sedation time)    There was a total of 2mg IV Midazolam and 100mcg Fentanyl titrated for the procedure    Pre Procedure diagnosis: Lumbar radiculopathy, chronic [M54.16]  Post-Procedure diagnosis: SAME     Complications: None    Specimens: None      DESCRIPTION OF PROCEDURE: The patient was brought to the procedure room. IV access was obtained prior to the procedure. The patient was positioned prone on the fluoroscopy table. Continuous hemodynamic monitoring was initiated including blood pressure, EKG, and pulse oximetry. . The skin was prepped with chlorhexidine and draped in a sterile fashion.      SPECIMENS: none    TECHNIQUE: With the patient laying in a prone position, the area was prepped and draped in the usual sterile fashion using ChloraPrep and a fenestrated drape. 1% lidocaine was given using a 27-gauge needle by raising a wheal and going down to the hub of the needle over the  interlaminar space.  The interlaminar space was then  approached with a 3.5 inch 18-gauge Touhy needle was introduced under fluoroscopic guidance in the AP and Lateral view. Secondary to patient anatomy, the L5-S1 intralaminar space was unable to be approached, even from more superior approaches. Decision to change to a transforaminal approach on the R.    The  L4/5 and  L5/S1 transforaminal spaces were identified with fluoroscopy in the  AP, oblique, and lateral views.  A 22 gauge spinal quinke needle was then advanced into the area of the trans foraminal spaces right with confirmation of proper needle position using AP, oblique, and lateral fluoroscopic views. Once the needle tip was in the area of the transforaminal space, and there was no blood, CSF or paraesthesias,  1.5 mL of Omnipaque 300mg/ml was injected on right for a total of 3mL.  Fluoroscopic imaging in the AP and lateral views revealed a clear outline of the spinal nerve with proximal spread of agent through the neural foramen into the epidural space for L4. Vascular spread noted at L5/S1 level/ Needle redirected more lateral and inferior. A total combination of 2mL of Bupivacaine and 10 mg dexamethasone was injected on each level with displacement of the contrast dye confirming that the medication went into the area of the transforaminal spaces right. A sterile bandaid was applied.   Patient tolerated the procedure well.    Patient was taken back to the recovery room for further observation.     The patient was discharged to home in stable condition

## 2024-05-03 NOTE — TELEPHONE ENCOUNTER
----- Message from Vladimir Borja sent at 5/3/2024  4:52 PM CDT -----  Contact: self  Type:  Patient Returning Call    Who Called:Daniel Villeda  Who Left Message for Patient:Bella Avendaño MA  Does the patient know what this is regarding?: possibly for his back procedure  Would the patient rather a call back or a response via [facility]? Call Back, if no answer, please try again.   Best Call Back Number:[phone] or [phone]  Additional Information: n/a

## 2024-05-03 NOTE — TELEPHONE ENCOUNTER
I have  tried contacting this patient to schedule but no answer. I have left him a detailed message.

## 2024-05-14 RX ORDER — PANTOPRAZOLE SODIUM 40 MG/1
40 TABLET, DELAYED RELEASE ORAL DAILY
Qty: 90 TABLET | Refills: 3 | Status: SHIPPED | OUTPATIENT
Start: 2024-05-14

## 2024-05-14 NOTE — TELEPHONE ENCOUNTER
----- Message from Vladimir Borja sent at 5/14/2024 11:54 AM CDT -----  Contact: self  Type:  RX Refill Request    Who Called: Daniel Villeda  Refill or New Rx:REfill  RX Name and Strength:pantoprazole (PROTONIX) 40 MG tablet  How is the patient currently taking it? (ex. 1XDay):1xDay  Is this a 30 day or 90 day RX:90 Day   Preferred Pharmacy with phone number:  [address] [address]  Phone: [phone] Fax: [phone]    Local or Mail Order:Local  Ordering Provider:Dr. FAISAL Lugo  Would the patient rather a call back or a response via [facility]? Call Back  Best Call Back Number: [phone]  Additional Information: n/a

## 2024-05-14 NOTE — TELEPHONE ENCOUNTER
Requested Prescriptions     Pending Prescriptions Disp Refills    pantoprazole (PROTONIX) 40 MG tablet 90 tablet 3     Sig: Take 1 tablet (40 mg total) by mouth once daily.     LV 11/15/2023   NV   05/22/2024   LF 05/22/2023

## 2024-05-14 NOTE — TELEPHONE ENCOUNTER
----- Message from Vladimir Borja sent at 5/14/2024 11:54 AM CDT -----  Contact: self  Type:  RX Refill Request    Who Called: Daniel Villeda  Refill or New Rx:REfill  RX Name and Strength:pantoprazole (PROTONIX) 40 MG tablet  How is the patient currently taking it? (ex. 1XDay):1xDay  Is this a 30 day or 90 day RX:90 Day   Preferred Pharmacy with phone number:  [address] [address]  Phone: [phone] Fax: [phone]    Local or Mail Order:Local  Ordering Provider:Dr. FAISAL Lugo  Would the patient rather a call back or a response via [facility]? Call Back  Best Call Back Number: [phone]  Additional Information: n/a

## 2024-05-15 ENCOUNTER — LAB VISIT (OUTPATIENT)
Dept: LAB | Facility: HOSPITAL | Age: 79
End: 2024-05-15
Attending: INTERNAL MEDICINE
Payer: MEDICARE

## 2024-05-15 DIAGNOSIS — E11.8 DM (DIABETES MELLITUS) WITH COMPLICATIONS: ICD-10-CM

## 2024-05-15 LAB
ALBUMIN SERPL BCP-MCNC: 3.5 G/DL (ref 3.5–5.2)
ALBUMIN/CREAT UR: 4.3 UG/MG (ref 0–30)
ALP SERPL-CCNC: 73 U/L (ref 55–135)
ALT SERPL W/O P-5'-P-CCNC: 33 U/L (ref 10–44)
ANION GAP SERPL CALC-SCNC: 9 MMOL/L (ref 8–16)
AST SERPL-CCNC: 21 U/L (ref 10–40)
BASOPHILS # BLD AUTO: 0.05 K/UL (ref 0–0.2)
BASOPHILS NFR BLD: 1 % (ref 0–1.9)
BILIRUB SERPL-MCNC: 0.7 MG/DL (ref 0.1–1)
BUN SERPL-MCNC: 23 MG/DL (ref 8–23)
CALCIUM SERPL-MCNC: 9.3 MG/DL (ref 8.7–10.5)
CHLORIDE SERPL-SCNC: 105 MMOL/L (ref 95–110)
CHOLEST SERPL-MCNC: 77 MG/DL (ref 120–199)
CHOLEST/HDLC SERPL: 4.3 {RATIO} (ref 2–5)
CO2 SERPL-SCNC: 25 MMOL/L (ref 23–29)
CREAT SERPL-MCNC: 1.3 MG/DL (ref 0.5–1.4)
CREAT UR-MCNC: 115 MG/DL (ref 23–375)
DIFFERENTIAL METHOD BLD: ABNORMAL
EOSINOPHIL # BLD AUTO: 0.1 K/UL (ref 0–0.5)
EOSINOPHIL NFR BLD: 1.2 % (ref 0–8)
ERYTHROCYTE [DISTWIDTH] IN BLOOD BY AUTOMATED COUNT: 13.7 % (ref 11.5–14.5)
EST. GFR  (NO RACE VARIABLE): 55.5 ML/MIN/1.73 M^2
ESTIMATED AVG GLUCOSE: 177 MG/DL (ref 68–131)
GLUCOSE SERPL-MCNC: 229 MG/DL (ref 70–110)
HBA1C MFR BLD: 7.8 % (ref 4–5.6)
HCT VFR BLD AUTO: 40.5 % (ref 40–54)
HDLC SERPL-MCNC: 18 MG/DL (ref 40–75)
HDLC SERPL: 23.4 % (ref 20–50)
HGB BLD-MCNC: 13.3 G/DL (ref 14–18)
IMM GRANULOCYTES # BLD AUTO: 0.13 K/UL (ref 0–0.04)
IMM GRANULOCYTES NFR BLD AUTO: 2.5 % (ref 0–0.5)
LDLC SERPL CALC-MCNC: 10.4 MG/DL (ref 63–159)
LYMPHOCYTES # BLD AUTO: 1.7 K/UL (ref 1–4.8)
LYMPHOCYTES NFR BLD: 32.2 % (ref 18–48)
MCH RBC QN AUTO: 29.8 PG (ref 27–31)
MCHC RBC AUTO-ENTMCNC: 32.8 G/DL (ref 32–36)
MCV RBC AUTO: 91 FL (ref 82–98)
MICROALBUMIN UR DL<=1MG/L-MCNC: 5 UG/ML
MONOCYTES # BLD AUTO: 0.6 K/UL (ref 0.3–1)
MONOCYTES NFR BLD: 10.8 % (ref 4–15)
NEUTROPHILS # BLD AUTO: 2.7 K/UL (ref 1.8–7.7)
NEUTROPHILS NFR BLD: 52.3 % (ref 38–73)
NONHDLC SERPL-MCNC: 59 MG/DL
NRBC BLD-RTO: 0 /100 WBC
PLATELET # BLD AUTO: 133 K/UL (ref 150–450)
PMV BLD AUTO: 10.9 FL (ref 9.2–12.9)
POTASSIUM SERPL-SCNC: 4.7 MMOL/L (ref 3.5–5.1)
PROT SERPL-MCNC: 6 G/DL (ref 6–8.4)
RBC # BLD AUTO: 4.46 M/UL (ref 4.6–6.2)
SODIUM SERPL-SCNC: 139 MMOL/L (ref 136–145)
TRIGL SERPL-MCNC: 243 MG/DL (ref 30–150)
TSH SERPL DL<=0.005 MIU/L-ACNC: 1.65 UIU/ML (ref 0.4–4)
WBC # BLD AUTO: 5.19 K/UL (ref 3.9–12.7)

## 2024-05-15 PROCEDURE — 80061 LIPID PANEL: CPT | Performed by: INTERNAL MEDICINE

## 2024-05-15 PROCEDURE — 82043 UR ALBUMIN QUANTITATIVE: CPT | Performed by: INTERNAL MEDICINE

## 2024-05-15 PROCEDURE — 83036 HEMOGLOBIN GLYCOSYLATED A1C: CPT | Performed by: INTERNAL MEDICINE

## 2024-05-15 PROCEDURE — 80053 COMPREHEN METABOLIC PANEL: CPT | Performed by: INTERNAL MEDICINE

## 2024-05-15 PROCEDURE — 36415 COLL VENOUS BLD VENIPUNCTURE: CPT | Mod: PO | Performed by: INTERNAL MEDICINE

## 2024-05-15 PROCEDURE — 84443 ASSAY THYROID STIM HORMONE: CPT | Performed by: INTERNAL MEDICINE

## 2024-05-15 PROCEDURE — 85025 COMPLETE CBC W/AUTO DIFF WBC: CPT | Performed by: INTERNAL MEDICINE

## 2024-05-22 ENCOUNTER — OFFICE VISIT (OUTPATIENT)
Dept: INTERNAL MEDICINE | Facility: CLINIC | Age: 79
End: 2024-05-22
Payer: MEDICARE

## 2024-05-22 VITALS
BODY MASS INDEX: 32.81 KG/M2 | DIASTOLIC BLOOD PRESSURE: 68 MMHG | HEART RATE: 71 BPM | SYSTOLIC BLOOD PRESSURE: 118 MMHG | WEIGHT: 235.25 LBS | TEMPERATURE: 97 F | OXYGEN SATURATION: 96 %

## 2024-05-22 DIAGNOSIS — Z86.010 HISTORY OF COLON POLYPS: ICD-10-CM

## 2024-05-22 DIAGNOSIS — N18.30 CKD STAGE 3 DUE TO TYPE 2 DIABETES MELLITUS: ICD-10-CM

## 2024-05-22 DIAGNOSIS — I70.0 AORTIC ATHEROSCLEROSIS: ICD-10-CM

## 2024-05-22 DIAGNOSIS — E78.5 HYPERLIPIDEMIA ASSOCIATED WITH TYPE 2 DIABETES MELLITUS: Primary | ICD-10-CM

## 2024-05-22 DIAGNOSIS — E11.59 HYPERTENSION ASSOCIATED WITH DIABETES: ICD-10-CM

## 2024-05-22 DIAGNOSIS — Z85.528 HISTORY OF RENAL CELL CARCINOMA: ICD-10-CM

## 2024-05-22 DIAGNOSIS — E11.69 HYPERLIPIDEMIA ASSOCIATED WITH TYPE 2 DIABETES MELLITUS: Primary | ICD-10-CM

## 2024-05-22 DIAGNOSIS — N18.32 TYPE 2 DIABETES MELLITUS WITH STAGE 3B CHRONIC KIDNEY DISEASE, WITHOUT LONG-TERM CURRENT USE OF INSULIN: ICD-10-CM

## 2024-05-22 DIAGNOSIS — I15.2 HYPERTENSION ASSOCIATED WITH DIABETES: ICD-10-CM

## 2024-05-22 DIAGNOSIS — Z86.718 HISTORY OF DVT (DEEP VEIN THROMBOSIS): ICD-10-CM

## 2024-05-22 DIAGNOSIS — E11.22 TYPE 2 DIABETES MELLITUS WITH STAGE 3B CHRONIC KIDNEY DISEASE, WITHOUT LONG-TERM CURRENT USE OF INSULIN: ICD-10-CM

## 2024-05-22 DIAGNOSIS — C49.A0 GASTROINTESTINAL STROMAL TUMOR (GIST): ICD-10-CM

## 2024-05-22 DIAGNOSIS — E11.8 DM (DIABETES MELLITUS) WITH COMPLICATIONS: ICD-10-CM

## 2024-05-22 DIAGNOSIS — E11.22 CKD STAGE 3 DUE TO TYPE 2 DIABETES MELLITUS: ICD-10-CM

## 2024-05-22 PROCEDURE — 99999 PR PBB SHADOW E&M-EST. PATIENT-LVL IV: CPT | Mod: PBBFAC,,, | Performed by: INTERNAL MEDICINE

## 2024-05-22 PROCEDURE — 1101F PT FALLS ASSESS-DOCD LE1/YR: CPT | Mod: CPTII,S$GLB,, | Performed by: INTERNAL MEDICINE

## 2024-05-22 PROCEDURE — 3074F SYST BP LT 130 MM HG: CPT | Mod: CPTII,S$GLB,, | Performed by: INTERNAL MEDICINE

## 2024-05-22 PROCEDURE — G2211 COMPLEX E/M VISIT ADD ON: HCPCS | Mod: S$GLB,,, | Performed by: INTERNAL MEDICINE

## 2024-05-22 PROCEDURE — 3078F DIAST BP <80 MM HG: CPT | Mod: CPTII,S$GLB,, | Performed by: INTERNAL MEDICINE

## 2024-05-22 PROCEDURE — 1126F AMNT PAIN NOTED NONE PRSNT: CPT | Mod: CPTII,S$GLB,, | Performed by: INTERNAL MEDICINE

## 2024-05-22 PROCEDURE — 3288F FALL RISK ASSESSMENT DOCD: CPT | Mod: CPTII,S$GLB,, | Performed by: INTERNAL MEDICINE

## 2024-05-22 PROCEDURE — 99215 OFFICE O/P EST HI 40 MIN: CPT | Mod: S$GLB,,, | Performed by: INTERNAL MEDICINE

## 2024-05-22 PROCEDURE — 1159F MED LIST DOCD IN RCRD: CPT | Mod: CPTII,S$GLB,, | Performed by: INTERNAL MEDICINE

## 2024-05-22 PROCEDURE — 3072F LOW RISK FOR RETINOPATHY: CPT | Mod: CPTII,S$GLB,, | Performed by: INTERNAL MEDICINE

## 2024-05-22 NOTE — PROGRESS NOTES
HPI:  Patient is an 78-year-old man who comes in today for follow-up of his diabetes, hypertension, lipids, chronic kidney disease, and for his annual physical exam.  Patient denies any hypoglycemic problems.  His blood pressure home has been well controlled.  He denies any other new problems or complaints.      Current MEDS: medcard review, verified and update  Allergies: Per the electronic medical record    Past Medical History:   Diagnosis Date    CKD stage 3 due to type 2 diabetes mellitus     Deep vein thrombosis     DM (diabetes mellitus) with complications     Gastrointestinal stromal tumor (GIST) 3/9/2021    GERD (gastroesophageal reflux disease)     History of DVT (deep vein thrombosis)     History of renal cell carcinoma 2009    Hyperlipidemia associated with type 2 diabetes mellitus 02/26/2021    Hypertension associated with diabetes     Panlobular emphysema     Thrombocytopenia     Type II diabetes mellitus with renal manifestations        Past Surgical History:   Procedure Laterality Date    CLOSURE OF PERFORATED ULCER OF DUODENUM USING OMENTAL PATCH N/A 3/9/2021    Procedure: CLOSURE, ULCER, PERFORATED, DUODENUM, USING OMENTAL PATCH;  Surgeon: Edin Bradford MD;  Location: [facility] OR;  Service: General;  Laterality: N/A;    COLONOSCOPY N/A 1/5/2022    Procedure: COLONOSCOPY;  Surgeon: Mirta Grover MD;  Location: [facility] ENDO;  Service: Endoscopy;  Laterality: N/A;    ENDOSCOPIC ULTRASOUND OF UPPER GASTROINTESTINAL TRACT N/A 2/5/2021    Procedure: ULTRASOUND, UPPER GI TRACT, ENDOSCOPIC;  Surgeon: Geremias Brizuela MD;  Location: [facility] ENDO;  Service: Endoscopy;  Laterality: N/A;    EPIDURAL STEROID INJECTION N/A 5/3/2024    Procedure: Lumbar L5/S1 IL JASON;  Surgeon: Debra De MD;  Location: [facility] PAIN MGT;  Service: Pain Management;  Laterality: N/A;    ESOPHAGOGASTRODUODENOSCOPY N/A 2/3/2021    Procedure: EGD (ESOPHAGOGASTRODUODENOSCOPY);  Surgeon: Mirta Grover MD;  Location: [facility]  ENDO;  Service: Endoscopy;  Laterality: N/A;    ESOPHAGOGASTRODUODENOSCOPY N/A 3/9/2021    Procedure: EGD (ESOPHAGOGASTRODUODENOSCOPY);  Surgeon: Edin Bradford MD;  Location: [facility] OR;  Service: General;  Laterality: N/A;    INJECTION OF ANESTHETIC AGENT AROUND MEDIAL BRANCH NERVES INNERVATING LUMBAR FACET JOINT Bilateral 10/17/2023    Procedure: Bilateral L3-5 MBB;  Surgeon: Debra De MD;  Location: [facility] PAIN MGT;  Service: Pain Management;  Laterality: Bilateral;    INJECTION OF ANESTHETIC AGENT AROUND MEDIAL BRANCH NERVES INNERVATING LUMBAR FACET JOINT Bilateral 3/1/2024    Procedure: Bilateral L3-5 MBB Dignostic;  Surgeon: Debra De MD;  Location: [facility] PAIN MGT;  Service: Pain Management;  Laterality: Bilateral;    INJECTION OF ANESTHETIC AGENT INTO TISSUE PLANE DEFINED BY TRANSVERSUS ABDOMINIS MUSCLE  4/17/2023    Procedure: BLOCK, TRANSVERSUS ABDOMINIS PLANE;  Surgeon: Yumiko Rosen DO;  Location: [facility] OR;  Service: General;;    left partial nephrectomy Left 2006    RADIOFREQUENCY THERMOCOAGULATION Bilateral 3/19/2024    Procedure: Bilateral L3-5 Lumbar RFA;  Surgeon: Debra De MD;  Location: [facility] PAIN MGT;  Service: Pain Management;  Laterality: Bilateral;    ROBOT-ASSISTED REPAIR OF INCISIONAL HERNIA USING DA PERRI XI Left 4/17/2023    Procedure: XI ROBOTIC REPAIR, HERNIA, INCISIONAL;  Surgeon: Yumiko Rosen DO;  Location: [facility] OR;  Service: General;  Laterality: Left;  Left flank. Position right lateral decubitus. Ernst.    ROBOT-ASSISTED SURGICAL REMOVAL OF STOMACH USING DA PERRI XI N/A 3/9/2021    Procedure: XI ROBOTIC GASTRECTOMY;  Surgeon: Edin Bradford MD;  Location: [facility] OR;  Service: General;  Laterality: N/A;  partial       SHx: per the electronic medical record    FHx: recorded in the electronic medical record    ROS:    denies any chest pains or shortness of breath. Denies any nausea, vomiting or diarrhea. Denies any fever, chills or sweats. Denies any change in  weight, voice, stool, skin or hair. Denies any dysuria, dyspepsia or dysphagia. Denies any change in vision, hearing or headaches. Denies any swollen lymph nodes or loss of memory.    PE:  /68 (BP Location: Right arm, Patient Position: Sitting, BP Method: Large (Manual))   Pulse 71   Temp 96.5 °F (35.8 °C) (Tympanic)   Wt 106.7 kg (235 lb 3.7 oz)   SpO2 96%   BMI 32.81 kg/m²   Gen: Well-developed, well-nourished, male, in no acute distress, oriented x3  HEENT: neck is supple, no adenopathy, carotids 2+ equal without bruits, thyroid exam normal size without nodules.  CHEST: clear to auscultation and percussion  CVS: regular rate and rhythm without significant murmur, gallop, or rubs  ABD: soft, benign, no rebound no guarding, no distention.  Bowel sounds are normal.     nontender.  No palpable masses.  No organomegaly and no audible bruits.  RECTAL:  Deferred.  EXT: no clubbing, cyanosis, or edema  LYMPH: no cervical, inguinal, or axillary adenopathy  FEET: no loss of sensation.  No ulcers or pressure sores.  Monofilament testing normal  NEURO: gait normal.  Cranial nerves II- XII intact. No nystagmus.  Speech normal.   Gross motor and sensory unremarkable.    Lab Results   Component Value Date    WBC 5.19 05/15/2024    HGB 13.3 (L) 05/15/2024    HCT 40.5 05/15/2024     (L) 05/15/2024    CHOL 77 (L) 05/15/2024    TRIG 243 (H) 05/15/2024    HDL 18 (L) 05/15/2024    ALT 33 05/15/2024    AST 21 05/15/2024     05/15/2024    K 4.7 05/15/2024     05/15/2024    CREATININE 1.3 05/15/2024    BUN 23 05/15/2024    CO2 25 05/15/2024    TSH 1.648 05/15/2024    HGBA1C 7.8 (H) 05/15/2024       Impression:  Diabetes, controlled not to goal.  Patient will be started on Jardiance.  Hypertension and lipids, both very well controlled on current therapy  Chronic kidney disease, stable GFR  Patient Active Problem List   Diagnosis    RBBB    First degree AV block    Gastrointestinal stromal tumor (GIST)    DM  (diabetes mellitus) with complications    Type II diabetes mellitus with renal manifestations    Hypertension associated with diabetes    Hyperlipidemia associated with type 2 diabetes mellitus    History of renal cell carcinoma    History of DVT (deep vein thrombosis)    Thrombocytopenia    CKD stage 3 due to type 2 diabetes mellitus    History of colon polyps    Aortic atherosclerosis    Benign prostatic hyperplasia with urinary frequency    Lumbar spondylosis    Lumbar radiculopathy, chronic       Plan:   Orders Placed This Encounter    Hemoglobin A1C    Basic Metabolic Panel    empagliflozin (JARDIANCE) 10 mg tablet   Patient was started on Jardiance 10 mg today.  He will be seen in 3 months with the above lab work.    This note is generated with speech recognition software and is subject to transcription error and sound alike phrases that may be missed by proofreading.

## 2024-05-27 NOTE — PROGRESS NOTES
"Established Patient Chronic Pain Note     Referring Physician: No ref. provider found    PCP: Avinash Lugo MD    Chief Complaint:   LBP       SUBJECTIVE:  Interval History (6/3/2024):  Patient Daniel Villeda presents today for follow-up visit.  Patient was last seen on 5/3/2024 Lumbar L5/S1 IL JASON with 80% relief. He continues to get good axial back pain relief from the RFA. He still has some right sided low back pain which occasionally goes into the side of the right thigh. He rates his pain today 6/10.  He does not want any medications at this time for his pain.  He has completed PT in the past with minimal benefit.   Patient denies night fever/night sweats, urinary incontinence, bowel incontinence, significant weight loss and significant motor weakness.   Patient denies any other complaints or concerns at this time.      Interval history 04/18/2024  Patient presents status post bilateral L3-5 lumbar RFA 03/19/2024 and bilateral L3-5 lumbar diagnostic medial branch block 03/01/2024.  Patient reports 95% sustained relief in lower axial back pain following bilateral L3-5 lumbar radiofrequency ablation.  Patient reports he feels like a different person!" He does report residual radicular pain with prolonged standing and with ambulation.  Pain can be rated a 2/10 on the best day or 6/10 on the worst day.  Pain can radiate into the buttocks in L5-S1 distribution. Of note patient reports receiving a series of 3 epidurals with , several years prior which gave him significant relief of radicular pain lasting several years in duration and improvement in ambulation.  Patient has continued physician directed physical therapy exercises over the last 8 weeks from 02/18/2024 through 04/18/2024 with marginal improvement in pain, range of motion and functionality.      Interval history 02/22/2024  Patient presents status post bilateral L3-5 lumbar medial branch block 10/17/2023 and for MRI lumbar spine " review.  EMG ordered, not completed.  Patient reports 90% relief lasting 2 days in duration following his lumbar medial branch block he does report pain is improved from baseline.  Today he reports pain is rated a 6/10.  Patient reports pain in a bandlike distribution in the lower back.  He does report intermittent radiation into the right buttock and periodically down the right lower extremity in L5-S1 distribution to the calf.  Majority, 90% of his pain remains axial in the lower back.  Patient has continued physician directed physical therapy exercises over the last 8 weeks from 12- 02/22/2024 with marginal improvement in pain, range of motion and functionality.  Patient continues to ambulate with a cane to help offload weight in the right lower extremity.  He does endorse associated weakness in the right lower extremity associated with his pain.  Of note patient reports receiving a series of 3 epidurals with , several years prior which gave him significant relief of radicular pain lasting several months in duration and improvement in ambulation.  Today he emphasizes majority of his pain remains in the lower back and is axial in nature.      HPI 09/21/2023  Daniel Villeda is a 78 y.o. male with past medical history significant for first-degree AV block, type 2 diabetes, hypertension, hyperlipidemia, right bundle branch block, stage 3 chronic kidney disease, history of DVT, history of renal cell carcinoma and gastrointestinal stromal tumor, GERD who presents to the clinic for the evaluation of lower back and right leg pain.  Patient reports pain has been present for over a decade.  He believes pain began in high school when he was taking a ice box up the stairs when he pulled his lower back.  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  Today he reports pain which is constant which is rated a 7/10.   "Pain is described as aching and throbbing in nature.  Patient reports pain in a bandlike distribution in the lower back which radiates down the lateral and posterior aspect of the right lower extremity in L4-5 distribution to the calf.  Pain is exacerbated with lumbar flexion as well as with ambulation.  Patient reports he is able to ambulate approximately half a block before requiring rest.  Patient reports reduced range of motion in the right ankle and weakness in the right lower extremity.  Pain is improved with rest.  Patient has completed conventional physical therapy 6-10 years prior with no improvement in his pain.  He is continued physician directed physical therapy exercises at home over the last 6 weeks without meaningful improvement in his pain.  Patient has trialed membrane stabilizing agents in the past, he can not recall which in particular, but did not like the cognitive side effects.  Of note patient has received numerous lumbar epidural steroid injections, 8-10 years prior with Dr. Hester." Which gave him several years of significant relief.  Patient is interested in trialing interventional therapy.  He would like to refrain from any pharmacologic management.    Of note patient saw DERRICK Garduno, neurosurgery 04/10/2023 with the following evaluation      Patient reports significant motor weakness and loss of sensations.  Patient denies night fever/night sweats, urinary incontinence, bowel incontinence, and significant weight loss.      Pain Disability Index Review:         6/3/2024     1:11 PM 4/18/2024    12:50 PM 2/22/2024     9:29 AM   Last 3 PDI Scores   Pain Disability Index (PDI) 42 14 36       Non-Pharmacologic Treatments:  Physical Therapy/Home Exercise: yes  Ice/Heat:yes  TENS: no  Acupuncture: no  Massage: no  Chiropractic: no    Other: no      Pain Medications:  - Adjuvant Medications: Tylenol (Acetaminophen)  - Anti-Coagulants: Aspirin    Pain Procedures:   Dr. De:  -5/3/2024 " Lumbar L5/S1 IL JASON with 80% relief  -03/19/2024: Bilateral L3-5 lumbar radiofrequency ablation  -03/01/2024: Bilateral L3-5 diagnostic lumbar medial branch block  -10/17/2023: Bilateral L3-5 lumbar medial branch block      DEBBIE: NASRIN    Past Medical History:   Diagnosis Date    CKD stage 3 due to type 2 diabetes mellitus     Deep vein thrombosis     DM (diabetes mellitus) with complications     Gastrointestinal stromal tumor (GIST) 3/9/2021    GERD (gastroesophageal reflux disease)     History of DVT (deep vein thrombosis)     History of renal cell carcinoma 2009    Hyperlipidemia associated with type 2 diabetes mellitus 02/26/2021    Hypertension associated with diabetes     Panlobular emphysema     Thrombocytopenia     Type II diabetes mellitus with renal manifestations      Past Surgical History:   Procedure Laterality Date    CLOSURE OF PERFORATED ULCER OF DUODENUM USING OMENTAL PATCH N/A 3/9/2021    Procedure: CLOSURE, ULCER, PERFORATED, DUODENUM, USING OMENTAL PATCH;  Surgeon: Edin Bradford MD;  Location: [facility] OR;  Service: General;  Laterality: N/A;    COLONOSCOPY N/A 1/5/2022    Procedure: COLONOSCOPY;  Surgeon: Mirta Grover MD;  Location: [facility];  Service: Endoscopy;  Laterality: N/A;    ENDOSCOPIC ULTRASOUND OF UPPER GASTROINTESTINAL TRACT N/A 2/5/2021    Procedure: ULTRASOUND, UPPER GI TRACT, ENDOSCOPIC;  Surgeon: Geremias Brizuela MD;  Location: [facility];  Service: Endoscopy;  Laterality: N/A;    EPIDURAL STEROID INJECTION N/A 5/3/2024    Procedure: Lumbar L5/S1 IL JASON;  Surgeon: Debra De MD;  Location: [facility] MGT;  Service: Pain Management;  Laterality: N/A;    ESOPHAGOGASTRODUODENOSCOPY N/A 2/3/2021    Procedure: EGD (ESOPHAGOGASTRODUODENOSCOPY);  Surgeon: Mirta Grover MD;  Location: [facility] ENDO;  Service: Endoscopy;  Laterality: N/A;    ESOPHAGOGASTRODUODENOSCOPY N/A 3/9/2021    Procedure: EGD (ESOPHAGOGASTRODUODENOSCOPY);  Surgeon: Edin Bradford MD;  Location: [facility]  OR;  Service: General;  Laterality: N/A;    INJECTION OF ANESTHETIC AGENT AROUND MEDIAL BRANCH NERVES INNERVATING LUMBAR FACET JOINT Bilateral 10/17/2023    Procedure: Bilateral L3-5 MBB;  Surgeon: Debra De MD;  Location: [facility];  Service: Pain Management;  Laterality: Bilateral;    INJECTION OF ANESTHETIC AGENT AROUND MEDIAL BRANCH NERVES INNERVATING LUMBAR FACET JOINT Bilateral 3/1/2024    Procedure: Bilateral L3-5 MBB Dignostic;  Surgeon: Debra De MD;  Location: [facility];  Service: Pain Management;  Laterality: Bilateral;    INJECTION OF ANESTHETIC AGENT INTO TISSUE PLANE DEFINED BY TRANSVERSUS ABDOMINIS MUSCLE  4/17/2023    Procedure: BLOCK, TRANSVERSUS ABDOMINIS PLANE;  Surgeon: Yumiko Rosen DO;  Location: [facility] OR;  Service: General;;    left partial nephrectomy Left 2006    RADIOFREQUENCY THERMOCOAGULATION Bilateral 3/19/2024    Procedure: Bilateral L3-5 Lumbar RFA;  Surgeon: Debra De MD;  Location: [facility];  Service: Pain Management;  Laterality: Bilateral;    ROBOT-ASSISTED REPAIR OF INCISIONAL HERNIA USING DA PERRI XI Left 4/17/2023    Procedure: XI ROBOTIC REPAIR, HERNIA, INCISIONAL;  Surgeon: Yumiko Rosen DO;  Location: [facility] OR;  Service: General;  Laterality: Left;  Left flank. Position right lateral decubitus. Ernst.    ROBOT-ASSISTED SURGICAL REMOVAL OF STOMACH USING DA PERRI XI N/A 3/9/2021    Procedure: XI ROBOTIC GASTRECTOMY;  Surgeon: Edin Bradford MD;  Location: [facility] OR;  Service: General;  Laterality: N/A;  partial     Review of patient's allergies indicates:  No Known Allergies    Current Outpatient Medications   Medication Sig    acetaminophen (TYLENOL) 650 MG TbSR Take 650 mg by mouth every 8 (eight) hours.    aspirin 81 MG Chew Take 81 mg by mouth once daily.    atorvastatin (LIPITOR) 20 MG tablet Take 1 tablet (20 mg total) by mouth every evening.    cholecalciferol, vitamin D3, (VITAMIN D3) 50 mcg (2,000 unit) Tab Take 2,000 Units by  "mouth once daily.     empagliflozin (JARDIANCE) 10 mg tablet Take 1 tablet (10 mg total) by mouth once daily.    fish oil-omega-3 fatty acids 300-1,000 mg capsule Take by mouth once daily.    losartan-hydrochlorothiazide 100-12.5 mg (HYZAAR) 100-12.5 mg Tab Take 1 tablet by mouth once daily.    magnesium oxide (MAG-OX) 400 mg (241.3 mg magnesium) tablet Take 400 mg by mouth once daily.    metFORMIN (GLUCOPHAGE) 500 MG tablet TAKE 1 TABLET BY MOUTH TWICE DAILY WITH MEALS    oxybutynin (DITROPAN) 5 MG Tab Take 1 tablet (5 mg total) by mouth 2 (two) times daily.    pantoprazole (PROTONIX) 40 MG tablet Take 1 tablet (40 mg total) by mouth once daily.    metoprolol tartrate (LOPRESSOR) 100 MG tablet Take 1 tablet (100 mg total) by mouth 2 (two) times daily.     No current facility-administered medications for this visit.       Review of Systems     GENERAL:  No weight loss, malaise or fevers.  HEENT:   No recent changes in vision or hearing  NECK:  Negative for lumps, no difficulty with swallowing.  RESPIRATORY:  Negative for cough, wheezing or shortness of breath, patient denies any recent URI.  CARDIOVASCULAR:  Negative for chest pain or palpitations.  GI:  Negative for abdominal discomfort, blood in stools or black stools or change in bowel habits.  MUSCULOSKELETAL:  See HPI.  SKIN:  Negative for lesions, rash, and itching.  PSYCH:  No mood disorder or recent psychosocial stressors.   HEMATOLOGY/LYMPHOLOGY:  Negative for prolonged bleeding, bruising easily or swollen nodes.    NEURO:   No history of syncope, paralysis, seizures or tremors.  All other reviewed and negative other than HPI.    OBJECTIVE:    /65   Pulse 69   Resp 16   Ht 5' 11" (1.803 m)   Wt 105 kg (231 lb 7.7 oz)   BMI 32.29 kg/m²       Physical Exam    GENERAL: Well appearing, in no acute distress, alert and oriented x3.  PSYCH:  Mood and affect appropriate.  SKIN: Skin color, texture, turgor normal, no rashes or lesions.  HEAD/FACE:  " Normocephalic, atraumatic. Cranial nerves grossly intact.    CV: RRR with palpation of the radial artery.  PULM: No evidence of respiratory difficulty, symmetric chest rise.  GI:  Soft and non-tender.    BACK: Straight leg raising in the sitting and supine positions is negative to radicular pain. pain to palpation over the facet joints of the lumbar spine or spinous processes. Normal range of motion without pain reproduction.  EXTREMITIES: Peripheral joint ROM is full and pain free without obvious instability or laxity in all four extremities. No deformities, edema, or skin discoloration. Good capillary refill.  MUSCULOSKELETAL: Able to stand on heels & toes.   hip, and knee provocative maneuvers are negative.  There is no pain with palpation over the sacroiliac joints bilaterally.  Gaenslen's, Distraction/Compression and  FABERs test is negative.  Facet loading test is positive bilaterally.   Bilateral upper and lower extremity strength is normal and symmetric.  No atrophy or tone abnormalities are noted.    RIGHT Lower extremity: Hip flexion 5/5, Hip Abduction 5/5, Hip Adduction 5/5, Knee extension 5/5, Knee flexion 5/5, Ankle dorsiflexion5/5, Extensor hallucis longus 5/5, Ankle plantarflexion 5/5  LEFT Lower extremity:  Hip flexion 5/5, Hip Abduction 5/5,Hip Adduction 5/5, Knee extension 5/5, Knee flexion 5/5, Ankle dorsiflexion 5/5, Extensor hallucis longus 5/5, Ankle plantarflexion 5/5  -Normal testing knee (patellar) jerk and ankle (achilles) jerk    NEURO: Bilateral upper and lower extremity coordination and muscle stretch reflexes are physiologic and symmetric. No loss of sensation is noted.  GAIT: normal.    Imaging:         MRI lumbar spine 10/02/2023  FINDINGS:  Alignment: Grade 2 L5-S1 anterolisthesis likely secondary to bilateral pars defects.  Minor L1-L2, L2-L3 and L4-L5 retrolisthesis.     Vertebrae: Lumbar vertebral body heights are maintained.  There are mild multilevel endplate degenerative  changes.  No significant endplate edema.  No evidence of an acute fracture.  Marrow signal is otherwise within normal limits.     Discs: Disc desiccation throughout the lumbar spine with height loss most pronounced at T12-L1, L1-L2 and L5-S1.     Cord: Within normal limits.  Conus terminates at L1-L2.     Degenerative findings:     T12-L1: Minor broad-based posterior disc bulge with a left subarticular zones/neural foraminal disc protrusion, which appears to contact the exited left T12 nerve root.  No significant spinal canal stenosis.  There is narrowing of the left lateral recess with severe left-sided neural foraminal stenosis.     L1-L2: Minor anterolisthesis.  Mild broad-based posterior disc bulge with a tiny left subarticular zone disc protrusion narrowing the left lateral recess.  No significant spinal canal stenosis.  Facet arthropathy contributes to mild right and moderate left-sided neural foraminal stenosis.     L2-L3: Minor retrolisthesis.  Mild broad-based posterior disc bulge and mild bilateral facet arthropathy ligamentum flavum hypertrophy contributes to mild spinal canal stenosis and mild-to-moderate bilateral neural foraminal stenosis.     L3-L4: Minimal broad-based posterior disc bulge and mild bilateral facet arthropathy.  No significant spinal canal stenosis.  There is mild-to-moderate right and mild left-sided neural foraminal stenosis.  There is a far right lateral disc osteophyte complex.     L4-L5: Minor retrolisthesis.  Mild broad-based posterior disc bulge and mild bilateral facet arthropathy.  No significant spinal canal stenosis.  There is moderate right and mild left-sided neural foraminal stenosis.     L5-S1: Grade 2 anterolisthesis with significant uncovering of the posterior disc space.  Broad-based posterior disc bulge, facet arthropathy ligamentum flavum hypertrophy contribute to severe spinal canal stenosis and severe bilateral neural foraminal stenosis.     Paraspinal muscles &  soft tissues: Fatty atrophy of the posterior paraspinous musculature.     Impression:     1. L5-S1 grade 2 anterolisthesis likely secondary to bilateral pars defects contributes to severe spinal canal stenosis and severe bilateral neural foraminal stenosis.  2. Mild L2-L3 spinal canal stenosis with mild-to-moderate bilateral neural foraminal stenosis.  3. Small left T12-L1 subarticular zone/neural foraminal disc protrusion contributes to severe left-sided neural foraminal stenosis.        CT C/A/P 01/03/2023     FINDINGS:  Finding: The size of the heart is within normal limits.  There is no evidence of an acute pulmonary process.  There is no pneumothorax or pleural effusion.     There is moderate generalized atrophy of the pancreas.  The liver, gallbladder, spleen, and adrenals are normal in appearance.  There are hypodense masses associated with both kidneys.  One of the larger ones measures 9 mm and is located off of the lateral aspect of the inferior pole of the right kidney.  This mass has a Hounsfield measurement of 7.  The ureters and the urinary bladder are normal in appearance.  The prostate is normal in appearance.  The appendix is normal in appearance.  There is a moderate amount of diverticulosis in the descending and sigmoid portions of the colon.  There is no abnormal mass visualized within the gastrointestinal system.  There is no free fluid within the abdomen or pelvis. There is no pneumoperitoneum.  There are old bilateral pars interarticularis fractures of L5.  There is grade 3 anterolisthesis of L5 on S1.  There are moderate degenerative changes between L5 and S1.  There is no abnormal lymphadenopathy based on CT size criteria.     Impression:     1. There is no abnormal mass visualized within the gastrointestinal system.  2. There is no abnormal lymphadenopathy based on CT size criteria.  3. There are hypodense masses associated with both kidneys. One of the larger ones measures 9 mm and is  located off of the lateral aspect of the inferior pole of the right kidney.  This mass has a Hounsfield measurement of 7.  This is characteristic of a cyst.  4. There is a moderate amount of diverticulosis in the descending and sigmoid portions of the colon.  There is no abnormal mass visualized within the gastrointestinal system.  5. There are old bilateral pars interarticularis fractures of L5.  There is grade 3 anterolisthesis of L5 on S1. There are moderate degenerative changes between L5 and S1.      ASSESSMENT: 78 y.o. year old male with     1. Anterolisthesis of lumbosacral spine        2. Lumbar radiculopathy, chronic        3. DDD (degenerative disc disease), lumbar                PLAN:   - Interventions:  None at this time     - Anticoagulation use: Yes aspirin  -Per JOSE guidelines, for secondary prophylaxis (prior DVT), patient can continue aspirin for lumbar epidural steroid injection    -Patient has 95% sustained relief in axial back pain following bilateral L3-5 lumbar radiofrequency ablation.  We have discussed repeating this procedure, no sooner than 6 months from the original procedure date should axial back pain exacerbate.     report:  Reviewed and consistent with medication use as prescribed.    - Medications:  -Patient would like to refrain from pharmacologic management at this time.    - Therapy:   We discussed continuing physician directed at home physical therapy to help manage the patient/s painful condition. The patient was counseled that muscle strengthening will improve the long term prognosis in regards to pain and may also help increase range of motion and mobility.     - Imaging: Reviewed available imaging (MRI lumbar spine) with patient and answered any questions they had regarding study.     - Consults/Referrals:  Physical Medicine Rehabilitation for lower extremity electromyography studies to help guide diagnosis and treatment (orders placed 10/4/2023)    - Records: Obtain old  records from outside physicians and imaging --not received (04/18/2024)        - Follow up visit:  3 months per patient request    The above plan and management options were discussed at length with patient. Patient is in agreement with the above and verbalized understanding.    - I discussed the goals of interventional chronic pain management with the patient on today's visit. We discussed a multimodal and systematic approach to pain.  This includes diagnostic and therapeutic injections, adjuvant pharmacologic treatment, physical therapy, and at times psychiatry.  I emphasized the importance of regular exercise, core strengthening and stretching, diet and weight loss as a cornerstone of long-term pain management.    - This condition does not require this patient to take time off of work, and the primary goal of our Pain Management services is to improve the patient's functional capacity.  - Patient Questions: Answered all of the patient's questions regarding diagnoses, therapy, treatment and next steps    Visit today included increased complexity associated with the care of the episodic problem of chronic pain which was addressed and continue to manage the longitudinal care of the patient due to the serious and/or complex managed problem(s) listed above.      Bibi Sánchez NP  Interventional Pain Management  [facility]    Disclaimer:  This note was prepared using voice recognition system and is likely to have sound alike errors that may have been overlooked even after proof reading.  Please call me with any questions

## 2024-05-31 ENCOUNTER — TELEPHONE (OUTPATIENT)
Dept: PAIN MEDICINE | Facility: CLINIC | Age: 79
End: 2024-05-31
Payer: MEDICARE

## 2024-05-31 NOTE — TELEPHONE ENCOUNTER
Attempt to call patient to confirm appointment. Patent did not answer, Left message on patients voice mail to call back at earliest convenience to confirm or reschedule p.t apt.     Ludy YEN

## 2024-06-03 ENCOUNTER — OFFICE VISIT (OUTPATIENT)
Dept: PAIN MEDICINE | Facility: CLINIC | Age: 79
End: 2024-06-03
Payer: MEDICARE

## 2024-06-03 VITALS
RESPIRATION RATE: 16 BRPM | DIASTOLIC BLOOD PRESSURE: 65 MMHG | SYSTOLIC BLOOD PRESSURE: 111 MMHG | HEART RATE: 69 BPM | HEIGHT: 71 IN | BODY MASS INDEX: 32.41 KG/M2 | WEIGHT: 231.5 LBS

## 2024-06-03 DIAGNOSIS — M51.36 DDD (DEGENERATIVE DISC DISEASE), LUMBAR: ICD-10-CM

## 2024-06-03 DIAGNOSIS — M43.17 ANTEROLISTHESIS OF LUMBOSACRAL SPINE: Primary | ICD-10-CM

## 2024-06-03 DIAGNOSIS — M54.16 LUMBAR RADICULOPATHY, CHRONIC: ICD-10-CM

## 2024-06-03 PROCEDURE — 3078F DIAST BP <80 MM HG: CPT | Mod: CPTII,S$GLB,, | Performed by: NURSE PRACTITIONER

## 2024-06-03 PROCEDURE — 3072F LOW RISK FOR RETINOPATHY: CPT | Mod: CPTII,S$GLB,, | Performed by: NURSE PRACTITIONER

## 2024-06-03 PROCEDURE — 99999 PR PBB SHADOW E&M-EST. PATIENT-LVL III: CPT | Mod: PBBFAC,,, | Performed by: NURSE PRACTITIONER

## 2024-06-03 PROCEDURE — G2211 COMPLEX E/M VISIT ADD ON: HCPCS | Mod: S$GLB,,, | Performed by: NURSE PRACTITIONER

## 2024-06-03 PROCEDURE — 99213 OFFICE O/P EST LOW 20 MIN: CPT | Mod: S$GLB,,, | Performed by: NURSE PRACTITIONER

## 2024-06-03 PROCEDURE — 3074F SYST BP LT 130 MM HG: CPT | Mod: CPTII,S$GLB,, | Performed by: NURSE PRACTITIONER

## 2024-06-03 PROCEDURE — 1125F AMNT PAIN NOTED PAIN PRSNT: CPT | Mod: CPTII,S$GLB,, | Performed by: NURSE PRACTITIONER

## 2024-06-03 PROCEDURE — 3288F FALL RISK ASSESSMENT DOCD: CPT | Mod: CPTII,S$GLB,, | Performed by: NURSE PRACTITIONER

## 2024-06-03 PROCEDURE — 1159F MED LIST DOCD IN RCRD: CPT | Mod: CPTII,S$GLB,, | Performed by: NURSE PRACTITIONER

## 2024-06-03 PROCEDURE — 1101F PT FALLS ASSESS-DOCD LE1/YR: CPT | Mod: CPTII,S$GLB,, | Performed by: NURSE PRACTITIONER

## 2024-06-20 ENCOUNTER — OFFICE VISIT (OUTPATIENT)
Dept: OPHTHALMOLOGY | Facility: CLINIC | Age: 79
End: 2024-06-20
Payer: MEDICARE

## 2024-06-20 DIAGNOSIS — E11.9 DIABETES MELLITUS TYPE 2 WITHOUT RETINOPATHY: ICD-10-CM

## 2024-06-20 DIAGNOSIS — E11.59 HYPERTENSION ASSOCIATED WITH DIABETES: ICD-10-CM

## 2024-06-20 DIAGNOSIS — I15.2 HYPERTENSION ASSOCIATED WITH DIABETES: ICD-10-CM

## 2024-06-20 DIAGNOSIS — Z96.1 PSEUDOPHAKIA OF BOTH EYES: Primary | ICD-10-CM

## 2024-06-20 DIAGNOSIS — H52.7 REFRACTIVE ERRORS: ICD-10-CM

## 2024-06-20 PROCEDURE — 1159F MED LIST DOCD IN RCRD: CPT | Mod: CPTII,S$GLB,, | Performed by: OPTOMETRIST

## 2024-06-20 PROCEDURE — 92015 DETERMINE REFRACTIVE STATE: CPT | Mod: S$GLB,,, | Performed by: OPTOMETRIST

## 2024-06-20 PROCEDURE — 2023F DILAT RTA XM W/O RTNOPTHY: CPT | Mod: CPTII,S$GLB,, | Performed by: OPTOMETRIST

## 2024-06-20 PROCEDURE — 92014 COMPRE OPH EXAM EST PT 1/>: CPT | Mod: S$GLB,,, | Performed by: OPTOMETRIST

## 2024-06-20 PROCEDURE — 99999 PR PBB SHADOW E&M-EST. PATIENT-LVL II: CPT | Mod: PBBFAC,,, | Performed by: OPTOMETRIST

## 2024-06-20 NOTE — PROGRESS NOTES
SUBJECTIVE  Daniel Villeda is 78 y.o. male  Uncorrected distance visual acuity was 20/25 -2 in the right eye and 20/20 -114 in the left eye. Corrected distance visual acuity was 14 in the right eye and not recorded in the left eye. Corrected near visual acuity was J1 in the right eye and J1 in the left eye.   Chief Complaint   Patient presents with    Diabetic Eye Exam    Hypertensive Eye Exam    Eye Exam          HPI    NIDDM exam  No visual complaints  Last eye exam 06/20/2023 TRF.  Update glasses RX.  Lab Results       Component                Value               Date                       HGBA1C                   7.8 (H)             05/15/2024              Last edited by Rachell Ontiveros MA on 6/20/2024  8:48 AM.         Assessment /Plan :  1. Pseudophakia of both eyes   Well-centered, stable IOL OU. Monitor annually.      2. Diabetes mellitus type 2 without retinopathy   No Background Diabetic Retinopathy  Strict BG control, f/u w/ PCP, and annual DFE  Stressed importance of DM control to preserve vision      3. Hypertension associated with diabetes   No HTN Retinopathy, monitor annually.      4. Refractive errors   Dispense Final Rx for glasses.  RTC 1 year  Discussed above and answered questions.

## 2024-06-24 RX ORDER — METOPROLOL TARTRATE 100 MG/1
100 TABLET ORAL 2 TIMES DAILY
Qty: 180 TABLET | Refills: 3 | Status: SHIPPED | OUTPATIENT
Start: 2024-06-24 | End: 2025-06-24

## 2024-06-24 NOTE — TELEPHONE ENCOUNTER
Requested Prescriptions     Pending Prescriptions Disp Refills    metoprolol tartrate (LOPRESSOR) 100 MG tablet 180 tablet 3     Sig: Take 1 tablet (100 mg total) by mouth 2 (two) times daily.

## 2024-06-24 NOTE — TELEPHONE ENCOUNTER
----- Message from Emma Oli sent at 6/24/2024  9:11 AM CDT -----  .Type:  RX Refill Request    Who Called: .Daniel Villeda   Refill or New Rx:refill  RX Name and Strength:metoprolol tartrate (LOPRESSOR) 100 MG tablet  How is the patient currently taking it? (ex. 1XDay):daily  Is this a 30 day or 90 day RX:180    Preferred Pharmacy with phone number:.  [facility] [address] [place] [id number]  Phone: [phone] Fax: [phone]     Local or Mail Order:local  Ordering Provider:Brittney  Would the patient rather a call back or a response via [facility]? Call back  Best Call Back Number:.[phone]   Additional Information:

## 2024-08-05 ENCOUNTER — OFFICE VISIT (OUTPATIENT)
Dept: PAIN MEDICINE | Facility: CLINIC | Age: 79
End: 2024-08-05
Payer: MEDICARE

## 2024-08-05 VITALS
HEART RATE: 62 BPM | BODY MASS INDEX: 32.17 KG/M2 | SYSTOLIC BLOOD PRESSURE: 127 MMHG | HEIGHT: 71 IN | DIASTOLIC BLOOD PRESSURE: 75 MMHG | WEIGHT: 229.81 LBS

## 2024-08-05 DIAGNOSIS — M47.816 LUMBAR SPONDYLOSIS: Primary | ICD-10-CM

## 2024-08-05 PROCEDURE — 1159F MED LIST DOCD IN RCRD: CPT | Mod: CPTII,S$GLB,, | Performed by: NURSE PRACTITIONER

## 2024-08-05 PROCEDURE — 1125F AMNT PAIN NOTED PAIN PRSNT: CPT | Mod: CPTII,S$GLB,, | Performed by: NURSE PRACTITIONER

## 2024-08-05 PROCEDURE — 3288F FALL RISK ASSESSMENT DOCD: CPT | Mod: CPTII,S$GLB,, | Performed by: NURSE PRACTITIONER

## 2024-08-05 PROCEDURE — 99999 PR PBB SHADOW E&M-EST. PATIENT-LVL III: CPT | Mod: PBBFAC,,, | Performed by: NURSE PRACTITIONER

## 2024-08-05 PROCEDURE — 1101F PT FALLS ASSESS-DOCD LE1/YR: CPT | Mod: CPTII,S$GLB,, | Performed by: NURSE PRACTITIONER

## 2024-08-05 PROCEDURE — 3078F DIAST BP <80 MM HG: CPT | Mod: CPTII,S$GLB,, | Performed by: NURSE PRACTITIONER

## 2024-08-05 PROCEDURE — 3074F SYST BP LT 130 MM HG: CPT | Mod: CPTII,S$GLB,, | Performed by: NURSE PRACTITIONER

## 2024-08-05 PROCEDURE — 99214 OFFICE O/P EST MOD 30 MIN: CPT | Mod: S$GLB,,, | Performed by: NURSE PRACTITIONER

## 2024-08-06 ENCOUNTER — TELEPHONE (OUTPATIENT)
Dept: NEUROSURGERY | Facility: CLINIC | Age: 79
End: 2024-08-06
Payer: MEDICARE

## 2024-08-07 ENCOUNTER — TELEPHONE (OUTPATIENT)
Dept: PAIN MEDICINE | Facility: CLINIC | Age: 79
End: 2024-08-07
Payer: MEDICARE

## 2024-09-06 ENCOUNTER — TELEPHONE (OUTPATIENT)
Dept: NEUROSURGERY | Facility: CLINIC | Age: 79
End: 2024-09-06
Payer: MEDICARE

## 2024-09-09 NOTE — PRE-PROCEDURE INSTRUCTIONS
Spoke with patient regarding procedure scheduled on 9.20     Arrival time 1100     Has patient been sick with fever or on antibiotics within the last 7 days? No     Does the patient have any open wounds, sores or rashes? No     Does the patient have any recent fractures? no     Has patient received a vaccination within the last 7 days? No     Received the COVID vaccination?      Has the patient stopped all medications as directed? na    Does patient have a pacemaker, defibrillator, or implantable stimulator? No     Does the patient have a ride to and from procedure and someone reliable to remain with patient? wife     Is the patient diabetic? yes     Does the patient have sleep apnea? Or use O2 at home? no     Is the patient receiving sedation?       Is the patient instructed to remain NPO beginning at midnight the night before their procedure? Yes     Procedure location confirmed with patient? Yes     Covid- Denies signs/symptoms. Instructed to notify PAT/MD if any changes.

## 2024-09-10 ENCOUNTER — HOSPITAL ENCOUNTER (OUTPATIENT)
Dept: RADIOLOGY | Facility: HOSPITAL | Age: 79
Discharge: HOME | End: 2024-09-10
Attending: NEUROLOGICAL SURGERY
Payer: MEDICARE

## 2024-09-10 ENCOUNTER — OFFICE VISIT (OUTPATIENT)
Dept: NEUROSURGERY | Facility: CLINIC | Age: 79
End: 2024-09-10
Payer: MEDICARE

## 2024-09-10 VITALS
BODY MASS INDEX: 31.79 KG/M2 | DIASTOLIC BLOOD PRESSURE: 72 MMHG | HEIGHT: 71 IN | SYSTOLIC BLOOD PRESSURE: 112 MMHG | HEART RATE: 70 BPM | WEIGHT: 227.06 LBS

## 2024-09-10 DIAGNOSIS — M43.16 SPONDYLOLISTHESIS, LUMBAR REGION: ICD-10-CM

## 2024-09-10 DIAGNOSIS — M43.10 SPONDYLOPTOSIS: Primary | ICD-10-CM

## 2024-09-10 DIAGNOSIS — M54.9 DORSALGIA, UNSPECIFIED: ICD-10-CM

## 2024-09-10 DIAGNOSIS — M54.16 LUMBAR RADICULOPATHY: ICD-10-CM

## 2024-09-10 DIAGNOSIS — M48.062 LUMBAR STENOSIS WITH NEUROGENIC CLAUDICATION: ICD-10-CM

## 2024-09-10 DIAGNOSIS — M43.10 SPONDYLOPTOSIS: ICD-10-CM

## 2024-09-10 PROCEDURE — 99213 OFFICE O/P EST LOW 20 MIN: CPT | Mod: S$GLB,,, | Performed by: NEUROLOGICAL SURGERY

## 2024-09-10 PROCEDURE — 72082 X-RAY EXAM ENTIRE SPI 2/3 VW: CPT | Mod: 26,,, | Performed by: RADIOLOGY

## 2024-09-10 PROCEDURE — 99999 PR PBB SHADOW E&M-EST. PATIENT-LVL III: CPT | Mod: PBBFAC,,, | Performed by: NEUROLOGICAL SURGERY

## 2024-09-10 PROCEDURE — 1125F AMNT PAIN NOTED PAIN PRSNT: CPT | Mod: CPTII,S$GLB,, | Performed by: NEUROLOGICAL SURGERY

## 2024-09-10 PROCEDURE — 3078F DIAST BP <80 MM HG: CPT | Mod: CPTII,S$GLB,, | Performed by: NEUROLOGICAL SURGERY

## 2024-09-10 PROCEDURE — 3074F SYST BP LT 130 MM HG: CPT | Mod: CPTII,S$GLB,, | Performed by: NEUROLOGICAL SURGERY

## 2024-09-10 PROCEDURE — 72082 X-RAY EXAM ENTIRE SPI 2/3 VW: CPT | Mod: TC

## 2024-09-10 NOTE — PROGRESS NOTES
"Subjective:      Patient ID: Daniel Villeda is a 79 y.o. male.    HPI    Patient here today for follow up after having injections   Symptoms are essentially unchanged from previous           PREVIOUS NOTES   The patient is here today for evaluation of degenerative disc disease of the lumbar spine.  He is referred to our clinic by Dr. Yumiko Rosen.  He's had pain for years.  Localizes pain to R side of lower back and radiating down to his foot.  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 I've had all sorts of therapy and it does no good."  The pain he has on the L side is localized to lower back and radiates to LLQ. States Dr. Rosen will address this pain in upcoming surgery (incisional hernia repair).  Patient has numbness/tingling and weakness, mainly in the R ankle and foot.  He recalls falling in the past. He's had one fall this year. Whenever he falls, he attributes this to the weakness in the RLE. If he walks, he notices that leg drag.   He will try to catch himself in the house by holding onto walls to prevent falling.  Leaning forward makes walking more tolerable.   Whenever he places weight on the R foot, he reports increased pressure in the lower back and hip- R side.    In the past, he's had several ESIs.  Patient denies any previous lower back surgery.  At times, he does have to ambulate with a cane.    He ordered Omega XL and this did help with the pain.  Rates his pain 10/10.   Denies acute bladder/bowel changes but has an enlarged prostate.  Has a history of gastrointestinal stromal tumor and renal cell carcinoma.   Half of L kidney removed at [facility]- 12-15 yrs ago.  Patient has been retired for almost 20 years. Prior to this he worked for [employer].   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 He slipped from standing position.    Objective:     Body mass index is 31.67 kg/m².  Vitals:    09/10/24 1107   BP: 112/72   Pulse: 70          Back:  None  " Paraspinal muscle spasms   None  Pain with flexion and extention   WNL Limited Range of motion    Neg + right Straight leg raise     Motor   Right Right Left Left  Level Group   5  5  L2 Hip flexor (Psoas)   5  5  L3 Leg extension (Quads)   5 3/4 DF 5  L4 Dorsiflexion & foot inversion (Tibialis Anterior)   5 3 5  L5 Great toe extension ( EHL)   5 4 5  S1 Foot eversion (Gastroc, PL & PB)     Sensation  NL Decreased (R/L/BL) Level Sensation    X  L2 Anterio-medial thigh   X  L3 Medial thigh around knee   X  L4 Medial foot   X  L5 Dorsum foot   X  S1 Lateral foot     Back Exam     Tenderness   The patient is experiencing tenderness in the lumbar.    Other   Toe walk: abnormal  Heel walk: abnormal  Gait: drop-foot (R drop foot)                    Lab Results   Component Value Date    WBC 5.19 05/15/2024    HCT 40.5 05/15/2024     CT C/A/P     FINDINGS:  Finding: The size of the heart is within normal limits.  There is no evidence of an acute pulmonary process.  There is no pneumothorax or pleural effusion.     There is moderate generalized atrophy of the pancreas.  The liver, gallbladder, spleen, and adrenals are normal in appearance.  There are hypodense masses associated with both kidneys.  One of the larger ones measures 9 mm and is located off of the lateral aspect of the inferior pole of the right kidney.  This mass has a Hounsfield measurement of 7.  The ureters and the urinary bladder are normal in appearance.  The prostate is normal in appearance.  The appendix is normal in appearance.  There is a moderate amount of diverticulosis in the descending and sigmoid portions of the colon.  There is no abnormal mass visualized within the gastrointestinal system.  There is no free fluid within the abdomen or pelvis. There is no pneumoperitoneum.  There are old bilateral pars interarticularis fractures of L5.  There is grade 3 anterolisthesis of L5 on S1.  There are moderate degenerative changes between L5 and S1.  There  is no abnormal lymphadenopathy based on CT size criteria.     Impression:     1. There is no abnormal mass visualized within the gastrointestinal system.  2. There is no abnormal lymphadenopathy based on CT size criteria.  3. There are hypodense masses associated with both kidneys. One of the larger ones measures 9 mm and is located off of the lateral aspect of the inferior pole of the right kidney.  This mass has a Hounsfield measurement of 7.  This is characteristic of a cyst.  4. There is a moderate amount of diverticulosis in the descending and sigmoid portions of the colon.  There is no abnormal mass visualized within the gastrointestinal system.  5. There are old bilateral pars interarticularis fractures of L5.  There is grade 3 anterolisthesis of L5 on S1. There are moderate degenerative changes between L5 and S1.      INDEPENDENT INTERPRETATION OF TEST:  Relevant imaging results reviewed and interpreted by me, discussed with the patient and / or family today.  Assessment:     1. Spondyloptosis    2. Dorsalgia, unspecified    3. Spondylolisthesis, lumbar region    4. Lumbar radiculopathy    5. Lumbar stenosis with neurogenic claudication        Plan:     Spondyloptosis  -     CT Lumbar Spine Without Contrast; Future; Expected date: 09/10/2024  -     X-Ray Spine Survey AP And Lateral; Future; Expected date: 09/10/2024    Dorsalgia, unspecified  -     CT Lumbar Spine Without Contrast; Future; Expected date: 09/10/2024    Spondylolisthesis, lumbar region    Lumbar radiculopathy    Lumbar stenosis with neurogenic claudication    Pt with long spinal hx   Severe DDD with varying degrees of narrowing throughout   Will get CT scan lumbar spine to evaluate bone anatomy   Standing AP and Lt to look at sagittal balance   Follow up after to discuss findings and possible tx options     Thank you for the referral   Please call with any questions    Jerome Morales MD  Neurosurgery     Disclaimer: This note was prepared using  a voice recognition system and is likely to have sound alike errors within the text.

## 2024-09-20 ENCOUNTER — HOSPITAL ENCOUNTER (OUTPATIENT)
Facility: HOSPITAL | Age: 79
Discharge: HOME | End: 2024-09-20
Attending: ANESTHESIOLOGY | Admitting: ANESTHESIOLOGY
Payer: MEDICARE

## 2024-09-20 ENCOUNTER — TELEPHONE (OUTPATIENT)
Dept: PAIN MEDICINE | Facility: CLINIC | Age: 79
End: 2024-09-20
Payer: MEDICARE

## 2024-09-20 VITALS
BODY MASS INDEX: 31.68 KG/M2 | OXYGEN SATURATION: 100 % | SYSTOLIC BLOOD PRESSURE: 148 MMHG | WEIGHT: 226.31 LBS | HEIGHT: 71 IN | RESPIRATION RATE: 14 BRPM | HEART RATE: 56 BPM | DIASTOLIC BLOOD PRESSURE: 81 MMHG | TEMPERATURE: 97 F

## 2024-09-20 DIAGNOSIS — M47.816 LUMBAR SPONDYLOSIS: ICD-10-CM

## 2024-09-20 LAB — POCT GLUCOSE: 276 MG/DL (ref 70–110)

## 2024-09-20 PROCEDURE — 64635 DESTROY LUMB/SAC FACET JNT: CPT | Mod: 50,,, | Performed by: ANESTHESIOLOGY

## 2024-09-20 PROCEDURE — 64635 DESTROY LUMB/SAC FACET JNT: CPT | Mod: 50 | Performed by: ANESTHESIOLOGY

## 2024-09-20 PROCEDURE — 64636 DESTROY L/S FACET JNT ADDL: CPT | Mod: 50,,, | Performed by: ANESTHESIOLOGY

## 2024-09-20 PROCEDURE — 25000003 PHARM REV CODE 250: Performed by: ANESTHESIOLOGY

## 2024-09-20 PROCEDURE — 82962 GLUCOSE BLOOD TEST: CPT | Performed by: ANESTHESIOLOGY

## 2024-09-20 PROCEDURE — 64636 DESTROY L/S FACET JNT ADDL: CPT | Mod: 50 | Performed by: ANESTHESIOLOGY

## 2024-09-20 RX ORDER — INDOMETHACIN 25 MG/1
CAPSULE ORAL
Status: DISCONTINUED | OUTPATIENT
Start: 2024-09-20 | End: 2024-09-20 | Stop reason: HOSPADM

## 2024-09-20 RX ORDER — LIDOCAINE HYDROCHLORIDE 20 MG/ML
INJECTION, SOLUTION EPIDURAL; INFILTRATION; INTRACAUDAL; PERINEURAL
Status: DISCONTINUED | OUTPATIENT
Start: 2024-09-20 | End: 2024-09-20 | Stop reason: HOSPADM

## 2024-09-20 RX ORDER — BUPIVACAINE HYDROCHLORIDE 2.5 MG/ML
INJECTION, SOLUTION EPIDURAL; INFILTRATION; INTRACAUDAL
Status: DISCONTINUED | OUTPATIENT
Start: 2024-09-20 | End: 2024-09-20 | Stop reason: HOSPADM

## 2024-09-20 NOTE — TELEPHONE ENCOUNTER
----- Message from Medhat Malik MA sent at 9/20/2024  2:01 PM CDT -----  The patient calling to reschedule appointment that was just made at the [facility] to the [facility] location. No openings coming up. Please give him a call back at [phone]

## 2024-09-20 NOTE — H&P
HPI  Patient presenting for Procedure(s) (LRB):  Bilateral L3-5 RFA (Bilateral)     Patient on Anti-coagulation No    No health changes since previous encounter    Past Medical History:   Diagnosis Date    CKD stage 3 due to type 2 diabetes mellitus     Deep vein thrombosis     DM (diabetes mellitus) with complications     Gastrointestinal stromal tumor (GIST) 3/9/2021    GERD (gastroesophageal reflux disease)     History of DVT (deep vein thrombosis)     History of renal cell carcinoma 2009    Hyperlipidemia associated with type 2 diabetes mellitus 02/26/2021    Hypertension associated with diabetes     Panlobular emphysema     Thrombocytopenia     Type II diabetes mellitus with renal manifestations      Past Surgical History:   Procedure Laterality Date    CLOSURE OF PERFORATED ULCER OF DUODENUM USING OMENTAL PATCH N/A 3/9/2021    Procedure: CLOSURE, ULCER, PERFORATED, DUODENUM, USING OMENTAL PATCH;  Surgeon: Edin Bradford MD;  Location: [facility] OR;  Service: General;  Laterality: N/A;    COLONOSCOPY N/A 1/5/2022    Procedure: COLONOSCOPY;  Surgeon: Mirta Grover MD;  Location: [facility] ENDO;  Service: Endoscopy;  Laterality: N/A;    ENDOSCOPIC ULTRASOUND OF UPPER GASTROINTESTINAL TRACT N/A 2/5/2021    Procedure: ULTRASOUND, UPPER GI TRACT, ENDOSCOPIC;  Surgeon: Geremias Brizuela MD;  Location: [facility] ENDO;  Service: Endoscopy;  Laterality: N/A;    EPIDURAL STEROID INJECTION N/A 5/3/2024    Procedure: Lumbar L5/S1 IL JASON;  Surgeon: Debra De MD;  Location: [facility] PAIN MGT;  Service: Pain Management;  Laterality: N/A;    ESOPHAGOGASTRODUODENOSCOPY N/A 2/3/2021    Procedure: EGD (ESOPHAGOGASTRODUODENOSCOPY);  Surgeon: Mirta Grover MD;  Location: [facility] ENDO;  Service: Endoscopy;  Laterality: N/A;    ESOPHAGOGASTRODUODENOSCOPY N/A 3/9/2021    Procedure: EGD (ESOPHAGOGASTRODUODENOSCOPY);  Surgeon: Edin Bradford MD;  Location: [facility] OR;  Service: General;  Laterality: N/A;    INJECTION OF ANESTHETIC AGENT  AROUND MEDIAL BRANCH NERVES INNERVATING LUMBAR FACET JOINT Bilateral 10/17/2023    Procedure: Bilateral L3-5 MBB;  Surgeon: Debra De MD;  Location: [facility] PAIN MGT;  Service: Pain Management;  Laterality: Bilateral;    INJECTION OF ANESTHETIC AGENT AROUND MEDIAL BRANCH NERVES INNERVATING LUMBAR FACET JOINT Bilateral 3/1/2024    Procedure: Bilateral L3-5 MBB Dignostic;  Surgeon: Debra De MD;  Location: [facility] PAIN MGT;  Service: Pain Management;  Laterality: Bilateral;    INJECTION OF ANESTHETIC AGENT INTO TISSUE PLANE DEFINED BY TRANSVERSUS ABDOMINIS MUSCLE  4/17/2023    Procedure: BLOCK, TRANSVERSUS ABDOMINIS PLANE;  Surgeon: Yumiko Rosen DO;  Location: [facility] OR;  Service: General;;    left partial nephrectomy Left 2006    RADIOFREQUENCY THERMOCOAGULATION Bilateral 3/19/2024    Procedure: Bilateral L3-5 Lumbar RFA;  Surgeon: Debra De MD;  [facility];  Service: Pain Management;  Laterality: Bilateral;    ROBOT-ASSISTED REPAIR OF INCISIONAL HERNIA USING DA PERRI XI Left 4/17/2023    Procedure: XI ROBOTIC REPAIR, HERNIA, INCISIONAL;  Surgeon: Yumiko Rosen DO;  Location: [facility] OR;  Service: General;  Laterality: Left;  Left flank. Position right lateral decubitus. Ernst.    ROBOT-ASSISTED SURGICAL REMOVAL OF STOMACH USING DA PERRI XI N/A 3/9/2021    Procedure: XI ROBOTIC GASTRECTOMY;  Surgeon: Edin Bradford MD;  Location: [facility] OR;  Service: General;  Laterality: N/A;  partial     Review of patient's allergies indicates:  No Known Allergies     No current facility-administered medications on file prior to encounter.     Current Outpatient Medications on File Prior to Encounter   Medication Sig Dispense Refill    aspirin 81 MG Chew Take 81 mg by mouth once daily.      atorvastatin (LIPITOR) 20 MG tablet Take 1 tablet (20 mg total) by mouth every evening. 52 tablet 3    cholecalciferol, vitamin D3, (VITAMIN D3) 50 mcg (2,000 unit) Tab Take 2,000 Units by mouth once daily.        "empagliflozin (JARDIANCE) 10 mg tablet Take 1 tablet (10 mg total) by mouth once daily. 90 tablet 3    fish oil-omega-3 fatty acids 300-1,000 mg capsule Take by mouth once daily.      losartan-hydrochlorothiazide 100-12.5 mg (HYZAAR) 100-12.5 mg Tab Take 1 tablet by mouth once daily. 90 tablet 3    magnesium oxide (MAG-OX) 400 mg (241.3 mg magnesium) tablet Take 400 mg by mouth once daily.      metFORMIN (GLUCOPHAGE) 500 MG tablet TAKE 1 TABLET BY MOUTH TWICE DAILY WITH MEALS 180 tablet 3    metoprolol tartrate (LOPRESSOR) 100 MG tablet Take 1 tablet (100 mg total) by mouth 2 (two) times daily. 180 tablet 3    oxybutynin (DITROPAN) 5 MG Tab Take 1 tablet (5 mg total) by mouth 2 (two) times daily. 180 tablet 3    pantoprazole (PROTONIX) 40 MG tablet Take 1 tablet (40 mg total) by mouth once daily. 90 tablet 3    acetaminophen (TYLENOL) 650 MG TbSR Take 650 mg by mouth every 8 (eight) hours.          PMHx, PSHx, Allergies, Medications reviewed in epic    ROS negative except pain complaints in HPI    OBJECTIVE:    /72 (BP Location: Right arm, Patient Position: Sitting)   Pulse 60   Temp 97.3 °F (36.3 °C) (Temporal)   Resp 18   Ht 5' 11" (1.803 m)   Wt 102.7 kg (226 lb 4.8 oz)   SpO2 100%   BMI 31.56 kg/m²     PHYSICAL EXAMINATION:    GENERAL: Well appearing, in no acute distress, alert and oriented x3.  PSYCH:  Mood and affect appropriate.    Pt BG > 270. We have discussed risk of severe hyperglycemia/DKA with use of steroid medication. Patient expresses anger and states he will cancel his whole procedure and states that he has never been contacted by PAT regarding preoperative instructions.     Confirmed with Sania Brar RN that patient was spoken to on 09/09/2024 at 09:23 am with all preoperative instructions include NPO status covered.    We have discussed no use of steroid. Patient also retorts " you're hazel I didn't eat!" Patient has drank a cup of coffee this morning. We have discussed " "maintaining respectful communication. Patient states " This is how I talk!" I have stated calmly that NPO status, hypoglycemia guidelines are a department policy and to ensure patient safety. Patient apologizes. We have decided to move forward with no steroid and no sedation.        SKIN: Skin color, texture, turgor normal, no rashes or lesions which will impact the procedure.  CV: RRR with palpation of the radial artery.  PULM: No evidence of respiratory difficulty, symmetric chest rise. Clear to auscultation.  NEURO: Cranial nerves grossly intact.    Plan:    Proceed with procedure as planned Procedure(s) (LRB):  Bilateral L3-5 RFA (Bilateral)    Debra De MD  09/20/2024            "

## 2024-09-20 NOTE — PLAN OF CARE
"Pt discharged home, awake, alert, oriented x's 4,  stating "pain at 7" on scale of 1-10, no other apparent of distress noted. All questions and concerns addressed and answered, pt verbalizes understanding of discharge process, pt meets discharge criteria and is being discharged to car via wheelchair.    "

## 2024-09-20 NOTE — DISCHARGE SUMMARY
Discharge Note  Short Stay      SUMMARY     Admit Date: 9/20/2024    Attending Physician: Debra De MD        Discharge Physician: Debra De MD        Discharge Date: 9/20/2024 5:12 PM    Procedure(s) (LRB):  Bilateral L3-5 RFA (Bilateral)    Final Diagnosis: Lumbar spondylosis [M47.816]    Disposition: Home or self care    Patient Instructions:   Discharge Medication List as of 9/20/2024 10:48 AM        CONTINUE these medications which have NOT CHANGED    Details   acetaminophen (TYLENOL) 650 MG TbSR Take 650 mg by mouth every 8 (eight) hours., Historical Med      aspirin 81 MG Chew Take 81 mg by mouth once daily., Historical Med      atorvastatin (LIPITOR) 20 MG tablet Take 1 tablet (20 mg total) by mouth every evening., Starting Mon 2/12/2024, Normal      cholecalciferol, vitamin D3, (VITAMIN D3) 50 mcg (2,000 unit) Tab Take 2,000 Units by mouth once daily. , Historical Med      empagliflozin (JARDIANCE) 10 mg tablet Take 1 tablet (10 mg total) by mouth once daily., Starting Wed 5/22/2024, Normal      fish oil-omega-3 fatty acids 300-1,000 mg capsule Take by mouth once daily., Historical Med      losartan-hydrochlorothiazide 100-12.5 mg (HYZAAR) 100-12.5 mg Tab Take 1 tablet by mouth once daily., Starting Mon 12/18/2023, Normal      magnesium oxide (MAG-OX) 400 mg (241.3 mg magnesium) tablet Take 400 mg by mouth once daily., Historical Med      metFORMIN (GLUCOPHAGE) 500 MG tablet TAKE 1 TABLET BY MOUTH TWICE DAILY WITH MEALS, Starting Mon 4/22/2024, Normal      metoprolol tartrate (LOPRESSOR) 100 MG tablet Take 1 tablet (100 mg total) by mouth 2 (two) times daily., Starting Mon 6/24/2024, Until Tue 6/24/2025, Normal      oxybutynin (DITROPAN) 5 MG Tab Take 1 tablet (5 mg total) by mouth 2 (two) times daily., Starting Wed 11/15/2023, Until Thu 11/14/2024, No Print      pantoprazole (PROTONIX) 40 MG tablet Take 1 tablet (40 mg total) by mouth once daily., Starting Tue 5/14/2024, Normal                  Discharge Diagnosis: Lumbar spondylosis [M47.816]  Condition on Discharge: Stable with no complications to procedure   Diet on Discharge: Same as before.  Activity: as per instruction sheet.  Discharge to: Home with a responsible adult.  Follow up: 2-4 weeks       Please call the office at [phone] if you experience any weakness or loss of sensation, fever > 101.5, pain uncontrolled with oral medications, persistent nausea/vomiting/or diarrhea, redness or drainage from the incisions, or any other worrisome concerns. If physician on call was not reached or could not communicate with our office for any reason please go to the nearest emergency department

## 2024-09-20 NOTE — OP NOTE
Daniel Villeda  79 y.o. male      Vitals:    09/20/24 1135   BP: (!) 148/81   Pulse: (!) 56   Resp: 14   Temp:         INFORMED CONSENT: The procedure, risks, benefits and options were discussed with patient. There are no contraindications to the procedure. The patient expressed understanding and agreed to proceed. The personnel performing the procedure was discussed. I verify that I personally obtained the patient's consent prior to the start of the procedure and the signed consent can be found on the patient's chart.     Procedure Date: 09/20/2024       Pre Procedure diagnosis: Lumbar spondylosis [M47.816]  Post-Procedure diagnosis:         Sedation:  Local provided by M.D    The patient was monitored with continuous pulse oximetry, EKG, and intermittent blood pressure monitors.  The patient was hemodynamically stable throughout the entire process was responsive to voice, and breathing spontaneously.  Supplemental O2 was provided at 2L/min via nasal cannula.  Patient was comfortable for the duration of the procedure. (See nurse documentation and case log for sedation time)            Conscious sedation ordered by MD.  Patient reevaluated and sedation administered by MD and monitored by RN.  Total sedation time was lmore than 15 min. (See nurse documentation and case log for sedation time)      PROCEDURE: bilateral L3,4,5  FACET MEDIAL BRANCH NERVE RADIOFREQUENCY NEUROTOMY (lumbar)         DESCRIPTION OF PROCEDURE: The patient was brought to the procedure room.  After performing time out IV access was obtained prior to the procedure. The patient was positioned prone on the fluoroscopy table. Continuous hemodynamic monitoring was initiated including blood pressure and pulse oximetry. IV sedation was administered incrementally to allow the patient to remain comfortable and conversant throughout the procedure. The area of the lumbar spine was prepped chlorhexidine three times and draped into a sterile field.   Fluoroscopy was used to identify the location of the TIFFANIE side  L3, L4, and L5 medial branch nerves at the junctions of the superior articular process and the transverse processes of  L4, L5, and the sacral ala respectively.  Skin anesthesia was achieved using 3 cc of Lidocaine 1% over the injection sites. A 20 gauge, 100mm (10mm active tip) curved RF needle was slowly inserted at each level using AP, lateral and oblique fluoroscopic imaging. Negative aspiration for blood or CSF was confirmed.  Sensory stimulation at 50Hz below 0.5V was achieved at every level. Motor stimulation at 2Hz up to 1.5V did not cause any radicular symptoms at any level. Each level was anesthetized with 1.5 cc of lidocaine 1%.  Radiofrequency lesioning was performed for 90 seconds at 80 degrees in two different positions at each level.  Total of 3 cc of bupivacaine 0.25% and 10 mg of Decadron was injected was injected at all levels.. The needles were removed and bleeding was nil.  A sterile dressing was applied. Patient was taken back to the recovery room for further observation.      Stimulation Results:     L3 = Sensory positive @ 0.5, Motor negative @ 1.5  L4 = Sensory positive @ 0.7, Motor negative @ 1.5  L5 = Sensory positive @ 0.5, Motor negative @ 1.5     Blood Loss: Nill  Specimen: None

## 2024-09-20 NOTE — DISCHARGE INSTRUCTIONS

## 2024-09-20 NOTE — TELEPHONE ENCOUNTER
Called patient and rescheduled appt to the [place] location with Bibi Sánchez per patient.    Ludy YEN

## 2024-10-01 ENCOUNTER — OFFICE VISIT (OUTPATIENT)
Dept: NEUROSURGERY | Facility: CLINIC | Age: 79
End: 2024-10-01
Payer: MEDICARE

## 2024-10-01 ENCOUNTER — HOSPITAL ENCOUNTER (OUTPATIENT)
Dept: RADIOLOGY | Facility: HOSPITAL | Age: 79
Discharge: HOME | End: 2024-10-01
Attending: NEUROLOGICAL SURGERY
Payer: MEDICARE

## 2024-10-01 VITALS
HEIGHT: 71 IN | HEART RATE: 63 BPM | WEIGHT: 224.88 LBS | SYSTOLIC BLOOD PRESSURE: 126 MMHG | DIASTOLIC BLOOD PRESSURE: 80 MMHG | BODY MASS INDEX: 31.48 KG/M2

## 2024-10-01 DIAGNOSIS — M54.9 DORSALGIA, UNSPECIFIED: ICD-10-CM

## 2024-10-01 DIAGNOSIS — M43.10 SPONDYLOPTOSIS: ICD-10-CM

## 2024-10-01 DIAGNOSIS — M43.16 SPONDYLOLISTHESIS, LUMBAR REGION: ICD-10-CM

## 2024-10-01 DIAGNOSIS — M48.062 LUMBAR STENOSIS WITH NEUROGENIC CLAUDICATION: ICD-10-CM

## 2024-10-01 DIAGNOSIS — T14.8XXA MUSCULOLIGAMENTOUS STRAIN: ICD-10-CM

## 2024-10-01 DIAGNOSIS — M54.16 LUMBAR RADICULOPATHY: Primary | ICD-10-CM

## 2024-10-01 PROCEDURE — 99213 OFFICE O/P EST LOW 20 MIN: CPT | Mod: S$GLB,,, | Performed by: NEUROLOGICAL SURGERY

## 2024-10-01 PROCEDURE — 3288F FALL RISK ASSESSMENT DOCD: CPT | Mod: CPTII,S$GLB,, | Performed by: NEUROLOGICAL SURGERY

## 2024-10-01 PROCEDURE — 3074F SYST BP LT 130 MM HG: CPT | Mod: CPTII,S$GLB,, | Performed by: NEUROLOGICAL SURGERY

## 2024-10-01 PROCEDURE — 1101F PT FALLS ASSESS-DOCD LE1/YR: CPT | Mod: CPTII,S$GLB,, | Performed by: NEUROLOGICAL SURGERY

## 2024-10-01 PROCEDURE — 3079F DIAST BP 80-89 MM HG: CPT | Mod: CPTII,S$GLB,, | Performed by: NEUROLOGICAL SURGERY

## 2024-10-01 PROCEDURE — 72131 CT LUMBAR SPINE W/O DYE: CPT | Mod: 26,,, | Performed by: RADIOLOGY

## 2024-10-01 PROCEDURE — 72131 CT LUMBAR SPINE W/O DYE: CPT | Mod: TC

## 2024-10-01 PROCEDURE — 1125F AMNT PAIN NOTED PAIN PRSNT: CPT | Mod: CPTII,S$GLB,, | Performed by: NEUROLOGICAL SURGERY

## 2024-10-01 PROCEDURE — 99999 PR PBB SHADOW E&M-EST. PATIENT-LVL III: CPT | Mod: PBBFAC,,, | Performed by: NEUROLOGICAL SURGERY

## 2024-10-01 NOTE — PROGRESS NOTES
Subjective:      Patient ID: Daniel Villeda is a 79 y.o. male.    Chief Complaint: Spondyloptosis (Pt reports with lumbar spine symptoms unchanged since LOV 09/10/24; Pt reports constant Rt sided LBP that sometimes goes into RT leg. Pt reports pain from sciatic nerve as well; Pt has CT lumbar performed today for review)    Pt here today for follow up   Recently had RFA and CT lumbar  Symptoms essentially unchanged   Ambulates with cane for stability purposes  Lower back pain goes right side down with the foot        Review of Systems   Constitutional:  Negative for activity change, appetite change and chills.   HENT:  Negative for hearing loss, sore throat and tinnitus.    Eyes:  Negative for pain, discharge and itching.   Cardiovascular:  Negative for chest pain.   Gastrointestinal:  Negative for abdominal pain.   Endocrine: Negative for cold intolerance and heat intolerance.   Genitourinary:  Negative for difficulty urinating and dysuria.   Musculoskeletal:  Positive for back pain and gait problem.   Allergic/Immunologic: Negative for environmental allergies.   Neurological:  Positive for weakness. Negative for dizziness, tremors, light-headedness and headaches.   Hematological:  Negative for adenopathy.   Psychiatric/Behavioral:  Negative for agitation, behavioral problems and confusion.          Objective:       Physical Exam:  Nursing note and vitals reviewed.    Constitutional: He appears well-nourished. He is not diaphoretic. No distress.     Eyes: Pupils are equal, round, and reactive to light. EOM are normal.     Cardiovascular: Normal rate and regular rhythm.     Psych/Behavior: He is alert. He is oriented to person, place, and time. He has a normal mood and affect.     Musculoskeletal:        Back: Range of motion is limited. There is tenderness. Muscle strength is 5/5.       Right Lower Extremities: Range of motion is full. There is no tenderness. Muscle strength is 5/5. Tone is normal.        Left  Lower Extremities: There is no tenderness. Muscle strength is 5/5. Tone is normal.     Neurological:        Sensory: There is no sensory deficit in the trunk. There is no sensory deficit in the extremities.        Cranial nerves: Cranial nerve(s) II, III, IV, V, VI, VII, VIII, IX, X, XI and XII are intact.     General    Nursing note and vitals reviewed.  Constitutional: He is oriented to person, place, and time. He appears well-nourished. No distress.   Eyes: EOM are normal. Pupils are equal, round, and reactive to light.   Cardiovascular:  Normal rate and regular rhythm.            Neurological: He is alert and oriented to person, place, and time.   Psychiatric: He has a normal mood and affect.             Ortho Exam    CT scan lumbar spine]    Results for orders placed during the hospital encounter of 10/02/23    MRI Lumbar Spine Without Contrast        Impression  1. L5-S1 grade 2 anterolisthesis likely secondary to bilateral pars defects contributes to severe spinal canal stenosis and severe bilateral neural foraminal stenosis.  2. Mild L2-L3 spinal canal stenosis with mild-to-moderate bilateral neural foraminal stenosis.  3. Small left T12-L1 subarticular zone/neural foraminal disc protrusion contributes to severe left-sided neural foraminal stenosis.        Assessment:     1. Lumbar radiculopathy    2. Lumbar stenosis with neurogenic claudication    3. Spondylolisthesis, lumbar region    4. Musculoligamentous strain      Plan:     Lumbar radiculopathy    Lumbar stenosis with neurogenic claudication    Spondylolisthesis, lumbar region    Musculoligamentous strain    Patient with severe degenerative changes multiple pain generators throughout the lumbar spine including severe degenerative disc disease and a grade 2 spondylolisthesis at L5-S1  Understands that in order to fide this patient report a large multilevel decompressive fusion with rods and screws to decompress the thecal sac as well as to stabilize the  spine    After discussing all the risks benefits along with the recovery from this particular procedure patient is declining to pursue any further surgical intervention    If at any time he would like to discuss further please make follow-up appointment otherwise he can follow-up as needed    Thank you for the referral   Please call with any questions    Jeroem Morales MD  Neurosurgery     Disclaimer: This note was prepared using a voice recognition system and is likely to have sound alike errors within the text.

## 2024-10-03 RX ORDER — ATORVASTATIN CALCIUM 20 MG/1
20 TABLET, FILM COATED ORAL NIGHTLY
Qty: 90 TABLET | Refills: 3 | Status: SHIPPED | OUTPATIENT
Start: 2024-10-03

## 2024-10-03 NOTE — TELEPHONE ENCOUNTER
Requested Prescriptions     Pending Prescriptions Disp Refills    atorvastatin (LIPITOR) 20 MG tablet [Pharmacy Med Name: Atorvastatin Calcium 20 MG Oral Tablet] 52 tablet 0     Sig: TAKE 1 TABLET BY MOUTH ONCE DAILY IN THE EVENING     LV 05/22/24  LF 02/12/2024

## 2024-10-07 ENCOUNTER — PATIENT MESSAGE (OUTPATIENT)
Dept: RESEARCH | Facility: HOSPITAL | Age: 79
End: 2024-10-07
Payer: MEDICARE

## 2024-11-22 ENCOUNTER — LAB VISIT (OUTPATIENT)
Dept: LAB | Facility: HOSPITAL | Age: 79
End: 2024-11-22
Attending: INTERNAL MEDICINE
Payer: MEDICARE

## 2024-11-22 ENCOUNTER — TELEPHONE (OUTPATIENT)
Dept: INTERNAL MEDICINE | Facility: CLINIC | Age: 79
End: 2024-11-22
Payer: MEDICARE

## 2024-11-22 DIAGNOSIS — E11.22 TYPE 2 DIABETES MELLITUS WITH STAGE 3B CHRONIC KIDNEY DISEASE, WITHOUT LONG-TERM CURRENT USE OF INSULIN: ICD-10-CM

## 2024-11-22 DIAGNOSIS — N18.32 TYPE 2 DIABETES MELLITUS WITH STAGE 3B CHRONIC KIDNEY DISEASE, WITHOUT LONG-TERM CURRENT USE OF INSULIN: ICD-10-CM

## 2024-11-22 DIAGNOSIS — R30.0 DYSURIA: Primary | ICD-10-CM

## 2024-11-22 LAB
ANION GAP SERPL CALC-SCNC: 10 MMOL/L (ref 8–16)
BUN SERPL-MCNC: 28 MG/DL (ref 8–23)
CALCIUM SERPL-MCNC: 9 MG/DL (ref 8.7–10.5)
CHLORIDE SERPL-SCNC: 105 MMOL/L (ref 95–110)
CO2 SERPL-SCNC: 26 MMOL/L (ref 23–29)
CREAT SERPL-MCNC: 1.6 MG/DL (ref 0.5–1.4)
EST. GFR  (NO RACE VARIABLE): 43 ML/MIN/1.73 M^2
ESTIMATED AVG GLUCOSE: 163 MG/DL (ref 68–131)
GLUCOSE SERPL-MCNC: 169 MG/DL (ref 70–110)
HBA1C MFR BLD: 7.3 % (ref 4–5.6)
POTASSIUM SERPL-SCNC: 4.7 MMOL/L (ref 3.5–5.1)
SODIUM SERPL-SCNC: 141 MMOL/L (ref 136–145)

## 2024-11-22 PROCEDURE — 80048 BASIC METABOLIC PNL TOTAL CA: CPT | Performed by: INTERNAL MEDICINE

## 2024-11-22 PROCEDURE — 36415 COLL VENOUS BLD VENIPUNCTURE: CPT | Mod: PO | Performed by: INTERNAL MEDICINE

## 2024-11-22 PROCEDURE — 83036 HEMOGLOBIN GLYCOSYLATED A1C: CPT | Performed by: INTERNAL MEDICINE

## 2024-11-22 NOTE — TELEPHONE ENCOUNTER
Pt came in and  dropped off urine for him but there are no orders for it/ will you add orders for a urine screening so the lab can process it.?/please advise

## 2024-11-23 ENCOUNTER — PATIENT MESSAGE (OUTPATIENT)
Dept: INTERNAL MEDICINE | Facility: CLINIC | Age: 79
End: 2024-11-23
Payer: MEDICARE

## 2024-11-24 ENCOUNTER — PATIENT MESSAGE (OUTPATIENT)
Dept: INTERNAL MEDICINE | Facility: CLINIC | Age: 79
End: 2024-11-24
Payer: MEDICARE

## 2024-11-26 ENCOUNTER — TELEPHONE (OUTPATIENT)
Dept: INTERNAL MEDICINE | Facility: CLINIC | Age: 79
End: 2024-11-26

## 2024-11-26 ENCOUNTER — OFFICE VISIT (OUTPATIENT)
Dept: INTERNAL MEDICINE | Facility: CLINIC | Age: 79
End: 2024-11-26
Payer: MEDICARE

## 2024-11-26 ENCOUNTER — CLINICAL SUPPORT (OUTPATIENT)
Dept: INTERNAL MEDICINE | Facility: CLINIC | Age: 79
End: 2024-11-26
Payer: MEDICARE

## 2024-11-26 VITALS
BODY MASS INDEX: 32.1 KG/M2 | HEART RATE: 72 BPM | SYSTOLIC BLOOD PRESSURE: 122 MMHG | OXYGEN SATURATION: 92 % | TEMPERATURE: 96 F | WEIGHT: 230.19 LBS | DIASTOLIC BLOOD PRESSURE: 76 MMHG

## 2024-11-26 DIAGNOSIS — E11.8 DM (DIABETES MELLITUS) WITH COMPLICATIONS: Primary | ICD-10-CM

## 2024-11-26 DIAGNOSIS — E11.22 TYPE 2 DIABETES MELLITUS WITH STAGE 3B CHRONIC KIDNEY DISEASE, WITHOUT LONG-TERM CURRENT USE OF INSULIN: ICD-10-CM

## 2024-11-26 DIAGNOSIS — Z23 NEED FOR VACCINATION: Primary | ICD-10-CM

## 2024-11-26 DIAGNOSIS — E78.5 HYPERLIPIDEMIA ASSOCIATED WITH TYPE 2 DIABETES MELLITUS: ICD-10-CM

## 2024-11-26 DIAGNOSIS — Z85.528 HISTORY OF RENAL CELL CARCINOMA: ICD-10-CM

## 2024-11-26 DIAGNOSIS — N18.30 CKD STAGE 3 DUE TO TYPE 2 DIABETES MELLITUS: ICD-10-CM

## 2024-11-26 DIAGNOSIS — Z86.0100 HISTORY OF COLON POLYPS: ICD-10-CM

## 2024-11-26 DIAGNOSIS — M54.16 LUMBAR RADICULOPATHY, CHRONIC: ICD-10-CM

## 2024-11-26 DIAGNOSIS — Z23 NEED FOR PROPHYLACTIC VACCINATION AND INOCULATION AGAINST INFLUENZA: ICD-10-CM

## 2024-11-26 DIAGNOSIS — E11.69 HYPERLIPIDEMIA ASSOCIATED WITH TYPE 2 DIABETES MELLITUS: ICD-10-CM

## 2024-11-26 DIAGNOSIS — D69.6 THROMBOCYTOPENIA: ICD-10-CM

## 2024-11-26 DIAGNOSIS — N18.32 TYPE 2 DIABETES MELLITUS WITH STAGE 3B CHRONIC KIDNEY DISEASE, WITHOUT LONG-TERM CURRENT USE OF INSULIN: ICD-10-CM

## 2024-11-26 DIAGNOSIS — I15.2 HYPERTENSION ASSOCIATED WITH DIABETES: ICD-10-CM

## 2024-11-26 DIAGNOSIS — E11.22 CKD STAGE 3 DUE TO TYPE 2 DIABETES MELLITUS: ICD-10-CM

## 2024-11-26 DIAGNOSIS — E11.59 HYPERTENSION ASSOCIATED WITH DIABETES: ICD-10-CM

## 2024-11-26 DIAGNOSIS — C49.A0 GASTROINTESTINAL STROMAL TUMOR (GIST): ICD-10-CM

## 2024-11-26 DIAGNOSIS — I70.0 AORTIC ATHEROSCLEROSIS: ICD-10-CM

## 2024-11-26 PROCEDURE — G0008 ADMIN INFLUENZA VIRUS VAC: HCPCS | Mod: S$GLB,,, | Performed by: INTERNAL MEDICINE

## 2024-11-26 PROCEDURE — 90653 IIV ADJUVANT VACCINE IM: CPT | Mod: S$GLB,,, | Performed by: INTERNAL MEDICINE

## 2024-11-26 PROCEDURE — 99999 PR PBB SHADOW E&M-EST. PATIENT-LVL IV: CPT | Mod: PBBFAC,,, | Performed by: INTERNAL MEDICINE

## 2024-11-26 PROCEDURE — 99999 PR PBB SHADOW E&M-EST. PATIENT-LVL II: CPT | Mod: PBBFAC,,,

## 2024-11-26 NOTE — TELEPHONE ENCOUNTER
Your urine does not look infected but you have lots of glucose in your urine which will make you urinate more frequently. The urine culture takes 3 days

## 2024-11-26 NOTE — ADDENDUM NOTE
Addended by: DANIEL CHAMPION on: 11/26/2024 11:23 AM     Modules accepted: Orders, Level of Service

## 2024-11-26 NOTE — PROGRESS NOTES
HPI:  Patient is a 79-year-old gentleman who comes in today for follow-up of diabetes, hypertension, lipids.  He has been doing well.  He has no reported problems or complaints.  He denies any hypoglycemic events.  His blood pressures been controlled.  His only complaint is that he can not afford the Jardiance and wants to stop it.    Current meds have been verified and updated per the EMR  Exam:/76 (BP Location: Right arm, Patient Position: Sitting)   Pulse 72   Temp 96.4 °F (35.8 °C) (Tympanic)   Wt 104.4 kg (230 lb 2.6 oz)   SpO2 (!) 92%   BMI 32.10 kg/m²   Carotids 2+ equal without bruits, neck is supple without adenopathy  Chest clear  Cardiovascular regular rate and rhythm without murmur gallop or rub  Extremities without edema    Lab Results   Component Value Date    WBC 5.19 05/15/2024    HGB 13.3 (L) 05/15/2024    HCT 40.5 05/15/2024     (L) 05/15/2024    CHOL 77 (L) 05/15/2024    TRIG 243 (H) 05/15/2024    HDL 18 (L) 05/15/2024    ALT 33 05/15/2024    AST 21 05/15/2024     11/22/2024    K 4.7 11/22/2024     11/22/2024    CREATININE 1.6 (H) 11/22/2024    BUN 28 (H) 11/22/2024    CO2 26 11/22/2024    TSH 1.648 05/15/2024    HGBA1C 7.3 (H) 11/22/2024       Impression:  Diabetes, hypertension, lipids all well controlled on current meds  Chronic kidney disease, stable  Patient Active Problem List   Diagnosis    RBBB    First degree AV block    Gastrointestinal stromal tumor (GIST)    DM (diabetes mellitus) with complications    Type II diabetes mellitus with renal manifestations    Hypertension associated with diabetes    Hyperlipidemia associated with type 2 diabetes mellitus    History of renal cell carcinoma    History of DVT (deep vein thrombosis)    Thrombocytopenia    CKD stage 3 due to type 2 diabetes mellitus    History of colon polyps    Aortic atherosclerosis    Benign prostatic hyperplasia with urinary frequency    Lumbar spondylosis    Lumbar radiculopathy, chronic        Plan:  Orders Placed This Encounter    Comprehensive Metabolic Panel    Hemoglobin A1C    Lipid Panel    TSH    CBC Auto Differential    Microalbumin/Creatinine Ratio, Urine     Patient may stop the Jardiance.  He has been told falls likely his A1c will go up significantly.  Patient has been encouraged to really be very meticulous with his diet and he needs to exercise.  He will be seen again in 6 months with the above lab work.    This note is generated with speech recognition software and is subject to transcription error and sound alike phrases that may be missed by proofreading.

## 2024-12-02 ENCOUNTER — TELEPHONE (OUTPATIENT)
Dept: INTERNAL MEDICINE | Facility: CLINIC | Age: 79
End: 2024-12-02
Payer: MEDICARE

## 2024-12-02 NOTE — TELEPHONE ENCOUNTER
Spoke with pt verbalized understanding no bacterial growth in urine specimen upon review by Dr. FAISAL Lugo

## 2024-12-06 RX ORDER — LOSARTAN POTASSIUM AND HYDROCHLOROTHIAZIDE 12.5; 1 MG/1; MG/1
1 TABLET ORAL DAILY
Qty: 90 TABLET | Refills: 3 | Status: SHIPPED | OUTPATIENT
Start: 2024-12-06

## 2024-12-06 NOTE — TELEPHONE ENCOUNTER
Requested Prescriptions     Pending Prescriptions Disp Refills    losartan-hydrochlorothiazide 100-12.5 mg (HYZAAR) 100-12.5 mg Tab [Pharmacy Med Name: Losartan Potassium-HCTZ 100-12.5 MG Oral Tablet] 90 tablet 0     Sig: Take 1 tablet by mouth once daily     LV 11/26/2024   LF 12/18/2023

## 2024-12-30 ENCOUNTER — OFFICE VISIT (OUTPATIENT)
Dept: PODIATRY | Facility: CLINIC | Age: 79
End: 2024-12-30
Payer: MEDICARE

## 2024-12-30 DIAGNOSIS — M54.16 LUMBAR RADICULOPATHY, CHRONIC: ICD-10-CM

## 2024-12-30 DIAGNOSIS — L60.2 ONYCHOGRYPHOSIS: Primary | ICD-10-CM

## 2024-12-30 DIAGNOSIS — E11.69 TYPE 2 DIABETES MELLITUS WITH OTHER SPECIFIED COMPLICATION, WITHOUT LONG-TERM CURRENT USE OF INSULIN: ICD-10-CM

## 2024-12-30 DIAGNOSIS — Z86.718 HISTORY OF DVT (DEEP VEIN THROMBOSIS): ICD-10-CM

## 2024-12-30 PROCEDURE — 3288F FALL RISK ASSESSMENT DOCD: CPT | Mod: CPTII,S$GLB,, | Performed by: PODIATRIST

## 2024-12-30 PROCEDURE — 99203 OFFICE O/P NEW LOW 30 MIN: CPT | Mod: S$GLB,,, | Performed by: PODIATRIST

## 2024-12-30 PROCEDURE — 1101F PT FALLS ASSESS-DOCD LE1/YR: CPT | Mod: CPTII,S$GLB,, | Performed by: PODIATRIST

## 2024-12-30 PROCEDURE — 1160F RVW MEDS BY RX/DR IN RCRD: CPT | Mod: CPTII,S$GLB,, | Performed by: PODIATRIST

## 2024-12-30 PROCEDURE — 99999 PR PBB SHADOW E&M-EST. PATIENT-LVL III: CPT | Mod: PBBFAC,,, | Performed by: PODIATRIST

## 2024-12-30 PROCEDURE — 1125F AMNT PAIN NOTED PAIN PRSNT: CPT | Mod: CPTII,S$GLB,, | Performed by: PODIATRIST

## 2024-12-30 PROCEDURE — 1159F MED LIST DOCD IN RCRD: CPT | Mod: CPTII,S$GLB,, | Performed by: PODIATRIST

## 2024-12-30 NOTE — PROGRESS NOTES
Subjective:       Patient ID: Daniel Villeda is a 79 y.o. male.    Chief Complaint: Nail Care (Right great hallux: c/o pain rate 6/10, pt is diabetic, pt states of wearing socks increases pressure to toe, sharpness pains. redness at present.)      HPI: Daniel Villeda presents to the office with complaints of abnormal appearance to the right great  toe, due to dystrophic and thickened nail.  States that has attempted to remove the nail himself but was unable to do this.  Reports having 6/10 pain which she relates as radiating from his back distally.  States that certain positions do cause significant pain progressing from his back to his toe.  Reports no signs of cellulitis, ingrowing, or drainage..  Patient's Primary Care Provider is Avinash Lugo MD.     Review of patient's allergies indicates:  No Known Allergies    Past Medical History:   Diagnosis Date    CKD stage 3 due to type 2 diabetes mellitus     Deep vein thrombosis     DM (diabetes mellitus) with complications     Gastrointestinal stromal tumor (GIST) 3/9/2021    GERD (gastroesophageal reflux disease)     History of DVT (deep vein thrombosis)     History of renal cell carcinoma     Hyperlipidemia associated with type 2 diabetes mellitus 2021    Hypertension associated with diabetes     Panlobular emphysema     Thrombocytopenia     Type II diabetes mellitus with renal manifestations        Family History   Problem Relation Name Age of Onset    Stroke Mother      Hodgkin's lymphoma Father         Social History     Socioeconomic History    Marital status:    Tobacco Use    Smoking status: Former     Current packs/day: 0.00     Average packs/day: 1.5 packs/day for 40.0 years (60.0 ttl pk-yrs)     Types: Cigarettes     Start date: 1965     Quit date: 2005     Years since quittin.7    Smokeless tobacco: Never   Substance and Sexual Activity    Alcohol use: Not Currently    Drug use: Never       Past Surgical History:    Procedure Laterality Date    CLOSURE OF PERFORATED ULCER OF DUODENUM USING OMENTAL PATCH N/A 3/9/2021    Procedure: CLOSURE, ULCER, PERFORATED, DUODENUM, USING OMENTAL PATCH;  Surgeon: Edin Bradford MD;  Location: [facility] OR;  Service: General;  Laterality: N/A;    COLONOSCOPY N/A 1/5/2022    Procedure: COLONOSCOPY;  Surgeon: Mirta Grover MD;  Location: [facility] ENDO;  Service: Endoscopy;  Laterality: N/A;    ENDOSCOPIC ULTRASOUND OF UPPER GASTROINTESTINAL TRACT N/A 2/5/2021    Procedure: ULTRASOUND, UPPER GI TRACT, ENDOSCOPIC;  Surgeon: Geremias Brizuela MD;  Location: [facility] ENDO;  Service: Endoscopy;  Laterality: N/A;    EPIDURAL STEROID INJECTION N/A 5/3/2024    Procedure: Lumbar L5/S1 IL JASON;  Surgeon: Debra De MD;  Location: [facility] PAIN MGT;  Service: Pain Management;  Laterality: N/A;    ESOPHAGOGASTRODUODENOSCOPY N/A 2/3/2021    Procedure: EGD (ESOPHAGOGASTRODUODENOSCOPY);  Surgeon: Mirta Groevr MD;  Location: [facility] ENDO;  Service: Endoscopy;  Laterality: N/A;    ESOPHAGOGASTRODUODENOSCOPY N/A 3/9/2021    Procedure: EGD (ESOPHAGOGASTRODUODENOSCOPY);  Surgeon: Edin Bradford MD;  Location: [facility] OR;  Service: General;  Laterality: N/A;    INJECTION OF ANESTHETIC AGENT AROUND MEDIAL BRANCH NERVES INNERVATING LUMBAR FACET JOINT Bilateral 10/17/2023    Procedure: Bilateral L3-5 MBB;  Surgeon: Debra De MD;  Location: [facility] PAIN MGT;  Service: Pain Management;  Laterality: Bilateral;    INJECTION OF ANESTHETIC AGENT AROUND MEDIAL BRANCH NERVES INNERVATING LUMBAR FACET JOINT Bilateral 3/1/2024    Procedure: Bilateral L3-5 MBB Dignostic;  Surgeon: Debra De MD;  Location: [facility] PAIN MGT;  Service: Pain Management;  Laterality: Bilateral;    INJECTION OF ANESTHETIC AGENT INTO TISSUE PLANE DEFINED BY TRANSVERSUS ABDOMINIS MUSCLE  4/17/2023    Procedure: BLOCK, TRANSVERSUS ABDOMINIS PLANE;  Surgeon: Yumiko Rosen DO;  Location: [facility] OR;  Service: General;;    left partial nephrectomy  Left 2006    RADIOFREQUENCY THERMOCOAGULATION Bilateral 3/19/2024    Procedure: Bilateral L3-5 Lumbar RFA;  Surgeon: Debra De MD;  Location: [facility] PAIN MGT;  Service: Pain Management;  Laterality: Bilateral;    RADIOFREQUENCY THERMOCOAGULATION Bilateral 9/20/2024    Procedure: Bilateral L3-5 RFA;  Surgeon: Debra De MD;  Location: [facility] PAIN MGT;  Service: Pain Management;  Laterality: Bilateral;    ROBOT-ASSISTED REPAIR OF INCISIONAL HERNIA USING DA PERRI XI Left 4/17/2023    Procedure: XI ROBOTIC REPAIR, HERNIA, INCISIONAL;  Surgeon: Yumiko Rosen DO;  Location: [facility] OR;  Service: General;  Laterality: Left;  Left flank. Position right lateral decubitus. Ernst.    ROBOT-ASSISTED SURGICAL REMOVAL OF STOMACH USING DA PERRI XI N/A 3/9/2021    Procedure: XI ROBOTIC GASTRECTOMY;  Surgeon: Edin Bradford MD;  Location: [facility] OR;  Service: General;  Laterality: N/A;  partial       Review of Systems      Objective:   There were no vitals taken for this visit.    Physical Exam  LOWER EXTREMITY PHYSICAL EXAMINATION    NEUROLOGY: Sensation to light touch is intact. Proprioception is intact.  Vibratory sensation intact    VASCULAR:  The right dorsalis pedis pulse 2/4 and the right posterior tibial pulse 2/4.  The left dorsalis pedis pulse 2/4 and posterior tibial pulse on the left is 2/4.  Capillary refill is intact.  Pedal hair growth intact    DERMATOLOGY:  Thickened discolored nail of the right foot of the great toe.  The nail plate is dark and brown in color.  There is chalky subungual debris.  The nail is not attached underlying nail bed.  Remains attached only to the proximal medial aspect of the nail bed.  No signs of ingrown acute signs of infection.  No nail bed complications    ORTHOPEDIC:  Weakness to the right lower extremity with partial paralysis to the peroneal muscles  Assessment:     1. Onychogryphosis    2. Lumbar radiculopathy, chronic    3. Type 2 diabetes mellitus with other specified  complication, without long-term current use of insulin    4. History of DVT (deep vein thrombosis)        Plan:     Onychogryphosis    Lumbar radiculopathy, chronic    Type 2 diabetes mellitus with other specified complication, without long-term current use of insulin    History of DVT (deep vein thrombosis)        Thorough discussion is had with the patient today, concerning the diagnosis, its etiology, and the treatment algorithm at present.    Discussed pathology in etiology associated with onychodystrophy as it relates to direct/indirect trauma, fungus/onychomycosis, vitamin insufficiencies, smoking, rheumatological pathologies, chemotherapy agents, peripheral vascular disease, diabetes mellitus,  history of nail loss or bleeding under the toenail, chemicals in nail polishes, etc...    Based on patient's symptoms, the nail is not attached underlying nail bed.  Discuss removing the nail plate without potential injection.  Patient relates interest.  Sterile nail Nipper was utilized to grasp the nail and slowly lift up the nail plate.  The nail was removed without incidence of bleeding or other complications.  Nail did not appear to be attached underlying nail bed.  There is complete healing of the underlying nail bed currently.    Discuss his back pain radiating to his foot likely associated with lumbar radiculopathy and not directly caused by his great toe.          Future Appointments   Date Time Provider Department Center   5/26/2025  7:40 AM SPECIMEN, [facility]   5/26/2025  8:00 AM LABORATORY, [facility] LAB [facility]   5/29/2025 10:40 AM Lorena Sellers MD [facility]   6/23/2025 10:15 AM [facility]

## 2025-01-29 ENCOUNTER — LAB VISIT (OUTPATIENT)
Dept: LAB | Facility: HOSPITAL | Age: 80
End: 2025-01-29
Attending: INTERNAL MEDICINE
Payer: MEDICARE

## 2025-01-29 DIAGNOSIS — C49.A0 GASTROINTESTINAL STROMAL TUMOR (GIST): ICD-10-CM

## 2025-01-29 LAB
ALBUMIN SERPL BCP-MCNC: 3.6 G/DL (ref 3.5–5.2)
ALP SERPL-CCNC: 81 U/L (ref 40–150)
ALT SERPL W/O P-5'-P-CCNC: 23 U/L (ref 10–44)
ANION GAP SERPL CALC-SCNC: 11 MMOL/L (ref 8–16)
AST SERPL-CCNC: 24 U/L (ref 10–40)
BASOPHILS # BLD AUTO: 0.03 K/UL (ref 0–0.2)
BASOPHILS NFR BLD: 0.8 % (ref 0–1.9)
BILIRUB SERPL-MCNC: 0.8 MG/DL (ref 0.1–1)
BUN SERPL-MCNC: 21 MG/DL (ref 8–23)
CALCIUM SERPL-MCNC: 9.3 MG/DL (ref 8.7–10.5)
CHLORIDE SERPL-SCNC: 104 MMOL/L (ref 95–110)
CO2 SERPL-SCNC: 25 MMOL/L (ref 23–29)
CREAT SERPL-MCNC: 1.3 MG/DL (ref 0.5–1.4)
CRP SERPL-MCNC: 0.8 MG/L (ref 0–8.2)
DIFFERENTIAL METHOD BLD: ABNORMAL
EOSINOPHIL # BLD AUTO: 0 K/UL (ref 0–0.5)
EOSINOPHIL NFR BLD: 1.1 % (ref 0–8)
ERYTHROCYTE [DISTWIDTH] IN BLOOD BY AUTOMATED COUNT: 14.5 % (ref 11.5–14.5)
EST. GFR  (NO RACE VARIABLE): 55 ML/MIN/1.73 M^2
GLUCOSE SERPL-MCNC: 209 MG/DL (ref 70–110)
HCT VFR BLD AUTO: 39.7 % (ref 40–54)
HGB BLD-MCNC: 12.2 G/DL (ref 14–18)
IMM GRANULOCYTES # BLD AUTO: 0.02 K/UL (ref 0–0.04)
IMM GRANULOCYTES NFR BLD AUTO: 0.6 % (ref 0–0.5)
LDH SERPL L TO P-CCNC: 209 U/L (ref 110–260)
LYMPHOCYTES # BLD AUTO: 1.2 K/UL (ref 1–4.8)
LYMPHOCYTES NFR BLD: 33.1 % (ref 18–48)
MCH RBC QN AUTO: 26.6 PG (ref 27–31)
MCHC RBC AUTO-ENTMCNC: 30.7 G/DL (ref 32–36)
MCV RBC AUTO: 87 FL (ref 82–98)
MONOCYTES # BLD AUTO: 0.4 K/UL (ref 0.3–1)
MONOCYTES NFR BLD: 11.6 % (ref 4–15)
NEUTROPHILS # BLD AUTO: 1.9 K/UL (ref 1.8–7.7)
NEUTROPHILS NFR BLD: 52.8 % (ref 38–73)
NRBC BLD-RTO: 0 /100 WBC
PLATELET # BLD AUTO: 106 K/UL (ref 150–450)
PMV BLD AUTO: 10.9 FL (ref 9.2–12.9)
POTASSIUM SERPL-SCNC: 4.6 MMOL/L (ref 3.5–5.1)
PROT SERPL-MCNC: 6.4 G/DL (ref 6–8.4)
RBC # BLD AUTO: 4.59 M/UL (ref 4.6–6.2)
SODIUM SERPL-SCNC: 140 MMOL/L (ref 136–145)
WBC # BLD AUTO: 3.54 K/UL (ref 3.9–12.7)

## 2025-01-29 PROCEDURE — 80053 COMPREHEN METABOLIC PANEL: CPT | Performed by: INTERNAL MEDICINE

## 2025-01-29 PROCEDURE — 85025 COMPLETE CBC W/AUTO DIFF WBC: CPT | Performed by: INTERNAL MEDICINE

## 2025-01-29 PROCEDURE — 86140 C-REACTIVE PROTEIN: CPT | Performed by: INTERNAL MEDICINE

## 2025-01-29 PROCEDURE — 83615 LACTATE (LD) (LDH) ENZYME: CPT | Performed by: INTERNAL MEDICINE

## 2025-02-05 ENCOUNTER — OFFICE VISIT (OUTPATIENT)
Dept: HEMATOLOGY/ONCOLOGY | Facility: CLINIC | Age: 80
End: 2025-02-05
Payer: MEDICARE

## 2025-02-05 ENCOUNTER — LAB VISIT (OUTPATIENT)
Dept: LAB | Facility: HOSPITAL | Age: 80
End: 2025-02-05
Attending: INTERNAL MEDICINE
Payer: MEDICARE

## 2025-02-05 VITALS
TEMPERATURE: 98 F | WEIGHT: 235.25 LBS | HEART RATE: 60 BPM | HEIGHT: 72 IN | BODY MASS INDEX: 31.86 KG/M2 | DIASTOLIC BLOOD PRESSURE: 78 MMHG | OXYGEN SATURATION: 98 % | SYSTOLIC BLOOD PRESSURE: 127 MMHG

## 2025-02-05 DIAGNOSIS — R71.8 OTHER ABNORMALITY OF RED BLOOD CELLS: ICD-10-CM

## 2025-02-05 DIAGNOSIS — N18.30 CKD STAGE 3 DUE TO TYPE 2 DIABETES MELLITUS: ICD-10-CM

## 2025-02-05 DIAGNOSIS — R94.6 ABNORMAL RESULTS OF THYROID FUNCTION STUDIES: ICD-10-CM

## 2025-02-05 DIAGNOSIS — R94.5 ABNORMAL RESULTS OF LIVER FUNCTION STUDIES: ICD-10-CM

## 2025-02-05 DIAGNOSIS — D51.0 VITAMIN B12 DEFICIENCY ANEMIA DUE TO INTRINSIC FACTOR DEFICIENCY: ICD-10-CM

## 2025-02-05 DIAGNOSIS — D69.6 THROMBOCYTOPENIA: ICD-10-CM

## 2025-02-05 DIAGNOSIS — Z86.718 HISTORY OF DVT (DEEP VEIN THROMBOSIS): ICD-10-CM

## 2025-02-05 DIAGNOSIS — C49.A0 GASTROINTESTINAL STROMAL TUMOR (GIST): ICD-10-CM

## 2025-02-05 DIAGNOSIS — D61.818 PANCYTOPENIA: ICD-10-CM

## 2025-02-05 DIAGNOSIS — C49.A0 GASTROINTESTINAL STROMAL TUMOR (GIST): Primary | ICD-10-CM

## 2025-02-05 DIAGNOSIS — E11.22 CKD STAGE 3 DUE TO TYPE 2 DIABETES MELLITUS: ICD-10-CM

## 2025-02-05 LAB
ALBUMIN SERPL BCP-MCNC: 3.7 G/DL (ref 3.5–5.2)
ALP SERPL-CCNC: 81 U/L (ref 40–150)
ALT SERPL W/O P-5'-P-CCNC: 24 U/L (ref 10–44)
ALT SERPL W/O P-5'-P-CCNC: 24 U/L (ref 10–44)
ANION GAP SERPL CALC-SCNC: 5 MMOL/L (ref 8–16)
AST SERPL-CCNC: 19 U/L (ref 10–40)
BASOPHILS # BLD AUTO: 0.03 K/UL (ref 0–0.2)
BASOPHILS NFR BLD: 0.6 % (ref 0–1.9)
BILIRUB SERPL-MCNC: 0.7 MG/DL (ref 0.1–1)
BUN SERPL-MCNC: 20 MG/DL (ref 8–23)
CALCIUM SERPL-MCNC: 9.2 MG/DL (ref 8.7–10.5)
CHLORIDE SERPL-SCNC: 105 MMOL/L (ref 95–110)
CO2 SERPL-SCNC: 29 MMOL/L (ref 23–29)
CREAT SERPL-MCNC: 1.3 MG/DL (ref 0.5–1.4)
CRP SERPL-MCNC: 0.8 MG/L (ref 0–8.2)
DIFFERENTIAL METHOD BLD: ABNORMAL
EOSINOPHIL # BLD AUTO: 0.1 K/UL (ref 0–0.5)
EOSINOPHIL NFR BLD: 1 % (ref 0–8)
ERYTHROCYTE [DISTWIDTH] IN BLOOD BY AUTOMATED COUNT: 14.8 % (ref 11.5–14.5)
EST. GFR  (NO RACE VARIABLE): 55 ML/MIN/1.73 M^2
FERRITIN SERPL-MCNC: 25 NG/ML (ref 20–300)
GLUCOSE SERPL-MCNC: 143 MG/DL (ref 70–110)
HCT VFR BLD AUTO: 39.7 % (ref 40–54)
HGB BLD-MCNC: 12.4 G/DL (ref 14–18)
IMM GRANULOCYTES # BLD AUTO: 0.06 K/UL (ref 0–0.04)
IMM GRANULOCYTES NFR BLD AUTO: 1.2 % (ref 0–0.5)
INR PPP: 1 (ref 0.8–1.2)
IRON SERPL-MCNC: 48 UG/DL (ref 45–160)
LDH SERPL L TO P-CCNC: 197 U/L (ref 110–260)
LYMPHOCYTES # BLD AUTO: 1.5 K/UL (ref 1–4.8)
LYMPHOCYTES NFR BLD: 29.7 % (ref 18–48)
MCH RBC QN AUTO: 27.2 PG (ref 27–31)
MCHC RBC AUTO-ENTMCNC: 31.2 G/DL (ref 32–36)
MCV RBC AUTO: 87 FL (ref 82–98)
MONOCYTES # BLD AUTO: 0.5 K/UL (ref 0.3–1)
MONOCYTES NFR BLD: 10.5 % (ref 4–15)
NEUTROPHILS # BLD AUTO: 2.9 K/UL (ref 1.8–7.7)
NEUTROPHILS NFR BLD: 57 % (ref 38–73)
NRBC BLD-RTO: 0 /100 WBC
PLATELET # BLD AUTO: 145 K/UL (ref 150–450)
PMV BLD AUTO: 10.5 FL (ref 9.2–12.9)
POTASSIUM SERPL-SCNC: 4.6 MMOL/L (ref 3.5–5.1)
PROT SERPL-MCNC: 6.6 G/DL (ref 6–8.4)
PROTHROMBIN TIME: 11.3 SEC (ref 9–12.5)
RBC # BLD AUTO: 4.56 M/UL (ref 4.6–6.2)
SATURATED IRON: 12 % (ref 20–50)
SODIUM SERPL-SCNC: 139 MMOL/L (ref 136–145)
TOTAL IRON BINDING CAPACITY: 411 UG/DL (ref 250–450)
TRANSFERRIN SERPL-MCNC: 278 MG/DL (ref 200–375)
TSH SERPL DL<=0.005 MIU/L-ACNC: 1.26 UIU/ML (ref 0.4–4)
VIT B12 SERPL-MCNC: 428 PG/ML (ref 210–950)
WBC # BLD AUTO: 5.15 K/UL (ref 3.9–12.7)

## 2025-02-05 PROCEDURE — 99215 OFFICE O/P EST HI 40 MIN: CPT | Mod: S$GLB,,, | Performed by: INTERNAL MEDICINE

## 2025-02-05 PROCEDURE — 1125F AMNT PAIN NOTED PAIN PRSNT: CPT | Mod: CPTII,S$GLB,, | Performed by: INTERNAL MEDICINE

## 2025-02-05 PROCEDURE — 3074F SYST BP LT 130 MM HG: CPT | Mod: CPTII,S$GLB,, | Performed by: INTERNAL MEDICINE

## 2025-02-05 PROCEDURE — 3288F FALL RISK ASSESSMENT DOCD: CPT | Mod: CPTII,S$GLB,, | Performed by: INTERNAL MEDICINE

## 2025-02-05 PROCEDURE — 86140 C-REACTIVE PROTEIN: CPT | Performed by: INTERNAL MEDICINE

## 2025-02-05 PROCEDURE — 80053 COMPREHEN METABOLIC PANEL: CPT | Performed by: INTERNAL MEDICINE

## 2025-02-05 PROCEDURE — 85025 COMPLETE CBC W/AUTO DIFF WBC: CPT | Performed by: INTERNAL MEDICINE

## 2025-02-05 PROCEDURE — 82607 VITAMIN B-12: CPT | Performed by: INTERNAL MEDICINE

## 2025-02-05 PROCEDURE — 3072F LOW RISK FOR RETINOPATHY: CPT | Mod: CPTII,S$GLB,, | Performed by: INTERNAL MEDICINE

## 2025-02-05 PROCEDURE — 82728 ASSAY OF FERRITIN: CPT | Performed by: INTERNAL MEDICINE

## 2025-02-05 PROCEDURE — 83010 ASSAY OF HAPTOGLOBIN QUANT: CPT | Performed by: INTERNAL MEDICINE

## 2025-02-05 PROCEDURE — 83615 LACTATE (LD) (LDH) ENZYME: CPT | Performed by: INTERNAL MEDICINE

## 2025-02-05 PROCEDURE — 83540 ASSAY OF IRON: CPT | Performed by: INTERNAL MEDICINE

## 2025-02-05 PROCEDURE — 85610 PROTHROMBIN TIME: CPT | Performed by: INTERNAL MEDICINE

## 2025-02-05 PROCEDURE — 99999 PR PBB SHADOW E&M-EST. PATIENT-LVL III: CPT | Mod: PBBFAC,,, | Performed by: INTERNAL MEDICINE

## 2025-02-05 PROCEDURE — 1101F PT FALLS ASSESS-DOCD LE1/YR: CPT | Mod: CPTII,S$GLB,, | Performed by: INTERNAL MEDICINE

## 2025-02-05 PROCEDURE — 83521 IG LIGHT CHAINS FREE EACH: CPT | Performed by: INTERNAL MEDICINE

## 2025-02-05 PROCEDURE — 1159F MED LIST DOCD IN RCRD: CPT | Mod: CPTII,S$GLB,, | Performed by: INTERNAL MEDICINE

## 2025-02-05 PROCEDURE — 81450 HL NEO GSAP 5-50DNA/DNA&RNA: CPT | Performed by: INTERNAL MEDICINE

## 2025-02-05 PROCEDURE — 3078F DIAST BP <80 MM HG: CPT | Mod: CPTII,S$GLB,, | Performed by: INTERNAL MEDICINE

## 2025-02-05 PROCEDURE — 36415 COLL VENOUS BLD VENIPUNCTURE: CPT | Performed by: INTERNAL MEDICINE

## 2025-02-05 PROCEDURE — 84165 PROTEIN E-PHORESIS SERUM: CPT | Mod: 26,,, | Performed by: PATHOLOGY

## 2025-02-05 PROCEDURE — 84165 PROTEIN E-PHORESIS SERUM: CPT | Performed by: INTERNAL MEDICINE

## 2025-02-05 PROCEDURE — 84443 ASSAY THYROID STIM HORMONE: CPT | Performed by: INTERNAL MEDICINE

## 2025-02-05 NOTE — PROGRESS NOTES
Subjective:       Patient ID: Daniel Villeda is a 79 y.o. male.    Chief Complaint: Results and Anemia    HPI:  79-year-old male previous history of gastrointestinal stromal tumor.  Had patient had CT chest abdomen pelvis 2024 no evidence of active disease patient returns with laboratory studies today demonstrating progressive pancytopenia    Past Medical History:   Diagnosis Date    CKD stage 3 due to type 2 diabetes mellitus     Deep vein thrombosis     DM (diabetes mellitus) with complications     Gastrointestinal stromal tumor (GIST) 3/9/2021    GERD (gastroesophageal reflux disease)     History of DVT (deep vein thrombosis)     History of renal cell carcinoma     Hyperlipidemia associated with type 2 diabetes mellitus 2021    Hypertension associated with diabetes     Panlobular emphysema     Thrombocytopenia     Type II diabetes mellitus with renal manifestations      Family History   Problem Relation Name Age of Onset    Stroke Mother      Hodgkin's lymphoma Father       Social History     Socioeconomic History    Marital status:    Tobacco Use    Smoking status: Former     Current packs/day: 0.00     Average packs/day: 1.5 packs/day for 40.0 years (60.0 ttl pk-yrs)     Types: Cigarettes     Start date: 1965     Quit date: 2005     Years since quittin.8    Smokeless tobacco: Never   Substance and Sexual Activity    Alcohol use: Not Currently    Drug use: Never     Past Surgical History:   Procedure Laterality Date    CLOSURE OF PERFORATED ULCER OF DUODENUM USING OMENTAL PATCH N/A 3/9/2021    Procedure: CLOSURE, ULCER, PERFORATED, DUODENUM, USING OMENTAL PATCH;  Surgeon: Edin Bradford MD;  Location: [facility] OR;  Service: General;  Laterality: N/A;    COLONOSCOPY N/A 2022    Procedure: COLONOSCOPY;  Surgeon: Mirta Grover MD;  Location: [facility] ENDO;  Service: Endoscopy;  Laterality: N/A;    ENDOSCOPIC ULTRASOUND OF UPPER GASTROINTESTINAL TRACT N/A 2021     Procedure: ULTRASOUND, UPPER GI TRACT, ENDOSCOPIC;  Surgeon: Geremias Brizuela MD;  Location: [facility] ENDO;  Service: Endoscopy;  Laterality: N/A;    EPIDURAL STEROID INJECTION N/A 5/3/2024    Procedure: Lumbar L5/S1 IL JASON;  Surgeon: Debra De MD;  Location: [facility];  Service: Pain Management;  Laterality: N/A;    ESOPHAGOGASTRODUODENOSCOPY N/A 2/3/2021    Procedure: EGD (ESOPHAGOGASTRODUODENOSCOPY);  Surgeon: Mirta Grover MD;  Location: [facility] ENDO;  Service: Endoscopy;  Laterality: N/A;    ESOPHAGOGASTRODUODENOSCOPY N/A 3/9/2021    Procedure: EGD (ESOPHAGOGASTRODUODENOSCOPY);  Surgeon: Edin Bradford MD;  Location: [facility] OR;  Service: General;  Laterality: N/A;    INJECTION OF ANESTHETIC AGENT AROUND MEDIAL BRANCH NERVES INNERVATING LUMBAR FACET JOINT Bilateral 10/17/2023    Procedure: Bilateral L3-5 MBB;  Surgeon: Debra De MD;  Location: [facility];  Service: Pain Management;  Laterality: Bilateral;    INJECTION OF ANESTHETIC AGENT AROUND MEDIAL BRANCH NERVES INNERVATING LUMBAR FACET JOINT Bilateral 3/1/2024    Procedure: Bilateral L3-5 MBB Dignostic;  Surgeon: Debra De MD;  Location: [facility];  Service: Pain Management;  Laterality: Bilateral;    INJECTION OF ANESTHETIC AGENT INTO TISSUE PLANE DEFINED BY TRANSVERSUS ABDOMINIS MUSCLE  4/17/2023    Procedure: BLOCK, TRANSVERSUS ABDOMINIS PLANE;  Surgeon: Yumiko Rosen DO;  Location: [facility] OR;  Service: General;;    left partial nephrectomy Left 2006    RADIOFREQUENCY THERMOCOAGULATION Bilateral 3/19/2024    Procedure: Bilateral L3-5 Lumbar RFA;  Surgeon: Debra De MD;  Location: [facility];  Service: Pain Management;  Laterality: Bilateral;    RADIOFREQUENCY THERMOCOAGULATION Bilateral 9/20/2024    Procedure: Bilateral L3-5 RFA;  Surgeon: Debra De MD;  Location: [facility];  Service: Pain Management;  Laterality: Bilateral;    ROBOT-ASSISTED REPAIR OF INCISIONAL HERNIA USING DA PERRI XI Left  4/17/2023    Procedure: XI ROBOTIC REPAIR, HERNIA, INCISIONAL;  Surgeon: Yumiko Rosen DO;  Location: [facility] OR;  Service: General;  Laterality: Left;  Left flank. Position right lateral decubitus. Ernst.    ROBOT-ASSISTED SURGICAL REMOVAL OF STOMACH USING DA PERRI XI N/A 3/9/2021    Procedure: XI ROBOTIC GASTRECTOMY;  Surgeon: Edin Bradford MD;  Location: [facility] OR;  Service: General;  Laterality: N/A;  partial       Labs:  Lab Results   Component Value Date    WBC 3.54 (L) 01/29/2025    HGB 12.2 (L) 01/29/2025    HCT 39.7 (L) 01/29/2025    MCV 87 01/29/2025     (L) 01/29/2025     BMP  Lab Results   Component Value Date     01/29/2025    K 4.6 01/29/2025     01/29/2025    CO2 25 01/29/2025    BUN 21 01/29/2025    CREATININE 1.3 01/29/2025    CALCIUM 9.3 01/29/2025    ANIONGAP 11 01/29/2025    ESTGFRAFRICA 51 (A) 05/10/2022    EGFRNONAA 44 (A) 05/10/2022     Lab Results   Component Value Date    ALT 23 01/29/2025    AST 24 01/29/2025    ALKPHOS 81 01/29/2025    BILITOT 0.8 01/29/2025       Lab Results   Component Value Date    IRON 77 07/06/2021    TIBC 327 07/06/2021    FERRITIN 128 07/06/2021     Lab Results   Component Value Date    [id number] 355 07/06/2021     Lab Results   Component Value Date    FOLATE 5.8 07/06/2021     Lab Results   Component Value Date    TSH 1.648 05/15/2024         Review of Systems   Constitutional:  Negative for activity change, appetite change, chills, diaphoresis, fatigue, fever and unexpected weight change.   HENT:  Negative for congestion, dental problem, drooling, ear discharge, ear pain, facial swelling, hearing loss, mouth sores, nosebleeds, postnasal drip, rhinorrhea, sinus pressure, sneezing, sore throat, tinnitus, trouble swallowing and voice change.    Eyes:  Negative for photophobia, pain, discharge, redness, itching and visual disturbance.   Respiratory:  Negative for apnea, cough, choking, chest tightness, shortness of breath, wheezing and stridor.     Cardiovascular:  Negative for chest pain, palpitations and leg swelling.   Gastrointestinal:  Negative for abdominal distention, abdominal pain, anal bleeding, blood in stool, constipation, diarrhea, nausea, rectal pain and vomiting.   Endocrine: Negative for cold intolerance, heat intolerance, polydipsia, polyphagia and polyuria.   Genitourinary:  Negative for decreased urine volume, difficulty urinating, dysuria, enuresis, flank pain, frequency, genital sores, hematuria, penile discharge, penile pain, penile swelling, scrotal swelling, testicular pain and urgency.   Musculoskeletal:  Negative for arthralgias, back pain, gait problem, joint swelling, myalgias, neck pain and neck stiffness.   Skin:  Negative for color change, pallor, rash and wound.   Allergic/Immunologic: Negative for environmental allergies, food allergies and immunocompromised state.   Neurological:  Negative for dizziness, tremors, seizures, syncope, facial asymmetry, speech difficulty, weakness, light-headedness, numbness and headaches.   Hematological:  Negative for adenopathy. Does not bruise/bleed easily.   Psychiatric/Behavioral:  Negative for agitation, behavioral problems, confusion, decreased concentration, dysphoric mood, hallucinations, self-injury, sleep disturbance and suicidal ideas. The patient is not nervous/anxious and is not hyperactive.        Objective:      Physical Exam  Vitals reviewed.   Constitutional:       General: He is not in acute distress.     Appearance: He is well-developed. He is not diaphoretic.   HENT:      Head: Normocephalic.      Right Ear: External ear normal.      Left Ear: External ear normal.      Nose: Nose normal.      Right Sinus: No maxillary sinus tenderness or frontal sinus tenderness.      Left Sinus: No maxillary sinus tenderness or frontal sinus tenderness.      Mouth/Throat:      Pharynx: No oropharyngeal exudate.   Eyes:      General: Lids are normal. No scleral icterus.        Right eye: No  discharge.         Left eye: No discharge.      Extraocular Movements:      Right eye: Normal extraocular motion.      Left eye: Normal extraocular motion.      Conjunctiva/sclera:      Right eye: Right conjunctiva is not injected. No hemorrhage.     Left eye: Left conjunctiva is not injected. No hemorrhage.     Pupils: Pupils are equal, round, and reactive to light.   Neck:      Thyroid: No thyromegaly.      Vascular: No JVD.      Trachea: No tracheal deviation.   Cardiovascular:      Rate and Rhythm: Normal rate.   Pulmonary:      Effort: Pulmonary effort is normal. No respiratory distress.      Breath sounds: No stridor.   Abdominal:      General: Bowel sounds are normal.      Palpations: Abdomen is soft. There is no hepatomegaly, splenomegaly or mass.      Tenderness: There is no abdominal tenderness.   Musculoskeletal:         General: No tenderness. Normal range of motion.      Cervical back: Normal range of motion and neck supple.   Lymphadenopathy:      Head:      Right side of head: No posterior auricular or occipital adenopathy.      Left side of head: No posterior auricular or occipital adenopathy.      Cervical: No cervical adenopathy.      Right cervical: No superficial, deep or posterior cervical adenopathy.     Left cervical: No superficial, deep or posterior cervical adenopathy.      Upper Body:      Right upper body: No supraclavicular adenopathy.      Left upper body: No supraclavicular adenopathy.   Skin:     General: Skin is dry.      Findings: No erythema or rash.      Nails: There is no clubbing.   Neurological:      Mental Status: He is alert and oriented to person, place, and time.      Cranial Nerves: No cranial nerve deficit.      Coordination: Coordination normal.   Psychiatric:         Behavior: Behavior normal.         Thought Content: Thought content normal.         Judgment: Judgment normal.             Assessment:      1. Gastrointestinal stromal tumor (GIST)    2. Pancytopenia    3.  Other abnormality of red blood cells    4. Abnormal results of thyroid function studies    5. Vitamin B12 deficiency anemia due to intrinsic factor deficiency    6. Abnormal results of liver function studies    7. CKD stage 3 due to type 2 diabetes mellitus    8. History of DVT (deep vein thrombosis)    9. Thrombocytopenia           Med Onc Chart Routing      Follow up with physician . Return to clinic in 7-10 days for review of laboratory studies   Follow up with J[facility]    Infusion scheduling note    Injection scheduling note    Labs   Scheduling:  Preferred lab:  Lab interval:  Laboratory studies ordered today   Imaging    Pharmacy appointment    Other referrals         Please scheduled CT bone marrow after I have seen the patient's 7-10 days              Plan:     Reviewed information with patient patient has progressive pancytopenia reviewed information with him does not have e-mail or e-mail family variant unable to e-mail article on up-to-date with pancytopenia but explain to him printed off laboratory studies since 2021 to demonstrates change.  Laboratory studies today see back 7-10 days scheduled bone marrow afterwards high likelihood if no laboratory studies indicate cause possibility of underlying myelodysplasia developing        Artie Kolb Jr, MD FACP

## 2025-02-06 LAB
ALBUMIN SERPL ELPH-MCNC: 3.91 G/DL (ref 3.35–5.55)
ALPHA1 GLOB SERPL ELPH-MCNC: 0.28 G/DL (ref 0.17–0.41)
ALPHA2 GLOB SERPL ELPH-MCNC: 0.74 G/DL (ref 0.43–0.99)
B-GLOBULIN SERPL ELPH-MCNC: 0.77 G/DL (ref 0.5–1.1)
GAMMA GLOB SERPL ELPH-MCNC: 0.7 G/DL (ref 0.67–1.58)
HAPTOGLOB SERPL-MCNC: 153 MG/DL (ref 30–250)
KAPPA LC SER QL IA: 2.22 MG/DL (ref 0.33–1.94)
KAPPA LC/LAMBDA SER IA: 1.28 (ref 0.26–1.65)
LAMBDA LC SER QL IA: 1.74 MG/DL (ref 0.57–2.63)
PATHOLOGIST INTERPRETATION SPE: NORMAL
PROT SERPL-MCNC: 6.4 G/DL (ref 6–8.4)

## 2025-02-07 ENCOUNTER — OFFICE VISIT (OUTPATIENT)
Dept: HEMATOLOGY/ONCOLOGY | Facility: CLINIC | Age: 80
End: 2025-02-07
Payer: MEDICARE

## 2025-02-07 VITALS
OXYGEN SATURATION: 98 % | DIASTOLIC BLOOD PRESSURE: 83 MMHG | HEART RATE: 61 BPM | SYSTOLIC BLOOD PRESSURE: 141 MMHG | BODY MASS INDEX: 31.79 KG/M2 | RESPIRATION RATE: 20 BRPM | WEIGHT: 234.69 LBS | HEIGHT: 72 IN | TEMPERATURE: 98 F

## 2025-02-07 DIAGNOSIS — E78.5 HYPERLIPIDEMIA ASSOCIATED WITH TYPE 2 DIABETES MELLITUS: ICD-10-CM

## 2025-02-07 DIAGNOSIS — C49.A0 GASTROINTESTINAL STROMAL TUMOR (GIST): Primary | ICD-10-CM

## 2025-02-07 DIAGNOSIS — E11.69 HYPERLIPIDEMIA ASSOCIATED WITH TYPE 2 DIABETES MELLITUS: ICD-10-CM

## 2025-02-07 DIAGNOSIS — I15.2 HYPERTENSION ASSOCIATED WITH DIABETES: ICD-10-CM

## 2025-02-07 DIAGNOSIS — D50.0 IRON DEFICIENCY ANEMIA DUE TO CHRONIC BLOOD LOSS: ICD-10-CM

## 2025-02-07 DIAGNOSIS — E11.59 HYPERTENSION ASSOCIATED WITH DIABETES: ICD-10-CM

## 2025-02-07 DIAGNOSIS — Z86.718 HISTORY OF DVT (DEEP VEIN THROMBOSIS): ICD-10-CM

## 2025-02-07 DIAGNOSIS — N18.30 CKD STAGE 3 DUE TO TYPE 2 DIABETES MELLITUS: ICD-10-CM

## 2025-02-07 DIAGNOSIS — E11.22 CKD STAGE 3 DUE TO TYPE 2 DIABETES MELLITUS: ICD-10-CM

## 2025-02-07 PROCEDURE — 1125F AMNT PAIN NOTED PAIN PRSNT: CPT | Mod: CPTII,S$GLB,, | Performed by: INTERNAL MEDICINE

## 2025-02-07 PROCEDURE — 99214 OFFICE O/P EST MOD 30 MIN: CPT | Mod: S$GLB,,, | Performed by: INTERNAL MEDICINE

## 2025-02-07 PROCEDURE — 1159F MED LIST DOCD IN RCRD: CPT | Mod: CPTII,S$GLB,, | Performed by: INTERNAL MEDICINE

## 2025-02-07 PROCEDURE — 3072F LOW RISK FOR RETINOPATHY: CPT | Mod: CPTII,S$GLB,, | Performed by: INTERNAL MEDICINE

## 2025-02-07 PROCEDURE — 99999 PR PBB SHADOW E&M-EST. PATIENT-LVL IV: CPT | Mod: PBBFAC,,, | Performed by: INTERNAL MEDICINE

## 2025-02-07 PROCEDURE — 1160F RVW MEDS BY RX/DR IN RCRD: CPT | Mod: CPTII,S$GLB,, | Performed by: INTERNAL MEDICINE

## 2025-02-07 PROCEDURE — 3079F DIAST BP 80-89 MM HG: CPT | Mod: CPTII,S$GLB,, | Performed by: INTERNAL MEDICINE

## 2025-02-07 PROCEDURE — 3288F FALL RISK ASSESSMENT DOCD: CPT | Mod: CPTII,S$GLB,, | Performed by: INTERNAL MEDICINE

## 2025-02-07 PROCEDURE — 3077F SYST BP >= 140 MM HG: CPT | Mod: CPTII,S$GLB,, | Performed by: INTERNAL MEDICINE

## 2025-02-07 PROCEDURE — 1101F PT FALLS ASSESS-DOCD LE1/YR: CPT | Mod: CPTII,S$GLB,, | Performed by: INTERNAL MEDICINE

## 2025-02-07 RX ORDER — FERROUS SULFATE 325(65) MG
325 TABLET ORAL
Qty: 30 TABLET | Refills: 11 | Status: SHIPPED | OUTPATIENT
Start: 2025-02-07

## 2025-02-07 NOTE — PROGRESS NOTES
Subjective:       Patient ID: Daniel Villeda is a 79 y.o. male.    Chief Complaint: Results and Cancer    HPI:  79-year-old male history of gastrointestinal stromal tumor status post gastric resection mentioned found to be anemic laboratory studies here for review    Past Medical History:   Diagnosis Date    CKD stage 3 due to type 2 diabetes mellitus     Deep vein thrombosis     DM (diabetes mellitus) with complications     Gastrointestinal stromal tumor (GIST) 3/9/2021    GERD (gastroesophageal reflux disease)     History of DVT (deep vein thrombosis)     History of renal cell carcinoma     Hyperlipidemia associated with type 2 diabetes mellitus 2021    Hypertension associated with diabetes     Panlobular emphysema     Thrombocytopenia     Type II diabetes mellitus with renal manifestations      Family History   Problem Relation Name Age of Onset    Stroke Mother      Hodgkin's lymphoma Father       Social History     Socioeconomic History    Marital status:    Tobacco Use    Smoking status: Former     Current packs/day: 0.00     Average packs/day: 1.5 packs/day for 40.0 years (60.0 ttl pk-yrs)     Types: Cigarettes     Start date: 1965     Quit date: 2005     Years since quittin.8    Smokeless tobacco: Never   Substance and Sexual Activity    Alcohol use: Not Currently    Drug use: Never     Past Surgical History:   Procedure Laterality Date    CLOSURE OF PERFORATED ULCER OF DUODENUM USING OMENTAL PATCH N/A 3/9/2021    Procedure: CLOSURE, ULCER, PERFORATED, DUODENUM, USING OMENTAL PATCH;  Surgeon: Edin Bradford MD;  Location: [facility] OR;  Service: General;  Laterality: N/A;    COLONOSCOPY N/A 2022    Procedure: COLONOSCOPY;  Surgeon: Mirta Grover MD;  Location: [facility];  Service: Endoscopy;  Laterality: N/A;    ENDOSCOPIC ULTRASOUND OF UPPER GASTROINTESTINAL TRACT N/A 2021    Procedure: ULTRASOUND, UPPER GI TRACT, ENDOSCOPIC;  Surgeon: Geremias Brizuela MD;   Location: [facility] ENDO;  Service: Endoscopy;  Laterality: N/A;    EPIDURAL STEROID INJECTION N/A 5/3/2024    Procedure: Lumbar L5/S1 IL JASON;  Surgeon: Debra De MD;  Location: [facility];  Service: Pain Management;  Laterality: N/A;    ESOPHAGOGASTRODUODENOSCOPY N/A 2/3/2021    Procedure: EGD (ESOPHAGOGASTRODUODENOSCOPY);  Surgeon: Mirta Grover MD;  Location: [facility] ENDO;  Service: Endoscopy;  Laterality: N/A;    ESOPHAGOGASTRODUODENOSCOPY N/A 3/9/2021    Procedure: EGD (ESOPHAGOGASTRODUODENOSCOPY);  Surgeon: Edin Bradford MD;  Location: [facility] OR;  Service: General;  Laterality: N/A;    INJECTION OF ANESTHETIC AGENT AROUND MEDIAL BRANCH NERVES INNERVATING LUMBAR FACET JOINT Bilateral 10/17/2023    Procedure: Bilateral L3-5 MBB;  Surgeon: Debra De MD;  Location: [facility];  Service: Pain Management;  Laterality: Bilateral;    INJECTION OF ANESTHETIC AGENT AROUND MEDIAL BRANCH NERVES INNERVATING LUMBAR FACET JOINT Bilateral 3/1/2024    Procedure: Bilateral L3-5 MBB Dignostic;  Surgeon: Debra De MD;  Location: [facility];  Service: Pain Management;  Laterality: Bilateral;    INJECTION OF ANESTHETIC AGENT INTO TISSUE PLANE DEFINED BY TRANSVERSUS ABDOMINIS MUSCLE  4/17/2023    Procedure: BLOCK, TRANSVERSUS ABDOMINIS PLANE;  Surgeon: Yumiko Rosen DO;  Location: [facility] OR;  Service: General;;    left partial nephrectomy Left 2006    RADIOFREQUENCY THERMOCOAGULATION Bilateral 3/19/2024    Procedure: Bilateral L3-5 Lumbar RFA;  Surgeon: Debra De MD;  Location: [facility];  Service: Pain Management;  Laterality: Bilateral;    RADIOFREQUENCY THERMOCOAGULATION Bilateral 9/20/2024    Procedure: Bilateral L3-5 RFA;  Surgeon: Debra De MD;  Location: [facility];  Service: Pain Management;  Laterality: Bilateral;    ROBOT-ASSISTED REPAIR OF INCISIONAL HERNIA USING DA PERRI XI Left 4/17/2023    Procedure: XI ROBOTIC REPAIR, HERNIA, INCISIONAL;  Surgeon: Yumiko Rosen DO;   Location: [facility] OR;  Service: General;  Laterality: Left;  Left flank. Position right lateral decubitus. Ernst.    ROBOT-ASSISTED SURGICAL REMOVAL OF STOMACH USING DA PERRI XI N/A 3/9/2021    Procedure: XI ROBOTIC GASTRECTOMY;  Surgeon: Edin Bradford MD;  Location: [facility] OR;  Service: General;  Laterality: N/A;  partial       Labs:  Lab Results   Component Value Date    WBC 5.15 02/05/2025    HGB 12.4 (L) 02/05/2025    HCT 39.7 (L) 02/05/2025    MCV 87 02/05/2025     (L) 02/05/2025     BMP  Lab Results   Component Value Date     02/05/2025    K 4.6 02/05/2025     02/05/2025    CO2 29 02/05/2025    BUN 20 02/05/2025    CREATININE 1.3 02/05/2025    CALCIUM 9.2 02/05/2025    ANIONGAP 5 (L) 02/05/2025    ESTGFRAFRICA 51 (A) 05/10/2022    EGFRNONAA 44 (A) 05/10/2022     Lab Results   Component Value Date    ALT 24 02/05/2025    ALT 24 02/05/2025    AST 19 02/05/2025    ALKPHOS 81 02/05/2025    BILITOT 0.7 02/05/2025       Lab Results   Component Value Date    IRON 48 02/05/2025    TIBC 411 02/05/2025    FERRITIN 25 02/05/2025     Lab Results   Component Value Date    [id number] 428 02/05/2025     Lab Results   Component Value Date    FOLATE 5.8 07/06/2021     Lab Results   Component Value Date    TSH 1.260 02/05/2025         Review of Systems   Constitutional:  Positive for fatigue. Negative for activity change, appetite change, chills, diaphoresis, fever and unexpected weight change.   HENT:  Negative for congestion, dental problem, drooling, ear discharge, ear pain, facial swelling, hearing loss, mouth sores, nosebleeds, postnasal drip, rhinorrhea, sinus pressure, sneezing, sore throat, tinnitus, trouble swallowing and voice change.    Eyes:  Negative for photophobia, pain, discharge, redness, itching and visual disturbance.   Respiratory:  Negative for apnea, cough, choking, chest tightness, shortness of breath, wheezing and stridor.    Cardiovascular:  Negative for chest pain, palpitations and leg  swelling.   Gastrointestinal:  Negative for abdominal distention, abdominal pain, anal bleeding, blood in stool, constipation, diarrhea, nausea, rectal pain and vomiting.   Endocrine: Negative for cold intolerance, heat intolerance, polydipsia, polyphagia and polyuria.   Genitourinary:  Negative for decreased urine volume, difficulty urinating, dysuria, enuresis, flank pain, frequency, genital sores, hematuria, penile discharge, penile pain, penile swelling, scrotal swelling, testicular pain and urgency.   Musculoskeletal:  Negative for arthralgias, back pain, gait problem, joint swelling, myalgias, neck pain and neck stiffness.   Skin:  Negative for color change, pallor, rash and wound.   Allergic/Immunologic: Negative for environmental allergies, food allergies and immunocompromised state.   Neurological:  Positive for weakness. Negative for dizziness, tremors, seizures, syncope, facial asymmetry, speech difficulty, light-headedness, numbness and headaches.   Hematological:  Negative for adenopathy. Does not bruise/bleed easily.   Psychiatric/Behavioral:  Negative for agitation, behavioral problems, confusion, decreased concentration, dysphoric mood, hallucinations, self-injury, sleep disturbance and suicidal ideas. The patient is not nervous/anxious and is not hyperactive.        Objective:      Physical Exam  Vitals reviewed.   Constitutional:       General: He is not in acute distress.     Appearance: He is well-developed. He is not diaphoretic.   HENT:      Head: Normocephalic.      Right Ear: External ear normal.      Left Ear: External ear normal.      Nose: Nose normal.      Right Sinus: No maxillary sinus tenderness or frontal sinus tenderness.      Left Sinus: No maxillary sinus tenderness or frontal sinus tenderness.      Mouth/Throat:      Pharynx: No oropharyngeal exudate.   Eyes:      General: Lids are normal. No scleral icterus.        Right eye: No discharge.         Left eye: No discharge.       Extraocular Movements:      Right eye: Normal extraocular motion.      Left eye: Normal extraocular motion.      Conjunctiva/sclera:      Right eye: Right conjunctiva is not injected. No hemorrhage.     Left eye: Left conjunctiva is not injected. No hemorrhage.     Pupils: Pupils are equal, round, and reactive to light.   Neck:      Thyroid: No thyromegaly.      Vascular: No JVD.      Trachea: No tracheal deviation.   Cardiovascular:      Rate and Rhythm: Normal rate.   Pulmonary:      Effort: Pulmonary effort is normal. No respiratory distress.      Breath sounds: No stridor.   Abdominal:      General: Bowel sounds are normal.      Palpations: Abdomen is soft. There is no hepatomegaly, splenomegaly or mass.      Tenderness: There is no abdominal tenderness.   Musculoskeletal:         General: No tenderness. Normal range of motion.      Cervical back: Normal range of motion and neck supple.   Lymphadenopathy:      Head:      Right side of head: No posterior auricular or occipital adenopathy.      Left side of head: No posterior auricular or occipital adenopathy.      Cervical: No cervical adenopathy.      Right cervical: No superficial, deep or posterior cervical adenopathy.     Left cervical: No superficial, deep or posterior cervical adenopathy.      Upper Body:      Right upper body: No supraclavicular adenopathy.      Left upper body: No supraclavicular adenopathy.   Skin:     General: Skin is dry.      Findings: No erythema or rash.      Nails: There is no clubbing.   Neurological:      Mental Status: He is alert and oriented to person, place, and time.      Cranial Nerves: No cranial nerve deficit.      Coordination: Coordination normal.   Psychiatric:         Behavior: Behavior normal.         Thought Content: Thought content normal.         Judgment: Judgment normal.             Assessment:      1. Gastrointestinal stromal tumor (GIST)    2. History of DVT (deep vein thrombosis)    3. CKD stage 3 due to type  2 diabetes mellitus    4. Hypertension associated with diabetes    5. Hyperlipidemia associated with type 2 diabetes mellitus    6. Iron deficiency anemia due to chronic blood loss           Med Onc Chart Routing      Follow up with physician . Return 3 months CBC CMP serum iron TIBC ferritin prior   Follow up with [facility]    Infusion scheduling note    Injection scheduling note    Labs    Imaging    Pharmacy appointment    Other referrals         Needs EGD colonoscopies              Plan:     Reviewed laboratory studies previous history of gastrointestinal stromal tumor.  Start on oral iron supplementation 1 tablet per day if not able to tolerate with either nausea vomiting constipation decreased to 1 3 times a week article from up-to-date given to them on iron rich foods.  If that is not able to be done then will treat with intravenous iron.  EGD colonoscopies ordered last colonoscopies 2022        Artie Kolb Jr, MD FACP

## 2025-02-11 ENCOUNTER — OFFICE VISIT (OUTPATIENT)
Dept: GASTROENTEROLOGY | Facility: CLINIC | Age: 80
End: 2025-02-11
Payer: MEDICARE

## 2025-02-11 VITALS
DIASTOLIC BLOOD PRESSURE: 74 MMHG | HEART RATE: 66 BPM | HEIGHT: 71 IN | BODY MASS INDEX: 32.84 KG/M2 | SYSTOLIC BLOOD PRESSURE: 168 MMHG | WEIGHT: 234.56 LBS

## 2025-02-11 DIAGNOSIS — D64.9 NORMOCYTIC ANEMIA: ICD-10-CM

## 2025-02-11 DIAGNOSIS — D50.9 IRON DEFICIENCY ANEMIA, UNSPECIFIED IRON DEFICIENCY ANEMIA TYPE: Primary | ICD-10-CM

## 2025-02-11 PROCEDURE — 1159F MED LIST DOCD IN RCRD: CPT | Mod: CPTII,S$GLB,, | Performed by: INTERNAL MEDICINE

## 2025-02-11 PROCEDURE — 1160F RVW MEDS BY RX/DR IN RCRD: CPT | Mod: CPTII,S$GLB,, | Performed by: INTERNAL MEDICINE

## 2025-02-11 PROCEDURE — 3078F DIAST BP <80 MM HG: CPT | Mod: CPTII,S$GLB,, | Performed by: INTERNAL MEDICINE

## 2025-02-11 PROCEDURE — 3288F FALL RISK ASSESSMENT DOCD: CPT | Mod: CPTII,S$GLB,, | Performed by: INTERNAL MEDICINE

## 2025-02-11 PROCEDURE — 1126F AMNT PAIN NOTED NONE PRSNT: CPT | Mod: CPTII,S$GLB,, | Performed by: INTERNAL MEDICINE

## 2025-02-11 PROCEDURE — 3077F SYST BP >= 140 MM HG: CPT | Mod: CPTII,S$GLB,, | Performed by: INTERNAL MEDICINE

## 2025-02-11 PROCEDURE — 1101F PT FALLS ASSESS-DOCD LE1/YR: CPT | Mod: CPTII,S$GLB,, | Performed by: INTERNAL MEDICINE

## 2025-02-11 PROCEDURE — 99999 PR PBB SHADOW E&M-EST. PATIENT-LVL IV: CPT | Mod: PBBFAC,,, | Performed by: INTERNAL MEDICINE

## 2025-02-11 PROCEDURE — 99204 OFFICE O/P NEW MOD 45 MIN: CPT | Mod: S$GLB,,, | Performed by: INTERNAL MEDICINE

## 2025-02-11 PROCEDURE — 3072F LOW RISK FOR RETINOPATHY: CPT | Mod: CPTII,S$GLB,, | Performed by: INTERNAL MEDICINE

## 2025-02-11 NOTE — PROGRESS NOTES
Clinic Consult:  [facility] Consultation Note    Reason for Consult:  The primary encounter diagnosis was Iron deficiency anemia, unspecified iron deficiency anemia type. A diagnosis of Normocytic anemia was also pertinent to this visit.    PCP: Avinash Lugo   [id number] [address] [facility] / [place] [id number]    HPI:  This is a 79 y.o. male here for evaluation of KEVIN.   Patient most recent H/H is 12.4/39.7.  MCV was normal.   Baseline is ~13-14.  Patient denies hematemesis, melena or hematochezia.   He is not on anticoagulation.   Denies family history of colon cancer.   Last colonoscopy was in 2022 with polyps and diverticulosis.   EGD in 2021 revealed tumor in gastric body.   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         ROS:  As above HPI      Medical History:   Past Medical History:   Diagnosis Date    CKD stage 3 due to type 2 diabetes mellitus     Deep vein thrombosis     DM (diabetes mellitus) with complications     Gastrointestinal stromal tumor (GIST) 3/9/2021    GERD (gastroesophageal reflux disease)     History of DVT (deep vein thrombosis)     History of renal cell carcinoma 2009    Hyperlipidemia associated with type 2 diabetes mellitus 02/26/2021    Hypertension associated with diabetes     Panlobular emphysema     Thrombocytopenia     Type II diabetes mellitus with renal manifestations        Surgical History:  Past Surgical History:   Procedure Laterality Date    CLOSURE OF PERFORATED ULCER OF DUODENUM USING OMENTAL PATCH N/A 3/9/2021    Procedure: CLOSURE, ULCER, PERFORATED, DUODENUM, USING OMENTAL PATCH;  Surgeon: Edin Bradford MD;  Location: [facility] OR;  Service: General;  Laterality: N/A;    COLONOSCOPY N/A 1/5/2022    Procedure: COLONOSCOPY;  Surgeon: Mirta Grover MD;  Location: [facility] ENDO;  Service: Endoscopy;  Laterality: N/A;    ENDOSCOPIC ULTRASOUND OF UPPER GASTROINTESTINAL TRACT N/A 2/5/2021    Procedure: ULTRASOUND, UPPER GI TRACT, ENDOSCOPIC;  Surgeon:  Geremias Brizuela MD;  Location: [facility] ENDO;  Service: Endoscopy;  Laterality: N/A;    EPIDURAL STEROID INJECTION N/A 5/3/2024    Procedure: Lumbar L5/S1 IL JASON;  Surgeon: Debra De MD;  Location: [facility];  Service: Pain Management;  Laterality: N/A;    ESOPHAGOGASTRODUODENOSCOPY N/A 2/3/2021    Procedure: EGD (ESOPHAGOGASTRODUODENOSCOPY);  Surgeon: Mirta Grover MD;  Location: [facility] ENDO;  Service: Endoscopy;  Laterality: N/A;    ESOPHAGOGASTRODUODENOSCOPY N/A 3/9/2021    Procedure: EGD (ESOPHAGOGASTRODUODENOSCOPY);  Surgeon: Edin Bradford MD;  Location: [facility] OR;  Service: General;  Laterality: N/A;    INJECTION OF ANESTHETIC AGENT AROUND MEDIAL BRANCH NERVES INNERVATING LUMBAR FACET JOINT Bilateral 10/17/2023    Procedure: Bilateral L3-5 MBB;  Surgeon: Debra De MD;  Location: [facility];  Service: Pain Management;  Laterality: Bilateral;    INJECTION OF ANESTHETIC AGENT AROUND MEDIAL BRANCH NERVES INNERVATING LUMBAR FACET JOINT Bilateral 3/1/2024    Procedure: Bilateral L3-5 MBB Dignostic;  Surgeon: Debra De MD;  [facility];  Service: Pain Management;  Laterality: Bilateral;    INJECTION OF ANESTHETIC AGENT INTO TISSUE PLANE DEFINED BY TRANSVERSUS ABDOMINIS MUSCLE  4/17/2023    Procedure: BLOCK, TRANSVERSUS ABDOMINIS PLANE;  Surgeon: Yumiko Rosen DO;  Location: [facility] OR;  Service: General;;    left partial nephrectomy Left 2006    RADIOFREQUENCY THERMOCOAGULATION Bilateral 3/19/2024    Procedure: Bilateral L3-5 Lumbar RFA;  Surgeon: Debra De MD;  Location: [facility];  Service: Pain Management;  Laterality: Bilateral;    RADIOFREQUENCY THERMOCOAGULATION Bilateral 9/20/2024    Procedure: Bilateral L3-5 RFA;  Surgeon: Debra De MD;  Location: [facility];  Service: Pain Management;  Laterality: Bilateral;    ROBOT-ASSISTED REPAIR OF INCISIONAL HERNIA USING DA PERRI XI Left 4/17/2023    Procedure: XI ROBOTIC REPAIR, HERNIA, INCISIONAL;   Surgeon: Yumiko Rosen DO;  Location: [facility] OR;  Service: General;  Laterality: Left;  Left flank. Position right lateral decubitus. Ernst.    ROBOT-ASSISTED SURGICAL REMOVAL OF STOMACH USING DA PERRI XI N/A 3/9/2021    Procedure: XI ROBOTIC GASTRECTOMY;  Surgeon: Edin Bradford MD;  Location: [facility] OR;  Service: General;  Laterality: N/A;  partial       Family History:   Family History   Problem Relation Name Age of Onset    Stroke Mother      Hodgkin's lymphoma Father         Social History:   Social History     Tobacco Use    Smoking status: Former     Current packs/day: 0.00     Average packs/day: 1.5 packs/day for 40.0 years (60.0 ttl pk-yrs)     Types: Cigarettes     Start date: 1965     Quit date: 2005     Years since quittin.8    Smokeless tobacco: Never   Substance Use Topics    Alcohol use: Not Currently    Drug use: Never       Allergies: Reviewed    Home Medications:   Medication List with Changes/Refills   Current Medications    ACETAMINOPHEN (TYLENOL) 650 MG TBSR    Take 650 mg by mouth every 8 (eight) hours.    ASPIRIN 81 MG CHEW    Take 81 mg by mouth once daily.    ATORVASTATIN (LIPITOR) 20 MG TABLET    TAKE 1 TABLET BY MOUTH ONCE DAILY IN THE EVENING    CHOLECALCIFEROL, VITAMIN D3, (VITAMIN D3) 50 MCG (2,000 UNIT) TAB    Take 2,000 Units by mouth once daily.     FERROUS SULFATE (FEOSOL) 325 MG (65 MG IRON) TAB TABLET    Take 1 tablet (325 mg total) by mouth daily with breakfast.    FISH OIL-OMEGA-3 FATTY ACIDS 300-1,000 MG CAPSULE    Take by mouth once daily.    LOSARTAN-HYDROCHLOROTHIAZIDE 100-12.5 MG (HYZAAR) 100-12.5 MG TAB    Take 1 tablet by mouth once daily    MAGNESIUM OXIDE (MAG-OX) 400 MG (241.3 MG MAGNESIUM) TABLET    Take 400 mg by mouth once daily.    METFORMIN (GLUCOPHAGE) 500 MG TABLET    TAKE 1 TABLET BY MOUTH TWICE DAILY WITH MEALS    METOPROLOL TARTRATE (LOPRESSOR) 100 MG TABLET    Take 1 tablet (100 mg total) by mouth 2 (two) times daily.    OXYBUTYNIN (DITROPAN)  "5 MG TAB    Take 1 tablet (5 mg total) by mouth 2 (two) times daily.    PANTOPRAZOLE (PROTONIX) 40 MG TABLET    Take 1 tablet (40 mg total) by mouth once daily.         Physical Exam:  Vital Signs:  BP (!) 168/74 (Patient Position: Sitting)   Pulse 66   Ht 5' 11" (1.803 m)   Wt 106.4 kg (234 lb 9.1 oz)   BMI 32.72 kg/m²   Body mass index is 32.72 kg/m².    Physical Exam  Constitutional:       Appearance: Normal appearance.   HENT:      Head: Normocephalic.   Eyes:      Conjunctiva/sclera: Conjunctivae normal.   Pulmonary:      Effort: Pulmonary effort is normal.   Abdominal:      General: There is no distension.      Palpations: Abdomen is soft.      Tenderness: There is no abdominal tenderness. There is no guarding.   Neurological:      Mental Status: He is alert.          Labs: Pertinent labs reviewed.        Assessment:  Problem List Items Addressed This Visit          Oncology    Normocytic anemia    Current Assessment & Plan     Plan:   -EGD and colonoscopy ordered   -Iron supplementation   -Continue to follow with hematology/oncology          Other Visit Diagnoses       Iron deficiency anemia, unspecified iron deficiency anemia type    -  Primary          Iron deficiency anemia, unspecified iron deficiency anemia type  -     Ambulatory referral/consult to Endo Procedure ; Future; Expected date: 02/12/2025    Normocytic anemia                Follow up if symptoms worsen or fail to improve.    Order summary:  Orders Placed This Encounter   Procedures    Ambulatory referral/consult to Endo Procedure        Thank you so much for allowing me to participate in the care of Daniel Sellers MD  Gastroenterology and Hepatology  [facility]    "

## 2025-02-11 NOTE — ASSESSMENT & PLAN NOTE
Plan:   -EGD and colonoscopy ordered   -Iron supplementation   -Continue to follow with hematology/oncology

## 2025-02-12 ENCOUNTER — PATIENT MESSAGE (OUTPATIENT)
Dept: ADMINISTRATIVE | Facility: OTHER | Age: 80
End: 2025-02-12
Payer: MEDICARE

## 2025-02-14 ENCOUNTER — HOSPITAL ENCOUNTER (OUTPATIENT)
Dept: PREADMISSION TESTING | Facility: HOSPITAL | Age: 80
Discharge: HOME | End: 2025-02-14
Attending: INTERNAL MEDICINE
Payer: MEDICARE

## 2025-02-14 DIAGNOSIS — D50.9 IRON DEFICIENCY ANEMIA, UNSPECIFIED IRON DEFICIENCY ANEMIA TYPE: Primary | ICD-10-CM

## 2025-02-14 LAB
ANNOTATION COMMENT IMP: NORMAL
NGS CLINCIAL TRIALS: NORMAL
NGS INDICATION OF TEST: NORMAL
NGS INTERPRETATION: NORMAL
NGS ONCOHEME PANEL GENE LIST: NORMAL
NGS PATHOGENIC MUTATIONS DETECTED: NORMAL
NGS REVIEWED BY:: NORMAL
NGS VARIANTS OF UNKNOWN SIGNIFICANCE: NORMAL
NGSHM RESULT, BONE MARROW: NORMAL
REF LAB TEST METHOD: NORMAL
SPECIMEN SOURCE: NORMAL
TEST PERFORMANCE INFO SPEC: NORMAL

## 2025-02-17 RX ORDER — SODIUM, POTASSIUM,MAG SULFATES 17.5-3.13G
1 SOLUTION, RECONSTITUTED, ORAL ORAL DAILY
Qty: 1 KIT | Refills: 0 | Status: SHIPPED | OUTPATIENT
Start: 2025-02-17 | End: 2025-02-19

## 2025-02-24 ENCOUNTER — HOSPITAL ENCOUNTER (OUTPATIENT)
Dept: ENDOSCOPY | Facility: HOSPITAL | Age: 80
Discharge: HOME | End: 2025-02-24
Attending: INTERNAL MEDICINE
Payer: MEDICARE

## 2025-02-24 ENCOUNTER — ANESTHESIA (OUTPATIENT)
Dept: ENDOSCOPY | Facility: HOSPITAL | Age: 80
End: 2025-02-24
Payer: MEDICARE

## 2025-02-24 ENCOUNTER — ANESTHESIA EVENT (OUTPATIENT)
Dept: ENDOSCOPY | Facility: HOSPITAL | Age: 80
End: 2025-02-24
Payer: MEDICARE

## 2025-02-24 VITALS
OXYGEN SATURATION: 99 % | TEMPERATURE: 98 F | BODY MASS INDEX: 32.9 KG/M2 | WEIGHT: 235 LBS | HEART RATE: 68 BPM | SYSTOLIC BLOOD PRESSURE: 124 MMHG | HEIGHT: 71 IN | DIASTOLIC BLOOD PRESSURE: 88 MMHG | RESPIRATION RATE: 16 BRPM

## 2025-02-24 DIAGNOSIS — D50.0 IRON DEFICIENCY ANEMIA DUE TO CHRONIC BLOOD LOSS: ICD-10-CM

## 2025-02-24 DIAGNOSIS — D64.9 NORMOCYTIC ANEMIA: Primary | ICD-10-CM

## 2025-02-24 DIAGNOSIS — D50.9 IRON DEFICIENCY ANEMIA, UNSPECIFIED IRON DEFICIENCY ANEMIA TYPE: ICD-10-CM

## 2025-02-24 LAB
GLUCOSE SERPL-MCNC: 168 MG/DL (ref 70–110)
POCT GLUCOSE: 168 MG/DL (ref 70–110)

## 2025-02-24 PROCEDURE — 43239 EGD BIOPSY SINGLE/MULTIPLE: CPT | Mod: 51,,, | Performed by: INTERNAL MEDICINE

## 2025-02-24 PROCEDURE — 63600175 PHARM REV CODE 636 W HCPCS: Performed by: ANESTHESIOLOGY

## 2025-02-24 PROCEDURE — 88342 IMHCHEM/IMCYTCHM 1ST ANTB: CPT | Mod: 59 | Performed by: STUDENT IN AN ORGANIZED HEALTH CARE EDUCATION/TRAINING PROGRAM

## 2025-02-24 PROCEDURE — 43239 EGD BIOPSY SINGLE/MULTIPLE: CPT | Performed by: INTERNAL MEDICINE

## 2025-02-24 PROCEDURE — 27201012 HC FORCEPS, HOT/COLD, DISP

## 2025-02-24 PROCEDURE — 88305 TISSUE EXAM BY PATHOLOGIST: CPT | Mod: 59 | Performed by: STUDENT IN AN ORGANIZED HEALTH CARE EDUCATION/TRAINING PROGRAM

## 2025-02-24 PROCEDURE — 37000009 HC ANESTHESIA EA ADD 15 MINS

## 2025-02-24 PROCEDURE — 45385 COLONOSCOPY W/LESION REMOVAL: CPT | Performed by: INTERNAL MEDICINE

## 2025-02-24 PROCEDURE — 45385 COLONOSCOPY W/LESION REMOVAL: CPT | Mod: ,,, | Performed by: INTERNAL MEDICINE

## 2025-02-24 PROCEDURE — 27201089 HC SNARE, DISP (ANY)

## 2025-02-24 PROCEDURE — 37000008 HC ANESTHESIA 1ST 15 MINUTES

## 2025-02-24 PROCEDURE — 88341 IMHCHEM/IMCYTCHM EA ADD ANTB: CPT | Performed by: STUDENT IN AN ORGANIZED HEALTH CARE EDUCATION/TRAINING PROGRAM

## 2025-02-24 RX ORDER — LIDOCAINE HYDROCHLORIDE 20 MG/ML
INJECTION INTRAVENOUS
Status: DISCONTINUED | OUTPATIENT
Start: 2025-02-24 | End: 2025-02-24

## 2025-02-24 RX ORDER — PROPOFOL 10 MG/ML
VIAL (ML) INTRAVENOUS
Status: DISCONTINUED | OUTPATIENT
Start: 2025-02-24 | End: 2025-02-24

## 2025-02-24 RX ORDER — PANTOPRAZOLE SODIUM 40 MG/1
40 TABLET, DELAYED RELEASE ORAL 2 TIMES DAILY
Qty: 90 TABLET | Refills: 3 | Status: SHIPPED | OUTPATIENT
Start: 2025-02-24

## 2025-02-24 RX ADMIN — PROPOFOL 50 MG: 10 INJECTION, EMULSION INTRAVENOUS at 12:02

## 2025-02-24 RX ADMIN — LIDOCAINE HYDROCHLORIDE 100 MG: 20 INJECTION INTRAVENOUS at 12:02

## 2025-02-24 NOTE — ANESTHESIA PREPROCEDURE EVALUATION
02/24/2025  Daniel Villeda is a 79 y.o., male.  Past Medical History:   Diagnosis Date    CKD stage 3 due to type 2 diabetes mellitus     Deep vein thrombosis     DM (diabetes mellitus) with complications     Gastrointestinal stromal tumor (GIST) 3/9/2021    GERD (gastroesophageal reflux disease)     History of DVT (deep vein thrombosis)     History of renal cell carcinoma 2009    Hyperlipidemia associated with type 2 diabetes mellitus 02/26/2021    Hypertension associated with diabetes     Panlobular emphysema     Thrombocytopenia     Type II diabetes mellitus with renal manifestations      Past Surgical History:   Procedure Laterality Date    CLOSURE OF PERFORATED ULCER OF DUODENUM USING OMENTAL PATCH N/A 3/9/2021    Procedure: CLOSURE, ULCER, PERFORATED, DUODENUM, USING OMENTAL PATCH;  Surgeon: Edin Bradford MD;  Location: [facility] OR;  Service: General;  Laterality: N/A;    COLONOSCOPY N/A 1/5/2022    Procedure: COLONOSCOPY;  Surgeon: Mirta Grover MD;  Location: [facility];  Service: Endoscopy;  Laterality: N/A;    ENDOSCOPIC ULTRASOUND OF UPPER GASTROINTESTINAL TRACT N/A 2/5/2021    Procedure: ULTRASOUND, UPPER GI TRACT, ENDOSCOPIC;  Surgeon: Geremias Brizuela MD;  Location: [facility];  Service: Endoscopy;  Laterality: N/A;    EPIDURAL STEROID INJECTION N/A 5/3/2024    Procedure: Lumbar L5/S1 IL JASON;  Surgeon: Debra De MD;  Location: [facility] PAIN MGT;  Service: Pain Management;  Laterality: N/A;    ESOPHAGOGASTRODUODENOSCOPY N/A 2/3/2021    Procedure: EGD (ESOPHAGOGASTRODUODENOSCOPY);  Surgeon: Mirta Grover MD;  Location: [facility] ENDO;  Service: Endoscopy;  Laterality: N/A;    ESOPHAGOGASTRODUODENOSCOPY N/A 3/9/2021    Procedure: EGD (ESOPHAGOGASTRODUODENOSCOPY);  Surgeon: Edin Bradford MD;  Location: [facility] OR;  Service: General;  Laterality: N/A;    INJECTION OF ANESTHETIC AGENT AROUND  MEDIAL BRANCH NERVES INNERVATING LUMBAR FACET JOINT Bilateral 10/17/2023    Procedure: Bilateral L3-5 MBB;  Surgeon: Debra De MD;  Location: [facility];  Service: Pain Management;  Laterality: Bilateral;    INJECTION OF ANESTHETIC AGENT AROUND MEDIAL BRANCH NERVES INNERVATING LUMBAR FACET JOINT Bilateral 3/1/2024    Procedure: Bilateral L3-5 MBB Dignostic;  Surgeon: Debra De MD;  Location: [facility];  Service: Pain Management;  Laterality: Bilateral;    INJECTION OF ANESTHETIC AGENT INTO TISSUE PLANE DEFINED BY TRANSVERSUS ABDOMINIS MUSCLE  4/17/2023    Procedure: BLOCK, TRANSVERSUS ABDOMINIS PLANE;  Surgeon: Yumiko Rosen DO;  Location: [facility] OR;  Service: General;;    left partial nephrectomy Left 2006    RADIOFREQUENCY THERMOCOAGULATION Bilateral 3/19/2024    Procedure: Bilateral L3-5 Lumbar RFA;  Surgeon: Debra De MD;  Location: [facility];  Service: Pain Management;  Laterality: Bilateral;    RADIOFREQUENCY THERMOCOAGULATION Bilateral 9/20/2024    Procedure: Bilateral L3-5 RFA;  Surgeon: Debra De MD;  Location: [facility];  Service: Pain Management;  Laterality: Bilateral;    ROBOT-ASSISTED REPAIR OF INCISIONAL HERNIA USING DA PERRI XI Left 4/17/2023    Procedure: XI ROBOTIC REPAIR, HERNIA, INCISIONAL;  Surgeon: Ymuiko Rosen DO;  Location: [facility] OR;  Service: General;  Laterality: Left;  Left flank. Position right lateral decubitus. Ernst.    ROBOT-ASSISTED SURGICAL REMOVAL OF STOMACH USING DA PERRI XI N/A 3/9/2021    Procedure: XI ROBOTIC GASTRECTOMY;  Surgeon: Edin Bradford MD;  Location: [facility] OR;  Service: General;  Laterality: N/A;  partial     Results for orders placed or performed during the hospital encounter of 03/28/23   EKG 12-lead    Collection Time: 03/28/23 11:49 AM    Narrative    Test Reason : Z01.818,    Vent. Rate : 055 BPM     Atrial Rate : 055 BPM     P-R Int : 222 ms          QRS Dur : 116 ms      QT Int : 488 ms       P-R-T Axes : 081 091  036 degrees     QTc Int : 466 ms    Sinus bradycardia with 1st degree A-V block  Low voltage QRS  Right bundle branch block  Abnormal ECG  When compared with ECG of 24-FEB-2021 13:12,  No significant change was found  Confirmed by AVERY WILKES MD ([id number]) on 3/28/2023 2:09:19 PM    Referred By: EDGAR GAFFNEY           Confirmed By:AVERY WILKES MD   Results for orders placed during the hospital encounter of 03/02/21    Echo Color Flow Doppler? Yes    Interpretation Summary  · The left ventricle is normal in size with normal systolic function. The estimated ejection fraction is 65%  · Grade I left ventricular diastolic dysfunction.  · Normal right ventricular size with normal right ventricular systolic function.  Lab Results   Component Value Date    WBC 5.15 02/05/2025    HGB 12.4 (L) 02/05/2025    HCT 39.7 (L) 02/05/2025    MCV 87 02/05/2025     (L) 02/05/2025       Chemistry        Component Value Date/Time     02/05/2025 1355    K 4.6 02/05/2025 1355     02/05/2025 1355    CO2 29 02/05/2025 1355    BUN 20 02/05/2025 1355    CREATININE 1.3 02/05/2025 1355     (H) 02/05/2025 1355        Component Value Date/Time    CALCIUM 9.2 02/05/2025 1355    ALKPHOS 81 02/05/2025 1355    AST 19 02/05/2025 1355    ALT 24 02/05/2025 1355    ALT 24 02/05/2025 1355    BILITOT 0.7 02/05/2025 1355    ESTGFRAFRICA 51 (A) 05/10/2022 0910    EGFRNONAA 44 (A) 05/10/2022 0910        Vitals:    02/24/25 1143   BP: (!) 165/84   Pulse: 66   Resp: 18   Temp: 36.7 °C (98.1 °F)       Pre-op Assessment    I have reviewed the Patient Summary Reports.     I have reviewed the Nursing Notes. I have reviewed the NPO Status.   I have reviewed the Medications.     Review of Systems  Anesthesia Hx:  No problems with previous Anesthesia             Denies Family Hx of Anesthesia complications.    Denies Personal Hx of Anesthesia complications.                    Social:  Non-Smoker, No Alcohol Use       Cardiovascular:      Hypertension    Dysrhythmias                                      Pulmonary:   COPD, mild                     Renal/:  Chronic Renal Disease, CKD                Hepatic/GI:     GERD                Musculoskeletal:  Arthritis               Neurological:  Neurology Normal                                      Endocrine:  Diabetes, well controlled               Physical Exam  General: Well nourished, Cooperative, Alert and Oriented    Airway:  Mallampati: III / II  Mouth Opening: Normal  TM Distance: Normal  Tongue: Normal  Neck ROM: Normal ROM    Dental:  Dentures        Anesthesia Plan  Type of Anesthesia, risks & benefits discussed:    Anesthesia Type: MAC  Intra-op Monitoring Plan: Standard ASA Monitors  Post Op Pain Control Plan: multimodal analgesia  Induction:  IV  Informed Consent: Informed consent signed with the Patient and all parties understand the risks and agree with anesthesia plan.  All questions answered.   ASA Score: 2  Day of Surgery Review of History & Physical: H&P Update referred to the surgeon/provider.    Ready For Surgery From Anesthesia Perspective.     .

## 2025-02-24 NOTE — H&P
Endoscopy History and Physical    PCP - Armando Sellers MD  Referring Physician - Brie Sellers MD  [address] [facility] [place] [id number]      ASA - per anesthesia  Mallampati - per anesthesia  History of Anesthesia problems - no  Family history Anesthesia problems -  no   Plan of anesthesia - General    HPI  79 y.o. male    Planned Procedure: Colonoscopy and EGD  Diagnosis: iron deficiency anemia  Chief Complaint: Same as above      ROS:  Constitutional: No fevers, chills, No weight loss  CV: No chest pain  Pulm: No cough, No shortness of breath  GI: see HPI    Medical History:  has a past medical history of CKD stage 3 due to type 2 diabetes mellitus, Deep vein thrombosis, DM (diabetes mellitus) with complications, Gastrointestinal stromal tumor (GIST) (3/9/2021), GERD (gastroesophageal reflux disease), History of DVT (deep vein thrombosis), History of renal cell carcinoma (2009), Hyperlipidemia associated with type 2 diabetes mellitus (02/26/2021), Hypertension associated with diabetes, Panlobular emphysema, Thrombocytopenia, and Type II diabetes mellitus with renal manifestations.    Surgical History:  has a past surgical history that includes left partial nephrectomy (Left, 2006); Esophagogastroduodenoscopy (N/A, 2/3/2021); Endoscopic ultrasound of upper gastrointestinal tract (N/A, 2/5/2021); Robot-assisted surgical removal of stomach using da Gumaro Xi (N/A, 3/9/2021); Esophagogastroduodenoscopy (N/A, 3/9/2021); Closure of perforated ulcer of duodenum using omental patch (N/A, 3/9/2021); Colonoscopy (N/A, 1/5/2022); Robot-assisted repair of incisional hernia using da Gumaro Xi (Left, 4/17/2023); Injection of anesthetic agent into tissue plane defined by transversus abdominis muscle (4/17/2023); Injection of anesthetic agent around medial branch nerves innervating lumbar facet joint (Bilateral, 10/17/2023); Injection of anesthetic agent around medial branch nerves innervating lumbar facet joint  (Bilateral, 3/1/2024); Radiofrequency thermocoagulation (Bilateral, 3/19/2024); Epidural steroid injection (N/A, 5/3/2024); and Radiofrequency thermocoagulation (Bilateral, 9/20/2024).    Family History: family history includes Hodgkin's lymphoma in his father; Stroke in his mother..    Social History:  reports that he quit smoking about 19 years ago. His smoking use included cigarettes. He started smoking about 59 years ago. He has a 60 pack-year smoking history. He has never used smokeless tobacco. He reports that he does not currently use alcohol. He reports that he does not use drugs.    Review of patient's allergies indicates:  No Known Allergies    Medications:   Prescriptions Prior to Admission[1]    Physical Exam:    Vital Signs: There were no vitals filed for this visit.    General Appearance: Well appearing in no acute distress  Abdomen: Soft, non tender, non distended with normal bowel sounds, no masses    Labs:  Lab Results   Component Value Date    WBC 5.15 02/05/2025    HGB 12.4 (L) 02/05/2025    HCT 39.7 (L) 02/05/2025     (L) 02/05/2025    CHOL 77 (L) 05/15/2024    TRIG 243 (H) 05/15/2024    HDL 18 (L) 05/15/2024    ALT 24 02/05/2025    ALT 24 02/05/2025    AST 19 02/05/2025     02/05/2025    K 4.6 02/05/2025     02/05/2025    CREATININE 1.3 02/05/2025    BUN 20 02/05/2025    CO2 29 02/05/2025    TSH 1.260 02/05/2025    INR 1.0 02/05/2025    HGBA1C 7.3 (H) 11/22/2024       I have explained the risks and benefits of this endoscopic procedure to the patient including but not limited to bleeding, inflammation, infection, perforation, and death.    SEDATION PLAN: per anesthesia       History reviewed, vital signs satisfactory, cardiopulmonary status satisfactory, sedation options, risks and plans have been discussed with the patient  All their questions were answered and the patient agrees to the sedation procedures as planned and the patient is deemed an appropriate candidate for the  sedation as planned.     The risks, benefits and alternatives of the procedure were discussed with the patient in detail. This discussion was had in the presence of endoscopy staff. The risks include, risks of adverse reaction to sedation requiring the use of reversal agents, bleeding requiring blood transfusion, perforation requiring surgical intervention and technical failure. Other risks include aspiration leading to respiratory distress and respiratory failure resulting in endotracheal intubation and mechanical ventilation including death. If anesthesia is being utilized for this procedure, it is up to the anesthesiologist to determine airway safety including elective endotracheal intubation. Questions were answered, they agree to proceed. There was no language barriers.       Procedure explained to patient, informed consent obtained and placed in chart.       Eugenio Mcarthur MD       [1] (Not in a hospital admission)

## 2025-02-24 NOTE — DISCHARGE SUMMARY
[facility] - Endoscopy (Hospital)  Discharge Note  Short Stay    Colonoscopy  EGD      OUTCOME: Patient tolerated treatment/procedure well without complication and is now ready for discharge.    DISPOSITION: Home or Self Care    FINAL DIAGNOSIS:  <principal problem not specified>    FOLLOWUP: With primary care provider    DISCHARGE INSTRUCTIONS:  No discharge procedures on file.      Clinical Reference Documents Added to Patient Instructions         Document    COLON POLYPS (ENGLISH)    GASTRITIS (ENGLISH)            TIME SPENT ON DISCHARGE: 20 minutes

## 2025-02-24 NOTE — TRANSFER OF CARE
"Anesthesia Transfer of Care Note    Patient: Daniel Villeda    Procedure(s) Performed: * No procedures listed *    Patient location: GI    Anesthesia Type: MAC    Transport from OR: Transported from OR on room air with adequate spontaneous ventilation    Post pain: adequate analgesia    Post assessment: no apparent anesthetic complications and tolerated procedure well    Post vital signs: stable    Level of consciousness: sedated    Nausea/Vomiting: no nausea/vomiting    Complications: none    Transfer of care protocol was followed      Last vitals: Visit Vitals  BP (!) 165/84   Pulse 66   Temp 36.7 °C (98.1 °F)   Resp 18   Ht 5' 11" (1.803 m)   Wt 106.6 kg (235 lb)   SpO2 100%   BMI 32.78 kg/m²     "

## 2025-02-24 NOTE — ANESTHESIA POSTPROCEDURE EVALUATION
Anesthesia Post Evaluation    Patient: Daniel Villeda    Procedure(s) Performed: * No procedures listed *    Final Anesthesia Type: MAC      Patient location during evaluation: GI PACU  Patient participation: Yes- Able to Participate  Level of consciousness: awake and alert and awake  Post-procedure vital signs: reviewed and stable  Pain management: adequate  Airway patency: patent  DILCIA mitigation strategies: Multimodal analgesia  PONV status at discharge: No PONV  Anesthetic complications: no      Cardiovascular status: blood pressure returned to baseline  Respiratory status: unassisted  Hydration status: euvolemic  Follow-up not needed.              Vitals Value Taken Time   /69 02/24/25 13:16   Temp 36.9 °C (98.4 °F) 02/24/25 13:16   Pulse 99 02/24/25 13:16   Resp 16 02/24/25 13:16   SpO2 96 % 02/24/25 13:16         No case tracking events are documented in the log.      Pain/Toni Score: Toni Score: 10 (2/24/2025  1:16 PM)

## 2025-02-28 LAB
FINAL PATHOLOGIC DIAGNOSIS: NORMAL
GROSS: NORMAL
Lab: NORMAL

## 2025-04-22 ENCOUNTER — OFFICE VISIT (OUTPATIENT)
Dept: INTERNAL MEDICINE | Facility: CLINIC | Age: 80
End: 2025-04-22
Payer: MEDICARE

## 2025-04-22 ENCOUNTER — TELEPHONE (OUTPATIENT)
Dept: URGENT CARE | Facility: CLINIC | Age: 80
End: 2025-04-22
Payer: MEDICARE

## 2025-04-22 ENCOUNTER — TELEPHONE (OUTPATIENT)
Dept: INTERNAL MEDICINE | Facility: CLINIC | Age: 80
End: 2025-04-22

## 2025-04-22 ENCOUNTER — LAB VISIT (OUTPATIENT)
Dept: LAB | Facility: HOSPITAL | Age: 80
End: 2025-04-22
Payer: MEDICARE

## 2025-04-22 VITALS
WEIGHT: 233.69 LBS | HEART RATE: 62 BPM | OXYGEN SATURATION: 98 % | SYSTOLIC BLOOD PRESSURE: 136 MMHG | HEIGHT: 70 IN | DIASTOLIC BLOOD PRESSURE: 84 MMHG | TEMPERATURE: 97 F | BODY MASS INDEX: 33.46 KG/M2

## 2025-04-22 DIAGNOSIS — I15.2 HYPERTENSION ASSOCIATED WITH DIABETES: ICD-10-CM

## 2025-04-22 DIAGNOSIS — E11.8 DM (DIABETES MELLITUS) WITH COMPLICATIONS: Primary | ICD-10-CM

## 2025-04-22 DIAGNOSIS — Z79.899 OTHER LONG TERM (CURRENT) DRUG THERAPY: ICD-10-CM

## 2025-04-22 DIAGNOSIS — E11.59 HYPERTENSION ASSOCIATED WITH DIABETES: ICD-10-CM

## 2025-04-22 DIAGNOSIS — E11.22 TYPE 2 DIABETES MELLITUS WITH STAGE 3A CHRONIC KIDNEY DISEASE, WITHOUT LONG-TERM CURRENT USE OF INSULIN: ICD-10-CM

## 2025-04-22 DIAGNOSIS — N18.31 TYPE 2 DIABETES MELLITUS WITH STAGE 3A CHRONIC KIDNEY DISEASE, WITHOUT LONG-TERM CURRENT USE OF INSULIN: ICD-10-CM

## 2025-04-22 LAB
ABSOLUTE EOSINOPHIL (OHS): 0.04 K/UL
ABSOLUTE MONOCYTE (OHS): 0.48 K/UL (ref 0.3–1)
ABSOLUTE NEUTROPHIL COUNT (OHS): 2.37 K/UL (ref 1.8–7.7)
ALBUMIN SERPL BCP-MCNC: 3.9 G/DL (ref 3.5–5.2)
ALBUMIN/CREAT UR: 7.5 UG/MG
ALP SERPL-CCNC: 80 UNIT/L (ref 40–150)
ALT SERPL W/O P-5'-P-CCNC: 26 UNIT/L (ref 10–44)
ANION GAP (OHS): 8 MMOL/L (ref 8–16)
AST SERPL-CCNC: 22 UNIT/L (ref 11–45)
BASOPHILS # BLD AUTO: 0.03 K/UL
BASOPHILS NFR BLD AUTO: 0.7 %
BILIRUB SERPL-MCNC: 0.8 MG/DL (ref 0.1–1)
BUN SERPL-MCNC: 26 MG/DL (ref 8–23)
CALCIUM SERPL-MCNC: 9.6 MG/DL (ref 8.7–10.5)
CHLORIDE SERPL-SCNC: 105 MMOL/L (ref 95–110)
CHOLEST SERPL-MCNC: 71 MG/DL (ref 120–199)
CHOLEST/HDLC SERPL: 3.6 {RATIO} (ref 2–5)
CO2 SERPL-SCNC: 27 MMOL/L (ref 23–29)
CREAT SERPL-MCNC: 1.4 MG/DL (ref 0.5–1.4)
CREAT UR-MCNC: 93 MG/DL (ref 23–375)
EAG (OHS): 166 MG/DL (ref 68–131)
ERYTHROCYTE [DISTWIDTH] IN BLOOD BY AUTOMATED COUNT: 15.1 % (ref 11.5–14.5)
GFR SERPLBLD CREATININE-BSD FMLA CKD-EPI: 50 ML/MIN/1.73/M2
GLUCOSE SERPL-MCNC: 196 MG/DL (ref 70–110)
HBA1C MFR BLD: 7.4 % (ref 4–5.6)
HCT VFR BLD AUTO: 41.6 % (ref 40–54)
HDLC SERPL-MCNC: 20 MG/DL (ref 40–75)
HDLC SERPL: 28.2 % (ref 20–50)
HGB BLD-MCNC: 13 GM/DL (ref 14–18)
IMM GRANULOCYTES # BLD AUTO: 0.02 K/UL (ref 0–0.04)
IMM GRANULOCYTES NFR BLD AUTO: 0.5 % (ref 0–0.5)
LDLC SERPL CALC-MCNC: 22.4 MG/DL (ref 63–159)
LYMPHOCYTES # BLD AUTO: 1.34 K/UL (ref 1–4.8)
MCH RBC QN AUTO: 26.9 PG (ref 27–31)
MCHC RBC AUTO-ENTMCNC: 31.3 G/DL (ref 32–36)
MCV RBC AUTO: 86 FL (ref 82–98)
MICROALBUMIN UR-MCNC: 7 UG/ML (ref ?–5000)
NONHDLC SERPL-MCNC: 51 MG/DL
NUCLEATED RBC (/100WBC) (OHS): 0 /100 WBC
PLATELET # BLD AUTO: 109 K/UL (ref 150–450)
PMV BLD AUTO: 11.5 FL (ref 9.2–12.9)
POTASSIUM SERPL-SCNC: 5.2 MMOL/L (ref 3.5–5.1)
PROT SERPL-MCNC: 6.8 GM/DL (ref 6–8.4)
RBC # BLD AUTO: 4.83 M/UL (ref 4.6–6.2)
RELATIVE EOSINOPHIL (OHS): 0.9 %
RELATIVE LYMPHOCYTE (OHS): 31.3 % (ref 18–48)
RELATIVE MONOCYTE (OHS): 11.2 % (ref 4–15)
RELATIVE NEUTROPHIL (OHS): 55.4 % (ref 38–73)
SODIUM SERPL-SCNC: 140 MMOL/L (ref 136–145)
TRIGL SERPL-MCNC: 143 MG/DL (ref 30–150)
TSH SERPL-ACNC: 1.38 UIU/ML (ref 0.4–4)
WBC # BLD AUTO: 4.28 K/UL (ref 3.9–12.7)

## 2025-04-22 PROCEDURE — 3072F LOW RISK FOR RETINOPATHY: CPT | Mod: CPTII,S$GLB,,

## 2025-04-22 PROCEDURE — 85025 COMPLETE CBC W/AUTO DIFF WBC: CPT

## 2025-04-22 PROCEDURE — 80061 LIPID PANEL: CPT

## 2025-04-22 PROCEDURE — 99999 PR PBB SHADOW E&M-EST. PATIENT-LVL IV: CPT | Mod: PBBFAC,,,

## 2025-04-22 PROCEDURE — 3079F DIAST BP 80-89 MM HG: CPT | Mod: CPTII,S$GLB,,

## 2025-04-22 PROCEDURE — 1126F AMNT PAIN NOTED NONE PRSNT: CPT | Mod: CPTII,S$GLB,,

## 2025-04-22 PROCEDURE — 99214 OFFICE O/P EST MOD 30 MIN: CPT | Mod: S$GLB,,,

## 2025-04-22 PROCEDURE — 82570 ASSAY OF URINE CREATININE: CPT

## 2025-04-22 PROCEDURE — G2211 COMPLEX E/M VISIT ADD ON: HCPCS | Mod: S$GLB,,,

## 2025-04-22 PROCEDURE — 3288F FALL RISK ASSESSMENT DOCD: CPT | Mod: CPTII,S$GLB,,

## 2025-04-22 PROCEDURE — 1101F PT FALLS ASSESS-DOCD LE1/YR: CPT | Mod: CPTII,S$GLB,,

## 2025-04-22 PROCEDURE — 84443 ASSAY THYROID STIM HORMONE: CPT

## 2025-04-22 PROCEDURE — 3075F SYST BP GE 130 - 139MM HG: CPT | Mod: CPTII,S$GLB,,

## 2025-04-22 PROCEDURE — 36415 COLL VENOUS BLD VENIPUNCTURE: CPT | Mod: PO

## 2025-04-22 PROCEDURE — 80053 COMPREHEN METABOLIC PANEL: CPT

## 2025-04-22 PROCEDURE — 83036 HEMOGLOBIN GLYCOSYLATED A1C: CPT

## 2025-04-22 PROCEDURE — 1159F MED LIST DOCD IN RCRD: CPT | Mod: CPTII,S$GLB,,

## 2025-04-22 RX ORDER — METFORMIN HYDROCHLORIDE 500 MG/1
500 TABLET ORAL 2 TIMES DAILY WITH MEALS
Qty: 180 TABLET | Refills: 3 | Status: SHIPPED | OUTPATIENT
Start: 2025-04-22

## 2025-04-22 NOTE — TELEPHONE ENCOUNTER
----- Message from Jina sent at 4/22/2025  3:06 PM CDT -----  Contact: Daniel  Type:  Patient Requesting a call back Who Called:Daniel What is the call back request regarding?:pt is calling to be scheduled for lab work for his appt in October Would the patient rather a call back or a response via [facility] Call Back Number:[phone] Information:

## 2025-04-22 NOTE — TELEPHONE ENCOUNTER
[facility] for patient to call Dr. Sellers's office back. Dr. Sellers did not order any labs to be done before his appointment on 10/22.

## 2025-04-22 NOTE — PROGRESS NOTES
Patient ID: Daniel Villeda is a 79 y.o. male.    Chief Complaint: Establish Care    History of Present Illness    CHIEF COMPLAINT:  - Mr. Villeda presents for medication refill and routine follow-up for diabetes management.    HPI:  Mr. Villeda has diabetes, currently managed with metformin 500 mg twice daily. His most recent HbA1c level was 7.4%, increased from previous levels of 6-6.5%. He previously tried Jardiance, prescribed by Dr. Lugo, but had nausea and vomiting as side effects.    Mr. Villeda has a history of renal cancer on the left side, resulting in partial nephrectomy and chronic kidney disease, affecting his medication management. He also has acid reflux, treated with pantoprazole. The dose of pantoprazole was recently doubled due to esophageal irritation.    Mr. 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 He is currently following up with gastroenterology and oncology for this condition. Mr. Villeda also has past anemia, likely due to blood loss from his GI issues, and is currently taking iron supplements.      ROS:  General: -fever, -chills, -fatigue, -weight gain, -weight loss  Eyes: -vision changes, -redness, -discharge  ENT: -ear pain, -nasal congestion, -sore throat  Cardiovascular: -chest pain, -palpitations, -lower extremity edema  Respiratory: -cough, -shortness of breath  Gastrointestinal: -abdominal pain, +nausea, +vomiting, -diarrhea, -constipation, -blood in stool, +heartburn, +indigestion  Genitourinary: -dysuria, -hematuria, -frequency  Musculoskeletal: -joint pain, -muscle pain  Skin: -rash, -lesion  Neurological: -headache, -dizziness, -numbness, -tingling, +memory loss  Psychiatric: -anxiety, -depression, -sleep difficulty         Pmh, Psh, Family Hx, Social Hx updated in Epic Tabs today.         4/22/2025    10:35 AM 5/22/2024     8:52 AM 11/15/2023     8:35 AM 5/10/2023     7:45 AM 5/2/2022    10:23 AM    Depression Patient Health Questionnaire   Over the last two weeks how often have you been bothered by little interest or pleasure in doing things Not at all Not at all Not at all Not at all Not at all    Over the last two weeks how often have you been bothered by feeling down, depressed or hopeless Not at all Not at all Not at all Not at all Not at all   PHQ-2 Total Score 0 0 0 0 0       Data saved with a previous flowsheet row definition       Active Problem List with Overview Notes    Diagnosis Date Noted    Type 2 diabetes mellitus with stage 3a chronic kidney disease, without long-term current use of insulin 04/22/2025    Iron deficiency anemia due to chronic blood loss 02/24/2025    Normocytic anemia 02/11/2025    Pancytopenia 02/05/2025    Lumbar radiculopathy, chronic 05/03/2024    Lumbar spondylosis 10/17/2023    Benign prostatic hyperplasia with urinary frequency 03/03/2023    Aortic atherosclerosis 01/27/2023    History of colon polyps 01/04/2022    DM (diabetes mellitus) with complications     Type II diabetes mellitus with renal manifestations     Hypertension associated with diabetes     History of renal cell carcinoma     History of DVT (deep vein thrombosis)     Thrombocytopenia     CKD stage 3 due to type 2 diabetes mellitus     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    RBBB 02/26/2021    First degree AV block 02/26/2021    Hyperlipidemia associated with type 2 diabetes mellitus 02/26/2021       Past Medical History:   Diagnosis Date    CKD stage 3 due to type 2 diabetes mellitus     Deep vein thrombosis     DM (diabetes mellitus) with complications     Gastrointestinal stromal tumor (GIST) 3/9/2021    GERD (gastroesophageal reflux disease)     History of DVT (deep vein thrombosis)     History of renal cell carcinoma 2009    Hyperlipidemia associated with type 2 diabetes mellitus 02/26/2021    Hypertension associated with diabetes     Panlobular emphysema     Thrombocytopenia     Type II  diabetes mellitus with renal manifestations        Past Surgical History:   Procedure Laterality Date    CLOSURE OF PERFORATED ULCER OF DUODENUM USING OMENTAL PATCH N/A 3/9/2021    Procedure: CLOSURE, ULCER, PERFORATED, DUODENUM, USING OMENTAL PATCH;  Surgeon: Edin Bradford MD;  Location: [facility] [place] OR;  Service: General;  Laterality: N/A;    COLONOSCOPY N/A 1/5/2022    Procedure: COLONOSCOPY;  Surgeon: Mirta Grover MD;  Location: [facility] [place] ENDO;  Service: Endoscopy;  Laterality: N/A;    ENDOSCOPIC ULTRASOUND OF UPPER GASTROINTESTINAL TRACT N/A 2/5/2021    Procedure: ULTRASOUND, UPPER GI TRACT, ENDOSCOPIC;  Surgeon: Geremias Brizuela MD;  Location: [facility] [place] ENDO;  Service: Endoscopy;  Laterality: N/A;    EPIDURAL STEROID INJECTION N/A 5/3/2024    Procedure: Lumbar L5/S1 IL JASON;  Surgeon: Debra De MD;  Location: [facility] PAIN MGT;  Service: Pain Management;  Laterality: N/A;    ESOPHAGOGASTRODUODENOSCOPY N/A 2/3/2021    Procedure: EGD (ESOPHAGOGASTRODUODENOSCOPY);  Surgeon: Mirta Grover MD;  Location: [facility] [place] ENDO;  Service: Endoscopy;  Laterality: N/A;    ESOPHAGOGASTRODUODENOSCOPY N/A 3/9/2021    Procedure: EGD (ESOPHAGOGASTRODUODENOSCOPY);  Surgeon: Edin Bradford MD;  Location: [facility] [place] OR;  Service: General;  Laterality: N/A;    INJECTION OF ANESTHETIC AGENT AROUND MEDIAL BRANCH NERVES INNERVATING LUMBAR FACET JOINT Bilateral 10/17/2023    Procedure: Bilateral L3-5 MBB;  Surgeon: Debra De MD;  Location: [facility] PAIN MGT;  Service: Pain Management;  Laterality: Bilateral;    INJECTION OF ANESTHETIC AGENT AROUND MEDIAL BRANCH NERVES INNERVATING LUMBAR FACET JOINT Bilateral 3/1/2024    Procedure: Bilateral L3-5 MBB Dignostic;  Surgeon: Debra De MD;  Location: [facility] PAIN MGT;  Service: Pain Management;  Laterality: Bilateral;    INJECTION OF ANESTHETIC AGENT INTO TISSUE PLANE DEFINED BY TRANSVERSUS ABDOMINIS MUSCLE  4/17/2023    Procedure: BLOCK, TRANSVERSUS ABDOMINIS PLANE;  Surgeon: Yumiko Rosen,  DO;  Location: [facility] OR;  Service: General;;    left partial nephrectomy Left 2006    RADIOFREQUENCY THERMOCOAGULATION Bilateral 3/19/2024    Procedure: Bilateral L3-5 Lumbar RFA;  Surgeon: Debra De MD;  Location: [facility] PAIN MGT;  Service: Pain Management;  Laterality: Bilateral;    RADIOFREQUENCY THERMOCOAGULATION Bilateral 2024    Procedure: Bilateral L3-5 RFA;  Surgeon: Debra De MD;  Location: [facility] PAIN MGT;  Service: Pain Management;  Laterality: Bilateral;    ROBOT-ASSISTED REPAIR OF INCISIONAL HERNIA USING DA PERRI XI Left 2023    Procedure: XI ROBOTIC REPAIR, HERNIA, INCISIONAL;  Surgeon: Yumiko Rosen DO;  Location: [facility] OR;  Service: General;  Laterality: Left;  Left flank. Position right lateral decubitus. Ernst.    ROBOT-ASSISTED SURGICAL REMOVAL OF STOMACH USING DA PERRI XI N/A 3/9/2021    Procedure: XI ROBOTIC GASTRECTOMY;  Surgeon: Edin Bradford MD;  Location: [facility] OR;  Service: General;  Laterality: N/A;  partial       Family History   Problem Relation Name Age of Onset    Stroke Mother      Hodgkin's lymphoma Father         Social History     Socioeconomic History    Marital status:    Tobacco Use    Smoking status: Former     Current packs/day: 0.00     Average packs/day: 1.5 packs/day for 40.0 years (60.0 ttl pk-yrs)     Types: Cigarettes     Start date: 1965     Quit date: 2005     Years since quittin.0    Smokeless tobacco: Never   Substance and Sexual Activity    Alcohol use: Not Currently    Drug use: Never       Medications Ordered Prior to Encounter[1]    Review of patient's allergies indicates:  No Known Allergies    General - Well developed, alert and oriented in NAD  HEENT - normocephalic, no evidence of trauma, sclera white, EOMI  Neck - full range of motion  COR - regular rate and rhythm without murmurs or gallops  Lungs - Clear  Abdomen - soft, non-tender  Ext - no cyanosis or edema     Assessment:     1. DM (diabetes mellitus) with  "complications    2. Type 2 diabetes mellitus with stage 3a chronic kidney disease, without long-term current use of insulin    3. Hypertension associated with diabetes    4. Other long term (current) drug therapy        Pertinent Labs:    Chemistry        Component Value Date/Time     02/05/2025 1355    K 4.6 02/05/2025 1355     02/05/2025 1355    CO2 29 02/05/2025 1355    BUN 20 02/05/2025 1355    CREATININE 1.3 02/05/2025 1355     (H) 02/05/2025 1355        Component Value Date/Time    CALCIUM 9.2 02/05/2025 1355    ALKPHOS 81 02/05/2025 1355    AST 19 02/05/2025 1355    ALT 24 02/05/2025 1355    ALT 24 02/05/2025 1355    BILITOT 0.7 02/05/2025 1355    ESTGFRAFRICA 51 (A) 05/10/2022 0910    EGFRNONAA 44 (A) 05/10/2022 0910          Lab Results   Component Value Date    WBC 5.15 02/05/2025    HGB 12.4 (L) 02/05/2025    HCT 39.7 (L) 02/05/2025    MCV 87 02/05/2025    MCH 27.2 02/05/2025    MCHC 31.2 (L) 02/05/2025    RDW 14.8 (H) 02/05/2025     (L) 02/05/2025    MPV 10.5 02/05/2025       Lab Results   Component Value Date    HGBA1C 7.3 (H) 11/22/2024    HGBA1C 7.8 (H) 05/15/2024    HGBA1C 6.6 (H) 11/03/2023     (H) 02/05/2025     Lab Results   Component Value Date    LDLCALC 10.4 (L) 05/15/2024     Lab Results   Component Value Date    TSH 1.260 02/05/2025     Lab Results   Component Value Date    CHOL 77 (L) 05/15/2024    CHOL 88 (L) 11/03/2023    CHOL 82 (L) 05/03/2023     Lab Results   Component Value Date    TRIG 243 (H) 05/15/2024    TRIG 108 11/03/2023    TRIG 128 05/03/2023     Lab Results   Component Value Date    HDL 18 (L) 05/15/2024    HDL 25 (L) 11/03/2023    HDL 17 (L) 05/03/2023     Lab Results   Component Value Date    LDLCALC 10.4 (L) 05/15/2024    LDLCALC 41.4 (L) 11/03/2023    LDLCALC 39.4 (L) 05/03/2023     No results found for: "NONHDLC"  Lab Results   Component Value Date    CHOLHDL 23.4 05/15/2024    CHOLHDL 28.4 11/03/2023    CHOLHDL 20.7 05/03/2023       The " ASCVD Risk score (Jose A CARRERO, et al., 2019) failed to calculate for the following reasons:    The 2019 ASCVD risk score is only valid for ages 40 to 79    Plan:     Assessment & Plan    Reviewed history of CKD and partial left kidney removal due to cancer, noting current renal function is good despite past fluctuations.  A1C slightly elevated at 7.4, above target of <7.  Considered past attempt with Jardiance, discontinued due to side effects and cost.  Evaluated current metformin dosage as appropriate given renal function.  Assessed for potential connection between acid reflux and previous medication intolerance.  Reviewed history of GI cancer and ongoing follow-up with gastroenterology and oncology.  Considered iron supplementation in context of history of anemia.    TYPE 2 DIABETES MELLITUS WITH DIABETIC CHRONIC KIDNEY DISEASE:  - Monitored diabetes levels, noting an increase in A1C from previous 6-6.5 to current 7.3-7.4.  - Evaluated kidney function, which has improved despite the patient having only half a kidney due to cancer removal.  - Discussed target A1C level, emphasizing the goal of keeping it under 7, preferably around 6.5.    GASTRO-ESOPHAGEAL REFLUX DISEASE:  - Monitored patient's reports of acid reflux and indigestion.  - Evaluated the presence of a small esophageal sore due to reflux.  - Increased the dose of Pantoprazole to twice daily to treat reflux symptoms and heal the esophageal sore.  - Assessed the reflux condition's potential impact on medication tolerance.    HISTORY OF KIDNEY CANCER:  - Noted patient's history of renal cancer resulting in partial nephrectomy.  - Adjusted metformin dose due to this history.    HISTORY OF STOMACH CANCER:  - Noted patient's history of stomach cancer resulting in partial gastrectomy.  - Mr. Villeda continues follow-ups with gastroenterology and oncology for this condition.    HISTORY OF LARGE INTESTINE CANCER:  - Noted patient's history of cancer in the lower  intestines.  - Mr. Villeda continues follow-ups with gastroenterology and oncology for GI tumor history.    DIABETES MANAGEMENT:  - Prescribed metformin 500mg twice daily for 90 days.  - Advised continuation of dietary changes along with medication.  - Assessed the need for repeat labs to check diabetes levels.    DRUG ALLERGY:  - Noted patient's previous experience of nausea and vomiting with Jardiance.  - Decided against prescribing Jardiance due to this adverse reaction.         1. DM (diabetes mellitus) with complications  - metFORMIN (GLUCOPHAGE) 500 MG tablet; Take 1 tablet (500 mg total) by mouth 2 (two) times daily with meals.  Dispense: 180 tablet; Refill: 3  - Microalbumin/Creatinine Ratio, Urine; Future  - Microalbumin/Creatinine Ratio, Urine    2. Type 2 diabetes mellitus with stage 3a chronic kidney disease, without long-term current use of insulin  - Microalbumin/Creatinine Ratio, Urine; Future  - Microalbumin/Creatinine Ratio, Urine    3. Hypertension associated with diabetes    4. Other long term (current) drug therapy  - CBC Auto Differential; Future  - Comprehensive Metabolic Panel; Future  - Hemoglobin A1C; Future  - Lipid Panel; Future  - TSH; Future    Hypertension:  Losartan-hydrochlorothiazide 100-12.5 mg daily, metoprolol tartrate 100 mg twice daily.    DM2:  Metformin 500 mg twice daily.  Hyperlipidemia: Atorvastatin 20 mg daily.    GIST s/p gastric resection:  Following Oncology and Gastroenterology.  On pantoprazole 40 mg twice daily.  Also taking iron supplementation for anemia.  History of Renal cell carcinoma, s/p left partial nephrectomy.  Following Oncology.  Overactive bladder: Oxybutynin 5 mg twice daily.  Chronic low back pain: Following pain medicine.  CKD 3a.    Immunization History   Administered Date(s) Administered    COVID-19, MRNA, LN-S, PF (Pfizer) (Purple Cap) 01/29/2021, 02/19/2021, 11/23/2021    Influenza (FLUAD) - Quadrivalent - Adjuvanted - PF *Preferred* (65+)  11/01/2021, 11/09/2022, 11/15/2023    Influenza - Trivalent - Afluria, Fluzone MDV 10/06/2017    Influenza - Trivalent - Fluad - Adjuvanted - PF (65 years and older 11/26/2024    Influenza - Trivalent - Fluzone High Dose - PF (65 years and older) 11/07/2018, 10/18/2019, 10/15/2020    Pneumococcal Conjugate - 13 Valent 11/09/2022    Pneumococcal Polysaccharide - 23 Valent 07/05/2011    Td - PF (ADULT) 07/12/2011       Orders Placed This Encounter   Procedures    CBC Auto Differential    Comprehensive Metabolic Panel    Hemoglobin A1C    Lipid Panel    TSH    Microalbumin/Creatinine Ratio, Urine       Portions of this note were generated by [employer].    Each patient to whom medical services by telemedicine are provided:  (1) informed of the relationship between the physician and patient and the respective role of any other health care provider with respect to management of the patient; and (2) notified that he or she may decline to receive medical services by telemedicine and may withdraw from such care at any time.    I spent a total of 35 minutes face to face and non-face to face on the date of this visit.This includes time preparing to see the patient (eg, review of tests, notes), obtaining and/or reviewing additional history from an independent historian and/or outside medical records, documenting clinical information in the electronic health record, independently interpreting results and/or communicating results to the patient/family/caregiver, or care coordinator.  Visit today included increased complexity associated with the care of the episodic problem addressed and managing the longitudinal care of the patient due to the serious and/or complex managed problem(s).      This note was generated with the assistance of ambient listening technology. Verbal consent was obtained by the patient and accompanying visitor(s) for the recording of patient appointment to facilitate this note. I attest to having reviewed  and edited the generated note for accuracy, though some syntax or spelling errors may persist. Please contact the author of this note for any clarification.      Lorena Sellers MD         [1]   Current Outpatient Medications on File Prior to Visit   Medication Sig Dispense Refill    acetaminophen (TYLENOL) 650 MG TbSR Take 650 mg by mouth every 8 (eight) hours.      aspirin 81 MG Chew Take 81 mg by mouth once daily.      atorvastatin (LIPITOR) 20 MG tablet TAKE 1 TABLET BY MOUTH ONCE DAILY IN THE EVENING 90 tablet 3    cholecalciferol, vitamin D3, (VITAMIN D3) 50 mcg (2,000 unit) Tab Take 2,000 Units by mouth once daily.       ferrous sulfate (FEOSOL) 325 mg (65 mg iron) Tab tablet Take 1 tablet (325 mg total) by mouth daily with breakfast. 30 tablet 11    losartan-hydrochlorothiazide 100-12.5 mg (HYZAAR) 100-12.5 mg Tab Take 1 tablet by mouth once daily 90 tablet 3    magnesium oxide (MAG-OX) 400 mg (241.3 mg magnesium) tablet Take 400 mg by mouth once daily.      metoprolol tartrate (LOPRESSOR) 100 MG tablet Take 1 tablet (100 mg total) by mouth 2 (two) times daily. 180 tablet 3    oxybutynin (DITROPAN) 5 MG Tab Take 1 tablet (5 mg total) by mouth 2 (two) times daily. 180 tablet 3    pantoprazole (PROTONIX) 40 MG tablet Take 1 tablet (40 mg total) by mouth 2 (two) times daily. 90 tablet 3    TURMERIC ORAL Take by mouth.      [DISCONTINUED] metFORMIN (GLUCOPHAGE) 500 MG tablet TAKE 1 TABLET BY MOUTH TWICE DAILY WITH MEALS 180 tablet 3    fish oil-omega-3 fatty acids 300-1,000 mg capsule Take by mouth once daily. (Patient not taking: Reported on 4/22/2025)       Current Facility-Administered Medications on File Prior to Visit   Medication Dose Route Frequency Provider Last Rate Last Admin    influenza (adjuvanted) (Fluad) 45 mcg/0.5 mL IM vaccine (> or = 66 yo) 0.5 mL  0.5 mL Intramuscular 1 time in Clinic/HOD

## 2025-04-22 NOTE — TELEPHONE ENCOUNTER
Patient was seen today. Had fasting labs completed after today's appointment. He called in to schedule labs in October prior to appointment.  Please advise.

## 2025-04-22 NOTE — TELEPHONE ENCOUNTER
----- Message from Mario sent at 4/22/2025 12:58 PM CDT -----  Contact: self  .Type:  Needs Medical AdviceWho Called: .Daniel Gerber the patient rather a call back or a response via [facility]? Call Kale Call Back Number: .[phone] Information: Pt states he has an appt on 10/22 and wants to know if he is needing to do bloodwork before the appt.

## 2025-04-23 ENCOUNTER — RESULTS FOLLOW-UP (OUTPATIENT)
Dept: INTERNAL MEDICINE | Facility: CLINIC | Age: 80
End: 2025-04-23

## 2025-05-09 ENCOUNTER — LAB VISIT (OUTPATIENT)
Dept: LAB | Facility: HOSPITAL | Age: 80
End: 2025-05-09
Attending: INTERNAL MEDICINE
Payer: MEDICARE

## 2025-05-09 DIAGNOSIS — D50.0 IRON DEFICIENCY ANEMIA DUE TO CHRONIC BLOOD LOSS: ICD-10-CM

## 2025-05-09 LAB
ABSOLUTE EOSINOPHIL (OHS): 0.06 K/UL
ABSOLUTE MONOCYTE (OHS): 0.44 K/UL (ref 0.3–1)
ABSOLUTE NEUTROPHIL COUNT (OHS): 2.33 K/UL (ref 1.8–7.7)
ALBUMIN SERPL BCP-MCNC: 3.7 G/DL (ref 3.5–5.2)
ALP SERPL-CCNC: 93 UNIT/L (ref 40–150)
ALT SERPL W/O P-5'-P-CCNC: 21 UNIT/L (ref 10–44)
ANION GAP (OHS): 6 MMOL/L (ref 8–16)
AST SERPL-CCNC: 20 UNIT/L (ref 11–45)
BASOPHILS # BLD AUTO: 0.03 K/UL
BASOPHILS NFR BLD AUTO: 0.7 %
BILIRUB SERPL-MCNC: 0.6 MG/DL (ref 0.1–1)
BUN SERPL-MCNC: 23 MG/DL (ref 8–23)
CALCIUM SERPL-MCNC: 8.6 MG/DL (ref 8.7–10.5)
CHLORIDE SERPL-SCNC: 107 MMOL/L (ref 95–110)
CO2 SERPL-SCNC: 27 MMOL/L (ref 23–29)
CREAT SERPL-MCNC: 1.3 MG/DL (ref 0.5–1.4)
ERYTHROCYTE [DISTWIDTH] IN BLOOD BY AUTOMATED COUNT: 15.2 % (ref 11.5–14.5)
GFR SERPLBLD CREATININE-BSD FMLA CKD-EPI: 55 ML/MIN/1.73/M2
GLUCOSE SERPL-MCNC: 193 MG/DL (ref 70–110)
HCT VFR BLD AUTO: 40.4 % (ref 40–54)
HGB BLD-MCNC: 12.6 GM/DL (ref 14–18)
IMM GRANULOCYTES # BLD AUTO: 0.03 K/UL (ref 0–0.04)
IMM GRANULOCYTES NFR BLD AUTO: 0.7 % (ref 0–0.5)
LYMPHOCYTES # BLD AUTO: 1.28 K/UL (ref 1–4.8)
MCH RBC QN AUTO: 27.9 PG (ref 27–31)
MCHC RBC AUTO-ENTMCNC: 31.2 G/DL (ref 32–36)
MCV RBC AUTO: 90 FL (ref 82–98)
NUCLEATED RBC (/100WBC) (OHS): 0 /100 WBC
PLATELET # BLD AUTO: 117 K/UL (ref 150–450)
PMV BLD AUTO: 10.9 FL (ref 9.2–12.9)
POTASSIUM SERPL-SCNC: 4.6 MMOL/L (ref 3.5–5.1)
PROT SERPL-MCNC: 6.6 GM/DL (ref 6–8.4)
RBC # BLD AUTO: 4.51 M/UL (ref 4.6–6.2)
RELATIVE EOSINOPHIL (OHS): 1.4 %
RELATIVE LYMPHOCYTE (OHS): 30.7 % (ref 18–48)
RELATIVE MONOCYTE (OHS): 10.6 % (ref 4–15)
RELATIVE NEUTROPHIL (OHS): 55.9 % (ref 38–73)
SODIUM SERPL-SCNC: 140 MMOL/L (ref 136–145)
WBC # BLD AUTO: 4.17 K/UL (ref 3.9–12.7)

## 2025-05-09 PROCEDURE — 36415 COLL VENOUS BLD VENIPUNCTURE: CPT | Mod: PO

## 2025-05-09 PROCEDURE — 83540 ASSAY OF IRON: CPT

## 2025-05-09 PROCEDURE — 84075 ASSAY ALKALINE PHOSPHATASE: CPT

## 2025-05-09 PROCEDURE — 82728 ASSAY OF FERRITIN: CPT

## 2025-05-09 PROCEDURE — 85025 COMPLETE CBC W/AUTO DIFF WBC: CPT

## 2025-05-10 ENCOUNTER — RESULTS FOLLOW-UP (OUTPATIENT)
Dept: HEMATOLOGY/ONCOLOGY | Facility: CLINIC | Age: 80
End: 2025-05-10

## 2025-05-10 LAB
FERRITIN SERPL-MCNC: 32 NG/ML (ref 20–300)
IRON SATN MFR SERPL: 16 % (ref 20–50)
IRON SERPL-MCNC: 65 UG/DL (ref 45–160)
TIBC SERPL-MCNC: 408 UG/DL (ref 250–450)
TRANSFERRIN SERPL-MCNC: 276 MG/DL (ref 200–375)

## 2025-05-12 ENCOUNTER — OFFICE VISIT (OUTPATIENT)
Dept: HEMATOLOGY/ONCOLOGY | Facility: CLINIC | Age: 80
End: 2025-05-12
Payer: MEDICARE

## 2025-05-12 VITALS
OXYGEN SATURATION: 97 % | HEIGHT: 70 IN | WEIGHT: 237.19 LBS | BODY MASS INDEX: 33.96 KG/M2 | HEART RATE: 63 BPM | TEMPERATURE: 97 F

## 2025-05-12 DIAGNOSIS — N18.30 CKD STAGE 3 DUE TO TYPE 2 DIABETES MELLITUS: ICD-10-CM

## 2025-05-12 DIAGNOSIS — D50.0 IRON DEFICIENCY ANEMIA DUE TO CHRONIC BLOOD LOSS: ICD-10-CM

## 2025-05-12 DIAGNOSIS — Z85.528 HISTORY OF RENAL CELL CARCINOMA: ICD-10-CM

## 2025-05-12 DIAGNOSIS — E11.22 CKD STAGE 3 DUE TO TYPE 2 DIABETES MELLITUS: ICD-10-CM

## 2025-05-12 DIAGNOSIS — C49.A0 GASTROINTESTINAL STROMAL TUMOR (GIST): Primary | ICD-10-CM

## 2025-05-12 PROBLEM — D64.9 NORMOCYTIC ANEMIA: Status: RESOLVED | Noted: 2025-02-11 | Resolved: 2025-05-12

## 2025-05-12 PROBLEM — D61.818 PANCYTOPENIA: Status: RESOLVED | Noted: 2025-02-05 | Resolved: 2025-05-12

## 2025-05-12 PROCEDURE — 1101F PT FALLS ASSESS-DOCD LE1/YR: CPT | Mod: CPTII,S$GLB,, | Performed by: INTERNAL MEDICINE

## 2025-05-12 PROCEDURE — 99999 PR PBB SHADOW E&M-EST. PATIENT-LVL IV: CPT | Mod: PBBFAC,,, | Performed by: INTERNAL MEDICINE

## 2025-05-12 PROCEDURE — 1125F AMNT PAIN NOTED PAIN PRSNT: CPT | Mod: CPTII,S$GLB,, | Performed by: INTERNAL MEDICINE

## 2025-05-12 PROCEDURE — 1159F MED LIST DOCD IN RCRD: CPT | Mod: CPTII,S$GLB,, | Performed by: INTERNAL MEDICINE

## 2025-05-12 PROCEDURE — 3072F LOW RISK FOR RETINOPATHY: CPT | Mod: CPTII,S$GLB,, | Performed by: INTERNAL MEDICINE

## 2025-05-12 PROCEDURE — 3288F FALL RISK ASSESSMENT DOCD: CPT | Mod: CPTII,S$GLB,, | Performed by: INTERNAL MEDICINE

## 2025-05-12 PROCEDURE — 99214 OFFICE O/P EST MOD 30 MIN: CPT | Mod: S$GLB,,, | Performed by: INTERNAL MEDICINE

## 2025-05-12 PROCEDURE — 1160F RVW MEDS BY RX/DR IN RCRD: CPT | Mod: CPTII,S$GLB,, | Performed by: INTERNAL MEDICINE

## 2025-05-12 RX ORDER — HEPARIN 100 UNIT/ML
500 SYRINGE INTRAVENOUS
OUTPATIENT
Start: 2025-05-12

## 2025-05-12 RX ORDER — SODIUM FERRIC GLUCONATE COMPLEX IN SUCROSE 12.5 MG/ML
125 INJECTION INTRAVENOUS
OUTPATIENT
Start: 2025-05-12

## 2025-05-12 RX ORDER — SODIUM CHLORIDE 0.9 % (FLUSH) 0.9 %
10 SYRINGE (ML) INJECTION
OUTPATIENT
Start: 2025-05-12

## 2025-05-12 RX ORDER — EPINEPHRINE 0.3 MG/.3ML
0.3 INJECTION SUBCUTANEOUS ONCE AS NEEDED
OUTPATIENT
Start: 2025-05-12

## 2025-05-12 NOTE — PROGRESS NOTES
Subjective:       Patient ID: Daniel Villeda is a 79 y.o. male.    Chief Complaint: Cancer and Results    HPI:  79-year-old male history of gastrointestinal stromal tumor status post gastric resection documented iron deficiency.  Patient is here after upper lower endoscopies recently completed.; intolerant and nonresponsive to oral iron upset stomach reflux diarrhea    Past Medical History:   Diagnosis Date    CKD stage 3 due to type 2 diabetes mellitus     Deep vein thrombosis     DM (diabetes mellitus) with complications     Gastrointestinal stromal tumor (GIST) 3/9/2021    GERD (gastroesophageal reflux disease)     History of DVT (deep vein thrombosis)     History of renal cell carcinoma 2009    Hyperlipidemia associated with type 2 diabetes mellitus 02/26/2021    Hypertension associated with diabetes     Panlobular emphysema     Thrombocytopenia     Type II diabetes mellitus with renal manifestations      Family History   Problem Relation Name Age of Onset    Stroke Mother      Hodgkin's lymphoma Father       Social History[1]  Past Surgical History:   Procedure Laterality Date    CLOSURE OF PERFORATED ULCER OF DUODENUM USING OMENTAL PATCH N/A 3/9/2021    Procedure: CLOSURE, ULCER, PERFORATED, DUODENUM, USING OMENTAL PATCH;  Surgeon: Edin Bradford MD;  Location: [facility] OR;  Service: General;  Laterality: N/A;    COLONOSCOPY N/A 1/5/2022    Procedure: COLONOSCOPY;  Surgeon: Mirta Grover MD;  Location: [facility] ENDO;  Service: Endoscopy;  Laterality: N/A;    ENDOSCOPIC ULTRASOUND OF UPPER GASTROINTESTINAL TRACT N/A 2/5/2021    Procedure: ULTRASOUND, UPPER GI TRACT, ENDOSCOPIC;  Surgeon: Geremias Brizuela MD;  Location: [facility] ENDO;  Service: Endoscopy;  Laterality: N/A;    EPIDURAL STEROID INJECTION N/A 5/3/2024    Procedure: Lumbar L5/S1 IL JASON;  Surgeon: Debra De MD;  Location: [facility] PAIN MGT;  Service: Pain Management;  Laterality: N/A;    ESOPHAGOGASTRODUODENOSCOPY N/A 2/3/2021    Procedure: EGD  (ESOPHAGOGASTRODUODENOSCOPY);  Surgeon: iMrta Grover MD;  Location: [facility] ENDO;  Service: Endoscopy;  Laterality: N/A;    ESOPHAGOGASTRODUODENOSCOPY N/A 3/9/2021    Procedure: EGD (ESOPHAGOGASTRODUODENOSCOPY);  Surgeon: Edin Bradford MD;  Location: [facility] OR;  Service: General;  Laterality: N/A;    INJECTION OF ANESTHETIC AGENT AROUND MEDIAL BRANCH NERVES INNERVATING LUMBAR FACET JOINT Bilateral 10/17/2023    Procedure: Bilateral L3-5 MBB;  Surgeon: Debra De MD;  Location: [facility] PAIN MGT;  Service: Pain Management;  Laterality: Bilateral;    INJECTION OF ANESTHETIC AGENT AROUND MEDIAL BRANCH NERVES INNERVATING LUMBAR FACET JOINT Bilateral 3/1/2024    Procedure: Bilateral L3-5 MBB Dignostic;  Surgeon: Debra De MD;  Location: [facility] PAIN MGT;  Service: Pain Management;  Laterality: Bilateral;    INJECTION OF ANESTHETIC AGENT INTO TISSUE PLANE DEFINED BY TRANSVERSUS ABDOMINIS MUSCLE  4/17/2023    Procedure: BLOCK, TRANSVERSUS ABDOMINIS PLANE;  Surgeon: Yumiko Rosen DO;  Location: [facility] OR;  Service: General;;    left partial nephrectomy Left 2006    RADIOFREQUENCY THERMOCOAGULATION Bilateral 3/19/2024    Procedure: Bilateral L3-5 Lumbar RFA;  Surgeon: Debra De MD;  Location: [facility] PAIN MGT;  Service: Pain Management;  Laterality: Bilateral;    RADIOFREQUENCY THERMOCOAGULATION Bilateral 9/20/2024    Procedure: Bilateral L3-5 RFA;  Surgeon: Debra De MD;  Location: [facility] PAIN MGT;  Service: Pain Management;  Laterality: Bilateral;    ROBOT-ASSISTED REPAIR OF INCISIONAL HERNIA USING DA PERRI XI Left 4/17/2023    Procedure: XI ROBOTIC REPAIR, HERNIA, INCISIONAL;  Surgeon: Yumiko Rosen DO;  Location: [facility] OR;  Service: General;  Laterality: Left;  Left flank. Position right lateral decubitus. Ernst.    ROBOT-ASSISTED SURGICAL REMOVAL OF STOMACH USING DA PERRI XI N/A 3/9/2021    Procedure: XI ROBOTIC GASTRECTOMY;  Surgeon: Edin Bradford MD;  Location: [facility] OR;  Service: General;   Laterality: N/A;  partial       Labs:  Lab Results   Component Value Date    WBC 4.17 05/09/2025    HGB 12.6 (L) 05/09/2025    HCT 40.4 05/09/2025    MCV 90 05/09/2025     (L) 05/09/2025     BMP  Lab Results   Component Value Date     05/09/2025    K 4.6 05/09/2025     05/09/2025    CO2 27 05/09/2025    BUN 23 05/09/2025    CREATININE 1.3 05/09/2025    CALCIUM 8.6 (L) 05/09/2025    ANIONGAP 6 (L) 05/09/2025    ESTGFRAFRICA 51 (A) 05/10/2022    EGFRNONAA 44 (A) 05/10/2022     Lab Results   Component Value Date    ALT 21 05/09/2025    AST 20 05/09/2025    ALKPHOS 93 05/09/2025    BILITOT 0.6 05/09/2025       Lab Results   Component Value Date    IRON 65 05/09/2025    TIBC 408 05/09/2025    FERRITIN 32.0 05/09/2025     Lab Results   Component Value Date    QEPTJVNR72 428 02/05/2025     Lab Results   Component Value Date    FOLATE 5.8 07/06/2021     Lab Results   Component Value Date    TSH 1.379 04/22/2025         Review of Systems   Constitutional:  Negative for activity change, appetite change, chills, diaphoresis, fatigue, fever and unexpected weight change.   HENT:  Negative for congestion, dental problem, drooling, ear discharge, ear pain, facial swelling, hearing loss, mouth sores, nosebleeds, postnasal drip, rhinorrhea, sinus pressure, sneezing, sore throat, tinnitus, trouble swallowing and voice change.    Eyes:  Negative for photophobia, pain, discharge, redness, itching and visual disturbance.   Respiratory:  Negative for apnea, cough, choking, chest tightness, shortness of breath, wheezing and stridor.    Cardiovascular:  Negative for chest pain, palpitations and leg swelling.   Gastrointestinal:  Positive for abdominal distention and abdominal pain. Negative for anal bleeding, blood in stool, constipation, diarrhea, nausea, rectal pain and vomiting.        Upset stomach oral iron   Endocrine: Negative for cold intolerance, heat intolerance, polydipsia, polyphagia and polyuria.    Genitourinary:  Negative for decreased urine volume, difficulty urinating, dysuria, enuresis, flank pain, frequency, genital sores, hematuria, penile discharge, penile pain, penile swelling, scrotal swelling, testicular pain and urgency.   Musculoskeletal:  Negative for arthralgias, back pain, gait problem, joint swelling, myalgias, neck pain and neck stiffness.   Skin:  Negative for color change, pallor, rash and wound.   Allergic/Immunologic: Negative for environmental allergies, food allergies and immunocompromised state.   Neurological:  Negative for dizziness, tremors, seizures, syncope, facial asymmetry, speech difficulty, weakness, light-headedness, numbness and headaches.   Hematological:  Negative for adenopathy. Does not bruise/bleed easily.   Psychiatric/Behavioral:  Negative for agitation, behavioral problems, confusion, decreased concentration, dysphoric mood, hallucinations, self-injury, sleep disturbance and suicidal ideas. The patient is not nervous/anxious and is not hyperactive.        Objective:      Physical Exam  Vitals reviewed.   Constitutional:       General: He is not in acute distress.     Appearance: He is well-developed. He is not diaphoretic.   HENT:      Head: Normocephalic.      Right Ear: External ear normal.      Left Ear: External ear normal.      Nose: Nose normal.      Right Sinus: No maxillary sinus tenderness or frontal sinus tenderness.      Left Sinus: No maxillary sinus tenderness or frontal sinus tenderness.      Mouth/Throat:      Pharynx: No oropharyngeal exudate.   Eyes:      General: Lids are normal. No scleral icterus.        Right eye: No discharge.         Left eye: No discharge.      Extraocular Movements:      Right eye: Normal extraocular motion.      Left eye: Normal extraocular motion.      Conjunctiva/sclera:      Right eye: Right conjunctiva is not injected. No hemorrhage.     Left eye: Left conjunctiva is not injected. No hemorrhage.     Pupils: Pupils are  equal, round, and reactive to light.   Neck:      Thyroid: No thyromegaly.      Vascular: No JVD.      Trachea: No tracheal deviation.   Cardiovascular:      Rate and Rhythm: Normal rate.   Pulmonary:      Effort: Pulmonary effort is normal. No respiratory distress.      Breath sounds: No stridor.   Abdominal:      General: Bowel sounds are normal.      Palpations: Abdomen is soft. There is no hepatomegaly, splenomegaly or mass.      Tenderness: There is no abdominal tenderness.   Musculoskeletal:         General: No tenderness. Normal range of motion.      Cervical back: Normal range of motion and neck supple.   Lymphadenopathy:      Head:      Right side of head: No posterior auricular or occipital adenopathy.      Left side of head: No posterior auricular or occipital adenopathy.      Cervical: No cervical adenopathy.      Right cervical: No superficial, deep or posterior cervical adenopathy.     Left cervical: No superficial, deep or posterior cervical adenopathy.      Upper Body:      Right upper body: No supraclavicular adenopathy.      Left upper body: No supraclavicular adenopathy.   Skin:     General: Skin is dry.      Findings: No erythema or rash.      Nails: There is no clubbing.   Neurological:      Mental Status: He is alert and oriented to person, place, and time.      Cranial Nerves: No cranial nerve deficit.      Coordination: Coordination normal.   Psychiatric:         Behavior: Behavior normal.         Thought Content: Thought content normal.         Judgment: Judgment normal.             Assessment:      1. Gastrointestinal stromal tumor (GIST)    2. Iron deficiency anemia due to chronic blood loss    3. History of renal cell carcinoma    4. CKD stage 3 due to type 2 diabetes mellitus           Med Onc Chart Routing      Follow up with physician . Return 3 months CBC CMP serum iron TIBC ferritin prior   Follow up with [facility]    Infusion scheduling note    Injection scheduling note IV iron x4   Labs     Imaging    Pharmacy appointment    Other referrals           Repeat upper endoscopies 3 months          Plan:     Documented iron deficiency iron x4 return 3 months CBC CMP serum iron TIBC ferritin prior repeat EGD 3 months request of GI.  Patient is intolerant and nonresponsive to oral iron        Artie Kolb Jr, MD FACP         [1]   Social History  Socioeconomic History    Marital status:    Tobacco Use    Smoking status: Former     Current packs/day: 0.00     Average packs/day: 1.5 packs/day for 40.0 years (60.0 ttl pk-yrs)     Types: Cigarettes     Start date: 1965     Quit date: 2005     Years since quittin.0    Smokeless tobacco: Never   Substance and Sexual Activity    Alcohol use: Not Currently    Drug use: Never

## 2025-05-20 RX ORDER — SODIUM FERRIC GLUCONATE COMPLEX IN SUCROSE 12.5 MG/ML
125 INJECTION INTRAVENOUS
OUTPATIENT
Start: 2025-05-20

## 2025-05-27 ENCOUNTER — INFUSION (OUTPATIENT)
Dept: INFUSION THERAPY | Facility: HOSPITAL | Age: 80
End: 2025-05-27
Attending: INTERNAL MEDICINE
Payer: MEDICARE

## 2025-05-27 VITALS
HEART RATE: 57 BPM | OXYGEN SATURATION: 98 % | SYSTOLIC BLOOD PRESSURE: 177 MMHG | DIASTOLIC BLOOD PRESSURE: 84 MMHG | RESPIRATION RATE: 16 BRPM | TEMPERATURE: 99 F

## 2025-05-27 DIAGNOSIS — D50.0 IRON DEFICIENCY ANEMIA DUE TO CHRONIC BLOOD LOSS: Primary | ICD-10-CM

## 2025-05-27 PROCEDURE — 63600175 PHARM REV CODE 636 W HCPCS: Performed by: INTERNAL MEDICINE

## 2025-05-27 PROCEDURE — 96374 THER/PROPH/DIAG INJ IV PUSH: CPT

## 2025-05-27 RX ORDER — SODIUM FERRIC GLUCONATE COMPLEX IN SUCROSE 12.5 MG/ML
125 INJECTION INTRAVENOUS
OUTPATIENT
Start: 2025-06-02

## 2025-05-27 RX ORDER — SODIUM CHLORIDE 0.9 % (FLUSH) 0.9 %
10 SYRINGE (ML) INJECTION
OUTPATIENT
Start: 2025-06-02

## 2025-05-27 RX ORDER — HEPARIN 100 UNIT/ML
500 SYRINGE INTRAVENOUS
OUTPATIENT
Start: 2025-06-02

## 2025-05-27 RX ORDER — EPINEPHRINE 0.3 MG/.3ML
0.3 INJECTION SUBCUTANEOUS ONCE AS NEEDED
Status: DISCONTINUED | OUTPATIENT
Start: 2025-05-27 | End: 2025-05-27 | Stop reason: HOSPADM

## 2025-05-27 RX ORDER — SODIUM FERRIC GLUCONATE COMPLEX IN SUCROSE 12.5 MG/ML
125 INJECTION INTRAVENOUS
Status: COMPLETED | OUTPATIENT
Start: 2025-05-27 | End: 2025-05-27

## 2025-05-27 RX ORDER — EPINEPHRINE 0.3 MG/.3ML
0.3 INJECTION SUBCUTANEOUS ONCE AS NEEDED
OUTPATIENT
Start: 2025-06-02

## 2025-05-27 RX ADMIN — SODIUM FERRIC GLUCONATE COMPLEX IN SUCROSE 125 MG: 12.5 INJECTION INTRAVENOUS at 07:05

## 2025-05-27 NOTE — DISCHARGE INSTRUCTIONS
.[facility]  [address] [facility]  [address]  [phone] phone     [phone] fax  Hours of Operation: Monday- Friday 8:00am- 5:00pm  After hours phone  [phone]  Hematology / Oncology Physicians on call    Dr. Cortes Ramires        Nurse Practitioners:    Bre Nayak, ANGEL Titus, ANGEL Powell, ANGEL Luciano, ANGEL White, PA      Please don't hesitate to call if you have any concerns.

## 2025-05-27 NOTE — PLAN OF CARE
Problem: Adult Inpatient Plan of Care  Goal: Plan of Care Review  Outcome: Progressing  Flowsheets (Taken 5/27/2025 0729)  Plan of Care Reviewed With: patient  Goal: Optimal Comfort and Wellbeing  Outcome: Progressing  Intervention: Provide Person-Centered Care  Flowsheets (Taken 5/27/2025 0729)  Trust Relationship/Rapport:   choices provided   care explained   emotional support provided   empathic listening provided   questions answered   questions encouraged   reassurance provided   thoughts/feelings acknowledged

## 2025-06-03 ENCOUNTER — INFUSION (OUTPATIENT)
Dept: INFUSION THERAPY | Facility: HOSPITAL | Age: 80
End: 2025-06-03
Attending: INTERNAL MEDICINE
Payer: MEDICARE

## 2025-06-03 VITALS
DIASTOLIC BLOOD PRESSURE: 96 MMHG | TEMPERATURE: 98 F | RESPIRATION RATE: 18 BRPM | SYSTOLIC BLOOD PRESSURE: 175 MMHG | OXYGEN SATURATION: 98 % | HEART RATE: 57 BPM

## 2025-06-03 DIAGNOSIS — D50.0 IRON DEFICIENCY ANEMIA DUE TO CHRONIC BLOOD LOSS: Primary | ICD-10-CM

## 2025-06-03 PROCEDURE — 63600175 PHARM REV CODE 636 W HCPCS: Performed by: INTERNAL MEDICINE

## 2025-06-03 PROCEDURE — 96374 THER/PROPH/DIAG INJ IV PUSH: CPT

## 2025-06-03 RX ORDER — SODIUM CHLORIDE 0.9 % (FLUSH) 0.9 %
10 SYRINGE (ML) INJECTION
OUTPATIENT
Start: 2025-06-10

## 2025-06-03 RX ORDER — SODIUM FERRIC GLUCONATE COMPLEX IN SUCROSE 12.5 MG/ML
125 INJECTION INTRAVENOUS
Status: COMPLETED | OUTPATIENT
Start: 2025-06-03 | End: 2025-06-03

## 2025-06-03 RX ORDER — SODIUM FERRIC GLUCONATE COMPLEX IN SUCROSE 12.5 MG/ML
125 INJECTION INTRAVENOUS
OUTPATIENT
Start: 2025-06-10

## 2025-06-03 RX ORDER — EPINEPHRINE 0.3 MG/.3ML
0.3 INJECTION SUBCUTANEOUS ONCE AS NEEDED
OUTPATIENT
Start: 2025-06-10

## 2025-06-03 RX ORDER — SODIUM CHLORIDE 0.9 % (FLUSH) 0.9 %
10 SYRINGE (ML) INJECTION
Status: DISCONTINUED | OUTPATIENT
Start: 2025-06-03 | End: 2025-06-03 | Stop reason: HOSPADM

## 2025-06-03 RX ORDER — HEPARIN 100 UNIT/ML
500 SYRINGE INTRAVENOUS
OUTPATIENT
Start: 2025-06-10

## 2025-06-03 RX ADMIN — SODIUM FERRIC GLUCONATE COMPLEX IN SUCROSE 125 MG: 12.5 INJECTION INTRAVENOUS at 08:06

## 2025-06-10 ENCOUNTER — INFUSION (OUTPATIENT)
Dept: INFUSION THERAPY | Facility: HOSPITAL | Age: 80
End: 2025-06-10
Attending: INTERNAL MEDICINE
Payer: MEDICARE

## 2025-06-10 VITALS
DIASTOLIC BLOOD PRESSURE: 92 MMHG | OXYGEN SATURATION: 98 % | RESPIRATION RATE: 18 BRPM | HEART RATE: 52 BPM | SYSTOLIC BLOOD PRESSURE: 151 MMHG | TEMPERATURE: 98 F

## 2025-06-10 DIAGNOSIS — D50.0 IRON DEFICIENCY ANEMIA DUE TO CHRONIC BLOOD LOSS: Primary | ICD-10-CM

## 2025-06-10 PROCEDURE — 96374 THER/PROPH/DIAG INJ IV PUSH: CPT

## 2025-06-10 PROCEDURE — 63600175 PHARM REV CODE 636 W HCPCS: Performed by: INTERNAL MEDICINE

## 2025-06-10 RX ORDER — SODIUM FERRIC GLUCONATE COMPLEX IN SUCROSE 12.5 MG/ML
125 INJECTION INTRAVENOUS
OUTPATIENT
Start: 2025-06-17

## 2025-06-10 RX ORDER — SODIUM CHLORIDE 0.9 % (FLUSH) 0.9 %
10 SYRINGE (ML) INJECTION
Status: DISCONTINUED | OUTPATIENT
Start: 2025-06-10 | End: 2025-06-10 | Stop reason: HOSPADM

## 2025-06-10 RX ORDER — SODIUM CHLORIDE 0.9 % (FLUSH) 0.9 %
10 SYRINGE (ML) INJECTION
OUTPATIENT
Start: 2025-06-17

## 2025-06-10 RX ORDER — SODIUM FERRIC GLUCONATE COMPLEX IN SUCROSE 12.5 MG/ML
125 INJECTION INTRAVENOUS
Status: COMPLETED | OUTPATIENT
Start: 2025-06-10 | End: 2025-06-10

## 2025-06-10 RX ORDER — EPINEPHRINE 0.3 MG/.3ML
0.3 INJECTION SUBCUTANEOUS ONCE AS NEEDED
OUTPATIENT
Start: 2025-06-17

## 2025-06-10 RX ORDER — HEPARIN 100 UNIT/ML
500 SYRINGE INTRAVENOUS
OUTPATIENT
Start: 2025-06-17

## 2025-06-10 RX ORDER — EPINEPHRINE 0.3 MG/.3ML
0.3 INJECTION SUBCUTANEOUS ONCE AS NEEDED
Status: DISCONTINUED | OUTPATIENT
Start: 2025-06-10 | End: 2025-06-10 | Stop reason: HOSPADM

## 2025-06-10 RX ADMIN — SODIUM FERRIC GLUCONATE COMPLEX IN SUCROSE 125 MG: 12.5 INJECTION INTRAVENOUS at 08:06

## 2025-06-10 NOTE — DISCHARGE INSTRUCTIONS
[facility]  [address] [facility]  [address]  [phone] phone     [phone] fax  Hours of Operation: Monday- Friday 8:00am- 5:00pm  After hours phone  [phone]  Hematology / Oncology Physicians on call      JAISON May Dr., NP Phaon Dunbar, NP Khelsea Conley, FNP    Please call with any concerns regarding your appointment today.

## 2025-06-10 NOTE — PLAN OF CARE
Problem: Adult Inpatient Plan of Care  Goal: Plan of Care Review  Outcome: Progressing  Flowsheets (Taken 6/10/2025 0822)  Plan of Care Reviewed With: patient  Goal: Patient-Specific Goal (Individualized)  Outcome: Progressing  Flowsheets (Taken 6/10/2025 0822)  Individualized Care Needs: pt likes coffee and pillow under arm  Anxieties, Fears or Concerns: pt says he has back pain  Patient/Family-Specific Goals (Include Timeframe): pt to tolerate iron infusion without reaction     Problem: Anemia  Goal: Anemia Symptom Improvement  Outcome: Progressing

## 2025-06-16 RX ORDER — METOPROLOL TARTRATE 100 MG/1
100 TABLET ORAL 2 TIMES DAILY
Qty: 180 TABLET | Refills: 3 | Status: SHIPPED | OUTPATIENT
Start: 2025-06-16 | End: 2026-06-16

## 2025-06-16 NOTE — TELEPHONE ENCOUNTER
Requested Prescriptions     Pending Prescriptions Disp Refills    metoprolol tartrate (LOPRESSOR) 100 MG tablet 180 tablet 3     Sig: Take 1 tablet (100 mg total) by mouth 2 (two) times daily.     LV 04/22/2025 HS  NV 10/22/2025 HS  LF 06/24/2024         Copied from CRM #[id number]. Topic: Medications - Medication Refill  >> Jun 16, 2025  9:30 AM Paul wrote:  Type:  RX Refill Request    Who Called: Daniel Villeda  Refill or New Rx:refill  RX Name and Strength:metoprolol tartrate (LOPRESSOR) 100 MG tablet  How is the patient currently taking it? (ex. 1XDay):2xday  Is this a 30 day or 90 day RX:90  Preferred Pharmacy with phone number:  [facility] [address] [place] [id number]  Phone: [phone] Fax: [phone]  Local or Mail Order:local  Ordering Provider:wyatt  Would the patient rather a call back or a response via [facility]? Call back  Best Call Back Number:[phone]  Additional Information: pt stating he is out of his medication and is needing a refill

## 2025-06-17 ENCOUNTER — INFUSION (OUTPATIENT)
Dept: INFUSION THERAPY | Facility: HOSPITAL | Age: 80
End: 2025-06-17
Attending: INTERNAL MEDICINE
Payer: MEDICARE

## 2025-06-17 VITALS
SYSTOLIC BLOOD PRESSURE: 149 MMHG | HEART RATE: 62 BPM | OXYGEN SATURATION: 99 % | RESPIRATION RATE: 18 BRPM | DIASTOLIC BLOOD PRESSURE: 83 MMHG | TEMPERATURE: 98 F

## 2025-06-17 DIAGNOSIS — D50.0 IRON DEFICIENCY ANEMIA DUE TO CHRONIC BLOOD LOSS: Primary | ICD-10-CM

## 2025-06-17 PROCEDURE — 63600175 PHARM REV CODE 636 W HCPCS: Performed by: INTERNAL MEDICINE

## 2025-06-17 PROCEDURE — 96374 THER/PROPH/DIAG INJ IV PUSH: CPT

## 2025-06-17 RX ORDER — HEPARIN 100 UNIT/ML
500 SYRINGE INTRAVENOUS
OUTPATIENT
Start: 2025-06-17

## 2025-06-17 RX ORDER — SODIUM FERRIC GLUCONATE COMPLEX IN SUCROSE 12.5 MG/ML
125 INJECTION INTRAVENOUS
Status: COMPLETED | OUTPATIENT
Start: 2025-06-17 | End: 2025-06-17

## 2025-06-17 RX ORDER — EPINEPHRINE 0.3 MG/.3ML
0.3 INJECTION SUBCUTANEOUS ONCE AS NEEDED
OUTPATIENT
Start: 2025-06-17

## 2025-06-17 RX ORDER — SODIUM CHLORIDE 0.9 % (FLUSH) 0.9 %
10 SYRINGE (ML) INJECTION
OUTPATIENT
Start: 2025-06-17

## 2025-06-17 RX ORDER — SODIUM FERRIC GLUCONATE COMPLEX IN SUCROSE 12.5 MG/ML
125 INJECTION INTRAVENOUS
Status: CANCELLED | OUTPATIENT
Start: 2025-06-17

## 2025-06-17 RX ADMIN — SODIUM FERRIC GLUCONATE COMPLEX IN SUCROSE 125 MG: 12.5 INJECTION INTRAVENOUS at 07:06

## 2025-06-17 NOTE — PLAN OF CARE
Problem: Adult Inpatient Plan of Care  Goal: Plan of Care Review  Outcome: Progressing  Flowsheets (Taken 6/17/2025 0725)  Plan of Care Reviewed With: patient  Goal: Patient-Specific Goal (Individualized)  Outcome: Progressing  Flowsheets (Taken 6/17/2025 0725)  Individualized Care Needs: pt in recliner, legs down.  Anxieties, Fears or Concerns: Pt states he back pain is his normal. Bp is high.  Patient/Family-Specific Goals (Include Timeframe): pt will tolerate ferrlecit without adverse reaction today     Problem: Anemia  Goal: Anemia Symptom Improvement  Outcome: Progressing

## 2025-06-23 ENCOUNTER — OFFICE VISIT (OUTPATIENT)
Dept: OPHTHALMOLOGY | Facility: CLINIC | Age: 80
End: 2025-06-23
Payer: MEDICARE

## 2025-06-23 DIAGNOSIS — Z96.1 PSEUDOPHAKIA OF BOTH EYES: ICD-10-CM

## 2025-06-23 DIAGNOSIS — E11.59 HYPERTENSION ASSOCIATED WITH DIABETES: ICD-10-CM

## 2025-06-23 DIAGNOSIS — H52.7 REFRACTIVE ERRORS: ICD-10-CM

## 2025-06-23 DIAGNOSIS — E11.9 DIABETES MELLITUS TYPE 2 WITHOUT RETINOPATHY: Primary | ICD-10-CM

## 2025-06-23 DIAGNOSIS — I15.2 HYPERTENSION ASSOCIATED WITH DIABETES: ICD-10-CM

## 2025-06-23 PROCEDURE — 1159F MED LIST DOCD IN RCRD: CPT | Mod: CPTII,S$GLB,, | Performed by: OPTOMETRIST

## 2025-06-23 PROCEDURE — 99999 PR PBB SHADOW E&M-EST. PATIENT-LVL III: CPT | Mod: PBBFAC,,, | Performed by: OPTOMETRIST

## 2025-06-23 PROCEDURE — 92014 COMPRE OPH EXAM EST PT 1/>: CPT | Mod: S$GLB,,, | Performed by: OPTOMETRIST

## 2025-06-23 PROCEDURE — 92015 DETERMINE REFRACTIVE STATE: CPT | Mod: S$GLB,,, | Performed by: OPTOMETRIST

## 2025-06-23 PROCEDURE — 2023F DILAT RTA XM W/O RTNOPTHY: CPT | Mod: CPTII,S$GLB,, | Performed by: OPTOMETRIST

## 2025-06-23 NOTE — PROGRESS NOTES
SUBJECTIVE  Daniel Villeda is 79 y.o. male  Uncorrected distance visual acuity was 20/40 in the right eye and 20/20 in the left eye. Corrected near visual acuity was J1 in the right eye and J1 in the left eye.   Chief Complaint   Patient presents with    Diabetes    Hypertensive Eye Exam    Eye Exam          HPI    NIDDM exam  Decrease near visual acuity, patient wears OTC readers  Last eye exam 06/20/2024 TRF.  Update glasses RX..  Lab Results       Component                Value               Date                       HGBA1C                   7.4 (H)             04/22/2025              Last edited by Rachell Ontiveros MA on 6/23/2025  9:56 AM.         Assessment /Plan :  1. Diabetes mellitus type 2 without retinopathy  No Background Diabetic Retinopathy  Strict BG control, f/u w/ PCP, and annual DFE  Stressed importance of DM control to preserve vision     2. Hypertension associated with diabetes  No HTN Retinopathy, monitor annually.     3. Pseudophakia of both eyes  Well-centered, stable IOL OU. Monitor annually.     4. Refractive errors  Dispense Final Rx for glasses or may use OTC glasses.  RTC 1 year  Discussed above and answered questions.

## 2025-07-16 ENCOUNTER — OFFICE VISIT (OUTPATIENT)
Dept: ORTHOPEDICS | Facility: CLINIC | Age: 80
End: 2025-07-16
Payer: MEDICARE

## 2025-07-16 ENCOUNTER — HOSPITAL ENCOUNTER (OUTPATIENT)
Dept: RADIOLOGY | Facility: HOSPITAL | Age: 80
Discharge: HOME | End: 2025-07-16
Attending: ORTHOPAEDIC SURGERY
Payer: MEDICARE

## 2025-07-16 VITALS — WEIGHT: 237.19 LBS | BODY MASS INDEX: 33.96 KG/M2 | HEIGHT: 70 IN

## 2025-07-16 DIAGNOSIS — M65.4 RADIAL STYLOID TENOSYNOVITIS (DE QUERVAIN): Primary | ICD-10-CM

## 2025-07-16 DIAGNOSIS — M25.531 BILATERAL WRIST PAIN: Primary | ICD-10-CM

## 2025-07-16 DIAGNOSIS — M25.531 BILATERAL WRIST PAIN: ICD-10-CM

## 2025-07-16 DIAGNOSIS — M25.532 BILATERAL WRIST PAIN: Primary | ICD-10-CM

## 2025-07-16 DIAGNOSIS — M25.532 BILATERAL WRIST PAIN: ICD-10-CM

## 2025-07-16 PROCEDURE — 73110 X-RAY EXAM OF WRIST: CPT | Mod: TC,50

## 2025-07-16 PROCEDURE — 73110 X-RAY EXAM OF WRIST: CPT | Mod: 26,50,, | Performed by: RADIOLOGY

## 2025-07-16 PROCEDURE — 99999 PR PBB SHADOW E&M-EST. PATIENT-LVL III: CPT | Mod: PBBFAC,,, | Performed by: ORTHOPAEDIC SURGERY

## 2025-07-16 RX ORDER — TRIAMCINOLONE ACETONIDE 40 MG/ML
40 INJECTION, SUSPENSION INTRA-ARTICULAR; INTRAMUSCULAR
Status: DISCONTINUED | OUTPATIENT
Start: 2025-07-16 | End: 2025-07-16 | Stop reason: HOSPADM

## 2025-07-16 RX ADMIN — TRIAMCINOLONE ACETONIDE 40 MG: 40 INJECTION, SUSPENSION INTRA-ARTICULAR; INTRAMUSCULAR at 02:07

## 2025-07-16 NOTE — PROCEDURES
Tendon Sheath    Date/Time: 7/16/2025 2:30 PM    Performed by: Demetri Clay MD  Authorized by: Demetri Clay MD    Consent Done?:  Yes (Verbal)  Indications:  Pain  Local anesthesia used?: Yes    Local anesthetic:  Lidocaine 2% without epinephrine  Anesthetic total (ml):  0.5    Location:  Wrist  Site:  R first doral compartment  Ultrasonic guidance for needle placement?: No    Needle size:  25 G  Approach:  Radial  Medications:  40 mg triamcinolone acetonide 40 mg/mL

## 2025-07-16 NOTE — PROGRESS NOTES
Subjective:     Patient ID: Daniel Villeda is a 79 y.o. male.    Chief Complaint: Pain of the Right Wrist      HPI:  The patient is an 79-year-old male with a 6 week history of right wrist pain of insidious onset.    Past Medical History:   Diagnosis Date    CKD stage 3 due to type 2 diabetes mellitus     Deep vein thrombosis     DM (diabetes mellitus) with complications     Gastrointestinal stromal tumor (GIST) 3/9/2021    GERD (gastroesophageal reflux disease)     History of DVT (deep vein thrombosis)     History of renal cell carcinoma 2009    Hyperlipidemia associated with type 2 diabetes mellitus 02/26/2021    Hypertension associated with diabetes     Panlobular emphysema     Thrombocytopenia     Type II diabetes mellitus with renal manifestations      Past Surgical History:   Procedure Laterality Date    CLOSURE OF PERFORATED ULCER OF DUODENUM USING OMENTAL PATCH N/A 3/9/2021    Procedure: CLOSURE, ULCER, PERFORATED, DUODENUM, USING OMENTAL PATCH;  Surgeon: Edin Bradford MD;  Location: [facility] OR;  Service: General;  Laterality: N/A;    COLONOSCOPY N/A 1/5/2022    Procedure: COLONOSCOPY;  Surgeon: Mirta Grover MD;  Location: [facility];  Service: Endoscopy;  Laterality: N/A;    ENDOSCOPIC ULTRASOUND OF UPPER GASTROINTESTINAL TRACT N/A 2/5/2021    Procedure: ULTRASOUND, UPPER GI TRACT, ENDOSCOPIC;  Surgeon: Geremias Brizuela MD;  Location: [facility];  Service: Endoscopy;  Laterality: N/A;    EPIDURAL STEROID INJECTION N/A 5/3/2024    Procedure: Lumbar L5/S1 IL JASON;  Surgeon: Debra De MD;  [facility];  Service: Pain Management;  Laterality: N/A;    ESOPHAGOGASTRODUODENOSCOPY N/A 2/3/2021    Procedure: EGD (ESOPHAGOGASTRODUODENOSCOPY);  Surgeon: Mirta Grover MD;  Location: [facility] ENDO;  Service: Endoscopy;  Laterality: N/A;    ESOPHAGOGASTRODUODENOSCOPY N/A 3/9/2021    Procedure: EGD (ESOPHAGOGASTRODUODENOSCOPY);  Surgeon: Edin Bradford MD;  Location: [facility] OR;  Service:  General;  Laterality: N/A;    INJECTION OF ANESTHETIC AGENT AROUND MEDIAL BRANCH NERVES INNERVATING LUMBAR FACET JOINT Bilateral 10/17/2023    Procedure: Bilateral L3-5 MBB;  Surgeon: Debra De MD;  Location: [facility];  Service: Pain Management;  Laterality: Bilateral;    INJECTION OF ANESTHETIC AGENT AROUND MEDIAL BRANCH NERVES INNERVATING LUMBAR FACET JOINT Bilateral 3/1/2024    Procedure: Bilateral L3-5 MBB Dignostic;  Surgeon: Debra De MD;  Location: [facility];  Service: Pain Management;  Laterality: Bilateral;    INJECTION OF ANESTHETIC AGENT INTO TISSUE PLANE DEFINED BY TRANSVERSUS ABDOMINIS MUSCLE  4/17/2023    Procedure: BLOCK, TRANSVERSUS ABDOMINIS PLANE;  Surgeon: Yumiko Rosen DO;  Location: [facility] OR;  Service: General;;    left partial nephrectomy Left 2006    RADIOFREQUENCY THERMOCOAGULATION Bilateral 3/19/2024    Procedure: Bilateral L3-5 Lumbar RFA;  Surgeon: Debra De MD;  [facility];  Service: Pain Management;  Laterality: Bilateral;    RADIOFREQUENCY THERMOCOAGULATION Bilateral 9/20/2024    Procedure: Bilateral L3-5 RFA;  Surgeon: Debra De MD;  Location: [facility];  Service: Pain Management;  Laterality: Bilateral;    ROBOT-ASSISTED REPAIR OF INCISIONAL HERNIA USING DA PERRI XI Left 4/17/2023    Procedure: XI ROBOTIC REPAIR, HERNIA, INCISIONAL;  Surgeon: Yumiko Rosen DO;  Location: [facility] OR;  Service: General;  Laterality: Left;  Left flank. Position right lateral decubitus. Ernst.    ROBOT-ASSISTED SURGICAL REMOVAL OF STOMACH USING DA PERRI XI N/A 3/9/2021    Procedure: XI ROBOTIC GASTRECTOMY;  Surgeon: Edin Bradford MD;  Location: [facility] OR;  Service: General;  Laterality: N/A;  partial     Family History   Problem Relation Name Age of Onset    Stroke Mother      Hodgkin's lymphoma Father       Social History[1]  Medication List with Changes/Refills   Current Medications    ACETAMINOPHEN (TYLENOL) 650 MG TBSR    Take 650 mg by mouth  every 8 (eight) hours.    ASPIRIN 81 MG CHEW    Take 81 mg by mouth once daily.    ATORVASTATIN (LIPITOR) 20 MG TABLET    TAKE 1 TABLET BY MOUTH ONCE DAILY IN THE EVENING    CHOLECALCIFEROL, VITAMIN D3, (VITAMIN D3) 50 MCG (2,000 UNIT) TAB    Take 2,000 Units by mouth once daily.     FERROUS SULFATE (FEOSOL) 325 MG (65 MG IRON) TAB TABLET    Take 1 tablet (325 mg total) by mouth daily with breakfast.    FISH OIL-OMEGA-3 FATTY ACIDS 300-1,000 MG CAPSULE    Take by mouth once daily.    LOSARTAN-HYDROCHLOROTHIAZIDE 100-12.5 MG (HYZAAR) 100-12.5 MG TAB    Take 1 tablet by mouth once daily    MAGNESIUM OXIDE (MAG-OX) 400 MG (241.3 MG MAGNESIUM) TABLET    Take 400 mg by mouth once daily.    METFORMIN (GLUCOPHAGE) 500 MG TABLET    Take 1 tablet (500 mg total) by mouth 2 (two) times daily with meals.    METOPROLOL TARTRATE (LOPRESSOR) 100 MG TABLET    Take 1 tablet (100 mg total) by mouth 2 (two) times daily.    OXYBUTYNIN (DITROPAN) 5 MG TAB    Take 1 tablet (5 mg total) by mouth 2 (two) times daily.    PANTOPRAZOLE (PROTONIX) 40 MG TABLET    Take 1 tablet (40 mg total) by mouth 2 (two) times daily.    TURMERIC ORAL    Take by mouth.     Review of patient's allergies indicates:  No Known Allergies  Review of Systems   Constitutional: Negative for malaise/fatigue.   HENT:  Negative for hearing loss.    Eyes:  Negative for double vision and visual disturbance.   Cardiovascular:  Positive for irregular heartbeat. Negative for chest pain.   Respiratory:  Negative for shortness of breath.    Endocrine: Negative for cold intolerance.   Hematologic/Lymphatic: Does not bruise/bleed easily.   Skin:  Negative for poor wound healing and suspicious lesions.   Musculoskeletal:  Positive for back pain. Negative for gout, joint pain and joint swelling.   Gastrointestinal:  Negative for nausea and vomiting.   Genitourinary:  Positive for frequency, hesitancy, incomplete emptying and nocturia. Negative for dysuria.   Neurological:   Positive for tremors. Negative for numbness, paresthesias and sensory change.   Psychiatric/Behavioral:  Negative for depression, memory loss and substance abuse. The patient is not nervous/anxious.    Allergic/Immunologic: Negative for persistent infections.       Objective:   Body mass index is 34.04 kg/m².  There were no vitals filed for this visit.             General    Constitutional: He is oriented to person, place, and time. He appears well-developed and well-nourished. No distress.   HENT:   Head: Normocephalic.   Eyes: EOM are normal.   Pulmonary/Chest: Effort normal.   Neurological: He is oriented to person, place, and time.   Psychiatric: He has a normal mood and affect.             Right Hand/Wrist Exam     Inspection   Scars: Wrist - absent Hand -  absent  Effusion: Wrist - absent Hand -  absent    Pain   Wrist - The patient exhibits pain of the extensory musculature.    Tests   Finkelstein's test: positive      Other     Neuorologic Exam    Median Distribution: normal  Ulnar Distribution: normal  Radial Distribution: normal    Comments:  The patient has tenderness right wrist 1st dorsal extensor tendon compartment with a positive Finkelstein test          Vascular Exam       Capillary Refill  Right Hand: normal capillary refill          Relevant imaging results reviewed and interpreted by me, discussed with the patient and / or family today radiographs right wrist showed a healed radial styloid fracture with sclerosis  Assessment:     De Quervain tendonitis right wrist     Plan:       The patient declines thumb spica splint.  He was injected 1st dorsal extensor tendon compartment with 0.5 cc Kenalog and 0.5 cc 2% plain lidocaine under sterile technique.  He will return in 3 weeks if not improved.              Disclaimer: This note was prepared using a voice recognition system and is likely to have sound alike errors within the text.          [1]   Social History  Socioeconomic History    Marital  status:    Tobacco Use    Smoking status: Former     Current packs/day: 0.00     Average packs/day: 1.5 packs/day for 40.0 years (60.0 ttl pk-yrs)     Types: Cigarettes     Start date: 1965     Quit date: 2005     Years since quittin.2    Smokeless tobacco: Never   Substance and Sexual Activity    Alcohol use: Not Currently    Drug use: Never

## 2025-08-14 ENCOUNTER — LAB VISIT (OUTPATIENT)
Dept: LAB | Facility: HOSPITAL | Age: 80
End: 2025-08-14
Attending: INTERNAL MEDICINE
Payer: MEDICARE

## 2025-08-14 DIAGNOSIS — D50.0 IRON DEFICIENCY ANEMIA DUE TO CHRONIC BLOOD LOSS: ICD-10-CM

## 2025-08-14 LAB
ABSOLUTE EOSINOPHIL (OHS): 0.05 K/UL
ABSOLUTE MONOCYTE (OHS): 0.55 K/UL (ref 0.3–1)
ABSOLUTE NEUTROPHIL COUNT (OHS): 2.36 K/UL (ref 1.8–7.7)
ALBUMIN SERPL BCP-MCNC: 4 G/DL (ref 3.5–5.2)
ALP SERPL-CCNC: 85 UNIT/L (ref 40–150)
ALT SERPL W/O P-5'-P-CCNC: 29 UNIT/L (ref 10–44)
ANION GAP (OHS): 8 MMOL/L (ref 8–16)
AST SERPL-CCNC: 25 UNIT/L (ref 11–45)
BASOPHILS # BLD AUTO: 0.05 K/UL
BASOPHILS NFR BLD AUTO: 1.1 %
BILIRUB SERPL-MCNC: 0.8 MG/DL (ref 0.1–1)
BUN SERPL-MCNC: 31 MG/DL (ref 8–23)
CALCIUM SERPL-MCNC: 9.4 MG/DL (ref 8.7–10.5)
CHLORIDE SERPL-SCNC: 104 MMOL/L (ref 95–110)
CO2 SERPL-SCNC: 29 MMOL/L (ref 23–29)
CREAT SERPL-MCNC: 1.6 MG/DL (ref 0.5–1.4)
ERYTHROCYTE [DISTWIDTH] IN BLOOD BY AUTOMATED COUNT: 13.5 % (ref 11.5–14.5)
FERRITIN SERPL-MCNC: 121 NG/ML (ref 20–300)
GFR SERPLBLD CREATININE-BSD FMLA CKD-EPI: 43 ML/MIN/1.73/M2
GLUCOSE SERPL-MCNC: 237 MG/DL (ref 70–110)
HCT VFR BLD AUTO: 41.5 % (ref 40–54)
HGB BLD-MCNC: 13.6 GM/DL (ref 14–18)
IMM GRANULOCYTES # BLD AUTO: 0.04 K/UL (ref 0–0.04)
IMM GRANULOCYTES NFR BLD AUTO: 0.9 % (ref 0–0.5)
IRON SATN MFR SERPL: 22 % (ref 20–50)
IRON SERPL-MCNC: 76 UG/DL (ref 45–160)
LYMPHOCYTES # BLD AUTO: 1.4 K/UL (ref 1–4.8)
MCH RBC QN AUTO: 29.6 PG (ref 27–31)
MCHC RBC AUTO-ENTMCNC: 32.8 G/DL (ref 32–36)
MCV RBC AUTO: 90 FL (ref 82–98)
NUCLEATED RBC (/100WBC) (OHS): 0 /100 WBC
PLATELET # BLD AUTO: 106 K/UL (ref 150–450)
PMV BLD AUTO: 10.6 FL (ref 9.2–12.9)
POTASSIUM SERPL-SCNC: 4.4 MMOL/L (ref 3.5–5.1)
PROT SERPL-MCNC: 6.6 GM/DL (ref 6–8.4)
RBC # BLD AUTO: 4.6 M/UL (ref 4.6–6.2)
RELATIVE EOSINOPHIL (OHS): 1.1 %
RELATIVE LYMPHOCYTE (OHS): 31.5 % (ref 18–48)
RELATIVE MONOCYTE (OHS): 12.4 % (ref 4–15)
RELATIVE NEUTROPHIL (OHS): 53 % (ref 38–73)
SODIUM SERPL-SCNC: 141 MMOL/L (ref 136–145)
TIBC SERPL-MCNC: 342 UG/DL (ref 250–450)
TRANSFERRIN SERPL-MCNC: 231 MG/DL (ref 200–375)
WBC # BLD AUTO: 4.45 K/UL (ref 3.9–12.7)

## 2025-08-14 PROCEDURE — 82040 ASSAY OF SERUM ALBUMIN: CPT

## 2025-08-14 PROCEDURE — 82728 ASSAY OF FERRITIN: CPT

## 2025-08-14 PROCEDURE — 83540 ASSAY OF IRON: CPT

## 2025-08-14 PROCEDURE — 36415 COLL VENOUS BLD VENIPUNCTURE: CPT | Mod: PO

## 2025-08-14 PROCEDURE — 85025 COMPLETE CBC W/AUTO DIFF WBC: CPT

## 2025-08-19 ENCOUNTER — OFFICE VISIT (OUTPATIENT)
Dept: HEMATOLOGY/ONCOLOGY | Facility: CLINIC | Age: 80
End: 2025-08-19
Payer: MEDICARE

## 2025-08-19 VITALS
DIASTOLIC BLOOD PRESSURE: 83 MMHG | HEIGHT: 70 IN | TEMPERATURE: 98 F | SYSTOLIC BLOOD PRESSURE: 137 MMHG | OXYGEN SATURATION: 98 % | BODY MASS INDEX: 33.39 KG/M2 | WEIGHT: 233.25 LBS | HEART RATE: 67 BPM

## 2025-08-19 DIAGNOSIS — D50.0 IRON DEFICIENCY ANEMIA DUE TO CHRONIC BLOOD LOSS: ICD-10-CM

## 2025-08-19 DIAGNOSIS — D69.6 THROMBOCYTOPENIA: ICD-10-CM

## 2025-08-19 DIAGNOSIS — C49.A0 GASTROINTESTINAL STROMAL TUMOR (GIST): Primary | ICD-10-CM

## 2025-08-19 PROCEDURE — 3075F SYST BP GE 130 - 139MM HG: CPT | Mod: CPTII,S$GLB,, | Performed by: NURSE PRACTITIONER

## 2025-08-19 PROCEDURE — 1101F PT FALLS ASSESS-DOCD LE1/YR: CPT | Mod: CPTII,S$GLB,, | Performed by: NURSE PRACTITIONER

## 2025-08-19 PROCEDURE — 3288F FALL RISK ASSESSMENT DOCD: CPT | Mod: CPTII,S$GLB,, | Performed by: NURSE PRACTITIONER

## 2025-08-19 PROCEDURE — 99999 PR PBB SHADOW E&M-EST. PATIENT-LVL III: CPT | Mod: PBBFAC,,, | Performed by: NURSE PRACTITIONER

## 2025-08-19 PROCEDURE — 1159F MED LIST DOCD IN RCRD: CPT | Mod: CPTII,S$GLB,, | Performed by: NURSE PRACTITIONER

## 2025-08-19 PROCEDURE — 99214 OFFICE O/P EST MOD 30 MIN: CPT | Mod: S$GLB,,, | Performed by: NURSE PRACTITIONER

## 2025-08-19 PROCEDURE — 1160F RVW MEDS BY RX/DR IN RCRD: CPT | Mod: CPTII,S$GLB,, | Performed by: NURSE PRACTITIONER

## 2025-08-19 PROCEDURE — 1125F AMNT PAIN NOTED PAIN PRSNT: CPT | Mod: CPTII,S$GLB,, | Performed by: NURSE PRACTITIONER

## 2025-08-19 PROCEDURE — 3079F DIAST BP 80-89 MM HG: CPT | Mod: CPTII,S$GLB,, | Performed by: NURSE PRACTITIONER

## (undated) DEVICE — SEE MEDLINE ITEM 152622

## (undated) DEVICE — GRASPER TIP RENEW LAP FENEST

## (undated) DEVICE — CANNULA SEAL 12MM

## (undated) DEVICE — SYR 10CC LUER LOCK

## (undated) DEVICE — BAG TISS RETRV MONARCH 10MM

## (undated) DEVICE — TUBING HEATED INSUFFLATOR

## (undated) DEVICE — SEAL UNIVERSAL 5MM-8MM XI

## (undated) DEVICE — DRAPE LAP T SHT W/ INSTR PAD

## (undated) DEVICE — APPLICATOR CHLORAPREP ORN 26ML

## (undated) DEVICE — ADHESIVE MASTISOL VIAL 48/BX

## (undated) DEVICE — KIT ANTIFOG

## (undated) DEVICE — SUT 2/0 30IN SILK BLK BRAI

## (undated) DEVICE — DEVICE CLOSURE DISP 14G

## (undated) DEVICE — SUT STRATAFIX SPIRAL 20CM

## (undated) DEVICE — UNDERGLOVES BIOGEL PI SIZE 8

## (undated) DEVICE — SUT CTD VICRYL 0 UND BR SUT

## (undated) DEVICE — GLOVE BIOGEL PI MICRO SZ 6.5

## (undated) DEVICE — SYR 30CC LUER LOCK

## (undated) DEVICE — SUT VICRYL 2-0 CT-2 VCP269H

## (undated) DEVICE — UNDERGLOVES BIOGEL PI SIZE 7.5

## (undated) DEVICE — SEALER VESSEL EXTEND

## (undated) DEVICE — SOL 9P NACL IRR PIC IL

## (undated) DEVICE — OBTURATOR BLADELESS 8MM XI CLR

## (undated) DEVICE — GAUZE SPONGE 4X4 12PLY

## (undated) DEVICE — GOWN POLY REINF BRTH SLV XL

## (undated) DEVICE — SEE MEDLINE ITEM 157117

## (undated) DEVICE — SUT V-LOC 2-0 180

## (undated) DEVICE — SUT MCRYL PLUS 4-0 PS2 27IN

## (undated) DEVICE — KIT WING PAD POSITIONING

## (undated) DEVICE — NDL SAFETY 25G X 1.5 ECLIPSE

## (undated) DEVICE — MANIFOLD 4 PORT

## (undated) DEVICE — SEE MEDLINE ITEM 157027

## (undated) DEVICE — EVACUATOR KIT SMOKE PLUME AWAY

## (undated) DEVICE — DRAPE ABDOMINAL TIBURON 14X11

## (undated) DEVICE — IRRIGATOR ENDOWRIST XI SUCTION

## (undated) DEVICE — SOL NS 1000CC

## (undated) DEVICE — ADHESIVE DERMABOND ADVANCED

## (undated) DEVICE — ELECTRODE REM PLYHSV RETURN 9

## (undated) DEVICE — DRAPE COLUMN DAVINCI XI

## (undated) DEVICE — GLOVE SURGICAL LATEX SZ 7

## (undated) DEVICE — SUT MONOCRYL 4.0 PS2 CP496G

## (undated) DEVICE — GLOVE BIOGEL PI MICRO SZ 7.5

## (undated) DEVICE — CANNULA REDUCER 12-8MM

## (undated) DEVICE — GLOVE SURG BIOGEL LATEX SZ 7.5

## (undated) DEVICE — SUT STRATAFIX 0 PDS+ 23CM 9IN

## (undated) DEVICE — SUT VICRYL 3-0 27 SH

## (undated) DEVICE — Device

## (undated) DEVICE — SUT STRATAFIX 2-0 30CM

## (undated) DEVICE — NDL HYPODERMIC BLUNT 18G 1.5IN

## (undated) DEVICE — NDL PNEUMO INSUFFLATI 120MM

## (undated) DEVICE — TOWEL OR DISP STRL BLUE 4/PK

## (undated) DEVICE — KIT ENDOKIT COMPLIANCE CUSTOM

## (undated) DEVICE — PACK BASIC SETUP SC BR

## (undated) DEVICE — UNDERGLOVES BIOGEL PI SZ 7 LF

## (undated) DEVICE — SYR 3CC LUER LOC

## (undated) DEVICE — GOWN SURG 2XL DISP TIE BACK

## (undated) DEVICE — COVER TIP CURVED SCISSORS XI

## (undated) DEVICE — TIP GRASPER FENESTRATED DISP

## (undated) DEVICE — DRAPE ARM DAVINCI XI

## (undated) DEVICE — COVER LIGHT HANDLE 80/CA

## (undated) DEVICE — COVER OVERHEAD SURG LT BLUE